# Patient Record
Sex: FEMALE | Race: WHITE | NOT HISPANIC OR LATINO | Employment: OTHER | URBAN - METROPOLITAN AREA
[De-identification: names, ages, dates, MRNs, and addresses within clinical notes are randomized per-mention and may not be internally consistent; named-entity substitution may affect disease eponyms.]

---

## 2017-11-30 ENCOUNTER — TRANSCRIBE ORDERS (OUTPATIENT)
Dept: ADMINISTRATIVE | Facility: HOSPITAL | Age: 77
End: 2017-11-30

## 2018-01-18 ENCOUNTER — TRANSCRIBE ORDERS (OUTPATIENT)
Dept: ADMINISTRATIVE | Facility: HOSPITAL | Age: 78
End: 2018-01-18

## 2018-01-18 DIAGNOSIS — Z12.31 VISIT FOR SCREENING MAMMOGRAM: Primary | ICD-10-CM

## 2018-04-04 ENCOUNTER — HOSPITAL ENCOUNTER (OUTPATIENT)
Dept: MAMMOGRAPHY | Facility: HOSPITAL | Age: 78
Discharge: HOME | End: 2018-04-04
Attending: OBSTETRICS & GYNECOLOGY | Admitting: OBSTETRICS & GYNECOLOGY

## 2018-04-04 DIAGNOSIS — Z12.31 VISIT FOR SCREENING MAMMOGRAM: ICD-10-CM

## 2018-04-04 PROCEDURE — 77063 BREAST TOMOSYNTHESIS BI: CPT | Performed by: RADIOLOGY

## 2018-04-04 PROCEDURE — 77067 SCR MAMMO BI INCL CAD: CPT

## 2018-04-04 PROCEDURE — 77067 SCR MAMMO BI INCL CAD: CPT | Performed by: RADIOLOGY

## 2018-04-04 PROCEDURE — 77063 BREAST TOMOSYNTHESIS BI: CPT

## 2018-08-13 ENCOUNTER — TRANSCRIBE ORDERS (OUTPATIENT)
Dept: CARDIOLOGY | Facility: CLINIC | Age: 78
End: 2018-08-13

## 2018-08-13 DIAGNOSIS — R94.39 ABNORMAL STRESS TEST: Primary | ICD-10-CM

## 2018-08-15 ENCOUNTER — PREP FOR SURGERY (OUTPATIENT)
Dept: OTHER | Facility: HOSPITAL | Age: 78
End: 2018-08-15

## 2018-08-15 ENCOUNTER — HOSPITAL ENCOUNTER (OUTPATIENT)
Facility: HOSPITAL | Age: 78
Discharge: HOME | End: 2018-08-16
Attending: INTERNAL MEDICINE | Admitting: INTERNAL MEDICINE

## 2018-08-15 ENCOUNTER — APPOINTMENT (OUTPATIENT)
Dept: GENERAL RADIOLOGY | Facility: HOSPITAL | Age: 78
End: 2018-08-15

## 2018-08-15 DIAGNOSIS — I25.10 CORONARY ARTERY DISEASE INVOLVING NATIVE CORONARY ARTERY OF NATIVE HEART, ANGINA PRESENCE UNSPECIFIED: Primary | ICD-10-CM

## 2018-08-15 DIAGNOSIS — R94.39 ABNORMAL STRESS TEST: ICD-10-CM

## 2018-08-15 DIAGNOSIS — E78.2 MIXED HYPERLIPIDEMIA: ICD-10-CM

## 2018-08-15 LAB
ACT BLD: 180 SECONDS (ref 82–152)
ALBUMIN SERPL-MCNC: 4.15 G/DL (ref 3.2–4.8)
ALBUMIN/GLOB SERPL: 1.6 G/DL (ref 1.5–2.5)
ALP SERPL-CCNC: 49 U/L (ref 25–100)
ALT SERPL W P-5'-P-CCNC: 16 U/L (ref 7–40)
ANION GAP SERPL CALCULATED.3IONS-SCNC: 9 MMOL/L (ref 3–11)
ARTICHOKE IGE QN: 107 MG/DL (ref 0–130)
AST SERPL-CCNC: 17 U/L (ref 0–33)
BASOPHILS # BLD AUTO: 0.07 10*3/MM3 (ref 0–0.2)
BASOPHILS NFR BLD AUTO: 0.7 % (ref 0–1)
BILIRUB SERPL-MCNC: 0.4 MG/DL (ref 0.3–1.2)
BUN BLD-MCNC: 17 MG/DL (ref 9–23)
BUN/CREAT SERPL: 18.1 (ref 7–25)
CALCIUM SPEC-SCNC: 9.5 MG/DL (ref 8.7–10.4)
CHLORIDE SERPL-SCNC: 101 MMOL/L (ref 99–109)
CHOLEST SERPL-MCNC: 207 MG/DL (ref 0–200)
CO2 SERPL-SCNC: 26 MMOL/L (ref 20–31)
CREAT BLD-MCNC: 0.94 MG/DL (ref 0.6–1.3)
DEPRECATED RDW RBC AUTO: 42.1 FL (ref 37–54)
EOSINOPHIL # BLD AUTO: 0.31 10*3/MM3 (ref 0–0.3)
EOSINOPHIL NFR BLD AUTO: 3.2 % (ref 0–3)
ERYTHROCYTE [DISTWIDTH] IN BLOOD BY AUTOMATED COUNT: 12.9 % (ref 11.3–14.5)
GFR SERPL CREATININE-BSD FRML MDRD: 58 ML/MIN/1.73
GLOBULIN UR ELPH-MCNC: 2.6 GM/DL
GLUCOSE BLD-MCNC: 97 MG/DL (ref 70–100)
GLUCOSE BLDC GLUCOMTR-MCNC: 112 MG/DL (ref 70–130)
GLUCOSE BLDC GLUCOMTR-MCNC: 136 MG/DL (ref 70–130)
GLUCOSE BLDC GLUCOMTR-MCNC: 142 MG/DL (ref 70–130)
GLUCOSE BLDC GLUCOMTR-MCNC: 95 MG/DL (ref 70–130)
GLUCOSE BLDC GLUCOMTR-MCNC: 97 MG/DL (ref 70–130)
HBA1C MFR BLD: 8.3 % (ref 4.8–5.6)
HCT VFR BLD AUTO: 35 % (ref 34.5–44)
HDLC SERPL-MCNC: 39 MG/DL (ref 40–60)
HGB BLD-MCNC: 11.3 G/DL (ref 11.5–15.5)
IMM GRANULOCYTES # BLD: 0.04 10*3/MM3 (ref 0–0.03)
IMM GRANULOCYTES NFR BLD: 0.4 % (ref 0–0.6)
LYMPHOCYTES # BLD AUTO: 2.97 10*3/MM3 (ref 0.6–4.8)
LYMPHOCYTES NFR BLD AUTO: 31 % (ref 24–44)
MCH RBC QN AUTO: 29.2 PG (ref 27–31)
MCHC RBC AUTO-ENTMCNC: 32.3 G/DL (ref 32–36)
MCV RBC AUTO: 90.4 FL (ref 80–99)
MONOCYTES # BLD AUTO: 0.76 10*3/MM3 (ref 0–1)
MONOCYTES NFR BLD AUTO: 7.9 % (ref 0–12)
NEUTROPHILS # BLD AUTO: 5.47 10*3/MM3 (ref 1.5–8.3)
NEUTROPHILS NFR BLD AUTO: 57.2 % (ref 41–71)
PLATELET # BLD AUTO: 248 10*3/MM3 (ref 150–450)
PMV BLD AUTO: 10.1 FL (ref 6–12)
POTASSIUM BLD-SCNC: 3.9 MMOL/L (ref 3.5–5.5)
PROT SERPL-MCNC: 6.7 G/DL (ref 5.7–8.2)
RBC # BLD AUTO: 3.87 10*6/MM3 (ref 3.89–5.14)
SODIUM BLD-SCNC: 136 MMOL/L (ref 132–146)
TRIGL SERPL-MCNC: 275 MG/DL (ref 0–150)
WBC NRBC COR # BLD: 9.58 10*3/MM3 (ref 3.5–10.8)

## 2018-08-15 PROCEDURE — 93571 IV DOP VEL&/PRESS C FLO 1ST: CPT | Performed by: INTERNAL MEDICINE

## 2018-08-15 PROCEDURE — 93005 ELECTROCARDIOGRAM TRACING: CPT | Performed by: INTERNAL MEDICINE

## 2018-08-15 PROCEDURE — G0378 HOSPITAL OBSERVATION PER HR: HCPCS

## 2018-08-15 PROCEDURE — 25010000002 MIDAZOLAM PER 1 MG: Performed by: INTERNAL MEDICINE

## 2018-08-15 PROCEDURE — C1874 STENT, COATED/COV W/DEL SYS: HCPCS | Performed by: INTERNAL MEDICINE

## 2018-08-15 PROCEDURE — 82962 GLUCOSE BLOOD TEST: CPT

## 2018-08-15 PROCEDURE — 25010000002 FENTANYL CITRATE (PF) 100 MCG/2ML SOLUTION: Performed by: INTERNAL MEDICINE

## 2018-08-15 PROCEDURE — C1769 GUIDE WIRE: HCPCS | Performed by: INTERNAL MEDICINE

## 2018-08-15 PROCEDURE — 0 IOPAMIDOL PER 1 ML: Performed by: INTERNAL MEDICINE

## 2018-08-15 PROCEDURE — 80061 LIPID PANEL: CPT | Performed by: PHYSICIAN ASSISTANT

## 2018-08-15 PROCEDURE — 93005 ELECTROCARDIOGRAM TRACING: CPT | Performed by: PHYSICIAN ASSISTANT

## 2018-08-15 PROCEDURE — C9600 PERC DRUG-EL COR STENT SING: HCPCS | Performed by: INTERNAL MEDICINE

## 2018-08-15 PROCEDURE — 92978 ENDOLUMINL IVUS OCT C 1ST: CPT | Performed by: INTERNAL MEDICINE

## 2018-08-15 PROCEDURE — C1760 CLOSURE DEV, VASC: HCPCS | Performed by: INTERNAL MEDICINE

## 2018-08-15 PROCEDURE — C1887 CATHETER, GUIDING: HCPCS | Performed by: INTERNAL MEDICINE

## 2018-08-15 PROCEDURE — 93458 L HRT ARTERY/VENTRICLE ANGIO: CPT | Performed by: INTERNAL MEDICINE

## 2018-08-15 PROCEDURE — 83036 HEMOGLOBIN GLYCOSYLATED A1C: CPT | Performed by: PHYSICIAN ASSISTANT

## 2018-08-15 PROCEDURE — C1753 CATH, INTRAVAS ULTRASOUND: HCPCS | Performed by: INTERNAL MEDICINE

## 2018-08-15 PROCEDURE — 92928 PRQ TCAT PLMT NTRAC ST 1 LES: CPT | Performed by: INTERNAL MEDICINE

## 2018-08-15 PROCEDURE — 36415 COLL VENOUS BLD VENIPUNCTURE: CPT

## 2018-08-15 PROCEDURE — 25010000002 HEPARIN (PORCINE) PER 1000 UNITS: Performed by: INTERNAL MEDICINE

## 2018-08-15 PROCEDURE — C1725 CATH, TRANSLUMIN NON-LASER: HCPCS | Performed by: INTERNAL MEDICINE

## 2018-08-15 PROCEDURE — 25010000002 ONDANSETRON PER 1 MG: Performed by: INTERNAL MEDICINE

## 2018-08-15 PROCEDURE — 25010000002 BIVALIRUDIN TRIFLUOROACETATE 250 MG RECONSTITUTED SOLUTION 1 EACH VIAL: Performed by: INTERNAL MEDICINE

## 2018-08-15 PROCEDURE — 71046 X-RAY EXAM CHEST 2 VIEWS: CPT

## 2018-08-15 PROCEDURE — 80053 COMPREHEN METABOLIC PANEL: CPT | Performed by: PHYSICIAN ASSISTANT

## 2018-08-15 PROCEDURE — 85347 COAGULATION TIME ACTIVATED: CPT

## 2018-08-15 PROCEDURE — S0260 H&P FOR SURGERY: HCPCS | Performed by: INTERNAL MEDICINE

## 2018-08-15 PROCEDURE — C1894 INTRO/SHEATH, NON-LASER: HCPCS | Performed by: INTERNAL MEDICINE

## 2018-08-15 PROCEDURE — 85025 COMPLETE CBC W/AUTO DIFF WBC: CPT | Performed by: PHYSICIAN ASSISTANT

## 2018-08-15 DEVICE — XIENCE SIERRA™ EVEROLIMUS ELUTING CORONARY STENT SYSTEM 2.25 MM X 28 MM / RAPID-EXCHANGE
Type: IMPLANTABLE DEVICE | Status: FUNCTIONAL
Brand: XIENCE SIERRA™

## 2018-08-15 DEVICE — XIENCE SIERRA™ EVEROLIMUS ELUTING CORONARY STENT SYSTEM 2.50 MM X 18 MM / RAPID-EXCHANGE
Type: IMPLANTABLE DEVICE | Status: FUNCTIONAL
Brand: XIENCE SIERRA™

## 2018-08-15 RX ORDER — PRASUGREL 10 MG/1
10 TABLET, FILM COATED ORAL DAILY
Status: DISCONTINUED | OUTPATIENT
Start: 2018-08-16 | End: 2018-08-16 | Stop reason: HOSPADM

## 2018-08-15 RX ORDER — REPAGLINIDE 2 MG/1
2 TABLET ORAL
COMMUNITY
End: 2019-05-07

## 2018-08-15 RX ORDER — DEXTROSE MONOHYDRATE 25 G/50ML
25 INJECTION, SOLUTION INTRAVENOUS
Status: DISCONTINUED | OUTPATIENT
Start: 2018-08-15 | End: 2018-08-16 | Stop reason: HOSPADM

## 2018-08-15 RX ORDER — ACETAMINOPHEN 325 MG/1
650 TABLET ORAL EVERY 4 HOURS PRN
Status: DISCONTINUED | OUTPATIENT
Start: 2018-08-15 | End: 2018-08-15 | Stop reason: HOSPADM

## 2018-08-15 RX ORDER — ISOSORBIDE MONONITRATE 30 MG/1
30 TABLET, EXTENDED RELEASE ORAL EVERY MORNING
COMMUNITY
End: 2018-08-16 | Stop reason: HOSPADM

## 2018-08-15 RX ORDER — MIDAZOLAM HYDROCHLORIDE 1 MG/ML
INJECTION INTRAMUSCULAR; INTRAVENOUS AS NEEDED
Status: DISCONTINUED | OUTPATIENT
Start: 2018-08-15 | End: 2018-08-15 | Stop reason: HOSPADM

## 2018-08-15 RX ORDER — LEVOTHYROXINE SODIUM 88 UG/1
88 TABLET ORAL EVERY MORNING
COMMUNITY
End: 2019-05-07 | Stop reason: SDUPTHER

## 2018-08-15 RX ORDER — NITROGLYCERIN 0.4 MG/1
0.4 TABLET SUBLINGUAL
Status: DISCONTINUED | OUTPATIENT
Start: 2018-08-15 | End: 2018-08-15 | Stop reason: HOSPADM

## 2018-08-15 RX ORDER — AMOXICILLIN 875 MG/1
875 TABLET, COATED ORAL 2 TIMES DAILY
COMMUNITY
End: 2018-10-04

## 2018-08-15 RX ORDER — PRASUGREL 10 MG/1
60 TABLET, FILM COATED ORAL ONCE
Status: COMPLETED | OUTPATIENT
Start: 2018-08-15 | End: 2018-08-15

## 2018-08-15 RX ORDER — HEPARIN SODIUM 1000 [USP'U]/ML
INJECTION, SOLUTION INTRAVENOUS; SUBCUTANEOUS AS NEEDED
Status: DISCONTINUED | OUTPATIENT
Start: 2018-08-15 | End: 2018-08-15 | Stop reason: HOSPADM

## 2018-08-15 RX ORDER — SODIUM CHLORIDE 9 MG/ML
1-3 INJECTION, SOLUTION INTRAVENOUS CONTINUOUS
Status: DISCONTINUED | OUTPATIENT
Start: 2018-08-15 | End: 2018-08-15

## 2018-08-15 RX ORDER — NICOTINE POLACRILEX 4 MG
15 LOZENGE BUCCAL
Status: DISCONTINUED | OUTPATIENT
Start: 2018-08-15 | End: 2018-08-16 | Stop reason: HOSPADM

## 2018-08-15 RX ORDER — ASPIRIN 81 MG/1
81 TABLET ORAL DAILY
Status: DISCONTINUED | OUTPATIENT
Start: 2018-08-15 | End: 2018-08-15

## 2018-08-15 RX ORDER — CARVEDILOL 6.25 MG/1
6.25 TABLET ORAL 2 TIMES DAILY WITH MEALS
Status: DISCONTINUED | OUTPATIENT
Start: 2018-08-15 | End: 2018-08-16 | Stop reason: HOSPADM

## 2018-08-15 RX ORDER — FENTANYL CITRATE 50 UG/ML
INJECTION, SOLUTION INTRAMUSCULAR; INTRAVENOUS AS NEEDED
Status: DISCONTINUED | OUTPATIENT
Start: 2018-08-15 | End: 2018-08-15 | Stop reason: HOSPADM

## 2018-08-15 RX ORDER — NITROGLYCERIN 0.4 MG/1
0.4 TABLET SUBLINGUAL
Status: CANCELLED | OUTPATIENT
Start: 2018-08-15

## 2018-08-15 RX ORDER — SODIUM CHLORIDE 0.9 % (FLUSH) 0.9 %
1-10 SYRINGE (ML) INJECTION AS NEEDED
Status: DISCONTINUED | OUTPATIENT
Start: 2018-08-15 | End: 2018-08-15 | Stop reason: HOSPADM

## 2018-08-15 RX ORDER — ASPIRIN 81 MG/1
81 TABLET ORAL EVERY EVENING
COMMUNITY

## 2018-08-15 RX ORDER — SODIUM CHLORIDE 9 MG/ML
100 INJECTION, SOLUTION INTRAVENOUS CONTINUOUS
Status: DISCONTINUED | OUTPATIENT
Start: 2018-08-15 | End: 2018-08-16 | Stop reason: HOSPADM

## 2018-08-15 RX ORDER — ONDANSETRON 2 MG/ML
4 INJECTION INTRAMUSCULAR; INTRAVENOUS EVERY 6 HOURS PRN
Status: DISCONTINUED | OUTPATIENT
Start: 2018-08-15 | End: 2018-08-16 | Stop reason: HOSPADM

## 2018-08-15 RX ORDER — CARVEDILOL 6.25 MG/1
12.5 TABLET ORAL 2 TIMES DAILY WITH MEALS
COMMUNITY

## 2018-08-15 RX ORDER — LEVOTHYROXINE SODIUM 88 UG/1
88 TABLET ORAL EVERY MORNING
Status: DISCONTINUED | OUTPATIENT
Start: 2018-08-16 | End: 2018-08-16 | Stop reason: HOSPADM

## 2018-08-15 RX ORDER — CLOPIDOGREL BISULFATE 75 MG/1
75 TABLET ORAL DAILY
Status: DISCONTINUED | OUTPATIENT
Start: 2018-08-15 | End: 2018-08-15

## 2018-08-15 RX ORDER — ACETAMINOPHEN 325 MG/1
650 TABLET ORAL EVERY 4 HOURS PRN
Status: CANCELLED | OUTPATIENT
Start: 2018-08-15

## 2018-08-15 RX ORDER — GLIMEPIRIDE 4 MG/1
4 TABLET ORAL 2 TIMES DAILY
COMMUNITY
End: 2019-05-07 | Stop reason: SDUPTHER

## 2018-08-15 RX ORDER — SODIUM CHLORIDE 0.9 % (FLUSH) 0.9 %
1-10 SYRINGE (ML) INJECTION AS NEEDED
Status: CANCELLED | OUTPATIENT
Start: 2018-08-15

## 2018-08-15 RX ORDER — LIDOCAINE HYDROCHLORIDE 10 MG/ML
INJECTION, SOLUTION EPIDURAL; INFILTRATION; INTRACAUDAL; PERINEURAL AS NEEDED
Status: DISCONTINUED | OUTPATIENT
Start: 2018-08-15 | End: 2018-08-15 | Stop reason: HOSPADM

## 2018-08-15 RX ORDER — CLOPIDOGREL BISULFATE 75 MG/1
75 TABLET ORAL NIGHTLY
COMMUNITY
End: 2018-08-16 | Stop reason: HOSPADM

## 2018-08-15 RX ORDER — ASPIRIN 81 MG/1
81 TABLET ORAL EVERY EVENING
Status: DISCONTINUED | OUTPATIENT
Start: 2018-08-16 | End: 2018-08-16 | Stop reason: HOSPADM

## 2018-08-15 RX ADMIN — CARVEDILOL 6.25 MG: 6.25 TABLET, FILM COATED ORAL at 18:42

## 2018-08-15 RX ADMIN — ASPIRIN 81 MG: 81 TABLET, COATED ORAL at 10:49

## 2018-08-15 RX ADMIN — PRASUGREL HYDROCHLORIDE 60 MG: 10 TABLET, FILM COATED ORAL at 14:58

## 2018-08-15 RX ADMIN — ACETAMINOPHEN 650 MG: 325 TABLET ORAL at 09:55

## 2018-08-15 RX ADMIN — CLOPIDOGREL BISULFATE 75 MG: 75 TABLET ORAL at 10:49

## 2018-08-15 RX ADMIN — SODIUM CHLORIDE 3 ML/KG/HR: 9 INJECTION, SOLUTION INTRAVENOUS at 10:57

## 2018-08-15 RX ADMIN — ONDANSETRON 4 MG: 2 INJECTION INTRAMUSCULAR; INTRAVENOUS at 15:08

## 2018-08-15 NOTE — H&P
Pre-cardiac Catheterization History and Physical  [facility] at [facility]      Patient:  Angelica Spivey  :  1940  MRN: [id number]    PCP:  Abimael Matthews MD  PHONE:  [phone]    DATE: 8/15/2018  ID: Angelica Spivey is a 78 y.o. female resident of [place], [place]     CC: Chest pain and abnormal MPS    PROBLEM LIST:   1. Exertional angina   A. GXT MPS 2018: medium sized area of ischemia in anterosteptal and anteroapical wall, EF 64%  2. Hypertension  3. Dyslipidemia with statin intolerance  4. DM2  5. Hypothyroidism   6. Tendonitis  7. Surgical history:   A. Hysterectomy   B. Cataracts   C. Bladder sling   D. Vulvectomy    BRIEF HPI: Mrs. Spivey is a pleasant 78 y.o WF with history as noted above who presents for cardiac catheterization. She has been having symptoms of exertional angina with minimal activity such as walking, that is relieved with rest for the past month. She also describes nocturnal angina which lasts at most 5 minutes. This is described as midsternal chest pressure that radiates to bilateral shoulders and arms. She denies associated dyspnea, palpitations, nausea/vomiting or diaphoresis. She denies prior history of cardiovascular, cerebrovascular or peripheral vascular disease. She reports she has hyperlipidemia with elevated triglycerides and has tried numerous statins in the past, including Lipitor, Crestor and Pravastatin, however she is intolerant due to myalgias. She has recently been approved for Repatha through Dr. Matthews's office. She underwent a stress MPS in his office which was abnormal and demonstrated anteroseptal, and anteroapical ischemia with normal EF. She has been started on Plavix and Imdur and referred for catheterization today. Denies tobacco or alcohol use. 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 Family history is significant for CAD in her mother, father and brother.     Cardiac Risk Factors: advanced  "age (older than 55 for men, 65 for women), diabetes mellitus, dyslipidemia, hypertension and sedentary lifestyle    Allergies:      Allergies   Allergen Reactions   • Biaxin [Clarithromycin] Anaphylaxis     \"BLISTERS AND THROAT SWELLING\"   • Allegra [Fexofenadine] Other (See Comments)     \"MAKES ME FEEL FUNNY\"   • Sulfa Antibiotics Nausea Only       MEDICATIONS:  · ASA 81mg daily  · Clopidogrel 75mg daily  · Carvedilol 6.25mg BID  · Glimepiride 4mg BID  Isosorbide mononitrate 30mg daily  · Levothyrozine 88mcg daily  · Tradjenta 5mg daily  · Metformin 1000mg daily  · Prandin 2mg TID before meals    Past medical & surgical history, social and family history reviewed in the electronic medical record.    ROS: Pertinent positives listed in the HPI and problem list above. All others reviewed and negative.     Physical Exam:   /68 (BP Location: Left arm, Patient Position: Lying)   Pulse 71   Temp 97 °F (36.1 °C) (Tympanic)   Resp 16   Ht 162.6 cm (64\")   Wt 76.8 kg (169 lb 5 oz)   SpO2 97%   BMI 29.06 kg/m²     Constitutional:    Alert, cooperative, in no acute distress   Neck:     No Jugular venous distention, adenopathy, or thyromegaly noted. There are no carotid bruits.    Heart:    Regular rhythm and normal rate, normal S1 and S2, no murmurs,gallops, rubs, or clicks. No distinct PMI noted.    Lungs:     Clear to auscultation bilaterally, respirations regular, even     and unlabored    Abdomen:     Soft non-tender, non-distended, normal bowel sounds, no masses or organomegaly   Extremities:   No gross deformities, no edema, clubbing, or cyanosis.    Pulses:   Peripheral pulses palpable and equal bilaterally.     Barbaeu Test:  Left: Normal  (oxymetric Allens) Right: Not Assessed     Labs and Diagnostic Data:    Results from last 7 days  Lab Units 08/15/18  0934   SODIUM mmol/L 136   POTASSIUM mmol/L 3.9   CHLORIDE mmol/L 101   CO2 mmol/L 26.0   BUN mg/dL 17   CREATININE mg/dL 0.94   GLUCOSE mg/dL 97 "   CALCIUM mg/dL 9.5       Results from last 7 days  Lab Units 08/15/18  0934   WBC 10*3/mm3 9.58   HEMOGLOBIN g/dL 11.3*   HEMATOCRIT % 35.0   PLATELETS 10*3/mm3 248     Lab Results   Component Value Date    CHOL 207 (H) 08/15/2018    TRIG 275 (H) 08/15/2018    HDL 39 (L) 08/15/2018     08/15/2018    AST 17 08/15/2018    ALT 16 08/15/2018                   EKG: pending     IMPRESSION:  · 79 y/o WF with history of HTN, HLD and DM2 with recent symptoms of exertional angina, CCS III-IV. She underwent a GXT MPS and this was abnormal with anteroseptal and anteroapical ischemia, normal EF. She presents for cardiac catheterization with intervention standby.     PLAN:  · Procedure to perform: LHC +/- CBI. Risks, benefits and alternatives to the procedure explained to the patient and she understands and wishes to proceed.     I, David Burnett MD, personally performed the services described as documented by the above named individual. I have made any necessary edits and it is both accurate and complete 8/15/2018  11:40 AM    Scribed for David Burnett MD by Dalia Shah PA-C. 8/15/2018  10:25 AM

## 2018-08-16 VITALS
SYSTOLIC BLOOD PRESSURE: 145 MMHG | OXYGEN SATURATION: 94 % | WEIGHT: 169.31 LBS | RESPIRATION RATE: 16 BRPM | BODY MASS INDEX: 28.91 KG/M2 | HEART RATE: 87 BPM | DIASTOLIC BLOOD PRESSURE: 75 MMHG | TEMPERATURE: 99.2 F | HEIGHT: 64 IN

## 2018-08-16 LAB
ANION GAP SERPL CALCULATED.3IONS-SCNC: 6 MMOL/L (ref 3–11)
BUN BLD-MCNC: 12 MG/DL (ref 9–23)
BUN/CREAT SERPL: 14.5 (ref 7–25)
CALCIUM SPEC-SCNC: 9.3 MG/DL (ref 8.7–10.4)
CHLORIDE SERPL-SCNC: 99 MMOL/L (ref 99–109)
CO2 SERPL-SCNC: 28 MMOL/L (ref 20–31)
CREAT BLD-MCNC: 0.83 MG/DL (ref 0.6–1.3)
GFR SERPL CREATININE-BSD FRML MDRD: 67 ML/MIN/1.73
GLUCOSE BLD-MCNC: 80 MG/DL (ref 70–100)
GLUCOSE BLDC GLUCOMTR-MCNC: 106 MG/DL (ref 70–130)
GLUCOSE BLDC GLUCOMTR-MCNC: 234 MG/DL (ref 70–130)
POTASSIUM BLD-SCNC: 4.1 MMOL/L (ref 3.5–5.5)
SODIUM BLD-SCNC: 133 MMOL/L (ref 132–146)

## 2018-08-16 PROCEDURE — A9270 NON-COVERED ITEM OR SERVICE: HCPCS | Performed by: INTERNAL MEDICINE

## 2018-08-16 PROCEDURE — 63710000001 CARVEDILOL 6.25 MG TABLET: Performed by: PHYSICIAN ASSISTANT

## 2018-08-16 PROCEDURE — A9270 NON-COVERED ITEM OR SERVICE: HCPCS | Performed by: PHYSICIAN ASSISTANT

## 2018-08-16 PROCEDURE — 99217 PR OBSERVATION CARE DISCHARGE MANAGEMENT: CPT | Performed by: INTERNAL MEDICINE

## 2018-08-16 PROCEDURE — G0378 HOSPITAL OBSERVATION PER HR: HCPCS

## 2018-08-16 PROCEDURE — 63710000001 INSULIN LISPRO (HUMAN) PER 5 UNITS: Performed by: PHYSICIAN ASSISTANT

## 2018-08-16 PROCEDURE — 80048 BASIC METABOLIC PNL TOTAL CA: CPT | Performed by: PHYSICIAN ASSISTANT

## 2018-08-16 PROCEDURE — 63710000001 PRASUGREL 10 MG TABLET: Performed by: INTERNAL MEDICINE

## 2018-08-16 PROCEDURE — 82962 GLUCOSE BLOOD TEST: CPT

## 2018-08-16 PROCEDURE — 63710000001 LEVOTHYROXINE 88 MCG TABLET: Performed by: PHYSICIAN ASSISTANT

## 2018-08-16 RX ORDER — PRASUGREL 10 MG/1
10 TABLET, FILM COATED ORAL DAILY
Qty: 30 TABLET | Refills: 11 | Status: SHIPPED | OUTPATIENT
Start: 2018-08-17 | End: 2019-03-11 | Stop reason: ALTCHOICE

## 2018-08-16 RX ADMIN — CARVEDILOL 6.25 MG: 6.25 TABLET, FILM COATED ORAL at 08:49

## 2018-08-16 RX ADMIN — PRASUGREL HYDROCHLORIDE 10 MG: 10 TABLET, FILM COATED ORAL at 08:49

## 2018-08-16 RX ADMIN — LEVOTHYROXINE SODIUM 88 MCG: 88 TABLET ORAL at 06:04

## 2018-08-16 NOTE — PROGRESS NOTES
"  [facility]  PROGRESS NOTE    Date of Admission: 8/15/2018  Length of Stay: 0  Primary Care Physician: Abimael Matthews MD    Chief Complaint: f/u CAD with angina   Problem List:   1. Coronary artery disease  A. Exertional angina CCS III-IV summer 2018  B. GXT MPS 8/9/2018: medium sized area of ischemia in anterosteptal and anteroapical wall, EF 64%  C. LHC 8/15/2018: Normal LV gram, 2 vessel CAD involving distal dominant RCA (non-culprit) and severe disease in proximal/mid LAD; PTCA and KASHIF x3 from proximal to distal LAD under iFR and OCT guidance   2. Hypertension  3. Dyslipidemia with statin intolerance  4. DM2  5. Hypothyroidism   6. Tendonitis  7. Surgical history:              A. Hysterectomy              B. Cataracts              C. Bladder sling              D. Vulvectomy    Subjective      Patient feels well, denies angina, dyspnea. Has ambulated with no concerns.     Objective   Vitals: /72   Pulse 72   Temp 99 °F (37.2 °C) (Oral)   Resp 16   Ht 162.6 cm (64\")   Wt 76.8 kg (169 lb 5 oz)   SpO2 94%   BMI 29.06 kg/m²     Physical Exam:  GENERAL: Alert, cooperative, in no acute distress.   HEENT: Normocephalic, no jugular venous distention  HEART: No discrete PMI is noted. Regular rhythm, normal rate, and no murmurs, gallops, or rubs.   LUNGS: Clear to auscultation bilaterally. No wheezing, rales or ronchi.  ABDOMEN: Soft, bowel sounds present, non-tender   NEUROLOGIC: No focal abnormalities involving strength or sensation are noted.   EXTREMITIES: No clubbing, cyanosis, or edema noted. Sheath site stable with no bleeding, slight ecchymosis and no hematoma. DP pulses palpable     Results:    Results from last 7 days  Lab Units 08/15/18  0934   WBC 10*3/mm3 9.58   HEMOGLOBIN g/dL 11.3*   HEMATOCRIT % 35.0   PLATELETS 10*3/mm3 248       Results from last 7 days  Lab Units 08/16/18  0500 08/15/18  0934   SODIUM mmol/L 133 136   POTASSIUM mmol/L 4.1 3.9 "   CHLORIDE mmol/L 99 101   CO2 mmol/L 28.0 26.0   BUN mg/dL 12 17   CREATININE mg/dL 0.83 0.94   GLUCOSE mg/dL 80 97      Lab Results   Component Value Date    CHOL 207 (H) 08/15/2018    TRIG 275 (H) 08/15/2018    HDL 39 (L) 08/15/2018     08/15/2018    AST 17 08/15/2018    ALT 16 08/15/2018       Results from last 7 days  Lab Units 08/15/18  0934   HEMOGLOBIN A1C % 8.30*       Results from last 7 days  Lab Units 08/15/18  0934   CHOLESTEROL mg/dL 207*   TRIGLYCERIDES mg/dL 275*   HDL CHOL mg/dL 39*   LDL CHOL mg/dL 107       Intake/Output Summary (Last 24 hours) at 08/16/18 0840  Last data filed at 08/16/18 0355   Gross per 24 hour   Intake             2000 ml   Output              300 ml   Net             1700 ml     I personally reviewed the patient's EKG/Telemetry data    Radiology Data:   [facility] 8/15/2018:  Final Impression: 1) Normal left ventriculogram.                              2) Two vessel coronary artery disease involving the distal dominant RCA stenosis (non-culprit) and severe disease in the proximal/mid through distal LAD.  The LAD was treated with iFR and OCT guided stenting with an excellent angiographic result obtained (0% residual focal stenosis) that with the realization that treatment was not optimal due to abnormal iFR and apparent (by angiography and OCT) severe diffuse disease in the mid to distal LAD, a small vessel before this disease occurred.                                Current Medications:    aspirin 81 mg Oral Q PM   carvedilol 6.25 mg Oral BID With Meals   insulin lispro 0-7 Units Subcutaneous 4x Daily With Meals & Nightly   levothyroxine 88 mcg Oral QAM   Sarbjit      Sarbjit      Sarbjit      Sarbjit      prasugrel 10 mg Oral Daily       sodium chloride 100 mL/hr Last Rate: Stopped (08/15/18 9801)       Assessment and Plan:   1. CAD with angina  - s/p KASHIF x3 to proximal to distal LAD  - distal RCA  disease left untreated and addressed as a staged procedure  - Normal LV  gram  - on ASA and Prasugrel  - patient intolerant to multiple statins and reports she was approved for PCSK-9 inhibitor through Dr. Matthews's office    2. HTN  - controlled    3. HLD  -   - intolerant to multiple statins  - patient is approved for Repatha through Dr. Matthews's office recently, however has not started this yet    Disposition: Patient stable and ready for discharge on DAPT with aspirin and Prasugrel, and her usual home medicines. She will begin Repatha from Dr. Matthews's office. She will return for staged intervention of the RCA in about 1 month. All discussed with the patient and her daughters, and will discuss by telephone with Dr. Matthews as well.     I, David Burnett MD, personally performed the services described as documented by the above named individual. I have made any necessary edits and it is both accurate and complete 8/16/2018  10:16 AM          Scribed for David Burnett MD by Dalia Shah PA-C.

## 2018-08-16 NOTE — PLAN OF CARE
Problem: Cardiac Catheterization (Diagnostic/Interventional) (Adult)  Goal: Signs and Symptoms of Listed Potential Problems Will be Absent, Minimized or Managed (Cardiac Catheterization)  Outcome: Ongoing (interventions implemented as appropriate)    Goal: Anesthesia/Sedation Recovery  Outcome: Ongoing (interventions implemented as appropriate)      Problem: Pain, Acute (Adult)  Goal: Identify Related Risk Factors and Signs and Symptoms  Outcome: Ongoing (interventions implemented as appropriate)    Goal: Acceptable Pain Control/Comfort Level  Outcome: Ongoing (interventions implemented as appropriate)      Problem: Patient Care Overview  Goal: Plan of Care Review  Outcome: Ongoing (interventions implemented as appropriate)   08/16/18 3318   Coping/Psychosocial   Plan of Care Reviewed With patient;family   OTHER   Outcome Summary Pt came to the floor post heart cath. Small hematoma and leobardo patch on arrival. VSS. Normal sinus. No complaints of pain. Will continue to monitor.      Goal: Individualization and Mutuality  Outcome: Ongoing (interventions implemented as appropriate)    Goal: Discharge Needs Assessment  Outcome: Ongoing (interventions implemented as appropriate)    Goal: Interprofessional Rounds/Family Conf  Outcome: Ongoing (interventions implemented as appropriate)      Problem: Diabetes, Type 2 (Adult)  Goal: Signs and Symptoms of Listed Potential Problems Will be Absent, Minimized or Managed (Diabetes, Type 2)  Outcome: Ongoing (interventions implemented as appropriate)

## 2018-08-16 NOTE — NURSING NOTE
Pt. Referred for Phase II Cardiac Rehab. Staff discussed benefits of exercise, program protocol, and educational material provided. Teach back verified.  Permission granted from patient for staff to fax referral information to outlying program at this time.  Staff to fax referral info to [facility].

## 2018-08-16 NOTE — PROGRESS NOTES
Discharge Planning Assessment  [facility]     Patient Name: Angelica Spivey  MRN: [id number]  Today's Date: 8/16/2018    Admit Date: 8/15/2018          Discharge Needs Assessment     Row Name 08/16/18 1005       Living Environment    Lives With spouse;child(anisha), adult    Current Living Arrangements home/apartment/condo    Primary Care Provided by self    Provides Primary Care For no one    Family Caregiver if Needed spouse;child(anisha), adult    Able to Return to Prior Arrangements yes       Transition Planning    Patient/Family Anticipates Transition to home    Transportation Anticipated family or friend will provide            Discharge Plan     Row Name 08/16/18 1010       Plan    Plan Home    Patient/Family in Agreement with Plan yes    Plan Comments Spoke with patient and dtr at bedside. Patient lives with her  and dtr in [facility]. PTA she was independent with ADL's and mobility. She denies any HH/DME needs at this time. Plan is to return home w assist from family. CM following.     Final Discharge Disposition Code 01 - home or self-care        Destination     No service coordination in this encounter.      Durable Medical Equipment     No service coordination in this encounter.      Dialysis/Infusion     No service coordination in this encounter.      Home Medical Care     No service coordination in this encounter.      Social Care     No service coordination in this encounter.                Demographic Summary     Row Name 08/16/18 1004       General Information    Arrived From home    Reason for Consult discharge planning            Functional Status     Row Name 08/16/18 1005       Functional Status    Usual Activity Tolerance good    Current Activity Tolerance good            Psychosocial    No documentation.           Abuse/Neglect    No documentation.           Legal    No documentation.           Substance Abuse    No documentation.           Patient Forms    No documentation.         Roselia  Nori RN

## 2018-08-16 NOTE — DISCHARGE INSTRUCTIONS
Patient will receive a call to schedule staged intervention to Right Coronary Artery in about 1 month

## 2018-08-17 ENCOUNTER — HOSPITAL ENCOUNTER (EMERGENCY)
Facility: HOSPITAL | Age: 78
Discharge: HOME | End: 2018-08-17
Attending: EMERGENCY MEDICINE | Admitting: EMERGENCY MEDICINE

## 2018-08-17 VITALS
BODY MASS INDEX: 28.51 KG/M2 | SYSTOLIC BLOOD PRESSURE: 139 MMHG | DIASTOLIC BLOOD PRESSURE: 74 MMHG | HEIGHT: 64 IN | WEIGHT: 167 LBS | TEMPERATURE: 98.2 F | OXYGEN SATURATION: 97 % | HEART RATE: 72 BPM | RESPIRATION RATE: 16 BRPM

## 2018-08-17 DIAGNOSIS — R04.0 RECURRENT EPISTAXIS: Primary | ICD-10-CM

## 2018-08-17 LAB
ALBUMIN SERPL-MCNC: 4.14 G/DL (ref 3.2–4.8)
ALBUMIN/GLOB SERPL: 1.4 G/DL (ref 1.5–2.5)
ALP SERPL-CCNC: 55 U/L (ref 25–100)
ALT SERPL W P-5'-P-CCNC: 16 U/L (ref 7–40)
ANION GAP SERPL CALCULATED.3IONS-SCNC: 6 MMOL/L (ref 3–11)
AST SERPL-CCNC: 20 U/L (ref 0–33)
BASOPHILS # BLD AUTO: 0.04 10*3/MM3 (ref 0–0.2)
BASOPHILS NFR BLD AUTO: 0.5 % (ref 0–1)
BILIRUB SERPL-MCNC: 0.4 MG/DL (ref 0.3–1.2)
BUN BLD-MCNC: 11 MG/DL (ref 9–23)
BUN/CREAT SERPL: 12.9 (ref 7–25)
CALCIUM SPEC-SCNC: 9.6 MG/DL (ref 8.7–10.4)
CHLORIDE SERPL-SCNC: 101 MMOL/L (ref 99–109)
CO2 SERPL-SCNC: 30 MMOL/L (ref 20–31)
CREAT BLD-MCNC: 0.85 MG/DL (ref 0.6–1.3)
DEPRECATED RDW RBC AUTO: 43.5 FL (ref 37–54)
EOSINOPHIL # BLD AUTO: 0.28 10*3/MM3 (ref 0–0.3)
EOSINOPHIL NFR BLD AUTO: 3.3 % (ref 0–3)
ERYTHROCYTE [DISTWIDTH] IN BLOOD BY AUTOMATED COUNT: 13.1 % (ref 11.3–14.5)
GFR SERPL CREATININE-BSD FRML MDRD: 65 ML/MIN/1.73
GLOBULIN UR ELPH-MCNC: 2.9 GM/DL
GLUCOSE BLD-MCNC: 165 MG/DL (ref 70–100)
HCT VFR BLD AUTO: 38.2 % (ref 34.5–44)
HGB BLD-MCNC: 12.3 G/DL (ref 11.5–15.5)
IMM GRANULOCYTES # BLD: 0.05 10*3/MM3 (ref 0–0.03)
IMM GRANULOCYTES NFR BLD: 0.6 % (ref 0–0.6)
INR PPP: 0.98 (ref 0.91–1.09)
LYMPHOCYTES # BLD AUTO: 2.28 10*3/MM3 (ref 0.6–4.8)
LYMPHOCYTES NFR BLD AUTO: 26.6 % (ref 24–44)
MCH RBC QN AUTO: 29.3 PG (ref 27–31)
MCHC RBC AUTO-ENTMCNC: 32.2 G/DL (ref 32–36)
MCV RBC AUTO: 91 FL (ref 80–99)
MONOCYTES # BLD AUTO: 0.61 10*3/MM3 (ref 0–1)
MONOCYTES NFR BLD AUTO: 7.1 % (ref 0–12)
NEUTROPHILS # BLD AUTO: 5.37 10*3/MM3 (ref 1.5–8.3)
NEUTROPHILS NFR BLD AUTO: 62.5 % (ref 41–71)
PLATELET # BLD AUTO: 265 10*3/MM3 (ref 150–450)
PMV BLD AUTO: 9.7 FL (ref 6–12)
POTASSIUM BLD-SCNC: 4.2 MMOL/L (ref 3.5–5.5)
PROT SERPL-MCNC: 7 G/DL (ref 5.7–8.2)
PROTHROMBIN TIME: 10.3 SECONDS (ref 9.6–11.5)
RBC # BLD AUTO: 4.2 10*6/MM3 (ref 3.89–5.14)
SODIUM BLD-SCNC: 137 MMOL/L (ref 132–146)
WBC NRBC COR # BLD: 8.58 10*3/MM3 (ref 3.5–10.8)

## 2018-08-17 PROCEDURE — 80053 COMPREHEN METABOLIC PANEL: CPT | Performed by: EMERGENCY MEDICINE

## 2018-08-17 PROCEDURE — 85025 COMPLETE CBC W/AUTO DIFF WBC: CPT | Performed by: EMERGENCY MEDICINE

## 2018-08-17 PROCEDURE — 85610 PROTHROMBIN TIME: CPT | Performed by: EMERGENCY MEDICINE

## 2018-08-17 PROCEDURE — 99283 EMERGENCY DEPT VISIT LOW MDM: CPT

## 2018-08-17 RX ORDER — OXYMETAZOLINE HYDROCHLORIDE 0.05 G/100ML
1 SPRAY NASAL ONCE
Status: COMPLETED | OUTPATIENT
Start: 2018-08-17 | End: 2018-08-17

## 2018-08-17 RX ORDER — ASPIRIN 81 MG/1
81 TABLET, CHEWABLE ORAL ONCE
Status: COMPLETED | OUTPATIENT
Start: 2018-08-17 | End: 2018-08-17

## 2018-08-17 RX ADMIN — ASPIRIN 81 MG 81 MG: 81 TABLET ORAL at 14:46

## 2018-08-17 RX ADMIN — OXYMETAZOLINE HYDROCHLORIDE 1 SPRAY: 5 SPRAY NASAL at 12:49

## 2018-08-17 NOTE — DISCHARGE INSTRUCTIONS
Use the Afrin one spray in each nostril twice a day for the next 2 days.  Also begin using Ocean nasal spray one squirt in each nostril 4 times a day her next month.    If you're nose starts to bleed at home blow your nose to remove the clot and put a squirt of Afrin in your nose and soak the cotton ball that I gave you with Afrin and rolled up and placed in your nose and pinch her nose and hopefully this will stop it.  If the bleeding continues return to the ER for further evaluation.

## 2018-08-17 NOTE — ED PROVIDER NOTES
Subjective   Angelica Spivey is a 78 y.o.female who presents to the ED with c/o epistaxis with onset today. She reports that she recently was admitted for coronary stent placement per Dr. Burnett. This morning, she woke up and suddenly developed epistaxis across her bilateral nares. Right mildly worse than left. The pt's bleeding stopped while en route to the ED. She has no prior hx of epistaxis. She also complains of sore throat and cough but denies chest pain, palpitations, leg swelling, decreased appetite, ear pain, or any other complaints at this time. She denies any home O2 use.        History provided by:  Patient and relative  Nose Bleed   Location:  Bilateral  Severity:  Moderate  Timing:  Constant  Progression:  Worsening  Chronicity:  New  Relieved by:  None tried  Worsened by:  Nothing  Ineffective treatments:  None tried  Associated symptoms: cough and sore throat        Review of Systems   Constitutional: Negative for appetite change.   HENT: Positive for nosebleeds and sore throat. Negative for ear pain.    Respiratory: Positive for cough.    Cardiovascular: Negative for chest pain, palpitations and leg swelling.   All other systems reviewed and are negative.    Chart reviewed 12:33 PM    Problem List:   1. Coronary artery disease  A. Exertional angina CCS III-IV summer 2018  B. GXT MPS 8/9/2018: medium sized area of ischemia in anterosteptal and anteroapical wall, EF 64%  C. LHC 8/15/2018: Normal LV gram, 2 vessel CAD involving distal dominant RCA (non-culprit) and severe disease in proximal/mid LAD; PTCA and KASHIF x3 from proximal to distal LAD under iFR and OCT guidance   2. Hypertension  3. Dyslipidemia with statin intolerance  4. DM2  5. Hypothyroidism   6. Tendonitis  7. Surgical history:              A. Hysterectomy              B. Cataracts              C. Bladder sling              D. Vulvectomy  Past Medical History:   Diagnosis Date   • Diabetes mellitus (CMS/HCC)        Allergies   Allergen  "Reactions   • Biaxin [Clarithromycin] Anaphylaxis     \"BLISTERS AND THROAT SWELLING\"   • Allegra [Fexofenadine] Other (See Comments)     \"MAKES ME FEEL FUNNY\"   • Sulfa Antibiotics Nausea Only       Past Surgical History:   Procedure Laterality Date   • CARDIAC CATHETERIZATION N/A 8/15/2018    Procedure: Left Heart Cath;  Surgeon: David Burnett MD;  Location:  [facility];  Service: Cardiology   • CATARACT EXTRACTION Bilateral    • COLONOSCOPY     • HYSTERECTOMY  2014    PARTIAL   • OOPHORECTOMY Bilateral 2014       Family History   Problem Relation Age of Onset   • Breast cancer Neg Hx    • Ovarian cancer Neg Hx        Social History     Social History   • Marital status:      Social History Main Topics   • Smoking status: Never Smoker   • Smokeless tobacco: Never Used   • Alcohol use No   • Drug use: No   • Sexual activity: Defer     Other Topics Concern   • Not on file         Objective   Physical Exam   Constitutional: She is oriented to person, place, and time. She appears well-developed and well-nourished. No distress.   HENT:   Head: Normocephalic and atraumatic.   Nose: Nose normal.   Mouth/Throat: Oropharynx is clear and moist.   TMs: hearing aids bilaterally. Nose: no active bleeding. Right vestibule was without stigmata or bleeding. Inferior turbinate was swollen with no active bleeding on right. Left nare is less swollen with no active bleeding. Oropharynx normal.    Eyes: Conjunctivae are normal. No scleral icterus.   Neck: Normal range of motion. Neck supple.   Cardiovascular: Normal rate, regular rhythm and normal heart sounds.    No murmur heard.  Pulmonary/Chest: Effort normal and breath sounds normal. No stridor. No respiratory distress.   Abdominal: Soft. Bowel sounds are normal. There is no tenderness.   Musculoskeletal:   Extremities normal.   Lymphadenopathy:     She has no cervical adenopathy.   Neurological: She is alert and oriented to person, place, and time. "   Face symmetric, voice strong, tongue midline; Vision, hearing and speech all preserved.     Skin: Skin is warm and dry.   Psychiatric: She has a normal mood and affect. Her behavior is normal.   Nursing note and vitals reviewed.      Procedures         ED Course  ED Course as of Aug 17 1619   Fri Aug 17, 2018   1452 Re-evaluated the pt.  [SC]      ED Course User Index  [SC] Tee Cordoba     Recent Results (from the past 24 hour(s))   Comprehensive Metabolic Panel    Collection Time: 08/17/18 12:55 PM   Result Value Ref Range    Glucose 165 (H) 70 - 100 mg/dL    BUN 11 9 - 23 mg/dL    Creatinine 0.85 0.60 - 1.30 mg/dL    Sodium 137 132 - 146 mmol/L    Potassium 4.2 3.5 - 5.5 mmol/L    Chloride 101 99 - 109 mmol/L    CO2 30.0 20.0 - 31.0 mmol/L    Calcium 9.6 8.7 - 10.4 mg/dL    Total Protein 7.0 5.7 - 8.2 g/dL    Albumin 4.14 3.20 - 4.80 g/dL    ALT (SGPT) 16 7 - 40 U/L    AST (SGOT) 20 0 - 33 U/L    Alkaline Phosphatase 55 25 - 100 U/L    Total Bilirubin 0.4 0.3 - 1.2 mg/dL    eGFR Non African Amer 65 >60 mL/min/1.73    Globulin 2.9 gm/dL    A/G Ratio 1.4 (L) 1.5 - 2.5 g/dL    BUN/Creatinine Ratio 12.9 7.0 - 25.0    Anion Gap 6.0 3.0 - 11.0 mmol/L   Protime-INR    Collection Time: 08/17/18 12:55 PM   Result Value Ref Range    Protime 10.3 9.6 - 11.5 Seconds    INR 0.98 0.91 - 1.09   CBC Auto Differential    Collection Time: 08/17/18 12:55 PM   Result Value Ref Range    WBC 8.58 3.50 - 10.80 10*3/mm3    RBC 4.20 3.89 - 5.14 10*6/mm3    Hemoglobin 12.3 11.5 - 15.5 g/dL    Hematocrit 38.2 34.5 - 44.0 %    MCV 91.0 80.0 - 99.0 fL    MCH 29.3 27.0 - 31.0 pg    MCHC 32.2 32.0 - 36.0 g/dL    RDW 13.1 11.3 - 14.5 %    RDW-SD 43.5 37.0 - 54.0 fl    MPV 9.7 6.0 - 12.0 fL    Platelets 265 150 - 450 10*3/mm3    Neutrophil % 62.5 41.0 - 71.0 %    Lymphocyte % 26.6 24.0 - 44.0 %    Monocyte % 7.1 0.0 - 12.0 %    Eosinophil % 3.3 (H) 0.0 - 3.0 %    Basophil % 0.5 0.0 - 1.0 %    Immature Grans % 0.6 0.0 - 0.6 %     Neutrophils, Absolute 5.37 1.50 - 8.30 10*3/mm3    Lymphocytes, Absolute 2.28 0.60 - 4.80 10*3/mm3    Monocytes, Absolute 0.61 0.00 - 1.00 10*3/mm3    Eosinophils, Absolute 0.28 0.00 - 0.30 10*3/mm3    Basophils, Absolute 0.04 0.00 - 0.20 10*3/mm3    Immature Grans, Absolute 0.05 (H) 0.00 - 0.03 10*3/mm3     Note: In addition to lab results from this visit, the labs listed above may include labs taken at another facility or during a different encounter within the last 24 hours. Please correlate lab times with ED admission and discharge times for further clarification of the services performed during this visit.    No orders to display     Vitals:    08/17/18 1440 08/17/18 1447 08/17/18 1500 08/17/18 1507   BP:   139/74    BP Location:       Patient Position:       Pulse:  76  72   Resp:  16  16   Temp:       TempSrc:       SpO2: 97%  97%    Weight:       Height:         Medications   oxymetazoline (AFRIN) nasal spray 1 spray (1 spray Nasal Given 8/17/18 1249)   aspirin chewable tablet 81 mg (81 mg Oral Given 8/17/18 1446)     ECG/EMG Results (last 24 hours)     ** No results found for the last 24 hours. **                        MDM  Number of Diagnoses or Management Options  Recurrent epistaxis:   Diagnosis management comments:       Reviewed all available studies at the bedside with the patient and her family.  Her labs are reassuring.  She is not anemic and her blood pressure is reasonable.    In the ER she was treated with atomized Afrin in each nostril. I observed the patient for a total 4 hours after she had her oral antiplatelet agents.    She has had no evidence of bleeding here in the ER.    I discussed case with Dr. Burnett, the patient's cardiologist, is down to see the patient he agrees with continuing anticoagulation.    Unfortunately I cannot guarantee the patient would not bleed again from her nose I talked her about various treatment strategies to use at home including Afrin for the next 2 days and  Ocean nasal spray 4 times a day for the next month and I'll refer to ENT if she has ongoing issues.  Here I have given her a pack of cotton balls and explained how to use this to help if she develops a nosebleed at home and if that doesn't work she's been told to return to the ER for further evaluation and treatment.    All are agreeable with the plan       Amount and/or Complexity of Data Reviewed  Clinical lab tests: reviewed        Final diagnoses:   Recurrent epistaxis       Documentation assistance provided by sandrine Cordoba.  Information recorded by the sandrine was done at my direction and has been verified and validated by me.     Tee Cordoba  08/17/18 1323       Tee Cordoba  08/17/18 9279       Doug Cabrera MD  08/17/18 8218

## 2018-09-26 PROBLEM — E78.2 MIXED HYPERLIPIDEMIA: Status: ACTIVE | Noted: 2018-09-26

## 2018-09-26 PROBLEM — I25.10 CORONARY ARTERY DISEASE INVOLVING NATIVE CORONARY ARTERY OF NATIVE HEART: Status: ACTIVE | Noted: 2018-09-26

## 2018-10-04 ENCOUNTER — PREP FOR SURGERY (OUTPATIENT)
Dept: OTHER | Facility: HOSPITAL | Age: 78
End: 2018-10-04

## 2018-10-04 ENCOUNTER — APPOINTMENT (OUTPATIENT)
Dept: PREADMISSION TESTING | Facility: HOSPITAL | Age: 78
End: 2018-10-04

## 2018-10-04 DIAGNOSIS — I10 ESSENTIAL HYPERTENSION: ICD-10-CM

## 2018-10-04 DIAGNOSIS — E78.2 MIXED HYPERLIPIDEMIA: ICD-10-CM

## 2018-10-04 DIAGNOSIS — I25.10 CORONARY ARTERY DISEASE INVOLVING NATIVE CORONARY ARTERY OF NATIVE HEART, ANGINA PRESENCE UNSPECIFIED: ICD-10-CM

## 2018-10-04 DIAGNOSIS — I25.10 CORONARY ARTERY DISEASE INVOLVING NATIVE CORONARY ARTERY OF NATIVE HEART, ANGINA PRESENCE UNSPECIFIED: Primary | ICD-10-CM

## 2018-10-04 LAB
ALBUMIN SERPL-MCNC: 4.26 G/DL (ref 3.2–4.8)
ALBUMIN/GLOB SERPL: 2 G/DL (ref 1.5–2.5)
ALP SERPL-CCNC: 59 U/L (ref 25–100)
ALT SERPL W P-5'-P-CCNC: 19 U/L (ref 7–40)
ANION GAP SERPL CALCULATED.3IONS-SCNC: 6 MMOL/L (ref 3–11)
ARTICHOKE IGE QN: 118 MG/DL (ref 0–130)
AST SERPL-CCNC: 24 U/L (ref 0–33)
BILIRUB SERPL-MCNC: 0.5 MG/DL (ref 0.3–1.2)
BUN BLD-MCNC: 14 MG/DL (ref 9–23)
BUN/CREAT SERPL: 14.9 (ref 7–25)
CALCIUM SPEC-SCNC: 9.5 MG/DL (ref 8.7–10.4)
CHLORIDE SERPL-SCNC: 100 MMOL/L (ref 99–109)
CHOLEST SERPL-MCNC: 269 MG/DL (ref 0–200)
CO2 SERPL-SCNC: 29 MMOL/L (ref 20–31)
CREAT BLD-MCNC: 0.94 MG/DL (ref 0.6–1.3)
DEPRECATED RDW RBC AUTO: 43.1 FL (ref 37–54)
ERYTHROCYTE [DISTWIDTH] IN BLOOD BY AUTOMATED COUNT: 13.2 % (ref 11.3–14.5)
GFR SERPL CREATININE-BSD FRML MDRD: 58 ML/MIN/1.73
GLOBULIN UR ELPH-MCNC: 2.1 GM/DL
GLUCOSE BLD-MCNC: 193 MG/DL (ref 70–100)
HBA1C MFR BLD: 7.8 % (ref 4.8–5.6)
HCT VFR BLD AUTO: 37.5 % (ref 34.5–44)
HDLC SERPL-MCNC: 47 MG/DL (ref 40–60)
HGB BLD-MCNC: 12.1 G/DL (ref 11.5–15.5)
MCH RBC QN AUTO: 28.9 PG (ref 27–31)
MCHC RBC AUTO-ENTMCNC: 32.3 G/DL (ref 32–36)
MCV RBC AUTO: 89.5 FL (ref 80–99)
PLATELET # BLD AUTO: 232 10*3/MM3 (ref 150–450)
PMV BLD AUTO: 10.3 FL (ref 6–12)
POTASSIUM BLD-SCNC: 4.5 MMOL/L (ref 3.5–5.5)
PROT SERPL-MCNC: 6.4 G/DL (ref 5.7–8.2)
RBC # BLD AUTO: 4.19 10*6/MM3 (ref 3.89–5.14)
SODIUM BLD-SCNC: 135 MMOL/L (ref 132–146)
TRIGL SERPL-MCNC: 530 MG/DL (ref 0–150)
WBC NRBC COR # BLD: 7.1 10*3/MM3 (ref 3.5–10.8)

## 2018-10-04 PROCEDURE — 80061 LIPID PANEL: CPT | Performed by: PHYSICIAN ASSISTANT

## 2018-10-04 PROCEDURE — 83036 HEMOGLOBIN GLYCOSYLATED A1C: CPT | Performed by: PHYSICIAN ASSISTANT

## 2018-10-04 PROCEDURE — 85027 COMPLETE CBC AUTOMATED: CPT | Performed by: PHYSICIAN ASSISTANT

## 2018-10-04 PROCEDURE — 36415 COLL VENOUS BLD VENIPUNCTURE: CPT

## 2018-10-04 PROCEDURE — 80053 COMPREHEN METABOLIC PANEL: CPT | Performed by: PHYSICIAN ASSISTANT

## 2018-10-04 RX ORDER — ACETAMINOPHEN 325 MG/1
650 TABLET ORAL EVERY 4 HOURS PRN
Status: CANCELLED | OUTPATIENT
Start: 2018-10-04

## 2018-10-04 RX ORDER — NITROGLYCERIN 0.4 MG/1
0.4 TABLET SUBLINGUAL
Status: CANCELLED | OUTPATIENT
Start: 2018-10-04

## 2018-10-04 RX ORDER — SODIUM CHLORIDE 0.9 % (FLUSH) 0.9 %
3 SYRINGE (ML) INJECTION EVERY 12 HOURS SCHEDULED
Status: CANCELLED | OUTPATIENT
Start: 2018-10-04

## 2018-10-04 RX ORDER — OMEPRAZOLE 20 MG/1
20 CAPSULE, DELAYED RELEASE ORAL DAILY
COMMUNITY
End: 2019-11-26

## 2018-10-04 RX ORDER — SODIUM CHLORIDE 0.9 % (FLUSH) 0.9 %
3-10 SYRINGE (ML) INJECTION AS NEEDED
Status: CANCELLED | OUTPATIENT
Start: 2018-10-04

## 2018-10-04 NOTE — DISCHARGE INSTRUCTIONS

## 2018-10-04 NOTE — PAT
Patient stated she was not told to stop aspirin or effient. Called Dr. Burnett office and confirmed with JONG Walters that that patient is to continue taking.

## 2018-10-05 ENCOUNTER — HOSPITAL ENCOUNTER (OUTPATIENT)
Facility: HOSPITAL | Age: 78
Discharge: HOME | End: 2018-10-06
Attending: INTERNAL MEDICINE | Admitting: INTERNAL MEDICINE

## 2018-10-05 DIAGNOSIS — I25.10 CORONARY ARTERY DISEASE INVOLVING NATIVE CORONARY ARTERY OF NATIVE HEART, ANGINA PRESENCE UNSPECIFIED: ICD-10-CM

## 2018-10-05 DIAGNOSIS — E78.2 MIXED HYPERLIPIDEMIA: ICD-10-CM

## 2018-10-05 LAB
ACT BLD: 290 SECONDS (ref 82–152)
GLUCOSE BLDC GLUCOMTR-MCNC: 154 MG/DL (ref 70–130)

## 2018-10-05 PROCEDURE — 85347 COAGULATION TIME ACTIVATED: CPT

## 2018-10-05 PROCEDURE — 25010000002 FENTANYL CITRATE (PF) 100 MCG/2ML SOLUTION: Performed by: INTERNAL MEDICINE

## 2018-10-05 PROCEDURE — A9270 NON-COVERED ITEM OR SERVICE: HCPCS | Performed by: NURSE PRACTITIONER

## 2018-10-05 PROCEDURE — A9270 NON-COVERED ITEM OR SERVICE: HCPCS | Performed by: INTERNAL MEDICINE

## 2018-10-05 PROCEDURE — 25010000002 MIDAZOLAM PER 1 MG: Performed by: INTERNAL MEDICINE

## 2018-10-05 PROCEDURE — 93005 ELECTROCARDIOGRAM TRACING: CPT | Performed by: PHYSICIAN ASSISTANT

## 2018-10-05 PROCEDURE — 93454 CORONARY ARTERY ANGIO S&I: CPT | Performed by: INTERNAL MEDICINE

## 2018-10-05 PROCEDURE — 82962 GLUCOSE BLOOD TEST: CPT

## 2018-10-05 PROCEDURE — G0378 HOSPITAL OBSERVATION PER HR: HCPCS

## 2018-10-05 PROCEDURE — C1769 GUIDE WIRE: HCPCS | Performed by: INTERNAL MEDICINE

## 2018-10-05 PROCEDURE — 63710000001 LINAGLIPTIN 5 MG TABLET: Performed by: INTERNAL MEDICINE

## 2018-10-05 PROCEDURE — C1887 CATHETER, GUIDING: HCPCS | Performed by: INTERNAL MEDICINE

## 2018-10-05 PROCEDURE — 63710000001 HYDROCODONE-ACETAMINOPHEN 5-325 MG TABLET: Performed by: NURSE PRACTITIONER

## 2018-10-05 PROCEDURE — C9600 PERC DRUG-EL COR STENT SING: HCPCS | Performed by: INTERNAL MEDICINE

## 2018-10-05 PROCEDURE — C1894 INTRO/SHEATH, NON-LASER: HCPCS | Performed by: INTERNAL MEDICINE

## 2018-10-05 PROCEDURE — 92928 PRQ TCAT PLMT NTRAC ST 1 LES: CPT | Performed by: INTERNAL MEDICINE

## 2018-10-05 PROCEDURE — 93010 ELECTROCARDIOGRAM REPORT: CPT | Performed by: INTERNAL MEDICINE

## 2018-10-05 PROCEDURE — C1874 STENT, COATED/COV W/DEL SYS: HCPCS | Performed by: INTERNAL MEDICINE

## 2018-10-05 PROCEDURE — 63710000001 CARVEDILOL 6.25 MG TABLET: Performed by: INTERNAL MEDICINE

## 2018-10-05 PROCEDURE — C1760 CLOSURE DEV, VASC: HCPCS | Performed by: INTERNAL MEDICINE

## 2018-10-05 PROCEDURE — 25010000002 ONDANSETRON PER 1 MG: Performed by: NURSE PRACTITIONER

## 2018-10-05 PROCEDURE — 25010000002 HEPARIN (PORCINE) PER 1000 UNITS: Performed by: INTERNAL MEDICINE

## 2018-10-05 PROCEDURE — S0260 H&P FOR SURGERY: HCPCS | Performed by: INTERNAL MEDICINE

## 2018-10-05 DEVICE — XIENCE SIERRA™ EVEROLIMUS ELUTING CORONARY STENT SYSTEM 2.25 MM X 12 MM / RAPID-EXCHANGE
Type: IMPLANTABLE DEVICE | Status: FUNCTIONAL
Brand: XIENCE SIERRA™

## 2018-10-05 RX ORDER — MIDAZOLAM HYDROCHLORIDE 1 MG/ML
INJECTION INTRAMUSCULAR; INTRAVENOUS AS NEEDED
Status: DISCONTINUED | OUTPATIENT
Start: 2018-10-05 | End: 2018-10-05 | Stop reason: HOSPADM

## 2018-10-05 RX ORDER — SODIUM CHLORIDE 9 MG/ML
1-3 INJECTION, SOLUTION INTRAVENOUS CONTINUOUS
Status: DISCONTINUED | OUTPATIENT
Start: 2018-10-05 | End: 2018-10-05

## 2018-10-05 RX ORDER — HYDROCHLOROTHIAZIDE 12.5 MG/1
12.5 TABLET ORAL DAILY
Status: DISCONTINUED | OUTPATIENT
Start: 2018-10-06 | End: 2018-10-06 | Stop reason: HOSPADM

## 2018-10-05 RX ORDER — HYDROCODONE BITARTRATE AND ACETAMINOPHEN 5; 325 MG/1; MG/1
1 TABLET ORAL EVERY 6 HOURS PRN
Status: DISCONTINUED | OUTPATIENT
Start: 2018-10-05 | End: 2018-10-06 | Stop reason: HOSPADM

## 2018-10-05 RX ORDER — PANTOPRAZOLE SODIUM 40 MG/1
40 TABLET, DELAYED RELEASE ORAL EVERY MORNING
Status: DISCONTINUED | OUTPATIENT
Start: 2018-10-06 | End: 2018-10-06 | Stop reason: HOSPADM

## 2018-10-05 RX ORDER — PRASUGREL 10 MG/1
10 TABLET, FILM COATED ORAL DAILY
Status: DISCONTINUED | OUTPATIENT
Start: 2018-10-06 | End: 2018-10-06 | Stop reason: HOSPADM

## 2018-10-05 RX ORDER — ACETAMINOPHEN 325 MG/1
650 TABLET ORAL EVERY 4 HOURS PRN
Status: DISCONTINUED | OUTPATIENT
Start: 2018-10-05 | End: 2018-10-05 | Stop reason: HOSPADM

## 2018-10-05 RX ORDER — SODIUM CHLORIDE 0.9 % (FLUSH) 0.9 %
3 SYRINGE (ML) INJECTION EVERY 12 HOURS SCHEDULED
Status: DISCONTINUED | OUTPATIENT
Start: 2018-10-05 | End: 2018-10-05 | Stop reason: HOSPADM

## 2018-10-05 RX ORDER — LIDOCAINE HYDROCHLORIDE 10 MG/ML
INJECTION, SOLUTION INFILTRATION; PERINEURAL AS NEEDED
Status: DISCONTINUED | OUTPATIENT
Start: 2018-10-05 | End: 2018-10-05 | Stop reason: HOSPADM

## 2018-10-05 RX ORDER — SODIUM CHLORIDE 0.9 % (FLUSH) 0.9 %
3-10 SYRINGE (ML) INJECTION AS NEEDED
Status: DISCONTINUED | OUTPATIENT
Start: 2018-10-05 | End: 2018-10-05 | Stop reason: HOSPADM

## 2018-10-05 RX ORDER — ASPIRIN 81 MG/1
81 TABLET ORAL EVERY EVENING
Status: DISCONTINUED | OUTPATIENT
Start: 2018-10-06 | End: 2018-10-06 | Stop reason: HOSPADM

## 2018-10-05 RX ORDER — HEPARIN SODIUM 1000 [USP'U]/ML
INJECTION, SOLUTION INTRAVENOUS; SUBCUTANEOUS AS NEEDED
Status: DISCONTINUED | OUTPATIENT
Start: 2018-10-05 | End: 2018-10-05 | Stop reason: HOSPADM

## 2018-10-05 RX ORDER — OMEGA-3-ACID ETHYL ESTERS 1 G/1
2 CAPSULE, LIQUID FILLED ORAL 2 TIMES DAILY
Qty: 120 CAPSULE | Refills: 11 | Status: SHIPPED | OUTPATIENT
Start: 2018-10-05 | End: 2019-05-07

## 2018-10-05 RX ORDER — REPAGLINIDE 0.5 MG/1
2 TABLET ORAL
Status: DISCONTINUED | OUTPATIENT
Start: 2018-10-05 | End: 2018-10-06 | Stop reason: HOSPADM

## 2018-10-05 RX ORDER — CARVEDILOL 6.25 MG/1
6.25 TABLET ORAL 2 TIMES DAILY WITH MEALS
Status: DISCONTINUED | OUTPATIENT
Start: 2018-10-05 | End: 2018-10-06 | Stop reason: HOSPADM

## 2018-10-05 RX ORDER — ACETAMINOPHEN 325 MG/1
650 TABLET ORAL EVERY 4 HOURS PRN
Status: DISCONTINUED | OUTPATIENT
Start: 2018-10-05 | End: 2018-10-06 | Stop reason: HOSPADM

## 2018-10-05 RX ORDER — FENTANYL CITRATE 50 UG/ML
INJECTION, SOLUTION INTRAMUSCULAR; INTRAVENOUS AS NEEDED
Status: DISCONTINUED | OUTPATIENT
Start: 2018-10-05 | End: 2018-10-05 | Stop reason: HOSPADM

## 2018-10-05 RX ORDER — ONDANSETRON 2 MG/ML
4 INJECTION INTRAMUSCULAR; INTRAVENOUS EVERY 6 HOURS PRN
Status: DISCONTINUED | OUTPATIENT
Start: 2018-10-05 | End: 2018-10-06 | Stop reason: HOSPADM

## 2018-10-05 RX ORDER — NITROGLYCERIN 0.4 MG/1
0.4 TABLET SUBLINGUAL
Status: DISCONTINUED | OUTPATIENT
Start: 2018-10-05 | End: 2018-10-05 | Stop reason: HOSPADM

## 2018-10-05 RX ORDER — LOSARTAN POTASSIUM 50 MG/1
100 TABLET ORAL
Status: DISCONTINUED | OUTPATIENT
Start: 2018-10-06 | End: 2018-10-06 | Stop reason: HOSPADM

## 2018-10-05 RX ADMIN — HYDROCODONE BITARTRATE AND ACETAMINOPHEN 1 TABLET: 5; 325 TABLET ORAL at 19:18

## 2018-10-05 RX ADMIN — ONDANSETRON HYDROCHLORIDE 4 MG: 2 INJECTION, SOLUTION INTRAMUSCULAR; INTRAVENOUS at 21:01

## 2018-10-05 RX ADMIN — LINAGLIPTIN 5 MG: 5 TABLET, FILM COATED ORAL at 21:01

## 2018-10-05 RX ADMIN — SODIUM CHLORIDE 2.99 ML/KG/HR: 9 INJECTION, SOLUTION INTRAVENOUS at 09:39

## 2018-10-05 RX ADMIN — CARVEDILOL 6.25 MG: 6.25 TABLET, FILM COATED ORAL at 19:18

## 2018-10-05 RX ADMIN — Medication 3 ML: at 09:39

## 2018-10-05 RX ADMIN — HYDROCODONE BITARTRATE AND ACETAMINOPHEN 1 TABLET: 5; 325 TABLET ORAL at 23:58

## 2018-10-05 RX ADMIN — ACETAMINOPHEN 650 MG: 325 TABLET ORAL at 13:31

## 2018-10-05 NOTE — PROGRESS NOTES
Patient will be kept overnight for observation and may be discharged home if stable in the AM.  She will continue DAPT, Repatha, as well as her usual home medicines. We will add generic Lovaza 2g twice daily due to elevated triglycerides. She will follow up with Dr. Matthews and see us as needed.

## 2018-10-06 VITALS
DIASTOLIC BLOOD PRESSURE: 65 MMHG | OXYGEN SATURATION: 94 % | TEMPERATURE: 97.8 F | SYSTOLIC BLOOD PRESSURE: 121 MMHG | WEIGHT: 163.8 LBS | HEIGHT: 64 IN | RESPIRATION RATE: 18 BRPM | HEART RATE: 73 BPM | BODY MASS INDEX: 27.96 KG/M2

## 2018-10-06 LAB
BASOPHILS # BLD AUTO: 0.02 10*3/MM3 (ref 0–0.2)
BASOPHILS NFR BLD AUTO: 0.3 % (ref 0–1)
DEPRECATED RDW RBC AUTO: 44.7 FL (ref 37–54)
EOSINOPHIL # BLD AUTO: 0.12 10*3/MM3 (ref 0–0.3)
EOSINOPHIL NFR BLD AUTO: 1.5 % (ref 0–3)
ERYTHROCYTE [DISTWIDTH] IN BLOOD BY AUTOMATED COUNT: 13.3 % (ref 11.3–14.5)
GLUCOSE BLDC GLUCOMTR-MCNC: 218 MG/DL (ref 70–130)
HCT VFR BLD AUTO: 33.5 % (ref 34.5–44)
HGB BLD-MCNC: 10.6 G/DL (ref 11.5–15.5)
IMM GRANULOCYTES # BLD: 0.03 10*3/MM3 (ref 0–0.03)
IMM GRANULOCYTES NFR BLD: 0.4 % (ref 0–0.6)
LYMPHOCYTES # BLD AUTO: 1.24 10*3/MM3 (ref 0.6–4.8)
LYMPHOCYTES NFR BLD AUTO: 15.6 % (ref 24–44)
MCH RBC QN AUTO: 29.2 PG (ref 27–31)
MCHC RBC AUTO-ENTMCNC: 31.6 G/DL (ref 32–36)
MCV RBC AUTO: 92.3 FL (ref 80–99)
MONOCYTES # BLD AUTO: 0.43 10*3/MM3 (ref 0–1)
MONOCYTES NFR BLD AUTO: 5.4 % (ref 0–12)
NEUTROPHILS # BLD AUTO: 6.09 10*3/MM3 (ref 1.5–8.3)
NEUTROPHILS NFR BLD AUTO: 76.8 % (ref 41–71)
PLATELET # BLD AUTO: 187 10*3/MM3 (ref 150–450)
PMV BLD AUTO: 10.4 FL (ref 6–12)
RBC # BLD AUTO: 3.63 10*6/MM3 (ref 3.89–5.14)
WBC NRBC COR # BLD: 7.93 10*3/MM3 (ref 3.5–10.8)

## 2018-10-06 PROCEDURE — 25010000002 ONDANSETRON PER 1 MG: Performed by: NURSE PRACTITIONER

## 2018-10-06 PROCEDURE — G0378 HOSPITAL OBSERVATION PER HR: HCPCS

## 2018-10-06 PROCEDURE — A9270 NON-COVERED ITEM OR SERVICE: HCPCS | Performed by: INTERNAL MEDICINE

## 2018-10-06 PROCEDURE — 63710000001 PRASUGREL 10 MG TABLET: Performed by: INTERNAL MEDICINE

## 2018-10-06 PROCEDURE — A9270 NON-COVERED ITEM OR SERVICE: HCPCS | Performed by: NURSE PRACTITIONER

## 2018-10-06 PROCEDURE — 99217 PR OBSERVATION CARE DISCHARGE MANAGEMENT: CPT | Performed by: INTERNAL MEDICINE

## 2018-10-06 PROCEDURE — 63710000001 REPAGLINIDE 0.5 MG TABLET: Performed by: INTERNAL MEDICINE

## 2018-10-06 PROCEDURE — 25010000002 PROMETHAZINE PER 50 MG: Performed by: NURSE PRACTITIONER

## 2018-10-06 PROCEDURE — 63710000001 LOSARTAN 50 MG TABLET: Performed by: INTERNAL MEDICINE

## 2018-10-06 PROCEDURE — 85025 COMPLETE CBC W/AUTO DIFF WBC: CPT | Performed by: NURSE PRACTITIONER

## 2018-10-06 PROCEDURE — 63710000001 HYDROCODONE-ACETAMINOPHEN 5-325 MG TABLET: Performed by: NURSE PRACTITIONER

## 2018-10-06 PROCEDURE — 63710000001 CARVEDILOL 6.25 MG TABLET: Performed by: INTERNAL MEDICINE

## 2018-10-06 PROCEDURE — 82962 GLUCOSE BLOOD TEST: CPT

## 2018-10-06 PROCEDURE — 63710000001 PANTOPRAZOLE 40 MG TABLET DELAYED-RELEASE: Performed by: INTERNAL MEDICINE

## 2018-10-06 PROCEDURE — 63710000001 HYDROCHLOROTHIAZIDE 12.5 MG TABLET: Performed by: INTERNAL MEDICINE

## 2018-10-06 RX ORDER — PROMETHAZINE HYDROCHLORIDE 25 MG/ML
12.5 INJECTION, SOLUTION INTRAMUSCULAR; INTRAVENOUS ONCE
Status: COMPLETED | OUTPATIENT
Start: 2018-10-06 | End: 2018-10-06

## 2018-10-06 RX ADMIN — ONDANSETRON HYDROCHLORIDE 4 MG: 2 INJECTION, SOLUTION INTRAMUSCULAR; INTRAVENOUS at 04:31

## 2018-10-06 RX ADMIN — HYDROCHLOROTHIAZIDE 12.5 MG: 12.5 TABLET ORAL at 09:06

## 2018-10-06 RX ADMIN — HYDROCODONE BITARTRATE AND ACETAMINOPHEN 1 TABLET: 5; 325 TABLET ORAL at 04:31

## 2018-10-06 RX ADMIN — PANTOPRAZOLE SODIUM 40 MG: 40 TABLET, DELAYED RELEASE ORAL at 09:06

## 2018-10-06 RX ADMIN — PROMETHAZINE HYDROCHLORIDE 12.5 MG: 25 INJECTION INTRAMUSCULAR; INTRAVENOUS at 08:36

## 2018-10-06 RX ADMIN — PRASUGREL HYDROCHLORIDE 10 MG: 10 TABLET, FILM COATED ORAL at 09:06

## 2018-10-06 RX ADMIN — REPAGLINIDE 2 MG: 0.5 TABLET ORAL at 09:06

## 2018-10-06 RX ADMIN — CARVEDILOL 6.25 MG: 6.25 TABLET, FILM COATED ORAL at 09:06

## 2018-10-06 RX ADMIN — LOSARTAN POTASSIUM 100 MG: 50 TABLET ORAL at 09:06

## 2018-10-06 NOTE — DISCHARGE SUMMARY
"  [facility]   Inpatient Progress Note       LOS: 0 days   Patient Care Team:  Abimael Matthews MD as PCP - General (Cardiology)    Chief Complaint:  Follow-up for CAD and hematoma    Subjective     Interval History:   No chest pain. Right groin is sore. Has had some nausea and vomiting overnight. Thinks this is related to narcotic use overnight.  Currently feels well.  Ambulating.      Review of Systems:   Pertinent positives noted in history, exam, and assessment. Otherwise reviewed and negative.      Objective     Vitals:  Blood pressure 121/65, pulse 73, temperature 97.8 °F (36.6 °C), temperature source Oral, resp. rate 18, height 162.6 cm (64\"), weight 74.3 kg (163 lb 12.8 oz), SpO2 94 %.     Intake/Output Summary (Last 24 hours) at 10/06/18 1033  Last data filed at 10/06/18 0900   Gross per 24 hour   Intake              360 ml   Output              275 ml   Net               85 ml     Physical Exam   Constitutional: She is oriented to person, place, and time. She appears well-developed and well-nourished. No distress.   Neck: No JVD present. No tracheal deviation present.   Cardiovascular: Normal rate, regular rhythm, normal heart sounds and intact distal pulses.  Exam reveals no friction rub.    No murmur heard.  Right groin with large ecchymotic area no current hematoma.   Pulmonary/Chest: Effort normal and breath sounds normal. No respiratory distress.   Abdominal: Soft. Bowel sounds are normal. There is no tenderness.   Musculoskeletal: She exhibits no edema or deformity.   RFA site ecchymotic without hematoma or bruit   Neurological: She is alert and oriented to person, place, and time.          Results Review:     I reviewed the patient's new clinical results.      Results from last 7 days  Lab Units 10/06/18  0632   WBC 10*3/mm3 7.93   HEMOGLOBIN g/dL 10.6*   HEMATOCRIT % 33.5*   PLATELETS 10*3/mm3 187       Results from last 7 days  Lab Units 10/04/18  1517   SODIUM mmol/L " 135   POTASSIUM mmol/L 4.5   CHLORIDE mmol/L 100   CO2 mmol/L 29.0   BUN mg/dL 14   CREATININE mg/dL 0.94   CALCIUM mg/dL 9.5   BILIRUBIN mg/dL 0.5   ALK PHOS U/L 59   ALT (SGPT) U/L 19   AST (SGOT) U/L 24   GLUCOSE mg/dL 193*       Results from last 7 days  Lab Units 10/04/18  1517   SODIUM mmol/L 135   POTASSIUM mmol/L 4.5   CHLORIDE mmol/L 100   CO2 mmol/L 29.0   BUN mg/dL 14   CREATININE mg/dL 0.94   GLUCOSE mg/dL 193*   CALCIUM mg/dL 9.5         No results found for: TROPONINI        Results from last 7 days  Lab Units 10/04/18  1517   CHOLESTEROL mg/dL 269*   TRIGLYCERIDES mg/dL 530*   HDL CHOL mg/dL 47   LDL CHOL mg/dL 118             Tele:  SR    Assessment/Plan     Active Problems:    Mixed hyperlipidemia    Coronary artery disease involving native coronary artery of native heart      1. CAD with intervention to distal RCA last evening with Dr. Burnett.  2. Femoral access site hematoma. Stable today.  3. Dyslipidemia  4. Hypertension.  5. Diabetes     Plan:    Patient is stable from cardiac standpoint and may be discharged home today. Will follow-up with Dr. Matthews in 2 weeks.    [place] Shore, BUDDY  10/06/18  10:33 AM  I, Dinesh Fowler MD, personally performed the services described in this documentation as scribed by the above individual in my presence, and it is both accurate and complete    Dictated utilizing Dragon dictation

## 2018-10-23 ENCOUNTER — DOCUMENTATION (OUTPATIENT)
Dept: CARDIAC REHAB | Facility: HOSPITAL | Age: 78
End: 2018-10-23

## 2018-10-24 ENCOUNTER — DOCUMENTATION (OUTPATIENT)
Dept: CARDIAC REHAB | Facility: HOSPITAL | Age: 78
End: 2018-10-24

## 2018-10-24 NOTE — PROGRESS NOTES
Pt. Referred for Phase II Cardiac Rehab. Staff discussed benefits of exercise, program protocol, and educational material provided. Teach back verified.  Permission granted from patient for staff to fax referral information to outlying program at this time.  Staff to fax referral info to [facility].

## 2019-02-13 ENCOUNTER — TRANSCRIBE ORDERS (OUTPATIENT)
Dept: ADMINISTRATIVE | Facility: HOSPITAL | Age: 79
End: 2019-02-13

## 2019-02-13 DIAGNOSIS — Z12.31 VISIT FOR SCREENING MAMMOGRAM: Primary | ICD-10-CM

## 2019-03-11 RX ORDER — CLOPIDOGREL BISULFATE 75 MG/1
75 TABLET ORAL DAILY
Qty: 30 TABLET | Refills: 11 | Status: ON HOLD | OUTPATIENT
Start: 2019-03-11 | End: 2020-12-03

## 2019-04-05 ENCOUNTER — HOSPITAL ENCOUNTER (OUTPATIENT)
Dept: MAMMOGRAPHY | Facility: HOSPITAL | Age: 79
Discharge: HOME | End: 2019-04-05
Admitting: OBSTETRICS & GYNECOLOGY

## 2019-04-05 DIAGNOSIS — Z12.31 VISIT FOR SCREENING MAMMOGRAM: ICD-10-CM

## 2019-04-05 PROCEDURE — 77067 SCR MAMMO BI INCL CAD: CPT | Performed by: RADIOLOGY

## 2019-04-05 PROCEDURE — 77063 BREAST TOMOSYNTHESIS BI: CPT

## 2019-04-05 PROCEDURE — 77067 SCR MAMMO BI INCL CAD: CPT

## 2019-04-05 PROCEDURE — 77063 BREAST TOMOSYNTHESIS BI: CPT | Performed by: RADIOLOGY

## 2019-05-07 ENCOUNTER — OFFICE VISIT (OUTPATIENT)
Dept: ENDOCRINOLOGY | Facility: CLINIC | Age: 79
End: 2019-05-07

## 2019-05-07 VITALS
WEIGHT: 167 LBS | OXYGEN SATURATION: 98 % | HEART RATE: 82 BPM | BODY MASS INDEX: 28.67 KG/M2 | SYSTOLIC BLOOD PRESSURE: 122 MMHG | DIASTOLIC BLOOD PRESSURE: 62 MMHG

## 2019-05-07 DIAGNOSIS — E03.9 ACQUIRED HYPOTHYROIDISM: ICD-10-CM

## 2019-05-07 DIAGNOSIS — E78.2 MIXED HYPERLIPIDEMIA: ICD-10-CM

## 2019-05-07 DIAGNOSIS — E11.65 UNCONTROLLED TYPE 2 DIABETES MELLITUS WITH HYPERGLYCEMIA (HCC): Primary | ICD-10-CM

## 2019-05-07 DIAGNOSIS — D64.9 ANEMIA, UNSPECIFIED TYPE: ICD-10-CM

## 2019-05-07 LAB — GLUCOSE BLDC GLUCOMTR-MCNC: 329 MG/DL (ref 70–130)

## 2019-05-07 PROCEDURE — 82570 ASSAY OF URINE CREATININE: CPT | Performed by: PHYSICIAN ASSISTANT

## 2019-05-07 PROCEDURE — 82947 ASSAY GLUCOSE BLOOD QUANT: CPT | Performed by: PHYSICIAN ASSISTANT

## 2019-05-07 PROCEDURE — 82043 UR ALBUMIN QUANTITATIVE: CPT | Performed by: PHYSICIAN ASSISTANT

## 2019-05-07 PROCEDURE — 85025 COMPLETE CBC W/AUTO DIFF WBC: CPT | Performed by: PHYSICIAN ASSISTANT

## 2019-05-07 PROCEDURE — 84439 ASSAY OF FREE THYROXINE: CPT | Performed by: PHYSICIAN ASSISTANT

## 2019-05-07 PROCEDURE — 84443 ASSAY THYROID STIM HORMONE: CPT | Performed by: PHYSICIAN ASSISTANT

## 2019-05-07 PROCEDURE — 99204 OFFICE O/P NEW MOD 45 MIN: CPT | Performed by: PHYSICIAN ASSISTANT

## 2019-05-07 RX ORDER — EZETIMIBE 10 MG/1
10 TABLET ORAL DAILY
Qty: 30 TABLET | Refills: 6 | Status: SHIPPED | OUTPATIENT
Start: 2019-05-07 | End: 2019-11-26 | Stop reason: SDUPTHER

## 2019-05-07 RX ORDER — NITROGLYCERIN 0.4 MG/1
0.4 TABLET SUBLINGUAL
COMMUNITY
Start: 2019-03-06

## 2019-05-07 RX ORDER — BLOOD-GLUCOSE METER
EACH MISCELLANEOUS
Qty: 1 KIT | Refills: 0 | Status: SHIPPED | OUTPATIENT
Start: 2019-05-07

## 2019-05-07 RX ORDER — INSULIN GLARGINE 100 [IU]/ML
INJECTION, SOLUTION SUBCUTANEOUS
Refills: 2 | COMMUNITY
Start: 2019-04-25 | End: 2019-05-07 | Stop reason: SDUPTHER

## 2019-05-07 RX ORDER — INSULIN GLARGINE 100 [IU]/ML
15 INJECTION, SOLUTION SUBCUTANEOUS NIGHTLY
Qty: 2 PEN | Refills: 6 | Status: SHIPPED | OUTPATIENT
Start: 2019-05-07 | End: 2019-11-26 | Stop reason: SDUPTHER

## 2019-05-07 RX ORDER — GEMFIBROZIL 600 MG/1
600 TABLET, FILM COATED ORAL DAILY
Refills: 0 | COMMUNITY
Start: 2019-04-26 | End: 2019-11-26

## 2019-05-07 RX ORDER — LEVOTHYROXINE SODIUM 88 UG/1
88 TABLET ORAL EVERY MORNING
Qty: 30 TABLET | Refills: 6 | Status: SHIPPED | OUTPATIENT
Start: 2019-05-07 | End: 2019-11-26 | Stop reason: SDUPTHER

## 2019-05-07 RX ORDER — PEN NEEDLE, DIABETIC 32GX 5/32"
NEEDLE, DISPOSABLE MISCELLANEOUS
Refills: 2 | COMMUNITY
Start: 2019-04-01 | End: 2019-11-26 | Stop reason: SDUPTHER

## 2019-05-07 RX ORDER — GLIMEPIRIDE 4 MG/1
4 TABLET ORAL
Qty: 60 TABLET | Refills: 6 | Status: SHIPPED | OUTPATIENT
Start: 2019-05-07 | End: 2019-11-26 | Stop reason: SDUPTHER

## 2019-05-07 NOTE — PATIENT INSTRUCTIONS
Stop prandin.  Start Jardiance 25mg daily.  Continue Tradjenta 5mg daily.  Continue Metformin 500mg 2 pills daily.  Continue glimepiride 4mg 2x/day- always before a meal, otherwise it can drop sugar too low.  Increase Lantus to 15 units in the evening  We're looking for morning sugar <150 and 2 hours after a meal <180.

## 2019-05-07 NOTE — PROGRESS NOTES
"Chief Complaint  Establish care for Diabetes Mellitus.    HPI   Angelica Spivey is a 79 y.o. female who is here today for evaluation of Diabetes Mellitus type 2. The initial diagnosis of diabetes was made in her 50s.     A1C- 8.1 (5/2/19), 7.8 (10/2018)  Labs reviewed:  5/2/19- GFR 54, LFTs wnl, ,   10/2018- hgb 10.6, hct 33.5    Diabetic complications: cardiovascular disease s/p multiple stents  Eye exam current (within one year): utd  Foot care and dental care: discussed    Current diabetic medications include:  Metformin 500mg 2 tab QD - did not tolerate higher dose  Tradjenta 5mg QD  Glimepiride 4mg BID AC  Prandin 2mg TID AC  Lantus 10U QPM     Statin: did not tolerate statins due to myalgias, on lopid. Could not swallow vascepa pills. Had chest pains with repatha, took several shots.      Past medications: n/a    Diabetic Monitoring  - checks glucose 1x/day  Glucose is averaging- fbs 140-180  Hypoglycemia- not recently  Home blood sugar records: glucometer downloaded, data reviewed and scanned to chart    Nutrition:     Current diet: in general, a \"healthy\" diet  , on average, 3 meals per day  Current exercise: none  Seen RD in past: no    Hypothyroidism  -diagnosed many years ago  -on levothyroxine 88mcg daily, taking in the morning on empty stomach, not missing doses    The following portions of the patient's history were reviewed and updated by me as appropriate: allergies, current medications, past family history, past social history, past surgical history and problem list.    Past Medical History:   Diagnosis Date   • Arthritis    • Coronary artery disease    • Diabetes mellitus (CMS/HCC)    • Disease of thyroid gland    • Elevated cholesterol    • GERD (gastroesophageal reflux disease)    • Hearing aid worn    • History of stomach ulcers    • Hypertension    • PONV (postoperative nausea and vomiting)    • Wears glasses        Medications    Current Outpatient Medications:   •  aspirin 81 MG EC " tablet, Take 81 mg by mouth Every Evening., Disp: , Rfl:   •  carvedilol (COREG) 6.25 MG tablet, Take 6.25 mg by mouth 2 (Two) Times a Day With Meals., Disp: , Rfl:   •  clopidogrel (PLAVIX) 75 MG tablet, Take 1 tablet by mouth Daily., Disp: 30 tablet, Rfl: 11  •  glimepiride (AMARYL) 4 MG tablet, Take 1 tablet by mouth 2 (Two) Times a Day Before Meals., Disp: 60 tablet, Rfl: 6  •  levothyroxine (SYNTHROID, LEVOTHROID) 88 MCG tablet, Take 1 tablet by mouth Every Morning., Disp: 30 tablet, Rfl: 6  •  linagliptin (TRADJENTA) 5 MG tablet tablet, Take 1 tablet by mouth Every Evening., Disp: 30 tablet, Rfl: 6  •  losartan 50 MG tablet 2 tablet, hydrochlorothiazide 25 MG tablet 0.5 tablet, Take 1 dose by mouth Daily., Disp: , Rfl:   •  metFORMIN (GLUCOPHAGE) 500 MG tablet, Take 2 tablets by mouth Every Evening., Disp: 60 tablet, Rfl: 6  •  omeprazole (priLOSEC) 20 MG capsule, Take 20 mg by mouth Daily., Disp: , Rfl:   •  Empagliflozin (JARDIANCE) 25 MG tablet, Take 25 mg by mouth Daily., Disp: 30 tablet, Rfl: 6  •  ezetimibe (ZETIA) 10 MG tablet, Take 1 tablet by mouth Daily., Disp: 30 tablet, Rfl: 6  •  gemfibrozil (LOPID) 600 MG tablet, , Disp: , Rfl: 0  •  LANTUS SOLOSTAR 100 UNIT/ML injection pen, Inject 15 Units under the skin into the appropriate area as directed Every Night., Disp: 2 pen, Rfl: 6  •  nitroglycerin (NITROSTAT) 0.4 MG SL tablet, , Disp: , Rfl:   •  RELION PEN NEEDLES 32G X 4 MM misc, , Disp: , Rfl: 2    Review of Systems  Review of Systems   Constitutional: Negative for chills, fatigue, fever and unexpected weight change.   HENT: Positive for hearing loss. Negative for ear pain, nosebleeds, rhinorrhea and sore throat.    Eyes: Negative for pain, discharge, redness, itching and visual disturbance.   Respiratory: Positive for shortness of breath. Negative for cough and wheezing.    Cardiovascular: Positive for leg swelling. Negative for chest pain and palpitations.   Gastrointestinal: Negative for  abdominal pain, blood in stool, constipation and diarrhea.   Endocrine: Negative for cold intolerance, heat intolerance and polydipsia.   Genitourinary: Negative for dysuria, frequency, hematuria, pelvic pain, vaginal bleeding and vaginal discharge.   Musculoskeletal: Positive for arthralgias, joint swelling and myalgias. Negative for gait problem.   Skin: Negative for rash.   Allergic/Immunologic: Negative.    Neurological: Positive for weakness. Negative for dizziness, syncope, numbness and headaches.   Hematological: Negative for adenopathy. Bruises/bleeds easily.   Psychiatric/Behavioral: Positive for confusion. Negative for sleep disturbance and suicidal ideas. The patient is not nervous/anxious.         Physical Exam    /62   Pulse 82   Wt 75.8 kg (167 lb)   SpO2 98%   BMI 28.67 kg/m² Body mass index is 28.67 kg/m².  Physical Exam   Constitutional: She is oriented to person, place, and time. She appears well-developed. No distress.   HENT:   Head: Normocephalic.   Right Ear: External ear normal.   Left Ear: External ear normal.   Mouth/Throat: No oropharyngeal exudate.   Eyes: Conjunctivae and lids are normal. Right eye exhibits no discharge. Left eye exhibits no discharge. Right pupil is reactive. Left pupil is reactive.   Neck: No JVD present. No tracheal deviation present. No thyroid mass and no thyromegaly present.   Cardiovascular: Normal rate, regular rhythm, normal heart sounds and intact distal pulses.   No murmur heard.  Pulmonary/Chest: Effort normal and breath sounds normal. No respiratory distress. She has no wheezes.   Abdominal: Soft. Bowel sounds are normal. There is no tenderness.   Musculoskeletal: She exhibits no edema or tenderness.    Angelica had a diabetic foot exam performed today.   During the foot exam she had a monofilament test performed.    Neurological Sensory Findings -  Unaltered sharp/dull right ankle/foot discrimination and unaltered sharp/dull left ankle/foot  discrimination.  Vascular Status -  Her right foot exhibits normal foot vasculature  and no edema. Her left foot exhibits normal foot vasculature  and no edema.  Skin Integrity  -  Her right foot skin is intact. She has callous right foot.  She has no right foot onychomycosis and no right foot ulcer.Her left foot skin is intact.She has callous left foot. She has no left foot onychomycosis and no left foot ulcer..  Lymphadenopathy:     She has no cervical adenopathy.   Neurological: She is alert and oriented to person, place, and time.   Skin: Skin is warm, dry and intact. No rash noted. She is not diaphoretic. No erythema.   Psychiatric: She has a normal mood and affect. Her speech is normal and behavior is normal. Thought content normal.       Labs and Imaging   Lab Results   Component Value Date    HGBA1C 7.80 (H) 10/04/2018    HGBA1C 8.30 (H) 08/15/2018     Office Visit on 05/07/2019   Component Date Value Ref Range Status   • Glucose 05/07/2019 329* 70 - 130 mg/dL Final     No images are attached to the encounter or orders placed in the encounter.  Mammo Screening Digital Tomosynthesis Bilateral With Cad    Result Date: 4/8/2019  No findings suspicious for malignancy.  ACR BI-RADS CATEGORY:  1, NEGATIVE  RECOMMENDATION: Yearly mammogram, yearly clinical breast exam, and encourage self breast awareness.  CAD was used.  The standard false negative rate of mammography is between 10% and 25%. Complex patterns or increased breast density will markedly elevate the false negative rate of mammography.  A letter, in lay terminology, with the results of this exam will be mailed to the patient.   If there is a palpable area of concern, biopsy should be considered regardless of imaging findings.    This report was finalized on 4/8/2019 2:15 PM by Dr. Sofia Robertson MD.        Assessment / Plan   Angelica was seen today for establish care.    Diagnoses and all orders for this visit:    Uncontrolled type 2 diabetes mellitus with  hyperglycemia (CMS/HCC)  -     POC Glucose Fingerstick  -     Microalbumin / Creatinine Urine Ratio - Urine, Clean Catch  -     Empagliflozin (JARDIANCE) 25 MG tablet; Take 25 mg by mouth Daily.  -     metFORMIN (GLUCOPHAGE) 500 MG tablet; Take 2 tablets by mouth Every Evening.  -     linagliptin (TRADJENTA) 5 MG tablet tablet; Take 1 tablet by mouth Every Evening.  -     LANTUS SOLOSTAR 100 UNIT/ML injection pen; Inject 15 Units under the skin into the appropriate area as directed Every Night.  -     glimepiride (AMARYL) 4 MG tablet; Take 1 tablet by mouth 2 (Two) Times a Day Before Meals.    Anemia, unspecified type  -     CBC & Differential  -     CBC Auto Differential    Mixed hyperlipidemia  -     ezetimibe (ZETIA) 10 MG tablet; Take 1 tablet by mouth Daily.    Acquired hypothyroidism  -     TSH  -     T4, Free  -     levothyroxine (SYNTHROID, LEVOTHROID) 88 MCG tablet; Take 1 tablet by mouth Every Morning.        Diabetes Mellitus 2 is under inadequate control.  -A1c 8.1, no hypoglycemia per pt report  -Discussed with patient that glycemic targets are generally set somewhat higher (eg, <8 percent) for older adult patients with comorbidities.   -hx of anemia, which can falsely lower a1c, will recheck cbc  -fbs 140-180, would prefer consistently <150  -increase lantus to 15u qhs  -dc prandin  -add jardiance 25mg daily. Samples provided x 4 weeks. This may need a PA- she has CAD s/p multiple stents. Discussed importance of adequate hydration and good hygiene with this medication. Discussed with her if she experiences any dizziness we'll decrease her losartan-hctz.  -cont metformin 500mg 2 tab qd, did not tolerate higher dose  -discussed diet. Literature provided.  -discussed foot care  -check bs fasting and hs. Bring meter to f/u      1.  Diet: 3-4 carb servings per meal for females, 4-5 carb servings per meal for males  Spread carb intake throughout the day  Increase lean protein and vegetable intake  Avoid  sugary drinks and processed carbs including crackers, cookies, cakes  2.  Exercise: Recommend at least 30 minutes of exercise daily, at least 5 days per week. Increase exercise gradually.   3.  Blood Glucose Goal: Blood glucose goal <150 fasting, <180 2 hr postprandial  4.  Microalbumin due  5.  Education performed during this visit: long term diabetic complications discussed. , annual eye examinations at Ophthalmology discussed, dental hygiene discussed  and foot care reviewed., home glucose monitoring emphasized, all medications, side effects and compliance discussed carefully and Hypoglycemia management and prevention reviewed. Reviewed ‘ABCs’ of diabetes management (respective goals in parentheses):  A1C (<7), blood pressure (<130/80), and cholesterol (LDL <100, if CVD <70).    Hyperlipidemia  -intolerant to multiple statins  -also did not tolerate repatha due to chest pains that resolved after stopping the medication  -could not swallow vascepa  -cont lopid  -will try zetia 10mg daily    Patient Instructions   Stop prandin.  Start Jardiance 25mg daily.  Continue Tradjenta 5mg daily.  Continue Metformin 500mg 2 pills daily.  Continue glimepiride 4mg 2x/day- always before a meal, otherwise it can drop sugar too low.  Increase Lantus to 15 units in the evening  We're looking for morning sugar <150 and 2 hours after a meal <180.      Follow up: Return in about 3 months (around 8/7/2019).    Discussed the nature of the disease including, risks, complications, implications, management, safe and proper use of medications. Encouraged therapeutic lifestyle changes including low calorie diet with plenty of fruits and vegetables, daily exercise, medication compliance, and keeping scheduled follow up appointments with me and any other providers. Encouraged patient to have appointment for complete physical, fasting labs, appropriate screenings, and immunizations on an annual basis.    25 min  of 45 min face-to-face visit  time spent for coordination of care and counselling regarding identified problems as outlined in the objective, assessment and discussion portions of the documentation.    Santos Soriano PA-C

## 2019-05-08 LAB
ALBUMIN UR-MCNC: <1.2 MG/L
BASOPHILS # BLD AUTO: 0.04 10*3/MM3 (ref 0–0.2)
BASOPHILS NFR BLD AUTO: 0.8 % (ref 0–1.5)
CREAT UR-MCNC: 32.7 MG/DL
DEPRECATED RDW RBC AUTO: 46.2 FL (ref 37–54)
EOSINOPHIL # BLD AUTO: 0.18 10*3/MM3 (ref 0–0.4)
EOSINOPHIL NFR BLD AUTO: 3.6 % (ref 0.3–6.2)
ERYTHROCYTE [DISTWIDTH] IN BLOOD BY AUTOMATED COUNT: 13.4 % (ref 12.3–15.4)
HCT VFR BLD AUTO: 39.7 % (ref 34–46.6)
HGB BLD-MCNC: 12.5 G/DL (ref 12–15.9)
IMM GRANULOCYTES # BLD AUTO: 0.03 10*3/MM3 (ref 0–0.05)
IMM GRANULOCYTES NFR BLD AUTO: 0.6 % (ref 0–0.5)
LYMPHOCYTES # BLD AUTO: 1.86 10*3/MM3 (ref 0.7–3.1)
LYMPHOCYTES NFR BLD AUTO: 36.8 % (ref 19.6–45.3)
MCH RBC QN AUTO: 29.5 PG (ref 26.6–33)
MCHC RBC AUTO-ENTMCNC: 31.5 G/DL (ref 31.5–35.7)
MCV RBC AUTO: 93.6 FL (ref 79–97)
MICROALBUMIN/CREAT UR: NORMAL MG/G
MONOCYTES # BLD AUTO: 0.48 10*3/MM3 (ref 0.1–0.9)
MONOCYTES NFR BLD AUTO: 9.5 % (ref 5–12)
NEUTROPHILS # BLD AUTO: 2.46 10*3/MM3 (ref 1.7–7)
NEUTROPHILS NFR BLD AUTO: 48.7 % (ref 42.7–76)
NRBC BLD AUTO-RTO: 0 /100 WBC (ref 0–0.2)
PLATELET # BLD AUTO: 259 10*3/MM3 (ref 140–450)
PMV BLD AUTO: 11.2 FL (ref 6–12)
RBC # BLD AUTO: 4.24 10*6/MM3 (ref 3.77–5.28)
T4 FREE SERPL-MCNC: 1.27 NG/DL (ref 0.93–1.7)
TSH SERPL DL<=0.05 MIU/L-ACNC: 1.4 MIU/ML (ref 0.27–4.2)
WBC NRBC COR # BLD: 5.05 10*3/MM3 (ref 3.4–10.8)

## 2019-05-14 DIAGNOSIS — E11.65 UNCONTROLLED TYPE 2 DIABETES MELLITUS WITH HYPERGLYCEMIA (HCC): ICD-10-CM

## 2019-05-22 ENCOUNTER — TELEPHONE (OUTPATIENT)
Dept: INTERNAL MEDICINE | Facility: CLINIC | Age: 79
End: 2019-05-22

## 2019-05-22 NOTE — TELEPHONE ENCOUNTER
Attempted to contact to inform them that I did receive the fax, I filled it out and faxed in to the company the same day I got it but no one answered and there was no voicemail to leave a message, it just kept ringing.

## 2019-05-22 NOTE — TELEPHONE ENCOUNTER
PT WOULD LIKE TO KNOW IF PEDRO RECEIVED THE PAPERWORK FOR PATIENT ASSISTANCE PROGRAM. COULD YOU PLEASE CONTACT HER BACK ASAP. #[phone]

## 2019-05-22 NOTE — TELEPHONE ENCOUNTER
The family obviously called the financial assistance company because a representative spoke with Nika and informed her that the forms were not received and asked us to re fax them. I have everything to fax again except the original form that Santos signed, I put it in the scan folder to be scanned to the chart and it has yet to be done and it is no longer in the scan folder. I will have to what until Friday to ask Melly or whom ever is doing her scanning for her if they have it so I can re fax the information.

## 2019-05-24 NOTE — TELEPHONE ENCOUNTER
Spoke with pt's daughter and explained the situation, she is aware that I will re fax the information ASAP and will try to have someone come and  the samples

## 2019-05-24 NOTE — TELEPHONE ENCOUNTER
Attempted to call [phone] but there was no answer an no VM, LARA @ [phone] stating that I was unable to get a copy of the original form to refax right now because it has not been scanned to the chart and it was unavailable to me but that I would do it as soon as I could. Until then I offered to supply her with samples of Jardiance that I have placed in the cabinet in the front office if she would like to pick them up.

## 2019-07-25 ENCOUNTER — TELEPHONE (OUTPATIENT)
Dept: INTERNAL MEDICINE | Facility: CLINIC | Age: 79
End: 2019-07-25

## 2019-07-25 NOTE — TELEPHONE ENCOUNTER
PATIENT RECENTLY HAD A HEART ATTACK. SHE WOULD LIKE TO DISCUSS PATIENTS PHYSICAL ISSUES AND MEDICATION CONCERNS TILL PATIENT IS SEEN IN OFFICE. PHONE -[phone]

## 2019-07-26 NOTE — TELEPHONE ENCOUNTER
Spoke with pt's daughter and she said that after her mother had heart surgery he blood sugar has been better, she has since decreased  Lantus to 10 units, the problem is with the Jardinace ,it is causing really bad yeast infections that her PCP has been treating, she now has a UTI. I advised her that they could stop the Jardiance but that we would like to see a blood sugar log so that you could decide if what adjustments or changes to make, they will e-mail it to me Monday.

## 2019-07-30 NOTE — TELEPHONE ENCOUNTER
Spoke with PT's daughter and advised her or all recommendations, she will bring meter to next appt

## 2019-08-26 ENCOUNTER — OFFICE VISIT (OUTPATIENT)
Dept: ENDOCRINOLOGY | Facility: CLINIC | Age: 79
End: 2019-08-26

## 2019-08-26 VITALS
BODY MASS INDEX: 28.84 KG/M2 | WEIGHT: 168 LBS | HEART RATE: 89 BPM | OXYGEN SATURATION: 98 % | DIASTOLIC BLOOD PRESSURE: 70 MMHG | SYSTOLIC BLOOD PRESSURE: 122 MMHG

## 2019-08-26 DIAGNOSIS — E03.9 ACQUIRED HYPOTHYROIDISM: ICD-10-CM

## 2019-08-26 DIAGNOSIS — E11.65 UNCONTROLLED TYPE 2 DIABETES MELLITUS WITH HYPERGLYCEMIA (HCC): Primary | ICD-10-CM

## 2019-08-26 DIAGNOSIS — E78.2 MIXED HYPERLIPIDEMIA: ICD-10-CM

## 2019-08-26 LAB
GLUCOSE BLDC GLUCOMTR-MCNC: 244 MG/DL (ref 70–130)
HBA1C MFR BLD: 8 %

## 2019-08-26 PROCEDURE — 82947 ASSAY GLUCOSE BLOOD QUANT: CPT | Performed by: PHYSICIAN ASSISTANT

## 2019-08-26 PROCEDURE — 99214 OFFICE O/P EST MOD 30 MIN: CPT | Performed by: PHYSICIAN ASSISTANT

## 2019-08-26 PROCEDURE — 83036 HEMOGLOBIN GLYCOSYLATED A1C: CPT | Performed by: PHYSICIAN ASSISTANT

## 2019-08-26 RX ORDER — MAGNESIUM OXIDE 400 MG/1
1 TABLET ORAL DAILY
Refills: 0 | COMMUNITY
Start: 2019-08-17 | End: 2019-11-26

## 2019-08-26 NOTE — PROGRESS NOTES
"Chief Complaint  Establish care for Diabetes Mellitus.    HPI   Angelica Spivey is a 79 y.o. female who is here today for evaluation of Diabetes Mellitus type 2. The initial diagnosis of diabetes was made in her 50s.     Did not tolerate Jardiance due to frequent yeast infections.   S/p recent MI and stent.     A1C- 8 (8/26/19), 8.1 (5/2/19), 7.8 (10/2018)  Labs reviewed:  5/2/19- GFR 54, LFTs wnl, ,   10/2018- hgb 10.6, hct 33.5    Diabetic complications: cardiovascular disease s/p multiple stents  Eye exam current (within one year): utd  Foot care and dental care: discussed    Current diabetic medications include:  Metformin 500mg 2 tab QD - did not tolerate higher dose  Tradjenta 5mg QD  Glimepiride 4mg BID AC  Lantus 10U QPM     Statin: did not tolerate statins due to myalgias, on lopid. Could not swallow vascepa pills. Had chest pains with repatha, took several shots.      Past medications: prandin    Diabetic Monitoring  - checks glucose 1x/day  Glucose is averaging- fbs mostly 120-140s, in the evening mostly 180-200s  Hypoglycemia- not recently  Home blood sugar records: glucometer downloaded, data reviewed and scanned to chart    Nutrition:     Current diet: in general, a \"healthy\" diet  , on average, 3 meals per day  Current exercise: none  Seen RD in past: no    Hypothyroidism  -diagnosed many years ago  -on levothyroxine 88mcg daily, taking in the morning on empty stomach, not missing doses    The following portions of the patient's history were reviewed and updated by me as appropriate: allergies, current medications, past family history, past social history, past surgical history and problem list.    Past Medical History:   Diagnosis Date   • Arthritis    • Coronary artery disease    • Diabetes mellitus (CMS/HCC)    • Disease of thyroid gland    • Elevated cholesterol    • GERD (gastroesophageal reflux disease)    • Hearing aid worn    • History of stomach ulcers    • Hypertension    • PONV " (postoperative nausea and vomiting)    • Wears glasses        Medications    Current Outpatient Medications:   •  aspirin 81 MG EC tablet, Take 81 mg by mouth Every Evening., Disp: , Rfl:   •  Blood Glucose Monitoring Suppl (ACCU-CHEK LEXIE PLUS) w/Device kit, Use device to check blood sugar 3 times a day, Disp: 1 kit, Rfl: 0  •  carvedilol (COREG) 6.25 MG tablet, Take 6.25 mg by mouth 2 (Two) Times a Day With Meals., Disp: , Rfl:   •  clopidogrel (PLAVIX) 75 MG tablet, Take 1 tablet by mouth Daily., Disp: 30 tablet, Rfl: 11  •  ezetimibe (ZETIA) 10 MG tablet, Take 1 tablet by mouth Daily., Disp: 30 tablet, Rfl: 6  •  gemfibrozil (LOPID) 600 MG tablet, , Disp: , Rfl: 0  •  glimepiride (AMARYL) 4 MG tablet, Take 1 tablet by mouth 2 (Two) Times a Day Before Meals., Disp: 60 tablet, Rfl: 6  •  glucose blood (ACCU-CHEK LEXIE PLUS) test strip, Use as instructed to test blood sugar 3 times daily, Disp: 100 each, Rfl: 12  •  LANTUS SOLOSTAR 100 UNIT/ML injection pen, Inject 15 Units under the skin into the appropriate area as directed Every Night., Disp: 2 pen, Rfl: 6  •  levothyroxine (SYNTHROID, LEVOTHROID) 88 MCG tablet, Take 1 tablet by mouth Every Morning., Disp: 30 tablet, Rfl: 6  •  losartan 50 MG tablet 2 tablet, hydrochlorothiazide 25 MG tablet 0.5 tablet, Take 1 dose by mouth Daily., Disp: , Rfl:   •  magnesium oxide (MAG-OX) 400 MG tablet, Take 1 tablet by mouth Daily., Disp: , Rfl: 0  •  metFORMIN (GLUCOPHAGE) 500 MG tablet, Take 2 tablets by mouth Every Evening., Disp: 60 tablet, Rfl: 6  •  nitroglycerin (NITROSTAT) 0.4 MG SL tablet, , Disp: , Rfl:   •  omeprazole (priLOSEC) 20 MG capsule, Take 20 mg by mouth Daily., Disp: , Rfl:   •  RELION PEN NEEDLES 32G X 4 MM misc, , Disp: , Rfl: 2  •  Dulaglutide (TRULICITY) 0.75 MG/0.5ML solution pen-injector, Inject 0.75 mg under the skin into the appropriate area as directed 1 (One) Time Per Week., Disp: 4 pen, Rfl: 6    Review of Systems  Review of Systems    Constitutional: Negative for chills, fatigue, fever and unexpected weight change.   HENT: Positive for hearing loss. Negative for ear pain, nosebleeds, rhinorrhea and sore throat.    Eyes: Negative for pain, discharge, redness, itching and visual disturbance.   Respiratory: Positive for shortness of breath. Negative for cough and wheezing.    Cardiovascular: Positive for leg swelling. Negative for chest pain and palpitations.   Gastrointestinal: Negative for abdominal pain, blood in stool, constipation and diarrhea.   Endocrine: Negative for cold intolerance, heat intolerance and polydipsia.   Genitourinary: Negative for dysuria, frequency, hematuria, pelvic pain, vaginal bleeding and vaginal discharge.   Musculoskeletal: Positive for arthralgias, joint swelling and myalgias. Negative for gait problem.   Skin: Negative for rash.   Allergic/Immunologic: Negative.    Neurological: Positive for weakness. Negative for dizziness, syncope, numbness and headaches.   Hematological: Negative for adenopathy. Bruises/bleeds easily.   Psychiatric/Behavioral: Positive for confusion. Negative for sleep disturbance and suicidal ideas. The patient is not nervous/anxious.         Physical Exam    /70   Pulse 89   Wt 76.2 kg (168 lb)   SpO2 98%   BMI 28.84 kg/m² Body mass index is 28.84 kg/m².  Physical Exam   Constitutional: She is oriented to person, place, and time. She appears well-developed. No distress.   HENT:   Head: Normocephalic.   Right Ear: External ear normal.   Left Ear: External ear normal.   Mouth/Throat: No oropharyngeal exudate.   Eyes: Conjunctivae and lids are normal. Right eye exhibits no discharge. Left eye exhibits no discharge. Right pupil is reactive. Left pupil is reactive.   Neck: No JVD present. No tracheal deviation present. No thyroid mass and no thyromegaly present.   Cardiovascular: Normal rate, regular rhythm, normal heart sounds and intact distal pulses.   No murmur  heard.  Pulmonary/Chest: Effort normal and breath sounds normal. No respiratory distress. She has no wheezes.   Abdominal: Soft. Bowel sounds are normal. There is no tenderness.   Musculoskeletal: She exhibits no edema or tenderness.   Lymphadenopathy:     She has no cervical adenopathy.   Neurological: She is alert and oriented to person, place, and time.   Skin: Skin is warm, dry and intact. No rash noted. She is not diaphoretic. No erythema.   Psychiatric: She has a normal mood and affect. Her speech is normal and behavior is normal. Thought content normal.       Labs and Imaging   Lab Results   Component Value Date    HGBA1C 8.0 08/26/2019    HGBA1C 7.80 (H) 10/04/2018    HGBA1C 8.30 (H) 08/15/2018     Office Visit on 08/26/2019   Component Date Value Ref Range Status   • Glucose 08/26/2019 244* 70 - 130 mg/dL Final   • Hemoglobin A1C 08/26/2019 8.0  % Final     No images are attached to the encounter or orders placed in the encounter.  Mammo Screening Digital Tomosynthesis Bilateral With Cad    Result Date: 4/8/2019  No findings suspicious for malignancy.  ACR BI-RADS CATEGORY:  1, NEGATIVE  RECOMMENDATION: Yearly mammogram, yearly clinical breast exam, and encourage self breast awareness.  CAD was used.  The standard false negative rate of mammography is between 10% and 25%. Complex patterns or increased breast density will markedly elevate the false negative rate of mammography.  A letter, in lay terminology, with the results of this exam will be mailed to the patient.   If there is a palpable area of concern, biopsy should be considered regardless of imaging findings.    This report was finalized on 4/8/2019 2:15 PM by Dr. Sofia Robertson MD.        Assessment / Plan   Angelica was seen today for follow-up.    Diagnoses and all orders for this visit:    Uncontrolled type 2 diabetes mellitus with hyperglycemia (CMS/HCC)  -     POC Glucose Fingerstick  -     POC Glycosylated Hemoglobin (Hb A1C)  -     Dulaglutide  (TRULICITY) 0.75 MG/0.5ML solution pen-injector; Inject 0.75 mg under the skin into the appropriate area as directed 1 (One) Time Per Week.    Mixed hyperlipidemia    Acquired hypothyroidism        Diabetes Mellitus 2 is under inadequate control.  -A1c 8  -Discussed with patient that glycemic targets are generally set somewhat higher (eg, <8 percent) for older adult patients with comorbidities.   -hx of anemia, which can falsely lower a1c, will recheck cbc  -fasting bs in good range, need to improve post prandial readings  -did not tolerate jardiance due to yeast infections  -continue lantus 10u qhs  -cont metformin 500mg 2 tab qd, did not tolerate higher dose  -cont glimepiride 4mg AC BID (before breakfast and supper)  -change tradjenta to trulicity 0.75mg sc weekly. Samples provided. Administration reviewed. No hx of pancreatitis. No personal or fam hx of thyroid ca or MEN2. Ideally she would be on victoza due to CV indication, however, pt does not want to take another daily injection      1.  Diet: 3-4 carb servings per meal for females, 4-5 carb servings per meal for males  Spread carb intake throughout the day  Increase lean protein and vegetable intake  Avoid sugary drinks and processed carbs including crackers, cookies, cakes  2.  Exercise: Recommend at least 30 minutes of exercise daily, at least 5 days per week. Increase exercise gradually.   3.  Blood Glucose Goal: Blood glucose goal <150 fasting, <180 2 hr postprandial  4.  Microalbumin due 2/2020  5.  Education performed during this visit: long term diabetic complications discussed. , annual eye examinations at Ophthalmology discussed, dental hygiene discussed  and foot care reviewed., home glucose monitoring emphasized, all medications, side effects and compliance discussed carefully and Hypoglycemia management and prevention reviewed. Reviewed ‘ABCs’ of diabetes management (respective goals in parentheses):  A1C (<7), blood pressure (<130/80), and  cholesterol (LDL <100, if CVD <70).    Hyperlipidemia  -intolerant to multiple statins  -also did not tolerate repatha due to chest pains that resolved after stopping the medication  -could not swallow vascepa  -cont lopid  -zetia 10mg daily added 5/2019  -recheck lipids next visit    Hypothyroidism  -cont levothyroxine  -normal TFTs 5/2019    There are no Patient Instructions on file for this visit.    Follow up: Return in about 3 months (around 11/26/2019).    Discussed the nature of the disease including, risks, complications, implications, management, safe and proper use of medications. Encouraged therapeutic lifestyle changes including low calorie diet with plenty of fruits and vegetables, daily exercise, medication compliance, and keeping scheduled follow up appointments with me and any other providers. Encouraged patient to have appointment for complete physical, fasting labs, appropriate screenings, and immunizations on an annual basis.      Santos Soriano PA-C

## 2019-11-14 ENCOUNTER — PREP FOR SURGERY (OUTPATIENT)
Dept: OTHER | Facility: HOSPITAL | Age: 79
End: 2019-11-14

## 2019-11-14 DIAGNOSIS — K21.9 CHRONIC GERD: Primary | ICD-10-CM

## 2019-11-19 PROBLEM — K21.9 CHRONIC GERD: Status: ACTIVE | Noted: 2019-11-19

## 2019-11-26 ENCOUNTER — OFFICE VISIT (OUTPATIENT)
Dept: ENDOCRINOLOGY | Facility: CLINIC | Age: 79
End: 2019-11-26

## 2019-11-26 ENCOUNTER — APPOINTMENT (OUTPATIENT)
Dept: PREADMISSION TESTING | Facility: HOSPITAL | Age: 79
End: 2019-11-26

## 2019-11-26 VITALS
WEIGHT: 165 LBS | DIASTOLIC BLOOD PRESSURE: 70 MMHG | SYSTOLIC BLOOD PRESSURE: 138 MMHG | OXYGEN SATURATION: 98 % | BODY MASS INDEX: 28.32 KG/M2 | HEART RATE: 74 BPM

## 2019-11-26 VITALS — HEIGHT: 64 IN | BODY MASS INDEX: 28.24 KG/M2 | WEIGHT: 165.4 LBS

## 2019-11-26 DIAGNOSIS — E03.9 ACQUIRED HYPOTHYROIDISM: ICD-10-CM

## 2019-11-26 DIAGNOSIS — E78.2 MIXED HYPERLIPIDEMIA: ICD-10-CM

## 2019-11-26 DIAGNOSIS — E11.65 UNCONTROLLED TYPE 2 DIABETES MELLITUS WITH HYPERGLYCEMIA (HCC): Primary | ICD-10-CM

## 2019-11-26 LAB
DEPRECATED RDW RBC AUTO: 45.6 FL (ref 37–54)
ERYTHROCYTE [DISTWIDTH] IN BLOOD BY AUTOMATED COUNT: 14.3 % (ref 12.3–15.4)
GLUCOSE BLDC GLUCOMTR-MCNC: 163 MG/DL (ref 70–130)
HBA1C MFR BLD: 6.9 %
HCT VFR BLD AUTO: 37.6 % (ref 34–46.6)
HGB BLD-MCNC: 11.8 G/DL (ref 12–15.9)
MCH RBC QN AUTO: 27.3 PG (ref 26.6–33)
MCHC RBC AUTO-ENTMCNC: 31.4 G/DL (ref 31.5–35.7)
MCV RBC AUTO: 87 FL (ref 79–97)
PLATELET # BLD AUTO: 242 10*3/MM3 (ref 140–450)
PMV BLD AUTO: 9.8 FL (ref 6–12)
RBC # BLD AUTO: 4.32 10*6/MM3 (ref 3.77–5.28)
WBC NRBC COR # BLD: 6.37 10*3/MM3 (ref 3.4–10.8)

## 2019-11-26 PROCEDURE — 82947 ASSAY GLUCOSE BLOOD QUANT: CPT | Performed by: PHYSICIAN ASSISTANT

## 2019-11-26 PROCEDURE — 99214 OFFICE O/P EST MOD 30 MIN: CPT | Performed by: PHYSICIAN ASSISTANT

## 2019-11-26 PROCEDURE — 36415 COLL VENOUS BLD VENIPUNCTURE: CPT

## 2019-11-26 PROCEDURE — 85027 COMPLETE CBC AUTOMATED: CPT | Performed by: ANESTHESIOLOGY

## 2019-11-26 PROCEDURE — 83036 HEMOGLOBIN GLYCOSYLATED A1C: CPT | Performed by: PHYSICIAN ASSISTANT

## 2019-11-26 RX ORDER — RANOLAZINE 500 MG/1
500 TABLET, EXTENDED RELEASE ORAL 2 TIMES DAILY
Status: ON HOLD | COMMUNITY
End: 2020-01-08

## 2019-11-26 RX ORDER — EZETIMIBE 10 MG/1
10 TABLET ORAL DAILY
Qty: 30 TABLET | Refills: 6 | Status: ON HOLD | OUTPATIENT
Start: 2019-11-26 | End: 2020-01-08

## 2019-11-26 RX ORDER — GLIMEPIRIDE 4 MG/1
4 TABLET ORAL
Qty: 60 TABLET | Refills: 6 | Status: SHIPPED | OUTPATIENT
Start: 2019-11-26

## 2019-11-26 RX ORDER — PEN NEEDLE, DIABETIC 32GX 5/32"
NEEDLE, DISPOSABLE MISCELLANEOUS
Qty: 50 EACH | Refills: 11 | Status: SHIPPED | OUTPATIENT
Start: 2019-11-26

## 2019-11-26 RX ORDER — PANTOPRAZOLE SODIUM 40 MG/1
40 TABLET, DELAYED RELEASE ORAL DAILY
Status: ON HOLD | COMMUNITY
End: 2020-01-08

## 2019-11-26 RX ORDER — INSULIN GLARGINE 100 [IU]/ML
INJECTION, SOLUTION SUBCUTANEOUS
Qty: 2 PEN | Refills: 6 | Status: ON HOLD | OUTPATIENT
Start: 2019-11-26 | End: 2020-01-08

## 2019-11-26 RX ORDER — LEVOTHYROXINE SODIUM 88 UG/1
88 TABLET ORAL EVERY MORNING
Qty: 30 TABLET | Refills: 6 | Status: SHIPPED | OUTPATIENT
Start: 2019-11-26

## 2019-11-26 NOTE — PROGRESS NOTES
"Chief Complaint  Establish care for Diabetes Mellitus.    HPI   Angelica Spivey is a 79 y.o. female who is here today for evaluation of Diabetes Mellitus type 2. The initial diagnosis of diabetes was made in her 50s.     Did not start Trulicity due to cost.   Has improved diet in the last 2 weeks.   Labs with PCP 9/2019.     A1C- 8 (8/26/19), 8.1 (5/2/19), 7.8 (10/2018)  Labs reviewed:  5/2/19- GFR 54, LFTs wnl, ,   10/2018- hgb 10.6, hct 33.5    Diabetic complications: cardiovascular disease s/p multiple stents  Eye exam current (within one year): utd  Foot care and dental care: discussed    Current diabetic medications include:  Metformin 500mg 2 tab QD - did not tolerate higher dose  Tradjenta 5mg QD - still taking  Glimepiride 4mg BID AC, before breakfast and dinner  Lantus 10U QPM - varying the amount she takes based on her blood sugar in the evening to avoid hypoglycemia    Statin: did not tolerate statins due to myalgias, on lopid. Could not swallow vascepa pills. Had chest pains with repatha, took several shots. Now on zetia 10mg daily    Past medications: prandin, Jardiance (yeast infections), tradjenta     Diabetic Monitoring  - checks glucose 2x/day  Glucose is averaging- fbs mostly 100-130s, in the evening mostly 140-180  Hypoglycemia- not recently  Home blood sugar records: glucometer downloaded, data reviewed and scanned to chart    Nutrition:     Current diet: in general, a \"healthy\" diet  , on average, 3 meals per day  Current exercise: none  Seen RD in past: no    Hypothyroidism  -diagnosed many years ago  -on levothyroxine 88mcg daily, taking in the morning on empty stomach, not missing doses    The following portions of the patient's history were reviewed and updated by me as appropriate: allergies, current medications, past family history, past social history, past surgical history and problem list.    Past Medical History:   Diagnosis Date   • Anxiety    • Arthritis    • Coronary " artery disease    • Diabetes mellitus (CMS/HCC)    • Disease of thyroid gland    • Elevated cholesterol    • GERD (gastroesophageal reflux disease)    • Hearing aid worn    • Heart attack (CMS/HCC)    • History of stomach ulcers    • Hypertension    • PONV (postoperative nausea and vomiting)    • Wears glasses        Medications    Current Outpatient Medications:   •  aspirin 81 MG EC tablet, Take 81 mg by mouth Every Evening., Disp: , Rfl:   •  Blood Glucose Monitoring Suppl (ACCU-CHEK LEXIE PLUS) w/Device kit, Use device to check blood sugar 3 times a day, Disp: 1 kit, Rfl: 0  •  carvedilol (COREG) 6.25 MG tablet, Take 6.25 mg by mouth 2 (Two) Times a Day With Meals., Disp: , Rfl:   •  clopidogrel (PLAVIX) 75 MG tablet, Take 1 tablet by mouth Daily., Disp: 30 tablet, Rfl: 11  •  ezetimibe (ZETIA) 10 MG tablet, Take 1 tablet by mouth Daily., Disp: 30 tablet, Rfl: 6  •  glimepiride (AMARYL) 4 MG tablet, Take 1 tablet by mouth 2 (Two) Times a Day Before Meals., Disp: 60 tablet, Rfl: 6  •  glucose blood (ACCU-CHEK LEXIE PLUS) test strip, Use as instructed to test blood sugar 3 times daily, Disp: 100 each, Rfl: 12  •  LANTUS SOLOSTAR 100 UNIT/ML injection pen, Take up to 15u qhs as directed, Disp: 2 pen, Rfl: 6  •  levothyroxine (SYNTHROID, LEVOTHROID) 88 MCG tablet, Take 1 tablet by mouth Every Morning., Disp: 30 tablet, Rfl: 6  •  losartan 50 MG tablet 2 tablet, hydrochlorothiazide 25 MG tablet 0.5 tablet, Take 1 dose by mouth Daily., Disp: , Rfl:   •  metFORMIN (GLUCOPHAGE) 500 MG tablet, Take 2 tablets by mouth Every Evening., Disp: 60 tablet, Rfl: 6  •  nitroglycerin (NITROSTAT) 0.4 MG SL tablet, , Disp: , Rfl:   •  pantoprazole (PROTONIX) 40 MG EC tablet, Take 40 mg by mouth Daily., Disp: , Rfl:   •  ranolazine (RANEXA) 500 MG 12 hr tablet, Take 500 mg by mouth 2 (Two) Times a Day., Disp: , Rfl:   •  RELION PEN NEEDLES 32G X 4 MM misc, Use daily with insulin, Disp: 50 each, Rfl: 11  •  linagliptin (TRADJENTA) 5  MG tablet tablet, Take 1 tablet by mouth Daily., Disp: 30 tablet, Rfl: 6    Review of Systems  Review of Systems   Constitutional: Negative for chills, fatigue, fever and unexpected weight change.   HENT: Positive for hearing loss. Negative for ear pain, nosebleeds, rhinorrhea and sore throat.    Eyes: Negative for pain, discharge, redness, itching and visual disturbance.   Respiratory: Positive for shortness of breath. Negative for cough and wheezing.    Cardiovascular: Positive for leg swelling. Negative for chest pain and palpitations.   Gastrointestinal: Negative for abdominal pain, blood in stool, constipation and diarrhea.   Endocrine: Negative for cold intolerance, heat intolerance and polydipsia.   Genitourinary: Negative for dysuria, frequency, hematuria, pelvic pain, vaginal bleeding and vaginal discharge.   Musculoskeletal: Positive for arthralgias, joint swelling and myalgias. Negative for gait problem.   Skin: Negative for rash.   Allergic/Immunologic: Negative.    Neurological: Positive for weakness. Negative for dizziness, syncope, numbness and headaches.   Hematological: Negative for adenopathy. Bruises/bleeds easily.   Psychiatric/Behavioral: Positive for confusion. Negative for sleep disturbance and suicidal ideas. The patient is not nervous/anxious.         Physical Exam    /70   Pulse 74   Wt 74.8 kg (165 lb)   SpO2 98%   BMI 28.32 kg/m² Body mass index is 28.32 kg/m².  Physical Exam   Constitutional: She is oriented to person, place, and time. She appears well-developed. No distress.   HENT:   Head: Normocephalic.   Right Ear: External ear normal.   Left Ear: External ear normal.   Mouth/Throat: No oropharyngeal exudate.   Eyes: Conjunctivae and lids are normal. Right eye exhibits no discharge. Left eye exhibits no discharge. Right pupil is reactive. Left pupil is reactive.   Neck: No JVD present. No tracheal deviation present. No thyroid mass and no thyromegaly present.    Cardiovascular: Normal rate, regular rhythm, normal heart sounds and intact distal pulses.   No murmur heard.  Pulmonary/Chest: Effort normal and breath sounds normal. No respiratory distress. She has no wheezes.   Abdominal: Soft. Bowel sounds are normal. There is no tenderness.   Musculoskeletal: She exhibits no edema or tenderness.   Lymphadenopathy:     She has no cervical adenopathy.   Neurological: She is alert and oriented to person, place, and time.   Skin: Skin is warm, dry and intact. No rash noted. She is not diaphoretic. No erythema.   Psychiatric: She has a normal mood and affect. Her speech is normal and behavior is normal. Thought content normal.       Labs and Imaging   Lab Results   Component Value Date    HGBA1C 6.9 11/26/2019    HGBA1C 8.0 08/26/2019    HGBA1C 7.80 (H) 10/04/2018     Appointment on 11/26/2019   Component Date Value Ref Range Status   • WBC 11/26/2019 6.37  3.40 - 10.80 10*3/mm3 Final   • RBC 11/26/2019 4.32  3.77 - 5.28 10*6/mm3 Final   • Hemoglobin 11/26/2019 11.8* 12.0 - 15.9 g/dL Final   • Hematocrit 11/26/2019 37.6  34.0 - 46.6 % Final   • MCV 11/26/2019 87.0  79.0 - 97.0 fL Final   • MCH 11/26/2019 27.3  26.6 - 33.0 pg Final   • MCHC 11/26/2019 31.4* 31.5 - 35.7 g/dL Final   • RDW 11/26/2019 14.3  12.3 - 15.4 % Final   • RDW-SD 11/26/2019 45.6  37.0 - 54.0 fl Final   • MPV 11/26/2019 9.8  6.0 - 12.0 fL Final   • Platelets 11/26/2019 242  140 - 450 10*3/mm3 Final   Office Visit on 11/26/2019   Component Date Value Ref Range Status   • Glucose 11/26/2019 163* 70 - 130 mg/dL Final   • Hemoglobin A1C 11/26/2019 6.9  % Final     No images are attached to the encounter or orders placed in the encounter.  Mammo Screening Digital Tomosynthesis Bilateral With Cad    Result Date: 4/8/2019  No findings suspicious for malignancy.  ACR BI-RADS CATEGORY:  1, NEGATIVE  RECOMMENDATION: Yearly mammogram, yearly clinical breast exam, and encourage self breast awareness.  CAD was used.  The  standard false negative rate of mammography is between 10% and 25%. Complex patterns or increased breast density will markedly elevate the false negative rate of mammography.  A letter, in lay terminology, with the results of this exam will be mailed to the patient.   If there is a palpable area of concern, biopsy should be considered regardless of imaging findings.    This report was finalized on 4/8/2019 2:15 PM by Dr. Sofia Robertson MD.        Assessment / Plan   Angelica was seen today for follow-up.    Diagnoses and all orders for this visit:    Uncontrolled type 2 diabetes mellitus with hyperglycemia (CMS/[facility])  -     POC Glucose Fingerstick  -     POC Glycosylated Hemoglobin (Hb A1C)  -     glucose blood (ACCU-CHEK LEXIE PLUS) test strip; Use as instructed to test blood sugar 3 times daily  -     RELION PEN NEEDLES 32G X 4 MM misc; Use daily with insulin  -     metFORMIN (GLUCOPHAGE) 500 MG tablet; Take 2 tablets by mouth Every Evening.  -     LANTUS SOLOSTAR 100 UNIT/ML injection pen; Take up to 15u qhs as directed  -     glimepiride (AMARYL) 4 MG tablet; Take 1 tablet by mouth 2 (Two) Times a Day Before Meals.  -     linagliptin (TRADJENTA) 5 MG tablet tablet; Take 1 tablet by mouth Daily.    Mixed hyperlipidemia  -     ezetimibe (ZETIA) 10 MG tablet; Take 1 tablet by mouth Daily.    Acquired hypothyroidism  -     levothyroxine (SYNTHROID, LEVOTHROID) 88 MCG tablet; Take 1 tablet by mouth Every Morning.        Diabetes Mellitus 2 is under good control.  -A1c 6.9, no hypoglycemia  -bs readings have improved in the last 2 weeks with improved diet  -Discussed with patient that glycemic targets are generally set somewhat higher (eg, <8 percent) for older adult patients with comorbidities.   -continue lantus but decrease to 8u qhs as she is currently varying her dose to avoid hypoglycemia  -cont metformin 500mg 2 tab qd, did not tolerate higher dose  -cont glimepiride 4mg AC BID (before breakfast and  supper)  -continue tradjenta 5mg daily      1.  Diet: 3-4 carb servings per meal for females, 4-5 carb servings per meal for males  Spread carb intake throughout the day  Increase lean protein and vegetable intake  Avoid sugary drinks and processed carbs including crackers, cookies, cakes  2.  Exercise: Recommend at least 30 minutes of exercise daily, at least 5 days per week. Increase exercise gradually.   3.  Blood Glucose Goal: Blood glucose goal <150 fasting, <180 2 hr postprandial  4.  Microalbumin due 2/2020  5.  Education performed during this visit: long term diabetic complications discussed. , annual eye examinations at Ophthalmology discussed, dental hygiene discussed  and foot care reviewed., home glucose monitoring emphasized, all medications, side effects and compliance discussed carefully and Hypoglycemia management and prevention reviewed. Reviewed ‘ABCs’ of diabetes management (respective goals in parentheses):  A1C (<7), blood pressure (<130/80), and cholesterol (LDL <100, if CVD <70).    Hyperlipidemia  -intolerant to multiple statins  -also did not tolerate repatha due to chest pains that resolved after stopping the medication  -could not swallow vascepa  -cont lopid  -zetia 10mg daily added 5/2019  -will obtain labs from PCP from 9/2019    Hypothyroidism  -cont levothyroxine  -normal TFTs 5/2019, will obtain updated labs from PCP    There are no Patient Instructions on file for this visit.    Follow up: Return in about 3 months (around 2/26/2020).    Discussed the nature of the disease including, risks, complications, implications, management, safe and proper use of medications. Encouraged therapeutic lifestyle changes including low calorie diet with plenty of fruits and vegetables, daily exercise, medication compliance, and keeping scheduled follow up appointments with me and any other providers. Encouraged patient to have appointment for complete physical, fasting labs, appropriate screenings,  and immunizations on an annual basis.      Santos Soriano PA-C

## 2019-12-06 ENCOUNTER — ANESTHESIA (OUTPATIENT)
Dept: GASTROENTEROLOGY | Facility: HOSPITAL | Age: 79
End: 2019-12-06

## 2019-12-06 ENCOUNTER — ANESTHESIA EVENT (OUTPATIENT)
Dept: GASTROENTEROLOGY | Facility: HOSPITAL | Age: 79
End: 2019-12-06

## 2019-12-06 ENCOUNTER — HOSPITAL ENCOUNTER (OUTPATIENT)
Facility: HOSPITAL | Age: 79
Setting detail: HOSPITAL OUTPATIENT SURGERY
Discharge: HOME | End: 2019-12-06
Attending: INTERNAL MEDICINE | Admitting: INTERNAL MEDICINE

## 2019-12-06 VITALS
DIASTOLIC BLOOD PRESSURE: 84 MMHG | BODY MASS INDEX: 27.83 KG/M2 | WEIGHT: 163 LBS | RESPIRATION RATE: 20 BRPM | OXYGEN SATURATION: 97 % | SYSTOLIC BLOOD PRESSURE: 148 MMHG | HEIGHT: 64 IN | TEMPERATURE: 98.9 F | HEART RATE: 80 BPM

## 2019-12-06 DIAGNOSIS — K21.9 CHRONIC GERD: ICD-10-CM

## 2019-12-06 LAB
GLUCOSE BLDC GLUCOMTR-MCNC: 104 MG/DL (ref 70–130)
GLUCOSE BLDC GLUCOMTR-MCNC: 75 MG/DL (ref 70–130)
GLUCOSE BLDC GLUCOMTR-MCNC: 89 MG/DL (ref 70–130)
GLUCOSE BLDC GLUCOMTR-MCNC: 91 MG/DL (ref 70–130)
POTASSIUM BLD-SCNC: 3.9 MMOL/L (ref 3.5–5.2)

## 2019-12-06 PROCEDURE — 25010000002 PROPOFOL 10 MG/ML EMULSION: Performed by: NURSE ANESTHETIST, CERTIFIED REGISTERED

## 2019-12-06 PROCEDURE — 25010000003 LIDOCAINE 1 % SOLUTION: Performed by: NURSE ANESTHETIST, CERTIFIED REGISTERED

## 2019-12-06 PROCEDURE — 82962 GLUCOSE BLOOD TEST: CPT

## 2019-12-06 PROCEDURE — 88342 IMHCHEM/IMCYTCHM 1ST ANTB: CPT | Performed by: INTERNAL MEDICINE

## 2019-12-06 PROCEDURE — 93010 ELECTROCARDIOGRAM REPORT: CPT | Performed by: INTERNAL MEDICINE

## 2019-12-06 PROCEDURE — 88305 TISSUE EXAM BY PATHOLOGIST: CPT | Performed by: INTERNAL MEDICINE

## 2019-12-06 PROCEDURE — 43239 EGD BIOPSY SINGLE/MULTIPLE: CPT | Performed by: INTERNAL MEDICINE

## 2019-12-06 PROCEDURE — 93005 ELECTROCARDIOGRAM TRACING: CPT | Performed by: ANESTHESIOLOGY

## 2019-12-06 PROCEDURE — 84132 ASSAY OF SERUM POTASSIUM: CPT | Performed by: ANESTHESIOLOGY

## 2019-12-06 RX ORDER — SODIUM CHLORIDE, SODIUM LACTATE, POTASSIUM CHLORIDE, CALCIUM CHLORIDE 600; 310; 30; 20 MG/100ML; MG/100ML; MG/100ML; MG/100ML
INJECTION, SOLUTION INTRAVENOUS CONTINUOUS PRN
Status: DISCONTINUED | OUTPATIENT
Start: 2019-12-06 | End: 2019-12-06 | Stop reason: SURG

## 2019-12-06 RX ORDER — PROMETHAZINE HYDROCHLORIDE 25 MG/ML
6.25 INJECTION, SOLUTION INTRAMUSCULAR; INTRAVENOUS ONCE AS NEEDED
Status: DISCONTINUED | OUTPATIENT
Start: 2019-12-06 | End: 2019-12-06 | Stop reason: HOSPADM

## 2019-12-06 RX ORDER — SUCRALFATE 1 G/1
1 TABLET ORAL 4 TIMES DAILY PRN
Status: ON HOLD | COMMUNITY
End: 2020-12-03

## 2019-12-06 RX ORDER — LIDOCAINE HYDROCHLORIDE 10 MG/ML
INJECTION, SOLUTION INFILTRATION; PERINEURAL AS NEEDED
Status: DISCONTINUED | OUTPATIENT
Start: 2019-12-06 | End: 2019-12-06 | Stop reason: SURG

## 2019-12-06 RX ORDER — DEXTROSE MONOHYDRATE 25 G/50ML
25 INJECTION, SOLUTION INTRAVENOUS ONCE
Status: COMPLETED | OUTPATIENT
Start: 2019-12-06 | End: 2019-12-06

## 2019-12-06 RX ORDER — PROMETHAZINE HYDROCHLORIDE 25 MG/1
25 TABLET ORAL ONCE AS NEEDED
Status: DISCONTINUED | OUTPATIENT
Start: 2019-12-06 | End: 2019-12-06 | Stop reason: HOSPADM

## 2019-12-06 RX ORDER — PROPOFOL 10 MG/ML
VIAL (ML) INTRAVENOUS AS NEEDED
Status: DISCONTINUED | OUTPATIENT
Start: 2019-12-06 | End: 2019-12-06 | Stop reason: SURG

## 2019-12-06 RX ORDER — FAMOTIDINE 10 MG/ML
20 INJECTION, SOLUTION INTRAVENOUS ONCE
Status: COMPLETED | OUTPATIENT
Start: 2019-12-06 | End: 2019-12-06

## 2019-12-06 RX ORDER — PROMETHAZINE HYDROCHLORIDE 25 MG/1
25 SUPPOSITORY RECTAL ONCE AS NEEDED
Status: DISCONTINUED | OUTPATIENT
Start: 2019-12-06 | End: 2019-12-06 | Stop reason: HOSPADM

## 2019-12-06 RX ORDER — SODIUM CHLORIDE, SODIUM LACTATE, POTASSIUM CHLORIDE, CALCIUM CHLORIDE 600; 310; 30; 20 MG/100ML; MG/100ML; MG/100ML; MG/100ML
20 INJECTION, SOLUTION INTRAVENOUS ONCE
Status: COMPLETED | OUTPATIENT
Start: 2019-12-06 | End: 2019-12-06

## 2019-12-06 RX ADMIN — PROPOFOL 50 MG: 10 INJECTION, EMULSION INTRAVENOUS at 16:54

## 2019-12-06 RX ADMIN — LIDOCAINE HYDROCHLORIDE 50 MG: 10 INJECTION, SOLUTION INFILTRATION; PERINEURAL at 16:54

## 2019-12-06 RX ADMIN — SODIUM CHLORIDE, POTASSIUM CHLORIDE, SODIUM LACTATE AND CALCIUM CHLORIDE 20 ML/HR: 600; 310; 30; 20 INJECTION, SOLUTION INTRAVENOUS at 14:18

## 2019-12-06 RX ADMIN — DEXTROSE MONOHYDRATE 25 ML: 25 INJECTION, SOLUTION INTRAVENOUS at 16:08

## 2019-12-06 RX ADMIN — SODIUM CHLORIDE, POTASSIUM CHLORIDE, SODIUM LACTATE AND CALCIUM CHLORIDE: 600; 310; 30; 20 INJECTION, SOLUTION INTRAVENOUS at 16:49

## 2019-12-06 RX ADMIN — PROPOFOL 100 MG: 10 INJECTION, EMULSION INTRAVENOUS at 16:57

## 2019-12-06 RX ADMIN — FAMOTIDINE 20 MG: 10 INJECTION INTRAVENOUS at 14:18

## 2019-12-06 NOTE — OP NOTE
EGD with biopsy    Instrument: Olympus video gastroscope    Preop diagnosis: atypical chest pain and reflux    Postop diagnosis: gastritis shallow ulceration      Indications: Patient is a 79-year-old with atypical chest pain which described as a burning as well as epigastric burning.  This is fairly constant.  It is unrelieved with Nexium.  She does have a history of peptic ulcer disease.      Procedure: After the patient was informed of the risk benefits alternatives to the procedure informed sent was obtained.  The endoscope was inserted in the oropharynx down the esophagus into the stomach and duodenum.  Esophageal mucosa looked normal without any obvious signs of esophagitis.  Some small biopsies were taken to rule out eosinophilic esophagitis although no rings and furrows were seen.  There was a crisp Z line.  I did not see any Ricci's esophagus.  There was a very small hiatal hernia.  The stomach is remarkable for some mild gastritis.  There was one shallow erosion.  The duodenal bulb and descending duodenum were unremarkable.  Biopsies were taken from the antrum to rule out Helicobacter.  Retroflexion was performed.  Photos were taken.      Impressions and plan #1 gastritis with erosion #2 small hiatal hernia    Plan I am going to have her double her Nexium to twice daily if that is okay with cardiology.  We will follow-up the biopsies.        CC Aldo Anderson

## 2019-12-06 NOTE — ANESTHESIA PREPROCEDURE EVALUATION
Anesthesia Evaluation     Patient summary reviewed and Nursing notes reviewed   history of anesthetic complications: PONV  NPO Solid Status: > 8 hours  NPO Liquid Status: > 2 hours           Airway   Mallampati: I  TM distance: >3 FB  Neck ROM: full  No difficulty expected  Dental    (+) poor dentition    Pulmonary    (+) decreased breath sounds,   Cardiovascular     Rhythm: regular  Rate: normal    (+) hypertension well controlled 2 medications or greater, past MI  >12 months, CAD, hyperlipidemia,       Neuro/Psych  (+) psychiatric history,     GI/Hepatic/Renal/Endo    (+)  GERD,  diabetes mellitus type 2 well controlled using insulin,     Musculoskeletal     Abdominal   (+) obese,    Substance History      OB/GYN          Other   arthritis,                      Anesthesia Plan    ASA 3     general     intravenous induction     Anesthetic plan, all risks, benefits, and alternatives have been provided, discussed and informed consent has been obtained with: patient.    Plan discussed with CRNA.

## 2019-12-06 NOTE — H&P
"Gastroenterology Consult Note    Reason for Consultation: Reflux    Patient Care Team:  Abdoulaye Andreson DO as PCP - General (Internal Medicine)  Aldo Barth DO as Consulting Physician (Cardiology)    Chief complaint burning in the chest      History of present illness: The patient is a 79-year-old female with some burning in her chest.  This is been worse for the past month.  It is fairly constant.  It even comes up into her mouth.  She has tried Protonix in the past without significant benefit.  She is now on Nexium 40 mg a day.  She has an epigastric distress as well which is burning and fairly constant.  She has had an ultrasound of the gallbladder which was normal.  She has no dysphasia.  She is on Plavix.  Is a history of peptic ulcer disease.  She had a colonoscopy in the not too distant past and had polyps.  This was done by Dr. Shea.  She had recent coronary stents in September.  She has had 6 stents total.  She has diabetes and hyperlipidemia.    Allergies   Allergen Reactions   • Biaxin [Clarithromycin] Anaphylaxis     \"BLISTERS AND THROAT SWELLING\"   • Allegra [Fexofenadine] Other (See Comments)     \"MAKES ME FEEL FUNNY\"   • Repatha [Evolocumab] Other (See Comments)     Chest pain   • Sulfa Antibiotics Nausea Only     Prior to Admission medications    Medication Sig Start Date End Date Taking? Authorizing Provider   aspirin 81 MG EC tablet Take 81 mg by mouth Every Evening.   Yes Tim Gao MD   Blood Glucose Monitoring Suppl (ACCU-CHEK LEXIE PLUS) w/Device kit Use device to check blood sugar 3 times a day 5/7/19  Yes Santos Soriano PA-C   carvedilol (COREG) 6.25 MG tablet Take 6.25 mg by mouth 2 (Two) Times a Day With Meals.   Yes Tim Gao MD   clopidogrel (PLAVIX) 75 MG tablet Take 1 tablet by mouth Daily. 3/11/19  Yes David Burnett MD   ezetimibe (ZETIA) 10 MG tablet Take 1 tablet by mouth Daily. 11/26/19 11/25/20 Yes Santos Soriano PA-C   glimepiride " (AMARYL) 4 MG tablet Take 1 tablet by mouth 2 (Two) Times a Day Before Meals. 11/26/19  Yes Santos Soriano PA-C   glucose blood (ACCU-CHEK LEXIE PLUS) test strip Use as instructed to test blood sugar 3 times daily 11/26/19  Yes Santos Soriano PA-C   LANTUS SOLOSTAR 100 UNIT/ML injection pen Take up to 15u qhs as directed 11/26/19  Yes Santos Soriano PA-C   levothyroxine (SYNTHROID, LEVOTHROID) 88 MCG tablet Take 1 tablet by mouth Every Morning. 11/26/19  Yes Santos Soriano PA-C   linagliptin (TRADJENTA) 5 MG tablet tablet Take 1 tablet by mouth Daily. 11/26/19  Yes Santos Soriano PA-C   losartan 50 MG tablet 2 tablet, hydrochlorothiazide 25 MG tablet 0.5 tablet Take 1 dose by mouth Daily.   Yes Tim Gao MD   metFORMIN (GLUCOPHAGE) 500 MG tablet Take 2 tablets by mouth Every Evening. 11/26/19  Yes Santos Soriano PA-C   nitroglycerin (NITROSTAT) 0.4 MG SL tablet  3/6/19  Yes ProviderTim MD   pantoprazole (PROTONIX) 40 MG EC tablet Take 40 mg by mouth Daily.   Yes Tim Gao MD   ranolazine (RANEXA) 500 MG 12 hr tablet Take 500 mg by mouth 2 (Two) Times a Day.   Yes ProviderTim MD   RELION PEN NEEDLES 32G X 4 MM misc Use daily with insulin 11/26/19  Yes Santos Soriano PA-C   sucralfate (CARAFATE) 1 g tablet Take 1 g by mouth 4 (Four) Times a Day.   Yes ProviderTim MD      No current facility-administered medications for this encounter.       Past Medical History:   Diagnosis Date   • Anxiety    • Arthritis    • Coronary artery disease    • Diabetes mellitus (CMS/HCC)    • Disease of thyroid gland    • Elevated cholesterol    • GERD (gastroesophageal reflux disease)    • Hearing aid worn    • Heart attack (CMS/HCC)    • History of stomach ulcers    • Hypertension    • PONV (postoperative nausea and vomiting)    • Wears glasses      Past Surgical History:   Procedure Laterality Date   • BLADDER SUSPENSION     • CARDIAC CATHETERIZATION N/A  8/15/2018    Procedure: Left Heart Cath;  Surgeon: David Burnett MD;  Location:  [facility];  Service: Cardiology   • CATARACT EXTRACTION Bilateral    • COLONOSCOPY     • ENDOSCOPY     • HYSTERECTOMY  2014    PARTIAL   • OOPHORECTOMY Bilateral 2014   • OH RT/LT HEART CATHETERS N/A 10/5/2018    Procedure: Percutaneous Coronary Intervention;  Surgeon: David Burnett MD;  Location:  [facility];  Service: Cardiology   • VULVECTOMY       Family History   Problem Relation Age of Onset   • Breast cancer Neg Hx    • Ovarian cancer Neg Hx      GI Family History  No family history of colon polyps or cancers  Social History     Tobacco Use   Smoking Status Never Smoker   Smokeless Tobacco Never Used     Social History     Substance and Sexual Activity   Alcohol Use No      Social History     Substance and Sexual Activity   Drug Use No        ------  Review of Systems    Vital Signs   Temp:  [97.4 °F (36.3 °C)] 97.4 °F (36.3 °C)  Heart Rate:  [84] 84  Resp:  [16] 16  BP: (151)/(79) 151/79    Physical Exam:   General Appearance: Alert, in no acute distress  Head: Normocephalic, without obvious abnormality, atraumatic  Eyes: Lids and lashes normal, conjunctivae and sclerae normal, no icterus, no pallor, corneas clear, PERRLA  Ears: Ears appear intact with no abnormalities noted  Throat: No oral lesions, no thrush, oral mucosa moist  Neck: No adenopathy, supple, trachea midline, no thyromegaly, no JVD  Lungs: Clear to auscultation,respirations regular, even and unlabored Heart: Regular rhythm and normal rate, normal S1 and S2, no murmur, no gallop, no rub, no click  Chest Wall: Symmetrical respiratory expansion  Abdomen: Normal bowel sounds, no masses, no organomegaly, soft non-tender, non-distended, no guarding, no rebound tenderness  Extremities:  Moves all extremities well, no edema, no cyanosis, no redness  Skin: No bleeding, bruising or rash  Neurologic: Cranial nerves 2 - 12 grossly  intact, no focal deficits       LABS:   Lab Results (last 48 hours)     Procedure Component Value Units Date/Time    Potassium [[id number]] Collected:  12/06/19 1431    Specimen:  Blood Updated:  12/06/19 1431    POC Glucose Once [[id number]]  (Normal) Collected:  12/06/19 1354    Specimen:  Blood Updated:  12/06/19 1400     Glucose 89 mg/dL         RADIOLOGY:  Imaging Results (Last 24 Hours)     ** No results found for the last 24 hours. **          Assessment/Plan       Chronic GERD      Impressions and plan #1 burning chest pain as well as epigastric pain.  We will plan an upper endoscopy.  We will look for signs of eosinophilic esophagitis.  We will look for signs of ulcerative esophagitis.  Will biopsy as needed.  Risk benefits alternatives were explained in detail.  She has no dysphasia.  If we do not find anything significant we may have her double her Nexium for a while to see if that does not improve her symptoms.    I discussed the patients findings and my recommendations with patient    Burak Patino MD  12/06/19  2:58 PM

## 2019-12-06 NOTE — ANESTHESIA POSTPROCEDURE EVALUATION
Patient: Angelica Spivey    Procedure Summary     Date:  12/06/19 Room / Location:   [facility]    Anesthesia Start:  1650 Anesthesia Stop:      Procedure:  ESOPHAGOGASTRODUODENOSCOPY (N/A ) Diagnosis:       Chronic GERD      (Chronic GERD [K21.9])    Surgeon:  Burak Patino MD Provider:  Duke Corbett MD    Anesthesia Type:  general ASA Status:  3          Anesthesia Type: general  Last vitals  BP   136/70 (12/06/19 1708)   Temp   99 °F (37.2 °C) (12/06/19 1708)   Pulse   104 (12/06/19 1708)   Resp   24 (12/06/19 1708)     SpO2   99 % (12/06/19 1708)     Post Anesthesia Care and Evaluation    Patient location during evaluation: PACU  Patient participation: complete - patient participated  Level of consciousness: awake and alert  Pain score: 0  Pain management: adequate  Airway patency: patent  Anesthetic complications: No anesthetic complications  PONV Status: none  Cardiovascular status: stable  Respiratory status: acceptable, nasal cannula and spontaneous ventilation  Hydration status: stable    Comments: Pt transferred to PACU with O2. Vital signs stable. Report to PACU RN and care accepted.

## 2019-12-10 LAB
CYTO UR: NORMAL
LAB AP CASE REPORT: NORMAL
LAB AP CLINICAL INFORMATION: NORMAL
PATH REPORT.FINAL DX SPEC: NORMAL
PATH REPORT.GROSS SPEC: NORMAL

## 2019-12-31 ENCOUNTER — TRANSCRIBE ORDERS (OUTPATIENT)
Dept: ADMINISTRATIVE | Facility: HOSPITAL | Age: 79
End: 2019-12-31

## 2019-12-31 DIAGNOSIS — I20.0 UNSTABLE ANGINA (HCC): Primary | ICD-10-CM

## 2020-01-08 ENCOUNTER — HOSPITAL ENCOUNTER (OUTPATIENT)
Facility: HOSPITAL | Age: 80
Discharge: HOME | End: 2020-01-08
Attending: INTERNAL MEDICINE | Admitting: INTERNAL MEDICINE

## 2020-01-08 ENCOUNTER — DOCUMENTATION (OUTPATIENT)
Dept: CARDIAC REHAB | Facility: HOSPITAL | Age: 80
End: 2020-01-08

## 2020-01-08 VITALS
HEART RATE: 77 BPM | DIASTOLIC BLOOD PRESSURE: 70 MMHG | SYSTOLIC BLOOD PRESSURE: 148 MMHG | BODY MASS INDEX: 28.49 KG/M2 | WEIGHT: 166.89 LBS | HEIGHT: 64 IN | RESPIRATION RATE: 16 BRPM | OXYGEN SATURATION: 92 % | TEMPERATURE: 99.5 F

## 2020-01-08 DIAGNOSIS — E78.2 MIXED HYPERLIPIDEMIA: ICD-10-CM

## 2020-01-08 DIAGNOSIS — I20.0 UNSTABLE ANGINA (HCC): Primary | ICD-10-CM

## 2020-01-08 DIAGNOSIS — R94.39 ABNORMAL STRESS TEST: ICD-10-CM

## 2020-01-08 LAB
ANION GAP SERPL CALCULATED.3IONS-SCNC: 11 MMOL/L (ref 5–15)
BUN BLD-MCNC: 14 MG/DL (ref 8–23)
BUN/CREAT SERPL: 17.1 (ref 7–25)
CALCIUM SPEC-SCNC: 10 MG/DL (ref 8.6–10.5)
CHLORIDE SERPL-SCNC: 93 MMOL/L (ref 98–107)
CO2 SERPL-SCNC: 28 MMOL/L (ref 22–29)
CREAT BLD-MCNC: 0.82 MG/DL (ref 0.57–1)
DEPRECATED RDW RBC AUTO: 44.4 FL (ref 37–54)
ERYTHROCYTE [DISTWIDTH] IN BLOOD BY AUTOMATED COUNT: 13.9 % (ref 12.3–15.4)
GFR SERPL CREATININE-BSD FRML MDRD: 68 ML/MIN/1.73
GLUCOSE BLD-MCNC: 179 MG/DL (ref 65–99)
GLUCOSE BLDC GLUCOMTR-MCNC: 101 MG/DL (ref 70–130)
GLUCOSE BLDC GLUCOMTR-MCNC: 207 MG/DL (ref 70–130)
HCT VFR BLD AUTO: 37 % (ref 34–46.6)
HGB BLD-MCNC: 11.8 G/DL (ref 12–15.9)
MCH RBC QN AUTO: 27.4 PG (ref 26.6–33)
MCHC RBC AUTO-ENTMCNC: 31.9 G/DL (ref 31.5–35.7)
MCV RBC AUTO: 85.8 FL (ref 79–97)
PLATELET # BLD AUTO: 232 10*3/MM3 (ref 140–450)
PMV BLD AUTO: 10 FL (ref 6–12)
POTASSIUM BLD-SCNC: 4.4 MMOL/L (ref 3.5–5.2)
RBC # BLD AUTO: 4.31 10*6/MM3 (ref 3.77–5.28)
SODIUM BLD-SCNC: 132 MMOL/L (ref 136–145)
WBC NRBC COR # BLD: 6.7 10*3/MM3 (ref 3.4–10.8)

## 2020-01-08 PROCEDURE — 80048 BASIC METABOLIC PNL TOTAL CA: CPT | Performed by: INTERNAL MEDICINE

## 2020-01-08 PROCEDURE — C1769 GUIDE WIRE: HCPCS | Performed by: INTERNAL MEDICINE

## 2020-01-08 PROCEDURE — 82962 GLUCOSE BLOOD TEST: CPT

## 2020-01-08 PROCEDURE — 25010000002 ONDANSETRON PER 1 MG: Performed by: PHYSICIAN ASSISTANT

## 2020-01-08 PROCEDURE — 25010000002 MIDAZOLAM PER 1 MG: Performed by: INTERNAL MEDICINE

## 2020-01-08 PROCEDURE — C1887 CATHETER, GUIDING: HCPCS | Performed by: INTERNAL MEDICINE

## 2020-01-08 PROCEDURE — 63710000001 INSULIN LISPRO (HUMAN) PER 5 UNITS: Performed by: PHYSICIAN ASSISTANT

## 2020-01-08 PROCEDURE — 0 IOPAMIDOL PER 1 ML: Performed by: INTERNAL MEDICINE

## 2020-01-08 PROCEDURE — C1894 INTRO/SHEATH, NON-LASER: HCPCS | Performed by: INTERNAL MEDICINE

## 2020-01-08 PROCEDURE — 25010000002 BIVALIRUDIN TRIFLUOROACETATE 250 MG RECONSTITUTED SOLUTION 1 EACH VIAL: Performed by: INTERNAL MEDICINE

## 2020-01-08 PROCEDURE — 36415 COLL VENOUS BLD VENIPUNCTURE: CPT

## 2020-01-08 PROCEDURE — 85027 COMPLETE CBC AUTOMATED: CPT | Performed by: INTERNAL MEDICINE

## 2020-01-08 PROCEDURE — 25010000002 FENTANYL CITRATE (PF) 100 MCG/2ML SOLUTION: Performed by: INTERNAL MEDICINE

## 2020-01-08 PROCEDURE — 93458 L HRT ARTERY/VENTRICLE ANGIO: CPT | Performed by: INTERNAL MEDICINE

## 2020-01-08 RX ORDER — TEMAZEPAM 7.5 MG/1
7.5 CAPSULE ORAL NIGHTLY PRN
Status: DISCONTINUED | OUTPATIENT
Start: 2020-01-08 | End: 2020-01-08 | Stop reason: HOSPADM

## 2020-01-08 RX ORDER — FENTANYL CITRATE 50 UG/ML
INJECTION, SOLUTION INTRAMUSCULAR; INTRAVENOUS AS NEEDED
Status: DISCONTINUED | OUTPATIENT
Start: 2020-01-08 | End: 2020-01-08 | Stop reason: HOSPADM

## 2020-01-08 RX ORDER — ALPRAZOLAM 0.25 MG/1
0.25 TABLET ORAL 3 TIMES DAILY PRN
Status: DISCONTINUED | OUTPATIENT
Start: 2020-01-08 | End: 2020-01-08 | Stop reason: HOSPADM

## 2020-01-08 RX ORDER — MONTELUKAST SODIUM 10 MG/1
10 TABLET ORAL NIGHTLY
COMMUNITY

## 2020-01-08 RX ORDER — SODIUM CHLORIDE 9 MG/ML
250 INJECTION, SOLUTION INTRAVENOUS ONCE AS NEEDED
Status: DISCONTINUED | OUTPATIENT
Start: 2020-01-08 | End: 2020-01-08 | Stop reason: HOSPADM

## 2020-01-08 RX ORDER — LIDOCAINE HYDROCHLORIDE 10 MG/ML
INJECTION, SOLUTION EPIDURAL; INFILTRATION; INTRACAUDAL; PERINEURAL AS NEEDED
Status: DISCONTINUED | OUTPATIENT
Start: 2020-01-08 | End: 2020-01-08 | Stop reason: HOSPADM

## 2020-01-08 RX ORDER — ACETAMINOPHEN 325 MG/1
650 TABLET ORAL EVERY 4 HOURS PRN
Status: DISCONTINUED | OUTPATIENT
Start: 2020-01-08 | End: 2020-01-08 | Stop reason: HOSPADM

## 2020-01-08 RX ORDER — DEXTROSE MONOHYDRATE 25 G/50ML
25 INJECTION, SOLUTION INTRAVENOUS
Status: DISCONTINUED | OUTPATIENT
Start: 2020-01-08 | End: 2020-01-08 | Stop reason: HOSPADM

## 2020-01-08 RX ORDER — ESOMEPRAZOLE MAGNESIUM 40 MG/1
40 CAPSULE, DELAYED RELEASE ORAL 2 TIMES DAILY
Status: ON HOLD | COMMUNITY
End: 2020-12-03

## 2020-01-08 RX ORDER — ONDANSETRON 2 MG/ML
4 INJECTION INTRAMUSCULAR; INTRAVENOUS ONCE
Status: COMPLETED | OUTPATIENT
Start: 2020-01-08 | End: 2020-01-08

## 2020-01-08 RX ORDER — ASPIRIN 325 MG
325 TABLET, DELAYED RELEASE (ENTERIC COATED) ORAL DAILY
Status: DISCONTINUED | OUTPATIENT
Start: 2020-01-08 | End: 2020-01-08 | Stop reason: HOSPADM

## 2020-01-08 RX ORDER — OXYCODONE AND ACETAMINOPHEN 10; 325 MG/1; MG/1
1 TABLET ORAL EVERY 4 HOURS PRN
Status: DISCONTINUED | OUTPATIENT
Start: 2020-01-08 | End: 2020-01-08 | Stop reason: HOSPADM

## 2020-01-08 RX ORDER — NICOTINE POLACRILEX 4 MG
15 LOZENGE BUCCAL
Status: DISCONTINUED | OUTPATIENT
Start: 2020-01-08 | End: 2020-01-08 | Stop reason: HOSPADM

## 2020-01-08 RX ORDER — MIDAZOLAM HYDROCHLORIDE 1 MG/ML
INJECTION INTRAMUSCULAR; INTRAVENOUS AS NEEDED
Status: DISCONTINUED | OUTPATIENT
Start: 2020-01-08 | End: 2020-01-08 | Stop reason: HOSPADM

## 2020-01-08 RX ORDER — SODIUM CHLORIDE 9 MG/ML
250 INJECTION, SOLUTION INTRAVENOUS CONTINUOUS
Status: ACTIVE | OUTPATIENT
Start: 2020-01-08 | End: 2020-01-08

## 2020-01-08 RX ORDER — HYDROCODONE BITARTRATE AND ACETAMINOPHEN 5; 325 MG/1; MG/1
1 TABLET ORAL EVERY 4 HOURS PRN
Status: DISCONTINUED | OUTPATIENT
Start: 2020-01-08 | End: 2020-01-08 | Stop reason: HOSPADM

## 2020-01-08 RX ADMIN — ASPIRIN 325 MG: 325 TABLET, COATED ORAL at 11:56

## 2020-01-08 RX ADMIN — ONDANSETRON 4 MG: 2 INJECTION INTRAMUSCULAR; INTRAVENOUS at 14:57

## 2020-01-08 NOTE — H&P
"Pre-Cardiac Catheterization Report  Cardiovascular Laboratory  [facility]      Patient:  Angelica Spivey  :  1940  PCP:  Abdoulaye Anderson DO  PHONE:  [phone]    DATE: 2020    BRIEF HPI:  Angelica Spivey is a 79 y.o. female with hypertension, hypercholesterolemia, diabetes mellitus, and known coronary artery disease.  She is post previous multiple stents.  She is complaining of a 3-month history of chest pain which she describes as a pressure.  States is moderate severity lasting approximately 5 minutes with radiation to her left arm.  Associated symptoms include shortness of breath.  Her symptoms increase with walking and are decreased with rest.  She now presents for left heart catheterization with possible intervention.    Cardiac Risk Factors:  advanced age (older than 55 for men, 65 for women), diabetes mellitus, dyslipidemia, family history of premature cardiovascular disease, hypertension    Anginal class in last 2 weeks:  CCS class III    CHF Class in last 2 weeks:  NYHA Class II    Cardiogenic shock:  no    Cardiac arrest <24 hours:  no    Stress test within last 6 months:   no   Details:    Previous cardiac catheterization:  yes  Details:     Previous CABG:  no  Details:      Allergies:     IV contrast allergy:  no  Allergies   Allergen Reactions   • Biaxin [Clarithromycin] Anaphylaxis     \"BLISTERS AND THROAT SWELLING\"   • Allegra [Fexofenadine] Other (See Comments)     \"MAKES ME FEEL FUNNY\"   • Repatha [Evolocumab] Other (See Comments)     Chest pain   • Sulfa Antibiotics Nausea Only       MEDICATIONS:  Prior to Admission medications    Medication Sig Start Date End Date Taking? Authorizing Provider   aspirin 81 MG EC tablet Take 81 mg by mouth Every Evening.    Provider, MD Tim   Blood Glucose Monitoring Suppl (ACCU-CHEK LEXIE PLUS) w/Device kit Use device to check blood sugar 3 times a day 19   Santos Soriano PA-C   carvedilol (COREG) 6.25 MG tablet " Take 6.25 mg by mouth 2 (Two) Times a Day With Meals.    Tim Gao MD   clopidogrel (PLAVIX) 75 MG tablet Take 1 tablet by mouth Daily. 3/11/19   David Burnett MD   ezetimibe (ZETIA) 10 MG tablet Take 1 tablet by mouth Daily. 11/26/19 11/25/20  Santos Soriano PA-C   glimepiride (AMARYL) 4 MG tablet Take 1 tablet by mouth 2 (Two) Times a Day Before Meals. 11/26/19   Santos Soriano PA-C   glucose blood (ACCU-CHEK LEXIE PLUS) test strip Use as instructed to test blood sugar 3 times daily 11/26/19   Santos Soriano PA-C   LANTUS SOLOSTAR 100 UNIT/ML injection pen Take up to 15u qhs as directed 11/26/19   Santos Soriano PA-C   levothyroxine (SYNTHROID, LEVOTHROID) 88 MCG tablet Take 1 tablet by mouth Every Morning. 11/26/19   Santos Soriano PA-C   linagliptin (TRADJENTA) 5 MG tablet tablet Take 1 tablet by mouth Daily. 11/26/19   Santos Soriano PA-C   losartan 50 MG tablet 2 tablet, hydrochlorothiazide 25 MG tablet 0.5 tablet Take 1 dose by mouth Daily.    Tim Gao MD   metFORMIN (GLUCOPHAGE) 500 MG tablet Take 2 tablets by mouth Every Evening. 11/26/19   Santos Soriano PA-C   nitroglycerin (NITROSTAT) 0.4 MG SL tablet  3/6/19   Tim Gao MD   pantoprazole (PROTONIX) 40 MG EC tablet Take 40 mg by mouth Daily.    Tim Gao MD   ranolazine (RANEXA) 500 MG 12 hr tablet Take 500 mg by mouth 2 (Two) Times a Day.    Tim Gao MD   RELION PEN NEEDLES 32G X 4 MM misc Use daily with insulin 11/26/19   Santos Soriano PA-C   sucralfate (CARAFATE) 1 g tablet Take 1 g by mouth 4 (Four) Times a Day.    Tim Gao MD       Past medical & surgical history, social and family history reviewed in the electronic medical record.    ROS:  Cardiovascular ROS: positive for - chest pain and shortness of breath    Physical Exam:    Vitals: There were no vitals filed for this visit. There were no vitals filed for this visit.    General  Appearance:    Alert, cooperative, in no acute distress   Head:    Normocephalic, without obvious abnormality, atraumatic   Eyes:            Lids and lashes normal, conjunctivae and sclerae normal, no   icterus, no pallor, corneas clear, PERRLA   Ears:    Ears appear intact with no abnormalities noted   Neck:   No adenopathy, supple, trachea midline, no thyromegaly, no   carotid bruit, no JVD   Back:     No kyphosis present, no scoliosis present, range of motion normal   Lungs:     Clear to auscultation,respirations regular, even and                  unlabored    Heart:    Regular rhythm and normal rate, normal S1 and S2, no            murmur, no gallop, no rub, no click   Chest Wall:    No abnormalities observed   Abdomen:     Normal bowel sounds, no masses, no organomegaly, soft        non-tender, non-distended, no guarding, no rebound                tenderness   Rectal:     Deferred   Extremities:   Moves all extremities well, no edema, no cyanosis, no             redness   Pulses:   Pulses palpable and equal bilaterally   Skin:   No bleeding, bruising or rash   Neurologic:   Cranial nerves 2 - 12 grossly intact, sensation intact     Barbaeu Test:  Left: Normal  (oxymetric Allens) Right: Not Assessed             Lab Results   Component Value Date    TRIG 530 (H) 10/04/2018    HDL 47 10/04/2018    AST 24 10/04/2018    ALT 19 10/04/2018           Impression      · Unstable angina    Plan     · Procedure to perform: [facility]  · Planned access: Left radial artery              MARILYN Ledezma  01/08/20  11:00 AM

## 2020-01-08 NOTE — H&P (VIEW-ONLY)
"Pre-Cardiac Catheterization Report  Cardiovascular Laboratory  [facility]      Patient:  Angelica Spivey  :  1940  PCP:  Abdoulaye Anderson DO  PHONE:  [phone]    DATE: 2020    BRIEF HPI:  Angelica Spivey is a 79 y.o. female with hypertension, hypercholesterolemia, diabetes mellitus, and known coronary artery disease.  She is post previous multiple stents.  She is complaining of a 3-month history of chest pain which she describes as a pressure.  States is moderate severity lasting approximately 5 minutes with radiation to her left arm.  Associated symptoms include shortness of breath.  Her symptoms increase with walking and are decreased with rest.  She now presents for left heart catheterization with possible intervention.    Cardiac Risk Factors:  advanced age (older than 55 for men, 65 for women), diabetes mellitus, dyslipidemia, family history of premature cardiovascular disease, hypertension    Anginal class in last 2 weeks:  CCS class III    CHF Class in last 2 weeks:  NYHA Class II    Cardiogenic shock:  no    Cardiac arrest <24 hours:  no    Stress test within last 6 months:   no   Details:    Previous cardiac catheterization:  yes  Details:     Previous CABG:  no  Details:      Allergies:     IV contrast allergy:  no  Allergies   Allergen Reactions   • Biaxin [Clarithromycin] Anaphylaxis     \"BLISTERS AND THROAT SWELLING\"   • Allegra [Fexofenadine] Other (See Comments)     \"MAKES ME FEEL FUNNY\"   • Repatha [Evolocumab] Other (See Comments)     Chest pain   • Sulfa Antibiotics Nausea Only       MEDICATIONS:  Prior to Admission medications    Medication Sig Start Date End Date Taking? Authorizing Provider   aspirin 81 MG EC tablet Take 81 mg by mouth Every Evening.    Provider, MD Tim   Blood Glucose Monitoring Suppl (ACCU-CHEK LEXIE PLUS) w/Device kit Use device to check blood sugar 3 times a day 19   Santos Soriano PA-C   carvedilol (COREG) 6.25 MG tablet " Take 6.25 mg by mouth 2 (Two) Times a Day With Meals.    Tim Gao MD   clopidogrel (PLAVIX) 75 MG tablet Take 1 tablet by mouth Daily. 3/11/19   David Burnett MD   ezetimibe (ZETIA) 10 MG tablet Take 1 tablet by mouth Daily. 11/26/19 11/25/20  Santos Soriano PA-C   glimepiride (AMARYL) 4 MG tablet Take 1 tablet by mouth 2 (Two) Times a Day Before Meals. 11/26/19   Santos Soriano PA-C   glucose blood (ACCU-CHEK LEXIE PLUS) test strip Use as instructed to test blood sugar 3 times daily 11/26/19   Santos Soriano PA-C   LANTUS SOLOSTAR 100 UNIT/ML injection pen Take up to 15u qhs as directed 11/26/19   Santos Soriano PA-C   levothyroxine (SYNTHROID, LEVOTHROID) 88 MCG tablet Take 1 tablet by mouth Every Morning. 11/26/19   Santos Soriano PA-C   linagliptin (TRADJENTA) 5 MG tablet tablet Take 1 tablet by mouth Daily. 11/26/19   Santos Soriano PA-C   losartan 50 MG tablet 2 tablet, hydrochlorothiazide 25 MG tablet 0.5 tablet Take 1 dose by mouth Daily.    Tim Gao MD   metFORMIN (GLUCOPHAGE) 500 MG tablet Take 2 tablets by mouth Every Evening. 11/26/19   Santos Soriano PA-C   nitroglycerin (NITROSTAT) 0.4 MG SL tablet  3/6/19   Tim Gao MD   pantoprazole (PROTONIX) 40 MG EC tablet Take 40 mg by mouth Daily.    Tim Gao MD   ranolazine (RANEXA) 500 MG 12 hr tablet Take 500 mg by mouth 2 (Two) Times a Day.    Tim Gao MD   RELION PEN NEEDLES 32G X 4 MM misc Use daily with insulin 11/26/19   Santos Soriano PA-C   sucralfate (CARAFATE) 1 g tablet Take 1 g by mouth 4 (Four) Times a Day.    Tim Gao MD       Past medical & surgical history, social and family history reviewed in the electronic medical record.    ROS:  Cardiovascular ROS: positive for - chest pain and shortness of breath    Physical Exam:    Vitals: There were no vitals filed for this visit. There were no vitals filed for this visit.    General  Appearance:    Alert, cooperative, in no acute distress   Head:    Normocephalic, without obvious abnormality, atraumatic   Eyes:            Lids and lashes normal, conjunctivae and sclerae normal, no   icterus, no pallor, corneas clear, PERRLA   Ears:    Ears appear intact with no abnormalities noted   Neck:   No adenopathy, supple, trachea midline, no thyromegaly, no   carotid bruit, no JVD   Back:     No kyphosis present, no scoliosis present, range of motion normal   Lungs:     Clear to auscultation,respirations regular, even and                  unlabored    Heart:    Regular rhythm and normal rate, normal S1 and S2, no            murmur, no gallop, no rub, no click   Chest Wall:    No abnormalities observed   Abdomen:     Normal bowel sounds, no masses, no organomegaly, soft        non-tender, non-distended, no guarding, no rebound                tenderness   Rectal:     Deferred   Extremities:   Moves all extremities well, no edema, no cyanosis, no             redness   Pulses:   Pulses palpable and equal bilaterally   Skin:   No bleeding, bruising or rash   Neurologic:   Cranial nerves 2 - 12 grossly intact, sensation intact     Barbaeu Test:  Left: Normal  (oxymetric Allens) Right: Not Assessed             Lab Results   Component Value Date    TRIG 530 (H) 10/04/2018    HDL 47 10/04/2018    AST 24 10/04/2018    ALT 19 10/04/2018           Impression      · Unstable angina    Plan     · Procedure to perform: [facility]  · Planned access: Left radial artery              MARILYN Ledezma  01/08/20  11:00 AM

## 2020-01-15 ENCOUNTER — HOSPITAL ENCOUNTER (OUTPATIENT)
Facility: HOSPITAL | Age: 80
Setting detail: HOSPITAL OUTPATIENT SURGERY
Discharge: HOME | End: 2020-01-15
Attending: INTERNAL MEDICINE | Admitting: INTERNAL MEDICINE

## 2020-01-15 VITALS
WEIGHT: 163 LBS | TEMPERATURE: 97.9 F | SYSTOLIC BLOOD PRESSURE: 122 MMHG | DIASTOLIC BLOOD PRESSURE: 78 MMHG | BODY MASS INDEX: 27.83 KG/M2 | RESPIRATION RATE: 18 BRPM | HEART RATE: 83 BPM | HEIGHT: 64 IN | OXYGEN SATURATION: 97 %

## 2020-01-15 LAB
BUN BLDA-MCNC: 18 MG/DL (ref 8–26)
CA-I BLDA-SCNC: 1.25 MMOL/L (ref 1.2–1.32)
CHLORIDE BLDA-SCNC: 99 MMOL/L (ref 98–109)
CO2 BLDA-SCNC: 27 MMOL/L (ref 24–29)
CREAT BLDA-MCNC: 0.8 MG/DL (ref 0.6–1.3)
GLUCOSE BLDC GLUCOMTR-MCNC: 116 MG/DL (ref 70–130)
HCT VFR BLDA CALC: 35 % (ref 38–51)
HGB BLDA-MCNC: 11.9 G/DL (ref 12–17)
POTASSIUM BLDA-SCNC: 4.4 MMOL/L (ref 3.5–4.9)
SODIUM BLDA-SCNC: 134 MMOL/L (ref 138–146)

## 2020-01-15 PROCEDURE — 25010000003 LIDOCAINE 1 % SOLUTION: Performed by: INTERNAL MEDICINE

## 2020-01-15 PROCEDURE — C1753 CATH, INTRAVAS ULTRASOUND: HCPCS | Performed by: INTERNAL MEDICINE

## 2020-01-15 PROCEDURE — C9600 PERC DRUG-EL COR STENT SING: HCPCS | Performed by: INTERNAL MEDICINE

## 2020-01-15 PROCEDURE — C1769 GUIDE WIRE: HCPCS | Performed by: INTERNAL MEDICINE

## 2020-01-15 PROCEDURE — 0 IOPAMIDOL PER 1 ML: Performed by: INTERNAL MEDICINE

## 2020-01-15 PROCEDURE — C1887 CATHETER, GUIDING: HCPCS | Performed by: INTERNAL MEDICINE

## 2020-01-15 PROCEDURE — 93005 ELECTROCARDIOGRAM TRACING: CPT | Performed by: INTERNAL MEDICINE

## 2020-01-15 PROCEDURE — 80047 BASIC METABLC PNL IONIZED CA: CPT

## 2020-01-15 PROCEDURE — C1725 CATH, TRANSLUMIN NON-LASER: HCPCS | Performed by: INTERNAL MEDICINE

## 2020-01-15 PROCEDURE — 85014 HEMATOCRIT: CPT

## 2020-01-15 PROCEDURE — C1874 STENT, COATED/COV W/DEL SYS: HCPCS | Performed by: INTERNAL MEDICINE

## 2020-01-15 PROCEDURE — 92978 ENDOLUMINL IVUS OCT C 1ST: CPT | Performed by: INTERNAL MEDICINE

## 2020-01-15 PROCEDURE — 25010000002 BIVALIRUDIN TRIFLUOROACETATE 250 MG RECONSTITUTED SOLUTION 1 EACH VIAL

## 2020-01-15 PROCEDURE — 63710000001 INSULIN LISPRO (HUMAN) PER 5 UNITS: Performed by: PHYSICIAN ASSISTANT

## 2020-01-15 PROCEDURE — C1894 INTRO/SHEATH, NON-LASER: HCPCS | Performed by: INTERNAL MEDICINE

## 2020-01-15 PROCEDURE — 25010000002 MIDAZOLAM PER 1 MG: Performed by: INTERNAL MEDICINE

## 2020-01-15 PROCEDURE — 25010000002 FENTANYL CITRATE (PF) 100 MCG/2ML SOLUTION: Performed by: INTERNAL MEDICINE

## 2020-01-15 DEVICE — STNT CORNRY RESOLUTE ONYX RX 2X30MM: Type: IMPLANTABLE DEVICE | Status: FUNCTIONAL

## 2020-01-15 RX ORDER — ASPIRIN 325 MG
325 TABLET, DELAYED RELEASE (ENTERIC COATED) ORAL DAILY
Status: DISCONTINUED | OUTPATIENT
Start: 2020-01-15 | End: 2020-01-15 | Stop reason: HOSPADM

## 2020-01-15 RX ORDER — ALPRAZOLAM 0.25 MG/1
0.25 TABLET ORAL 3 TIMES DAILY PRN
Status: DISCONTINUED | OUTPATIENT
Start: 2020-01-15 | End: 2020-01-15 | Stop reason: HOSPADM

## 2020-01-15 RX ORDER — NICOTINE POLACRILEX 4 MG
15 LOZENGE BUCCAL
Status: DISCONTINUED | OUTPATIENT
Start: 2020-01-15 | End: 2020-01-15 | Stop reason: HOSPADM

## 2020-01-15 RX ORDER — HYDROCODONE BITARTRATE AND ACETAMINOPHEN 5; 325 MG/1; MG/1
1 TABLET ORAL EVERY 4 HOURS PRN
Status: DISCONTINUED | OUTPATIENT
Start: 2020-01-15 | End: 2020-01-15 | Stop reason: HOSPADM

## 2020-01-15 RX ORDER — SODIUM CHLORIDE 9 MG/ML
250 INJECTION, SOLUTION INTRAVENOUS CONTINUOUS
Status: ACTIVE | OUTPATIENT
Start: 2020-01-15 | End: 2020-01-15

## 2020-01-15 RX ORDER — FENTANYL CITRATE 50 UG/ML
INJECTION, SOLUTION INTRAMUSCULAR; INTRAVENOUS AS NEEDED
Status: DISCONTINUED | OUTPATIENT
Start: 2020-01-15 | End: 2020-01-15 | Stop reason: HOSPADM

## 2020-01-15 RX ORDER — ONDANSETRON 2 MG/ML
4 INJECTION INTRAMUSCULAR; INTRAVENOUS EVERY 6 HOURS PRN
Status: DISCONTINUED | OUTPATIENT
Start: 2020-01-15 | End: 2020-01-15 | Stop reason: HOSPADM

## 2020-01-15 RX ORDER — NALOXONE HCL 0.4 MG/ML
0.4 VIAL (ML) INJECTION
Status: DISCONTINUED | OUTPATIENT
Start: 2020-01-15 | End: 2020-01-15 | Stop reason: HOSPADM

## 2020-01-15 RX ORDER — ACETAMINOPHEN 325 MG/1
650 TABLET ORAL EVERY 4 HOURS PRN
Status: DISCONTINUED | OUTPATIENT
Start: 2020-01-15 | End: 2020-01-15 | Stop reason: HOSPADM

## 2020-01-15 RX ORDER — DEXTROSE MONOHYDRATE 25 G/50ML
25 INJECTION, SOLUTION INTRAVENOUS
Status: DISCONTINUED | OUTPATIENT
Start: 2020-01-15 | End: 2020-01-15 | Stop reason: HOSPADM

## 2020-01-15 RX ORDER — TEMAZEPAM 7.5 MG/1
7.5 CAPSULE ORAL NIGHTLY PRN
Status: DISCONTINUED | OUTPATIENT
Start: 2020-01-15 | End: 2020-01-15 | Stop reason: HOSPADM

## 2020-01-15 RX ORDER — MORPHINE SULFATE 2 MG/ML
1 INJECTION, SOLUTION INTRAMUSCULAR; INTRAVENOUS EVERY 4 HOURS PRN
Status: DISCONTINUED | OUTPATIENT
Start: 2020-01-15 | End: 2020-01-15 | Stop reason: HOSPADM

## 2020-01-15 RX ORDER — MIDAZOLAM HYDROCHLORIDE 1 MG/ML
INJECTION INTRAMUSCULAR; INTRAVENOUS AS NEEDED
Status: DISCONTINUED | OUTPATIENT
Start: 2020-01-15 | End: 2020-01-15 | Stop reason: HOSPADM

## 2020-01-15 RX ORDER — LIDOCAINE HYDROCHLORIDE 10 MG/ML
INJECTION, SOLUTION INFILTRATION; PERINEURAL AS NEEDED
Status: DISCONTINUED | OUTPATIENT
Start: 2020-01-15 | End: 2020-01-15 | Stop reason: HOSPADM

## 2020-01-15 RX ADMIN — ASPIRIN 325 MG: 325 TABLET, COATED ORAL at 13:58

## 2020-01-15 NOTE — INTERVAL H&P NOTE
H&P reviewed. The patient was examined and there are no changes to the H&P.   Patient returns for CBI to [facility].

## 2020-01-16 ENCOUNTER — DOCUMENTATION (OUTPATIENT)
Dept: CARDIAC REHAB | Facility: HOSPITAL | Age: 80
End: 2020-01-16

## 2020-01-16 ENCOUNTER — CALL CENTER PROGRAMS (OUTPATIENT)
Dept: CALL CENTER | Facility: HOSPITAL | Age: 80
End: 2020-01-16

## 2020-01-16 NOTE — PROGRESS NOTES
Pt. Referred for Phase II Cardiac Rehab. Staff discussed benefits of exercise, program protocol, and educational material provided. Teach back verified.  Permission granted from patient for staff to fax referral information to outlying program at this time.  Staff to fax referral info to [facility].

## 2020-01-16 NOTE — OUTREACH NOTE
PCI Survey      Responses   Facility patient discharged from?  [place]   Procedure date  01/15/20   PCI site:  Left, Arm   Performing MD Dr. Keyon Deng   Attempt successful?  Yes   Call start time  1154   Call end time  1155   Is the patient taking prescribed medications:  ASA, Plavix   Nursing intervention  Patient education provided, Advised patient to call cardiology office   Does the patient have an appointment scheduled with the cardiologist?  No   Did the patient feel prepared to go home on the same day as the procedure?  Yes   Is the patient satisfied with the same day discharge process?  Yes   PCI call completed  Yes          Attila Edmonds RN

## 2020-01-29 ENCOUNTER — DOCUMENTATION (OUTPATIENT)
Dept: CARDIAC REHAB | Facility: HOSPITAL | Age: 80
End: 2020-01-29

## 2020-01-29 NOTE — PROGRESS NOTES
Patient contacted staff in regards to a Cardiac Rehab closer to her home. Staff contacted [facility] and will fax referral.

## 2020-02-25 ENCOUNTER — TRANSCRIBE ORDERS (OUTPATIENT)
Dept: ADMINISTRATIVE | Facility: HOSPITAL | Age: 80
End: 2020-02-25

## 2020-02-25 DIAGNOSIS — Z12.31 VISIT FOR SCREENING MAMMOGRAM: Primary | ICD-10-CM

## 2020-07-04 DIAGNOSIS — E03.9 ACQUIRED HYPOTHYROIDISM: ICD-10-CM

## 2020-07-06 RX ORDER — LEVOTHYROXINE SODIUM 88 UG/1
TABLET ORAL
Qty: 90 TABLET | Refills: 0 | OUTPATIENT
Start: 2020-07-06

## 2020-08-14 ENCOUNTER — HOSPITAL ENCOUNTER (OUTPATIENT)
Dept: MAMMOGRAPHY | Facility: HOSPITAL | Age: 80
Discharge: HOME | End: 2020-08-14
Admitting: OBSTETRICS & GYNECOLOGY

## 2020-08-14 DIAGNOSIS — Z12.31 VISIT FOR SCREENING MAMMOGRAM: ICD-10-CM

## 2020-08-14 PROCEDURE — 77063 BREAST TOMOSYNTHESIS BI: CPT | Performed by: RADIOLOGY

## 2020-08-14 PROCEDURE — 77063 BREAST TOMOSYNTHESIS BI: CPT

## 2020-08-14 PROCEDURE — 77067 SCR MAMMO BI INCL CAD: CPT

## 2020-08-14 PROCEDURE — 77067 SCR MAMMO BI INCL CAD: CPT | Performed by: RADIOLOGY

## 2020-12-01 ENCOUNTER — TRANSCRIBE ORDERS (OUTPATIENT)
Dept: ADMINISTRATIVE | Facility: HOSPITAL | Age: 80
End: 2020-12-01

## 2020-12-01 DIAGNOSIS — R94.39 ABNORMAL STRESS TEST: Primary | ICD-10-CM

## 2020-12-03 ENCOUNTER — HOSPITAL ENCOUNTER (OUTPATIENT)
Facility: HOSPITAL | Age: 80
Discharge: HOME | End: 2020-12-03
Attending: INTERNAL MEDICINE | Admitting: INTERNAL MEDICINE

## 2020-12-03 VITALS
RESPIRATION RATE: 16 BRPM | DIASTOLIC BLOOD PRESSURE: 69 MMHG | TEMPERATURE: 98.6 F | SYSTOLIC BLOOD PRESSURE: 124 MMHG | WEIGHT: 167.11 LBS | BODY MASS INDEX: 28.53 KG/M2 | OXYGEN SATURATION: 97 % | HEART RATE: 76 BPM | HEIGHT: 64 IN

## 2020-12-03 DIAGNOSIS — R94.39 ABNORMAL STRESS TEST: ICD-10-CM

## 2020-12-03 LAB
ANION GAP SERPL CALCULATED.3IONS-SCNC: 10 MMOL/L (ref 5–15)
BUN SERPL-MCNC: 15 MG/DL (ref 8–23)
BUN/CREAT SERPL: 20.3 (ref 7–25)
CALCIUM SPEC-SCNC: 9.9 MG/DL (ref 8.6–10.5)
CHLORIDE SERPL-SCNC: 104 MMOL/L (ref 98–107)
CO2 SERPL-SCNC: 26 MMOL/L (ref 22–29)
CREAT SERPL-MCNC: 0.74 MG/DL (ref 0.57–1)
DEPRECATED RDW RBC AUTO: 49.7 FL (ref 37–54)
ERYTHROCYTE [DISTWIDTH] IN BLOOD BY AUTOMATED COUNT: 16.1 % (ref 12.3–15.4)
GFR SERPL CREATININE-BSD FRML MDRD: 76 ML/MIN/1.73
GLUCOSE SERPL-MCNC: 165 MG/DL (ref 65–99)
HCT VFR BLD AUTO: 37.9 % (ref 34–46.6)
HGB BLD-MCNC: 11.6 G/DL (ref 12–15.9)
MCH RBC QN AUTO: 25.8 PG (ref 26.6–33)
MCHC RBC AUTO-ENTMCNC: 30.6 G/DL (ref 31.5–35.7)
MCV RBC AUTO: 84.4 FL (ref 79–97)
PLATELET # BLD AUTO: 255 10*3/MM3 (ref 140–450)
PMV BLD AUTO: 9.8 FL (ref 6–12)
POTASSIUM SERPL-SCNC: 4.2 MMOL/L (ref 3.5–5.2)
RBC # BLD AUTO: 4.49 10*6/MM3 (ref 3.77–5.28)
SODIUM SERPL-SCNC: 140 MMOL/L (ref 136–145)
WBC # BLD AUTO: 7.67 10*3/MM3 (ref 3.4–10.8)

## 2020-12-03 PROCEDURE — C1769 GUIDE WIRE: HCPCS | Performed by: INTERNAL MEDICINE

## 2020-12-03 PROCEDURE — 93458 L HRT ARTERY/VENTRICLE ANGIO: CPT | Performed by: INTERNAL MEDICINE

## 2020-12-03 PROCEDURE — 25010000002 HEPARIN (PORCINE) PER 1000 UNITS: Performed by: INTERNAL MEDICINE

## 2020-12-03 PROCEDURE — 63710000001 ASPIRIN 325 MG TABLET DELAYED-RELEASE: Performed by: PHYSICIAN ASSISTANT

## 2020-12-03 PROCEDURE — 25010000002 FENTANYL CITRATE (PF) 100 MCG/2ML SOLUTION: Performed by: INTERNAL MEDICINE

## 2020-12-03 PROCEDURE — 99152 MOD SED SAME PHYS/QHP 5/>YRS: CPT | Performed by: INTERNAL MEDICINE

## 2020-12-03 PROCEDURE — 25010000002 MIDAZOLAM PER 1 MG: Performed by: INTERNAL MEDICINE

## 2020-12-03 PROCEDURE — A9270 NON-COVERED ITEM OR SERVICE: HCPCS | Performed by: PHYSICIAN ASSISTANT

## 2020-12-03 PROCEDURE — C1894 INTRO/SHEATH, NON-LASER: HCPCS | Performed by: INTERNAL MEDICINE

## 2020-12-03 PROCEDURE — 0 IOPAMIDOL PER 1 ML: Performed by: INTERNAL MEDICINE

## 2020-12-03 PROCEDURE — 80048 BASIC METABOLIC PNL TOTAL CA: CPT

## 2020-12-03 PROCEDURE — 85027 COMPLETE CBC AUTOMATED: CPT

## 2020-12-03 RX ORDER — ACETAMINOPHEN 325 MG/1
650 TABLET ORAL EVERY 4 HOURS PRN
Status: DISCONTINUED | OUTPATIENT
Start: 2020-12-03 | End: 2020-12-03 | Stop reason: HOSPADM

## 2020-12-03 RX ORDER — LIDOCAINE HYDROCHLORIDE 10 MG/ML
INJECTION, SOLUTION EPIDURAL; INFILTRATION; INTRACAUDAL; PERINEURAL AS NEEDED
Status: DISCONTINUED | OUTPATIENT
Start: 2020-12-03 | End: 2020-12-03 | Stop reason: HOSPADM

## 2020-12-03 RX ORDER — NICOTINE POLACRILEX 4 MG
15 LOZENGE BUCCAL
Status: DISCONTINUED | OUTPATIENT
Start: 2020-12-03 | End: 2020-12-03 | Stop reason: HOSPADM

## 2020-12-03 RX ORDER — ASPIRIN 325 MG
325 TABLET, DELAYED RELEASE (ENTERIC COATED) ORAL DAILY
Status: DISCONTINUED | OUTPATIENT
Start: 2020-12-03 | End: 2020-12-03 | Stop reason: HOSPADM

## 2020-12-03 RX ORDER — HYDROCODONE BITARTRATE AND ACETAMINOPHEN 5; 325 MG/1; MG/1
1 TABLET ORAL EVERY 4 HOURS PRN
Status: DISCONTINUED | OUTPATIENT
Start: 2020-12-03 | End: 2020-12-03 | Stop reason: HOSPADM

## 2020-12-03 RX ORDER — SODIUM CHLORIDE 9 MG/ML
250 INJECTION, SOLUTION INTRAVENOUS CONTINUOUS
Status: ACTIVE | OUTPATIENT
Start: 2020-12-03 | End: 2020-12-03

## 2020-12-03 RX ORDER — ALPRAZOLAM 0.25 MG/1
0.25 TABLET ORAL 3 TIMES DAILY PRN
Status: DISCONTINUED | OUTPATIENT
Start: 2020-12-03 | End: 2020-12-03 | Stop reason: HOSPADM

## 2020-12-03 RX ORDER — DEXTROSE MONOHYDRATE 25 G/50ML
25 INJECTION, SOLUTION INTRAVENOUS
Status: DISCONTINUED | OUTPATIENT
Start: 2020-12-03 | End: 2020-12-03 | Stop reason: HOSPADM

## 2020-12-03 RX ORDER — FENTANYL CITRATE 50 UG/ML
INJECTION, SOLUTION INTRAMUSCULAR; INTRAVENOUS AS NEEDED
Status: DISCONTINUED | OUTPATIENT
Start: 2020-12-03 | End: 2020-12-03 | Stop reason: HOSPADM

## 2020-12-03 RX ORDER — TEMAZEPAM 7.5 MG/1
7.5 CAPSULE ORAL NIGHTLY PRN
Status: DISCONTINUED | OUTPATIENT
Start: 2020-12-03 | End: 2020-12-03 | Stop reason: HOSPADM

## 2020-12-03 RX ORDER — POTASSIUM CHLORIDE 750 MG/1
10 TABLET, FILM COATED, EXTENDED RELEASE ORAL 2 TIMES DAILY
COMMUNITY

## 2020-12-03 RX ORDER — MIDAZOLAM HYDROCHLORIDE 1 MG/ML
INJECTION INTRAMUSCULAR; INTRAVENOUS AS NEEDED
Status: DISCONTINUED | OUTPATIENT
Start: 2020-12-03 | End: 2020-12-03 | Stop reason: HOSPADM

## 2020-12-03 RX ADMIN — ASPIRIN 325 MG: 325 TABLET, COATED ORAL at 13:26

## 2020-12-03 NOTE — H&P
"Pre-Cardiac Catheterization Report  Cardiovascular Laboratory  [facility]      Patient:  Angelica Spivey  :  1940  PCP:  Abdoulaye Anderson DO  PHONE:  [phone]    DATE: 12/3/2020    BRIEF HPI:  Angelica Spivey is a 80 y.o. female with hypertension, hypercholesterolemia, diabetes mellitus, and known coronary artery disease.  She is post previous LAD stent.  She is complaining of chest pain which she describes as burning that is been increasing over the past several weeks.  She states it is moderate in severity lasting minutes with associated shortness of breath, dyspnea on exertion, and fatigue.  She recently underwent an abnormal stress test and now presents for left heart catheterization with possible intervention.    Cardiac Risk Factors:  advanced age (older than 55 for men, 65 for women), diabetes mellitus, dyslipidemia, family history of premature cardiovascular disease, hypertension    Anginal class in last 2 weeks:  CCS class III    CHF Class in last 2 weeks:  NYHA Class II    Cardiogenic shock:  no    Cardiac arrest <24 hours:  no    Stress test within last 6 months:   yes   Details:    Previous cardiac catheterization:  yes  Details:     Previous CABG:  no  Details:      Allergies:     IV contrast allergy:  no  Allergies   Allergen Reactions   • Biaxin [Clarithromycin] Anaphylaxis     \"BLISTERS AND THROAT SWELLING\"   • Allegra [Fexofenadine] Other (See Comments)     \"MAKES ME FEEL FUNNY\"   • Repatha [Evolocumab] Other (See Comments)     Chest pain   • Sulfa Antibiotics Nausea Only       MEDICATIONS:  Prior to Admission medications    Medication Sig Start Date End Date Taking? Authorizing Provider   aspirin 81 MG EC tablet Take 81 mg by mouth Every Evening.    Provider, MD Tim   Blood Glucose Monitoring Suppl (ACCU-CHEK LEXIE PLUS) w/Device kit Use device to check blood sugar 3 times a day 19   Santos Soriano PA-C   carvedilol (COREG) 6.25 MG tablet Take 12.5 " mg by mouth 2 (Two) Times a Day With Meals.    Tim Gao MD   clopidogrel (PLAVIX) 75 MG tablet Take 1 tablet by mouth Daily. 3/11/19   David Burnett MD   esomeprazole (nexIUM) 40 MG capsule Take 40 mg by mouth 2 (Two) Times a Day.    Tim Gao MD   glimepiride (AMARYL) 4 MG tablet Take 1 tablet by mouth 2 (Two) Times a Day Before Meals. 11/26/19   Santos Soriano PA-C   glucose blood (ACCU-CHEK LEXIE PLUS) test strip Use as instructed to test blood sugar 3 times daily 11/26/19   Santos Soriano PA-C   Insulin Glargine (LANTUS SOLOSTAR SC) Inject 8 Units under the skin into the appropriate area as directed Every Night.    Tim Gao MD   levothyroxine (SYNTHROID, LEVOTHROID) 88 MCG tablet Take 1 tablet by mouth Every Morning. 11/26/19   Santos Soriano PA-C   linagliptin (TRADJENTA) 5 MG tablet tablet Take 5 mg by mouth Every Evening.    Tim Gao MD   losartan 50 MG tablet 100 mg, hydroCHLOROthiazide 12.5 MG 12.5 mg Take 1 dose by mouth Daily.    Tim Gao MD   metFORMIN (GLUCOPHAGE) 500 MG tablet Take 2 tablets by mouth Every Evening. 11/26/19   Santos Soriano PA-C   montelukast (SINGULAIR) 10 MG tablet Take 10 mg by mouth Every Night.    Tim Gao MD   nitroglycerin (NITROSTAT) 0.4 MG SL tablet Take 0.4 mg by mouth Every 5 (Five) Minutes As Needed. 3/6/19   Tim Gao MD   RELION PEN NEEDLES 32G X 4 MM misc Use daily with insulin 11/26/19   Santos Soriano PA-C   sucralfate (CARAFATE) 1 g tablet Take 1 g by mouth 4 (Four) Times a Day As Needed.    Tim Gao MD       Past medical & surgical history, social and family history reviewed in the electronic medical record.    ROS:  Cardiovascular ROS: positive for - chest pain, dyspnea on exertion and shortness of breath    Physical Exam:    Vitals: There were no vitals filed for this visit. There were no vitals filed for this visit.    General Appearance:     Alert, cooperative, in no acute distress   Head:    Normocephalic, without obvious abnormality, atraumatic   Eyes:            Lids and lashes normal, conjunctivae and sclerae normal, no   icterus, no pallor, corneas clear, PERRLA   Ears:    Ears appear intact with no abnormalities noted   Neck:   No adenopathy, supple, trachea midline, no thyromegaly, no   carotid bruit, no JVD   Back:     No kyphosis present, no scoliosis present, range of motion normal   Lungs:     Clear to auscultation,respirations regular, even and                unlabored    Heart:    Regular rhythm and normal rate, normal S1 and S2, no         murmur, no gallop, no rub, no click   Chest Wall:    No abnormalities observed   Abdomen:     Normal bowel sounds, no masses, no organomegaly, soft     nontender, nondistended, no guarding, no rebound                tenderness   Rectal:     Deferred   Extremities:   Moves all extremities well, no edema, no cyanosis, no           redness   Pulses:   Pulses palpable and equal bilaterally   Skin:   No bleeding, bruising or rash   Neurologic:   Cranial nerves 2 - 12 grossly intact, sensation intact     Barbaeu Test:  Left: Normal  (oxymetric Allens) Right: Not Assessed             Lab Results   Component Value Date    TRIG 530 (H) 10/04/2018    HDL 47 10/04/2018    AST 24 10/04/2018    ALT 19 10/04/2018           Impression      · Abnormal stress test    Plan     · Procedure to perform: [facility]  · Planned access: Left radial artery              MARILYN Ledezma  12/03/20  13:00 EST

## 2020-12-21 RX ORDER — INSULIN GLARGINE 100 [IU]/ML
INJECTION, SOLUTION SUBCUTANEOUS
Qty: 6 ML | Refills: 0 | Status: SHIPPED | OUTPATIENT
Start: 2020-12-21

## 2021-01-25 RX ORDER — INSULIN GLARGINE 100 [IU]/ML
INJECTION, SOLUTION SUBCUTANEOUS
Qty: 6 ML | Refills: 0 | OUTPATIENT
Start: 2021-01-25

## 2021-07-06 ENCOUNTER — TRANSCRIBE ORDERS (OUTPATIENT)
Dept: ADMINISTRATIVE | Facility: HOSPITAL | Age: 81
End: 2021-07-06

## 2021-07-06 DIAGNOSIS — Z12.31 VISIT FOR SCREENING MAMMOGRAM: Primary | ICD-10-CM

## 2021-10-14 ENCOUNTER — APPOINTMENT (OUTPATIENT)
Dept: MAMMOGRAPHY | Facility: HOSPITAL | Age: 81
End: 2021-10-14

## 2021-11-30 ENCOUNTER — APPOINTMENT (OUTPATIENT)
Dept: MAMMOGRAPHY | Facility: HOSPITAL | Age: 81
End: 2021-11-30

## 2021-12-20 ENCOUNTER — HOSPITAL ENCOUNTER (OUTPATIENT)
Dept: MAMMOGRAPHY | Facility: HOSPITAL | Age: 81
Discharge: HOME | End: 2021-12-20
Admitting: OBSTETRICS & GYNECOLOGY

## 2021-12-20 DIAGNOSIS — Z12.31 VISIT FOR SCREENING MAMMOGRAM: ICD-10-CM

## 2021-12-20 PROCEDURE — 77067 SCR MAMMO BI INCL CAD: CPT

## 2021-12-20 PROCEDURE — 77063 BREAST TOMOSYNTHESIS BI: CPT | Performed by: RADIOLOGY

## 2021-12-20 PROCEDURE — 77067 SCR MAMMO BI INCL CAD: CPT | Performed by: RADIOLOGY

## 2021-12-20 PROCEDURE — 77063 BREAST TOMOSYNTHESIS BI: CPT

## 2022-07-15 NOTE — H&P
"  Pre-cardiac Catheterization History and Physical  [facility]      Patient:  Angelica Spivey  :  1940  MRN: [id number]    PCP:  Abimael Matthews MD  PHONE:  [phone]    DATE: 10/5/2018  ID: Angelica Spivey is a 78 y.o. female resident of [place], [place]     CC: CAD with recent angina     PROBLEM LIST:   1. Coronary artery disease  A. Exertional angina CCS III-IV summer 2018  B. GXT MPS 2018: medium sized area of ischemia in anterosteptal and anteroapical wall, EF 64%  C. LHC 8/15/2018: Normal LV gram, 2 vessel CAD involving distal dominant RCA (non-culprit) and severe disease in proximal/mid LAD; PTCA and KASHIF x3 from proximal to distal LAD under iFR and OCT guidance   2. Hypertension  3. Dyslipidemia with statin intolerance  A. Started on Repatha 2018  4. DM2  5. Hypothyroidism   6. Tendonitis  7. Surgical history:              A. Hysterectomy              B. Cataracts              C. Bladder sling              D. Vulvectomy    BRIEF HPI: Mrs. Spivey is a pleasant 79 y/o WF with above noted history who presents today for staged PCI to RCA. She was seen approximately 2 months ago at which time she had an abnormal stress test. Catheterization revealed 2 vessel CAD involving distal RCA (non-culprit) and proximal/mid LAD. The LAD was treated with KASHIF x3 under iFR and OCT guidance and RCA intervention was deferred. She now presents for staged PCI to RCA. She reports she has been \"taking it easy\" however has not had recurrent angina or exertional dyspnea since LAD intervention. She denies any palpitations, dyspnea, orthopnea or PND. She has not had any recurrent epistaxis since August and no other signs of bleeding.     Allergies:      Allergies   Allergen Reactions   • Biaxin [Clarithromycin] Anaphylaxis     \"BLISTERS AND THROAT SWELLING\"   • Allegra [Fexofenadine] Other (See Comments)     \"MAKES ME FEEL FUNNY\"   • Sulfa Antibiotics Nausea Only     MEDICATIONS:  · ASA " "Sirisha Ruiz (88 y.o. Female)             Date of Birth   1933    Social Security Number       Address   [address] [place] [address]    Home Phone   [phone]    MRN   [id number]       [employer]   None    Marital Status                               Admission Date   7/10/22    Admission Type   Emergency    Admitting Provider   Willi Byrd MD    Attending Provider   Rocio Farmer MD    Department, Room/Bed   [address], [address]/1       Discharge Date       Discharge Disposition   Home-Health Care [facility]    Discharge Destination                               Attending Provider: Rocio Farmer MD    Allergies: Sulfa Antibiotics    Isolation: Contact   Infection: COVID Screen (preop/placement) (07/14/22), Other (07/14/22)   Code Status: CPR   Advance Care Planning Activity    Ht: 157.5 cm (62\")   Wt: 60.3 kg (133 lb)    Admission Cmt: None   Principal Problem: None                Active Insurance as of 7/10/2022     Primary Coverage     Payor Plan Insurance Group Employer/Plan Group    MEDICARE MEDICARE A & B      Payor Plan Address Payor Plan Phone Number Payor Plan Fax Number Effective Dates    [address] [phone]  7/1/2000 - None Entered    [facility] [id number]       Subscriber Name Subscriber Birth Date Member ID       SIRISHA RUIZ 1933 [id number]           Secondary Coverage     Payor Plan Insurance Group Employer/Plan Group    C[employer] CIGNA [id number]     Payor Plan Address Payor Plan Phone Number Payor Plan Fax Number Effective Dates    [address] [phone]  1/1/2016 - None Entered    [facility] [id number]       Subscriber Name Subscriber Birth Date Member ID       SIRISHA RUIZ 1933 [id number]                 Emergency Contacts      (Rel.) Home Phone Work Phone Mobile Phone    Marcos Aiken (Daughter) [phone] -- [phone]    Antelmo Ruiz (Son) [phone] -- --            {Outbreak/Travel/Exposure " "81mg daily  · Prasugrel 10mg daily  · Replaganide 2mg TID  · Glimepiride 4mg BID  · Coreg 6.25mg BID  · Metformin 1000mg daily  · Tradjenta 5mg daily   · Losartan/HCTZ 100-12.5 mg daily  · Synthroid 88 mcg daily  · Omeprazole 20mg daily  · Repatha injections     Past medical & surgical history, social and family history reviewed in the electronic medical record.    ROS: Pertinent positives listed in the HPI and problem list above. All others reviewed and negative.     Physical Exam:   /64 (BP Location: Right arm, Patient Position: Lying) Comment: 157/81 left  Pulse 78   Temp 98 °F (36.7 °C) (Temporal Artery )   Resp 16   Ht 162.6 cm (64\")   Wt 74.3 kg (163 lb 12.8 oz)   SpO2 99%   BMI 28.12 kg/m²     Constitutional:    Alert, cooperative, in no acute distress   Neck:     No Jugular venous distention, adenopathy, or thyromegaly noted.     Heart:    Regular rhythm and normal rate, normal S1 and S2, no murmurs,gallops, rubs, or clicks. No distinct PMI noted.    Lungs:     Clear to auscultation bilaterally, respirations regular, even     and unlabored    Abdomen:     Soft non-tender, non-distended, normal bowel sounds, no masses or organomegaly   Extremities:   No gross deformities, no edema, clubbing, or cyanosis.    Pulses:   Peripheral pulses palpable and equal bilaterally.     Labs and Diagnostic Data:    Results from last 7 days  Lab Units 10/04/18  1517   SODIUM mmol/L 135   POTASSIUM mmol/L 4.5   CHLORIDE mmol/L 100   CO2 mmol/L 29.0   BUN mg/dL 14   CREATININE mg/dL 0.94   GLUCOSE mg/dL 193*   CALCIUM mg/dL 9.5       Results from last 7 days  Lab Units 10/04/18  1517   WBC 10*3/mm3 7.10   HEMOGLOBIN g/dL 12.1   HEMATOCRIT % 37.5   PLATELETS 10*3/mm3 232     Lab Results   Component Value Date    CHOL 269 (H) 10/04/2018    TRIG 530 (H) 10/04/2018    HDL 47 10/04/2018     10/04/2018    AST 24 10/04/2018    ALT 19 10/04/2018       Results from last 7 days  Lab Units 10/04/18  1517   HEMOGLOBIN " Documentation......;  Question Available Choices Patient Response   COVID-19 Outbreak Screen:  Do you currently have a new onset of the following symptoms?        Fever/Chills, Cough, Shortness of air, Loss of taste or smell, No, Unknown  No (07/10/22 1033)   COVID-19 Outbreak Screen: In the last 14 days, have you had contact with anyone who is ill, has show any of the symptoms listed above and/or has been diagnosis with the 2019 Novel Coronavirus? This includes any immediate household members but excludes any patients with whom you have been in contact within your normal work duties wearing proper PPE, if you are a healthcare worker.  Yes, No, Unknown              No (07/10/22 1033)   COVID-19 Outbreak Screen: Who was notified? Free text (not recorded)   Ebola Screening Outbreak Screen: Have you traveled to the [place] or [place] within the past 21 days?  Yes, No, Unknown (not recorded)   Ebola Screening Outbreak Screen: Do you have ANY of the following symptoms: Fever/Chills, Vomiting, Diarrhea, Fatigue, Headache, Muscle pain, Unexplained bleeding, Abdominal (stomach) pain, No, Unknown (not recorded)   Ebola Screening Outbreak Screen: Name of Person notified Free text (not recorded)   Travel Screen: Have you traveled in the last month? If so, to what country have you traveled? If [place] what state? Yes, No, Unknown  List of all countries  List of all States No (07/10/22 0931)  (not recorded)  (not recorded)   Infection Risk: Do you currently have the following symptoms?  (If cough is selected, the Tuberculosis Screen is performed.) Cough, Fever, Rash, No No (07/10/22 0931)   Tuberculosis Screen: Do you have any of the following Tuberculosis Risks?  · Have you lived or spent time with anyone who had or may have TB?  · Have you lived in or visited any of the following areas for more than one month: [place], [place], [place], [place] or [place], the [place] or [place]?  · Do you have  A1C % 7.80*             [facility] 8/15/2018:  Final Impression: 1) Normal left ventriculogram.                              2) Two vessel coronary artery disease involving the distal dominant RCA stenosis (non-culprit) and severe disease in the proximal/mid through distal LAD.  The LAD was treated with iFR and OCT guided stenting with an excellent angiographic result obtained (0% residual focal stenosis) that with the realization that treatment was not optimal due to abnormal iFR and apparent (by angiography and OCT) severe diffuse disease in the mid to distal LAD, a small vessel before this disease occurred.    EKG: pending     IMPRESSION:  · 77 y/o WF with history of CAD and recent catheterization with KASHIF x3 to LAD. She presents for staged intervention to RCA  · She has taken ASA and Prasugrel this AM.     PLAN:  · Procedure to perform: [facility] +/- CBI. Risks, benefits and alternatives to the procedure explained to the patient and he understands and wishes to proceed.     I, David Burnett MD, personally performed the services described as documented by the above named individual. I have made any necessary edits and it is both accurate and complete 10/5/2018  4:14 PM    Scribed for David Burnett MD by Dalia Shah PA-C. 10/5/2018  9:47 AM         HIV/AIDS?  · Have you lived in or worked in a nursing home, homeless shelter, correctional facility, or substance abuse treatment facility?   · No    If Yes do you have any of the following symptoms? Yes responses display to the right    If Yes, symptoms listed are:  Cough greater than or equal to 3 weeks, Loss of appetite, Unexplained weight loss, Night sweats, Bloody sputum or hemoptysis, Hoarseness, Fever, Fatigue, Chest pain, No (not recorded)  (not recorded)   Exposure Screen: Have you been exposed to any of these contagious diseases in the last month? Measles, Chickenpox, Meningitis, Pertussis, Whooping Cough, No No (07/10/22 8325)

## 2022-11-21 ENCOUNTER — TRANSCRIBE ORDERS (OUTPATIENT)
Dept: ADMINISTRATIVE | Facility: HOSPITAL | Age: 82
End: 2022-11-21

## 2022-11-21 DIAGNOSIS — Z12.31 VISIT FOR SCREENING MAMMOGRAM: Primary | ICD-10-CM

## 2023-01-16 ENCOUNTER — HOSPITAL ENCOUNTER (OUTPATIENT)
Dept: MAMMOGRAPHY | Facility: HOSPITAL | Age: 83
Discharge: HOME | End: 2023-01-16
Admitting: OBSTETRICS & GYNECOLOGY
Payer: MEDICARE

## 2023-01-16 DIAGNOSIS — Z12.31 VISIT FOR SCREENING MAMMOGRAM: ICD-10-CM

## 2023-01-16 PROCEDURE — 77067 SCR MAMMO BI INCL CAD: CPT

## 2023-01-16 PROCEDURE — 77067 SCR MAMMO BI INCL CAD: CPT | Performed by: RADIOLOGY

## 2023-01-16 PROCEDURE — 77063 BREAST TOMOSYNTHESIS BI: CPT | Performed by: RADIOLOGY

## 2023-01-16 PROCEDURE — 77063 BREAST TOMOSYNTHESIS BI: CPT

## 2023-04-24 ENCOUNTER — HOSPITAL ENCOUNTER (INPATIENT)
Facility: HOSPITAL | Age: 83
LOS: 5 days | Discharge: NURSING, REHAB OR LONG-TERM CARE | DRG: 522 | End: 2023-04-29
Attending: EMERGENCY MEDICINE | Admitting: INTERNAL MEDICINE
Payer: MEDICARE

## 2023-04-24 ENCOUNTER — APPOINTMENT (OUTPATIENT)
Dept: GENERAL RADIOLOGY | Facility: HOSPITAL | Age: 83
DRG: 522 | End: 2023-04-24
Payer: MEDICARE

## 2023-04-24 DIAGNOSIS — R73.9 HYPERGLYCEMIA: ICD-10-CM

## 2023-04-24 DIAGNOSIS — E11.65 UNCONTROLLED TYPE 2 DIABETES MELLITUS WITH HYPERGLYCEMIA: ICD-10-CM

## 2023-04-24 DIAGNOSIS — S72.001A CLOSED FRACTURE OF RIGHT HIP, INITIAL ENCOUNTER: ICD-10-CM

## 2023-04-24 DIAGNOSIS — S72.001A CLOSED RIGHT HIP FRACTURE, INITIAL ENCOUNTER: Primary | ICD-10-CM

## 2023-04-24 PROBLEM — Z79.4 TYPE 2 DIABETES MELLITUS WITH HYPERGLYCEMIA, WITH LONG-TERM CURRENT USE OF INSULIN: Status: ACTIVE | Noted: 2019-08-26

## 2023-04-24 PROBLEM — I10 ESSENTIAL HYPERTENSION: Status: ACTIVE | Noted: 2023-04-24

## 2023-04-24 PROBLEM — S72.009A HIP FRACTURE: Status: ACTIVE | Noted: 2023-04-24

## 2023-04-24 LAB
ALBUMIN SERPL-MCNC: 3.8 G/DL (ref 3.5–5.2)
ALBUMIN/GLOB SERPL: 1.4 G/DL
ALP SERPL-CCNC: 71 U/L (ref 39–117)
ALT SERPL W P-5'-P-CCNC: 14 U/L (ref 1–33)
ANION GAP SERPL CALCULATED.3IONS-SCNC: 9 MMOL/L (ref 5–15)
AST SERPL-CCNC: 18 U/L (ref 1–32)
BASOPHILS # BLD AUTO: 0.07 10*3/MM3 (ref 0–0.2)
BASOPHILS NFR BLD AUTO: 0.6 % (ref 0–1.5)
BILIRUB SERPL-MCNC: 0.2 MG/DL (ref 0–1.2)
BUN SERPL-MCNC: 17 MG/DL (ref 8–23)
BUN/CREAT SERPL: 20.7 (ref 7–25)
CALCIUM SPEC-SCNC: 9.4 MG/DL (ref 8.6–10.5)
CHLORIDE SERPL-SCNC: 104 MMOL/L (ref 98–107)
CO2 SERPL-SCNC: 26 MMOL/L (ref 22–29)
CREAT SERPL-MCNC: 0.82 MG/DL (ref 0.57–1)
DEPRECATED RDW RBC AUTO: 46.5 FL (ref 37–54)
EGFRCR SERPLBLD CKD-EPI 2021: 72 ML/MIN/1.73
EOSINOPHIL # BLD AUTO: 0.19 10*3/MM3 (ref 0–0.4)
EOSINOPHIL NFR BLD AUTO: 1.6 % (ref 0.3–6.2)
ERYTHROCYTE [DISTWIDTH] IN BLOOD BY AUTOMATED COUNT: 14.6 % (ref 12.3–15.4)
GLOBULIN UR ELPH-MCNC: 2.7 GM/DL
GLUCOSE SERPL-MCNC: 214 MG/DL (ref 65–99)
HCT VFR BLD AUTO: 36 % (ref 34–46.6)
HGB BLD-MCNC: 11.4 G/DL (ref 12–15.9)
IMM GRANULOCYTES # BLD AUTO: 0.06 10*3/MM3 (ref 0–0.05)
IMM GRANULOCYTES NFR BLD AUTO: 0.5 % (ref 0–0.5)
INR PPP: 1.02 (ref 0.84–1.13)
LYMPHOCYTES # BLD AUTO: 2.03 10*3/MM3 (ref 0.7–3.1)
LYMPHOCYTES NFR BLD AUTO: 17.4 % (ref 19.6–45.3)
MCH RBC QN AUTO: 27.4 PG (ref 26.6–33)
MCHC RBC AUTO-ENTMCNC: 31.7 G/DL (ref 31.5–35.7)
MCV RBC AUTO: 86.5 FL (ref 79–97)
MONOCYTES # BLD AUTO: 0.63 10*3/MM3 (ref 0.1–0.9)
MONOCYTES NFR BLD AUTO: 5.4 % (ref 5–12)
NEUTROPHILS NFR BLD AUTO: 74.5 % (ref 42.7–76)
NEUTROPHILS NFR BLD AUTO: 8.7 10*3/MM3 (ref 1.7–7)
NRBC BLD AUTO-RTO: 0 /100 WBC (ref 0–0.2)
PLATELET # BLD AUTO: 229 10*3/MM3 (ref 140–450)
PMV BLD AUTO: 10.2 FL (ref 6–12)
POTASSIUM SERPL-SCNC: 4.3 MMOL/L (ref 3.5–5.2)
PROT SERPL-MCNC: 6.5 G/DL (ref 6–8.5)
PROTHROMBIN TIME: 13.3 SECONDS (ref 11.4–14.4)
RBC # BLD AUTO: 4.16 10*6/MM3 (ref 3.77–5.28)
SODIUM SERPL-SCNC: 139 MMOL/L (ref 136–145)
WBC NRBC COR # BLD: 11.68 10*3/MM3 (ref 3.4–10.8)

## 2023-04-24 PROCEDURE — 71045 X-RAY EXAM CHEST 1 VIEW: CPT

## 2023-04-24 PROCEDURE — 83036 HEMOGLOBIN GLYCOSYLATED A1C: CPT | Performed by: INTERNAL MEDICINE

## 2023-04-24 PROCEDURE — 73552 X-RAY EXAM OF FEMUR 2/>: CPT

## 2023-04-24 PROCEDURE — 85610 PROTHROMBIN TIME: CPT | Performed by: EMERGENCY MEDICINE

## 2023-04-24 PROCEDURE — 25010000002 FENTANYL CITRATE (PF) 50 MCG/ML SOLUTION: Performed by: EMERGENCY MEDICINE

## 2023-04-24 PROCEDURE — 84443 ASSAY THYROID STIM HORMONE: CPT | Performed by: INTERNAL MEDICINE

## 2023-04-24 PROCEDURE — 85025 COMPLETE CBC W/AUTO DIFF WBC: CPT | Performed by: EMERGENCY MEDICINE

## 2023-04-24 PROCEDURE — 99285 EMERGENCY DEPT VISIT HI MDM: CPT

## 2023-04-24 PROCEDURE — 73502 X-RAY EXAM HIP UNI 2-3 VIEWS: CPT

## 2023-04-24 PROCEDURE — 80053 COMPREHEN METABOLIC PANEL: CPT | Performed by: EMERGENCY MEDICINE

## 2023-04-24 PROCEDURE — 86901 BLOOD TYPING SEROLOGIC RH(D): CPT

## 2023-04-24 PROCEDURE — 86900 BLOOD TYPING SEROLOGIC ABO: CPT

## 2023-04-24 PROCEDURE — 93005 ELECTROCARDIOGRAM TRACING: CPT | Performed by: EMERGENCY MEDICINE

## 2023-04-24 RX ORDER — CLOPIDOGREL BISULFATE 75 MG/1
1 TABLET ORAL DAILY
Status: ON HOLD | COMMUNITY
Start: 2023-01-29 | End: 2023-04-29 | Stop reason: SDUPTHER

## 2023-04-24 RX ORDER — LOSARTAN POTASSIUM AND HYDROCHLOROTHIAZIDE 12.5; 1 MG/1; MG/1
TABLET ORAL
COMMUNITY
End: 2023-04-29 | Stop reason: HOSPADM

## 2023-04-24 RX ORDER — FAMOTIDINE 40 MG/1
1 TABLET, FILM COATED ORAL DAILY
COMMUNITY
Start: 2023-04-09

## 2023-04-24 RX ORDER — FENTANYL CITRATE 50 UG/ML
50 INJECTION, SOLUTION INTRAMUSCULAR; INTRAVENOUS ONCE
Status: COMPLETED | OUTPATIENT
Start: 2023-04-24 | End: 2023-04-24

## 2023-04-24 RX ORDER — SODIUM CHLORIDE 0.9 % (FLUSH) 0.9 %
10 SYRINGE (ML) INJECTION AS NEEDED
Status: DISCONTINUED | OUTPATIENT
Start: 2023-04-24 | End: 2023-04-29 | Stop reason: HOSPADM

## 2023-04-24 RX ORDER — EVOLOCUMAB 140 MG/ML
INJECTION, SOLUTION SUBCUTANEOUS
Status: ON HOLD | COMMUNITY
End: 2023-04-26

## 2023-04-24 RX ORDER — ESTRADIOL 0.1 MG/G
1 CREAM VAGINAL
COMMUNITY
Start: 2022-12-27 | End: 2023-05-04

## 2023-04-24 RX ORDER — METFORMIN HYDROCHLORIDE 500 MG/1
TABLET, EXTENDED RELEASE ORAL
Status: ON HOLD | COMMUNITY
End: 2023-04-29 | Stop reason: SDUPTHER

## 2023-04-24 RX ORDER — FLUTICASONE PROPIONATE 50 MCG
SPRAY, SUSPENSION (ML) NASAL
COMMUNITY
Start: 2022-12-28

## 2023-04-24 RX ORDER — CLOBETASOL PROPIONATE 0.5 MG/G
OINTMENT TOPICAL
COMMUNITY
Start: 2022-12-27 | End: 2023-04-29 | Stop reason: HOSPADM

## 2023-04-24 RX ORDER — BUPIVACAINE HYDROCHLORIDE 2.5 MG/ML
30 INJECTION, SOLUTION EPIDURAL; INFILTRATION; INTRACAUDAL ONCE
Status: COMPLETED | OUTPATIENT
Start: 2023-04-24 | End: 2023-04-24

## 2023-04-24 RX ORDER — DEXLANSOPRAZOLE 60 MG/1
60 CAPSULE, DELAYED RELEASE ORAL NIGHTLY
COMMUNITY

## 2023-04-24 RX ORDER — LIDOCAINE HYDROCHLORIDE 10 MG/ML
10 INJECTION, SOLUTION EPIDURAL; INFILTRATION; INTRACAUDAL; PERINEURAL ONCE
Status: COMPLETED | OUTPATIENT
Start: 2023-04-24 | End: 2023-04-24

## 2023-04-24 RX ADMIN — BUPIVACAINE HYDROCHLORIDE 30 ML: 2.5 INJECTION, SOLUTION EPIDURAL; INFILTRATION; INTRACAUDAL at 21:46

## 2023-04-24 RX ADMIN — FENTANYL CITRATE 50 MCG: 50 INJECTION, SOLUTION INTRAMUSCULAR; INTRAVENOUS at 20:16

## 2023-04-24 RX ADMIN — LIDOCAINE HYDROCHLORIDE 10 ML: 10 INJECTION, SOLUTION EPIDURAL; INFILTRATION; INTRACAUDAL; PERINEURAL at 21:46

## 2023-04-24 NOTE — Clinical Note
Level of Care: Telemetry [5]   Diagnosis: Hip fracture [197979]   Admitting Physician: YUDELKA DUNNE [[id number]]   Attending Physician: YUDELKA DUNNE [[id number]]   Isolate for COVID?: No [0]   Bed Request Comments: tele   Certification: I Certify That Inpatient Hospital Services Are Medically Necessary For Greater Than 2 Midnights

## 2023-04-25 ENCOUNTER — ANESTHESIA EVENT (OUTPATIENT)
Dept: TELEMETRY | Facility: HOSPITAL | Age: 83
DRG: 522 | End: 2023-04-25
Payer: MEDICARE

## 2023-04-25 ENCOUNTER — ANESTHESIA EVENT (OUTPATIENT)
Dept: PERIOP | Facility: HOSPITAL | Age: 83
DRG: 522 | End: 2023-04-25
Payer: MEDICARE

## 2023-04-25 ENCOUNTER — ANESTHESIA (OUTPATIENT)
Dept: TELEMETRY | Facility: HOSPITAL | Age: 83
DRG: 522 | End: 2023-04-25
Payer: MEDICARE

## 2023-04-25 ENCOUNTER — ANESTHESIA EVENT CONVERTED (OUTPATIENT)
Dept: ANESTHESIOLOGY | Facility: HOSPITAL | Age: 83
DRG: 522 | End: 2023-04-25
Payer: MEDICARE

## 2023-04-25 LAB
ABO GROUP BLD: NORMAL
ABO GROUP BLD: NORMAL
ANION GAP SERPL CALCULATED.3IONS-SCNC: 11 MMOL/L (ref 5–15)
BASOPHILS # BLD AUTO: 0.05 10*3/MM3 (ref 0–0.2)
BASOPHILS NFR BLD AUTO: 0.5 % (ref 0–1.5)
BLD GP AB SCN SERPL QL: NEGATIVE
BUN SERPL-MCNC: 15 MG/DL (ref 8–23)
BUN/CREAT SERPL: 21.7 (ref 7–25)
CALCIUM SPEC-SCNC: 9.5 MG/DL (ref 8.6–10.5)
CHLORIDE SERPL-SCNC: 103 MMOL/L (ref 98–107)
CO2 SERPL-SCNC: 25 MMOL/L (ref 22–29)
CREAT SERPL-MCNC: 0.69 MG/DL (ref 0.57–1)
DEPRECATED RDW RBC AUTO: 44.9 FL (ref 37–54)
EGFRCR SERPLBLD CKD-EPI 2021: 87.3 ML/MIN/1.73
EOSINOPHIL # BLD AUTO: 0.13 10*3/MM3 (ref 0–0.4)
EOSINOPHIL NFR BLD AUTO: 1.4 % (ref 0.3–6.2)
ERYTHROCYTE [DISTWIDTH] IN BLOOD BY AUTOMATED COUNT: 14.3 % (ref 12.3–15.4)
GLUCOSE BLDC GLUCOMTR-MCNC: 139 MG/DL (ref 70–130)
GLUCOSE BLDC GLUCOMTR-MCNC: 159 MG/DL (ref 70–130)
GLUCOSE BLDC GLUCOMTR-MCNC: 162 MG/DL (ref 70–130)
GLUCOSE BLDC GLUCOMTR-MCNC: 162 MG/DL (ref 70–130)
GLUCOSE BLDC GLUCOMTR-MCNC: 197 MG/DL (ref 70–130)
GLUCOSE SERPL-MCNC: 180 MG/DL (ref 65–99)
HBA1C MFR BLD: 8.2 % (ref 4.8–5.6)
HCT VFR BLD AUTO: 34.7 % (ref 34–46.6)
HGB BLD-MCNC: 11.1 G/DL (ref 12–15.9)
IMM GRANULOCYTES # BLD AUTO: 0.05 10*3/MM3 (ref 0–0.05)
IMM GRANULOCYTES NFR BLD AUTO: 0.5 % (ref 0–0.5)
INR PPP: 1.03 (ref 0.84–1.13)
LYMPHOCYTES # BLD AUTO: 2.21 10*3/MM3 (ref 0.7–3.1)
LYMPHOCYTES NFR BLD AUTO: 23.3 % (ref 19.6–45.3)
MAGNESIUM SERPL-MCNC: 1.8 MG/DL (ref 1.6–2.4)
MCH RBC QN AUTO: 27.5 PG (ref 26.6–33)
MCHC RBC AUTO-ENTMCNC: 32 G/DL (ref 31.5–35.7)
MCV RBC AUTO: 85.9 FL (ref 79–97)
MONOCYTES # BLD AUTO: 0.74 10*3/MM3 (ref 0.1–0.9)
MONOCYTES NFR BLD AUTO: 7.8 % (ref 5–12)
NEUTROPHILS NFR BLD AUTO: 6.32 10*3/MM3 (ref 1.7–7)
NEUTROPHILS NFR BLD AUTO: 66.5 % (ref 42.7–76)
NRBC BLD AUTO-RTO: 0 /100 WBC (ref 0–0.2)
PHOSPHATE SERPL-MCNC: 3.7 MG/DL (ref 2.5–4.5)
PLATELET # BLD AUTO: 208 10*3/MM3 (ref 140–450)
PMV BLD AUTO: 10.3 FL (ref 6–12)
POTASSIUM SERPL-SCNC: 4.1 MMOL/L (ref 3.5–5.2)
PROTHROMBIN TIME: 13.5 SECONDS (ref 11.4–14.4)
RBC # BLD AUTO: 4.04 10*6/MM3 (ref 3.77–5.28)
RH BLD: POSITIVE
RH BLD: POSITIVE
SODIUM SERPL-SCNC: 139 MMOL/L (ref 136–145)
T&S EXPIRATION DATE: NORMAL
TSH SERPL DL<=0.05 MIU/L-ACNC: 3.94 UIU/ML (ref 0.27–4.2)
WBC NRBC COR # BLD: 9.5 10*3/MM3 (ref 3.4–10.8)

## 2023-04-25 PROCEDURE — 84100 ASSAY OF PHOSPHORUS: CPT | Performed by: INTERNAL MEDICINE

## 2023-04-25 PROCEDURE — 25010000002 ROPIVACAINE PER 1 MG: Performed by: NURSE ANESTHETIST, CERTIFIED REGISTERED

## 2023-04-25 PROCEDURE — 25010000002 ONDANSETRON PER 1 MG: Performed by: INTERNAL MEDICINE

## 2023-04-25 PROCEDURE — 82962 GLUCOSE BLOOD TEST: CPT

## 2023-04-25 PROCEDURE — 86850 RBC ANTIBODY SCREEN: CPT | Performed by: INTERNAL MEDICINE

## 2023-04-25 PROCEDURE — 86900 BLOOD TYPING SEROLOGIC ABO: CPT | Performed by: INTERNAL MEDICINE

## 2023-04-25 PROCEDURE — 85610 PROTHROMBIN TIME: CPT | Performed by: INTERNAL MEDICINE

## 2023-04-25 PROCEDURE — 80048 BASIC METABOLIC PNL TOTAL CA: CPT | Performed by: INTERNAL MEDICINE

## 2023-04-25 PROCEDURE — 63710000001 INSULIN DETEMIR PER 5 UNITS: Performed by: INTERNAL MEDICINE

## 2023-04-25 PROCEDURE — 63710000001 INSULIN LISPRO (HUMAN) PER 5 UNITS: Performed by: INTERNAL MEDICINE

## 2023-04-25 PROCEDURE — 85025 COMPLETE CBC W/AUTO DIFF WBC: CPT | Performed by: INTERNAL MEDICINE

## 2023-04-25 PROCEDURE — 83735 ASSAY OF MAGNESIUM: CPT | Performed by: INTERNAL MEDICINE

## 2023-04-25 PROCEDURE — 86901 BLOOD TYPING SEROLOGIC RH(D): CPT | Performed by: INTERNAL MEDICINE

## 2023-04-25 PROCEDURE — 25010000002 HYDROMORPHONE PER 4 MG: Performed by: INTERNAL MEDICINE

## 2023-04-25 RX ORDER — ONDANSETRON 2 MG/ML
4 INJECTION INTRAMUSCULAR; INTRAVENOUS EVERY 6 HOURS PRN
Status: DISCONTINUED | OUTPATIENT
Start: 2023-04-25 | End: 2023-04-29 | Stop reason: HOSPADM

## 2023-04-25 RX ORDER — NALOXONE HCL 0.4 MG/ML
0.4 VIAL (ML) INJECTION
Status: DISCONTINUED | OUTPATIENT
Start: 2023-04-25 | End: 2023-04-25

## 2023-04-25 RX ORDER — INSULIN LISPRO 100 [IU]/ML
0-9 INJECTION, SOLUTION INTRAVENOUS; SUBCUTANEOUS EVERY 6 HOURS SCHEDULED
Status: DISCONTINUED | OUTPATIENT
Start: 2023-04-25 | End: 2023-04-27

## 2023-04-25 RX ORDER — FAMOTIDINE 20 MG/1
40 TABLET, FILM COATED ORAL DAILY
Status: DISCONTINUED | OUTPATIENT
Start: 2023-04-25 | End: 2023-04-29 | Stop reason: HOSPADM

## 2023-04-25 RX ORDER — POLYETHYLENE GLYCOL 3350 17 G/17G
17 POWDER, FOR SOLUTION ORAL DAILY PRN
Status: DISCONTINUED | OUTPATIENT
Start: 2023-04-25 | End: 2023-04-29 | Stop reason: HOSPADM

## 2023-04-25 RX ORDER — CHOLECALCIFEROL (VITAMIN D3) 125 MCG
5 CAPSULE ORAL NIGHTLY PRN
Status: DISCONTINUED | OUTPATIENT
Start: 2023-04-25 | End: 2023-04-29 | Stop reason: HOSPADM

## 2023-04-25 RX ORDER — DEXTROSE MONOHYDRATE 25 G/50ML
25 INJECTION, SOLUTION INTRAVENOUS
Status: DISCONTINUED | OUTPATIENT
Start: 2023-04-25 | End: 2023-04-29 | Stop reason: HOSPADM

## 2023-04-25 RX ORDER — SODIUM CHLORIDE 0.9 % (FLUSH) 0.9 %
10 SYRINGE (ML) INJECTION AS NEEDED
Status: DISCONTINUED | OUTPATIENT
Start: 2023-04-25 | End: 2023-04-29 | Stop reason: HOSPADM

## 2023-04-25 RX ORDER — SODIUM CHLORIDE 9 MG/ML
40 INJECTION, SOLUTION INTRAVENOUS AS NEEDED
Status: DISCONTINUED | OUTPATIENT
Start: 2023-04-25 | End: 2023-04-29 | Stop reason: HOSPADM

## 2023-04-25 RX ORDER — ROPIVACAINE HYDROCHLORIDE 2 MG/ML
INJECTION, SOLUTION EPIDURAL; INFILTRATION; PERINEURAL CONTINUOUS
Status: DISCONTINUED | OUTPATIENT
Start: 2023-04-25 | End: 2023-04-29 | Stop reason: HOSPADM

## 2023-04-25 RX ORDER — SODIUM CHLORIDE 0.9 % (FLUSH) 0.9 %
10 SYRINGE (ML) INJECTION EVERY 12 HOURS SCHEDULED
Status: DISCONTINUED | OUTPATIENT
Start: 2023-04-25 | End: 2023-04-29 | Stop reason: HOSPADM

## 2023-04-25 RX ORDER — ACETAMINOPHEN 325 MG/1
650 TABLET ORAL EVERY 4 HOURS PRN
Status: DISCONTINUED | OUTPATIENT
Start: 2023-04-25 | End: 2023-04-29 | Stop reason: HOSPADM

## 2023-04-25 RX ORDER — LOSARTAN POTASSIUM 50 MG/1
100 TABLET ORAL
Status: DISCONTINUED | OUTPATIENT
Start: 2023-04-25 | End: 2023-04-25

## 2023-04-25 RX ORDER — NALOXONE HCL 0.4 MG/ML
0.4 VIAL (ML) INJECTION AS NEEDED
Status: DISCONTINUED | OUTPATIENT
Start: 2023-04-25 | End: 2023-04-26 | Stop reason: SDUPTHER

## 2023-04-25 RX ORDER — NICOTINE POLACRILEX 4 MG
15 LOZENGE BUCCAL
Status: DISCONTINUED | OUTPATIENT
Start: 2023-04-25 | End: 2023-04-29 | Stop reason: HOSPADM

## 2023-04-25 RX ORDER — CARVEDILOL 12.5 MG/1
12.5 TABLET ORAL 2 TIMES DAILY WITH MEALS
Status: DISCONTINUED | OUTPATIENT
Start: 2023-04-25 | End: 2023-04-29 | Stop reason: HOSPADM

## 2023-04-25 RX ORDER — BISACODYL 5 MG/1
5 TABLET, DELAYED RELEASE ORAL DAILY PRN
Status: DISCONTINUED | OUTPATIENT
Start: 2023-04-25 | End: 2023-04-29 | Stop reason: HOSPADM

## 2023-04-25 RX ORDER — ACETAMINOPHEN 160 MG/5ML
650 SOLUTION ORAL EVERY 4 HOURS PRN
Status: DISCONTINUED | OUTPATIENT
Start: 2023-04-25 | End: 2023-04-29 | Stop reason: HOSPADM

## 2023-04-25 RX ORDER — LEVOTHYROXINE SODIUM 88 UG/1
88 TABLET ORAL EVERY MORNING
Status: DISCONTINUED | OUTPATIENT
Start: 2023-04-25 | End: 2023-04-29 | Stop reason: HOSPADM

## 2023-04-25 RX ORDER — BISACODYL 10 MG
10 SUPPOSITORY, RECTAL RECTAL DAILY PRN
Status: DISCONTINUED | OUTPATIENT
Start: 2023-04-25 | End: 2023-04-29 | Stop reason: HOSPADM

## 2023-04-25 RX ORDER — HYDROCODONE BITARTRATE AND ACETAMINOPHEN 5; 325 MG/1; MG/1
1 TABLET ORAL EVERY 6 HOURS PRN
Status: DISCONTINUED | OUTPATIENT
Start: 2023-04-25 | End: 2023-04-29 | Stop reason: HOSPADM

## 2023-04-25 RX ORDER — HYDROCHLOROTHIAZIDE 12.5 MG/1
12.5 TABLET ORAL
Status: DISCONTINUED | OUTPATIENT
Start: 2023-04-25 | End: 2023-04-25

## 2023-04-25 RX ORDER — ROPIVACAINE HYDROCHLORIDE 2 MG/ML
INJECTION, SOLUTION EPIDURAL; INFILTRATION; PERINEURAL CONTINUOUS
Status: DISCONTINUED | OUTPATIENT
Start: 2023-04-25 | End: 2023-04-25

## 2023-04-25 RX ORDER — AMOXICILLIN 250 MG
2 CAPSULE ORAL 2 TIMES DAILY
Status: DISCONTINUED | OUTPATIENT
Start: 2023-04-25 | End: 2023-04-29 | Stop reason: HOSPADM

## 2023-04-25 RX ORDER — SODIUM CHLORIDE 9 MG/ML
100 INJECTION, SOLUTION INTRAVENOUS CONTINUOUS
Status: ACTIVE | OUTPATIENT
Start: 2023-04-25 | End: 2023-04-26

## 2023-04-25 RX ORDER — SODIUM CHLORIDE, SODIUM LACTATE, POTASSIUM CHLORIDE, CALCIUM CHLORIDE 600; 310; 30; 20 MG/100ML; MG/100ML; MG/100ML; MG/100ML
50 INJECTION, SOLUTION INTRAVENOUS CONTINUOUS
Status: DISCONTINUED | OUTPATIENT
Start: 2023-04-25 | End: 2023-04-25

## 2023-04-25 RX ORDER — ROPIVACAINE HYDROCHLORIDE 5 MG/ML
INJECTION, SOLUTION EPIDURAL; INFILTRATION; PERINEURAL
Status: COMPLETED | OUTPATIENT
Start: 2023-04-25 | End: 2023-04-25

## 2023-04-25 RX ORDER — HYDROMORPHONE HYDROCHLORIDE 1 MG/ML
0.5 INJECTION, SOLUTION INTRAMUSCULAR; INTRAVENOUS; SUBCUTANEOUS
Status: DISCONTINUED | OUTPATIENT
Start: 2023-04-25 | End: 2023-04-26 | Stop reason: SDUPTHER

## 2023-04-25 RX ORDER — MONTELUKAST SODIUM 10 MG/1
10 TABLET ORAL NIGHTLY
Status: DISCONTINUED | OUTPATIENT
Start: 2023-04-25 | End: 2023-04-29 | Stop reason: HOSPADM

## 2023-04-25 RX ORDER — FLUTICASONE PROPIONATE 50 MCG
2 SPRAY, SUSPENSION (ML) NASAL 2 TIMES DAILY
Status: DISCONTINUED | OUTPATIENT
Start: 2023-04-25 | End: 2023-04-29 | Stop reason: HOSPADM

## 2023-04-25 RX ORDER — PANTOPRAZOLE SODIUM 40 MG/1
40 TABLET, DELAYED RELEASE ORAL
Status: DISCONTINUED | OUTPATIENT
Start: 2023-04-25 | End: 2023-04-29 | Stop reason: HOSPADM

## 2023-04-25 RX ORDER — ASPIRIN 81 MG/1
81 TABLET ORAL EVERY EVENING
Status: DISCONTINUED | OUTPATIENT
Start: 2023-04-25 | End: 2023-04-26 | Stop reason: DRUGHIGH

## 2023-04-25 RX ORDER — ONDANSETRON 4 MG/1
4 TABLET, FILM COATED ORAL EVERY 6 HOURS PRN
Status: DISCONTINUED | OUTPATIENT
Start: 2023-04-25 | End: 2023-04-29 | Stop reason: HOSPADM

## 2023-04-25 RX ORDER — ACETAMINOPHEN 650 MG/1
650 SUPPOSITORY RECTAL EVERY 4 HOURS PRN
Status: DISCONTINUED | OUTPATIENT
Start: 2023-04-25 | End: 2023-04-29 | Stop reason: HOSPADM

## 2023-04-25 RX ORDER — HYDROMORPHONE HYDROCHLORIDE 1 MG/ML
0.5 INJECTION, SOLUTION INTRAMUSCULAR; INTRAVENOUS; SUBCUTANEOUS
Status: DISCONTINUED | OUTPATIENT
Start: 2023-04-25 | End: 2023-04-25

## 2023-04-25 RX ADMIN — FLUTICASONE PROPIONATE 2 SPRAY: 50 SPRAY, METERED NASAL at 20:57

## 2023-04-25 RX ADMIN — LOSARTAN POTASSIUM 100 MG: 50 TABLET, FILM COATED ORAL at 08:47

## 2023-04-25 RX ADMIN — Medication 1000 MG: at 20:07

## 2023-04-25 RX ADMIN — FLUTICASONE PROPIONATE 2 SPRAY: 50 SPRAY, METERED NASAL at 08:49

## 2023-04-25 RX ADMIN — ACETAMINOPHEN 325MG 650 MG: 325 TABLET ORAL at 02:04

## 2023-04-25 RX ADMIN — HYDROMORPHONE HYDROCHLORIDE 0.5 MG: 1 INJECTION, SOLUTION INTRAMUSCULAR; INTRAVENOUS; SUBCUTANEOUS at 08:47

## 2023-04-25 RX ADMIN — PANTOPRAZOLE SODIUM 40 MG: 40 TABLET, DELAYED RELEASE ORAL at 05:20

## 2023-04-25 RX ADMIN — FAMOTIDINE 40 MG: 20 TABLET, FILM COATED ORAL at 08:47

## 2023-04-25 RX ADMIN — SODIUM CHLORIDE, POTASSIUM CHLORIDE, SODIUM LACTATE AND CALCIUM CHLORIDE 50 ML/HR: 600; 310; 30; 20 INJECTION, SOLUTION INTRAVENOUS at 01:46

## 2023-04-25 RX ADMIN — INSULIN DETEMIR 5 UNITS: 100 INJECTION, SOLUTION SUBCUTANEOUS at 02:04

## 2023-04-25 RX ADMIN — HYDROCHLOROTHIAZIDE 12.5 MG: 12.5 CAPSULE ORAL at 08:47

## 2023-04-25 RX ADMIN — MONTELUKAST 10 MG: 10 TABLET, FILM COATED ORAL at 20:57

## 2023-04-25 RX ADMIN — INSULIN DETEMIR 5 UNITS: 100 INJECTION, SOLUTION SUBCUTANEOUS at 20:57

## 2023-04-25 RX ADMIN — SENNOSIDES AND DOCUSATE SODIUM 2 TABLET: 8.6; 5 TABLET ORAL at 08:47

## 2023-04-25 RX ADMIN — ROPIVACAINE HYDROCHLORIDE 25 ML: 5 INJECTION, SOLUTION EPIDURAL; INFILTRATION; PERINEURAL at 14:15

## 2023-04-25 RX ADMIN — MONTELUKAST 10 MG: 10 TABLET, FILM COATED ORAL at 02:04

## 2023-04-25 RX ADMIN — LEVOTHYROXINE SODIUM 88 MCG: 0.09 TABLET ORAL at 05:20

## 2023-04-25 RX ADMIN — HYDROCODONE BITARTRATE AND ACETAMINOPHEN 1 TABLET: 5; 325 TABLET ORAL at 12:27

## 2023-04-25 RX ADMIN — ONDANSETRON 4 MG: 2 INJECTION INTRAMUSCULAR; INTRAVENOUS at 09:52

## 2023-04-25 RX ADMIN — ONDANSETRON 4 MG: 2 INJECTION INTRAMUSCULAR; INTRAVENOUS at 15:48

## 2023-04-25 RX ADMIN — Medication 10 ML: at 01:46

## 2023-04-25 RX ADMIN — ASPIRIN 81 MG: 81 TABLET, COATED ORAL at 17:52

## 2023-04-25 RX ADMIN — PANTOPRAZOLE SODIUM 40 MG: 40 TABLET, DELAYED RELEASE ORAL at 17:52

## 2023-04-25 RX ADMIN — INSULIN LISPRO 2 UNITS: 100 INJECTION, SOLUTION INTRAVENOUS; SUBCUTANEOUS at 12:26

## 2023-04-25 RX ADMIN — CARVEDILOL 12.5 MG: 12.5 TABLET, FILM COATED ORAL at 08:47

## 2023-04-25 RX ADMIN — Medication 10 ML: at 08:49

## 2023-04-25 RX ADMIN — INSULIN LISPRO 2 UNITS: 100 INJECTION, SOLUTION INTRAVENOUS; SUBCUTANEOUS at 05:21

## 2023-04-25 RX ADMIN — SENNOSIDES AND DOCUSATE SODIUM 2 TABLET: 8.6; 5 TABLET ORAL at 20:57

## 2023-04-25 RX ADMIN — Medication 10 ML: at 20:59

## 2023-04-25 NOTE — H&P
[facility] Medicine Services  HISTORY AND PHYSICAL    Patient Name: Angelica Spivey  : 1940  MRN: [id number]  Primary Care Physician: Abimael Matthews MD  Date of admission: 2023    Subjective   Subjective     Chief Complaint:  Right hip pain    HPI:  Angelica Spivey is a 83 y.o. female with medical history significant for HTN, HLD, T2DM, CAD s/p prior LAD stent (~ 2018) on DAPT who presents to the ED for evaluation after slipping and falling onto her right hip today. Patient reports the floor was slick and she slipped, denies hitting her head, no syncope, dizziness or lightheadedness. She was unable to get up and presents to the ED for evaluation. Imaging obtained in the ED showing an acute impacted likely subcapital fracture of the right femoral neck. Dr. Onofre, ortho on call, was notified by the ED provider.     Patient reports pain when trying to move her right leg, she is fairly comfortable when lying still. She denies any other recent acute illness. She is diabetic and takes her long acting insulin at night, she has not taken tonight. She does take plavix and took her last dose this morning ~ 10am.    Review of Systems   Constitutional: Negative for chills and fever.   Respiratory: Negative for shortness of breath and wheezing.    Cardiovascular: Negative for chest pain, palpitations and leg swelling.   Gastrointestinal: Positive for constipation (chronic).   Genitourinary: Negative.    Musculoskeletal: Positive for arthralgias and gait problem.   Neurological: Negative for dizziness, syncope and light-headedness.          Personal History     Past Medical History:   Diagnosis Date   • Anxiety    • Arthritis    • Coronary artery disease    • Diabetes mellitus    • Disease of thyroid gland    • Elevated cholesterol    • GERD (gastroesophageal reflux disease)    • Hearing aid worn    • Heart attack    • History of stomach ulcers    • Hypertension    • PONV  (postoperative nausea and vomiting)    • Wears glasses              Past Surgical History:   Procedure Laterality Date   • BLADDER SUSPENSION     • CARDIAC CATHETERIZATION N/A 08/15/2018    Procedure: Left Heart Cath;  Surgeon: David Burnett MD;  Location:  [facility];  Service: Cardiology   • CARDIAC CATHETERIZATION N/A 01/08/2020    Procedure: LEFT HEART CATH;  Surgeon: David Subramanian MD;  Location:  [facility];  Service: Cardiology   • CARDIAC CATHETERIZATION N/A 01/15/2020    Procedure: CBI to the LAD;  Surgeon: Phil Deng MD;  Location:  [facility];  Service: Cardiovascular   • CARDIAC CATHETERIZATION N/A 12/03/2020    Procedure: Left Heart Cath 41825;  Surgeon: Phil Deng MD;  Location:  [facility];  Service: Cardiovascular;  Laterality: N/A;   • CATARACT EXTRACTION Bilateral    • COLONOSCOPY     • ENDOSCOPY     • ENDOSCOPY N/A 12/06/2019    Procedure: ESOPHAGOGASTRODUODENOSCOPY;  Surgeon: Burak Patino MD;  Location:  [facility];  Service: Gastroenterology   • HYSTERECTOMY  2014    PARTIAL   • OOPHORECTOMY Bilateral 2014   • RI RT/LT HEART CATHETERS N/A 10/05/2018    Procedure: Percutaneous Coronary Intervention;  Surgeon: David Burnett MD;  Location:  [facility];  Service: Cardiology   • VULVECTOMY         Family History:  family history is not on file.     Social History:  reports that she has never smoked. She has never used smokeless tobacco. She reports that she does not drink alcohol and does not use drugs.  Social History     Social History Narrative   • Not on file       Medications:  Accu-Chek Ericka Plus, Dulaglutide, Estrogens Conjugated, Evolocumab, Insulin Glargine, Insulin Pen Needle, aspirin, carvedilol, clobetasol, clopidogrel, dexlansoprazole, estradiol, famotidine, fluticasone, glimepiride, glucose blood, levothyroxine, linagliptin, (losartan 50 MG tablet  "100 mg, hydroCHLOROthiazide 12.5 MG 12.5 mg), losartan-hydrochlorothiazide, metFORMIN, metFORMIN ER, montelukast, nitroglycerin, and potassium chloride    Allergies   Allergen Reactions   • Biaxin [Clarithromycin] Anaphylaxis     \"BLISTERS AND THROAT SWELLING\"   • Allegra [Fexofenadine] Other (See Comments)     \"MAKES ME FEEL FUNNY\"   • Repatha [Evolocumab] Other (See Comments)     Chest pain   • Sulfa Antibiotics Nausea Only       Objective   Objective     Vital Signs:   Temp:  [98 °F (36.7 °C)] 98 °F (36.7 °C)  Heart Rate:  [] 104  Resp:  [20] 20  BP: (138-166)/(78-93) 152/86    Physical Exam  Constitutional:       General: She is not in acute distress.     Appearance: Normal appearance. She is well-developed. She is not toxic-appearing.   HENT:      Head: Normocephalic and atraumatic.      Nose: Nose normal.      Mouth/Throat:      Mouth: Mucous membranes are moist.      Pharynx: Oropharynx is clear.   Eyes:      Extraocular Movements: Extraocular movements intact.      Conjunctiva/sclera: Conjunctivae normal.      Pupils: Pupils are equal, round, and reactive to light.   Cardiovascular:      Rate and Rhythm: Normal rate and regular rhythm.      Pulses: Decreased pulses (DP/PT bilaterally weakly palpable).      Heart sounds: Normal heart sounds. No murmur heard.  Pulmonary:      Effort: Pulmonary effort is normal. No respiratory distress.      Breath sounds: Normal breath sounds.   Abdominal:      General: Bowel sounds are normal. There is no distension.      Palpations: Abdomen is soft.      Tenderness: There is no abdominal tenderness. There is no guarding.   Musculoskeletal:         General: Deformity (RLE w/ foot turned inward) present. No swelling. Normal range of motion.      Cervical back: Normal range of motion and neck supple.      Right lower leg: No edema.      Left lower leg: No edema.   Skin:     General: Skin is warm and dry.      Capillary Refill: Capillary refill takes 2 to 3 seconds.      " Findings: No rash.   Neurological:      Mental Status: She is alert and oriented to person, place, and time.      Cranial Nerves: No cranial nerve deficit.   Psychiatric:         Mood and Affect: Mood normal.         Behavior: Behavior normal.            Result Review:  I have personally reviewed the results from the time of this admission to 4/24/2023 23:20 EDT and agree with these findings:  [x]  Laboratory list / accordion  []  Microbiology  [x]  Radiology  [x]  EKG/Telemetry   []  Cardiology/Vascular   []  Pathology  [x]  Old records  []  Other:  Most notable findings include:     LAB RESULTS:      Lab 04/24/23 2012   WBC 11.68*   HEMOGLOBIN 11.4*   HEMATOCRIT 36.0   PLATELETS 229   NEUTROS ABS 8.70*   IMMATURE GRANS (ABS) 0.06*   LYMPHS ABS 2.03   MONOS ABS 0.63   EOS ABS 0.19   MCV 86.5   PROTIME 13.3         Lab 04/24/23 2012   SODIUM 139   POTASSIUM 4.3   CHLORIDE 104   CO2 26.0   ANION GAP 9.0   BUN 17   CREATININE 0.82   EGFR 72.0   GLUCOSE 214*   CALCIUM 9.4         Lab 04/24/23 2012   TOTAL PROTEIN 6.5   ALBUMIN 3.8   GLOBULIN 2.7   ALT (SGPT) 14   AST (SGOT) 18   BILIRUBIN 0.2   ALK PHOS 71         Lab 04/24/23 2012   PROTIME 13.3   INR 1.02                 Brief Urine Lab Results     None        Microbiology Results (last 10 days)     ** No results found for the last 240 hours. **          XR Femur 2 View Right    Result Date: 4/24/2023  XR FEMUR 2 VW RIGHT, XR HIP W OR WO PELVIS 2-3 VIEW RIGHT Date of Exam: 4/24/2023 7:33 PM EDT Indication: fall Comparison: None available. Findings: The bones are demineralized limiting assessment for occult nondisplaced fractures. Right hip: There is an acute impacted fracture through the femoral neck (likely subcapital). Femoral head maintains articulation with the acetabulum. Right femur: No acute fracture or malalignment of the mid or distal femur.     Impression: Impression: Acute impacted likely subcapital fracture of the right femoral neck. Electronically  Signed: Krishan Yuen  4/24/2023 8:34 PM EDT  Workstation ID: [id number]    XR Chest 1 View    Result Date: 4/24/2023  XR CHEST 1 VW Date of Exam: 4/24/2023 7:32 PM EDT Indication: fall Comparison: Chest x-ray 8/15/2018 Findings: Coronary artery stent is noted. Normal cardiomediastinal silhouette. The lungs are clear. No pleural effusion or pneumothorax. No acute osseous findings.     Impression: Impression: No acute cardiopulmonary findings. Electronically Signed: Krishan Yuen  4/24/2023 8:36 PM EDT  Workstation ID: [id number]    XR Hip With or Without Pelvis 2 - 3 View Right    Result Date: 4/24/2023  XR FEMUR 2 VW RIGHT, XR HIP W OR WO PELVIS 2-3 VIEW RIGHT Date of Exam: 4/24/2023 7:33 PM EDT Indication: fall Comparison: None available. Findings: The bones are demineralized limiting assessment for occult nondisplaced fractures. Right hip: There is an acute impacted fracture through the femoral neck (likely subcapital). Femoral head maintains articulation with the acetabulum. Right femur: No acute fracture or malalignment of the mid or distal femur.     Impression: Impression: Acute impacted likely subcapital fracture of the right femoral neck. Electronically Signed: Krishan Yuen  4/24/2023 8:34 PM EDT  Workstation ID: [id number]          Assessment & Plan   Assessment & Plan       Closed fracture of right hip    Mixed hyperlipidemia    Coronary artery disease involving native coronary artery of native heart    Type 2 diabetes mellitus with hyperglycemia, with long-term current use of insulin    Acquired hypothyroidism    Essential hypertension    Right hip fracture  - consult ortho, ED talked w/ Dr. Onofre  - NPO after MN  - pain control  - bedrest  - type/screen  - LR @ 50 ml/hr  - bmp, cbc, mag, phos, inr in am    CAD / HTN / HLD  - on DAPT, LAD stent ~ 2018, last heart cath by Dr. Deng ~ 2020  - hold plavix, last dose ~ 10am today  - continue aspirin  - continue coreg  - hold losartan / hctz  - on repatha,  intolerant to statins  -   T2DM - insulin dependent  - levemir 5 units HS  - fsbg achs w/ moderate dose ssi  - check a1c    Hypothyroidism  - check tsh  - continue levothyroxine    DVT prophylaxis:  SCDS    CODE STATUS:    Code Status (Patient has no pulse and is not breathing): CPR (Attempt to Resuscitate)  Medical Interventions (Patient has pulse or is breathing): Full Support      Expected Discharge  Expected Discharge Date and Time     Expected Discharge Date Expected Discharge Time    Apr 28, 2023             This note has been completed as part of a split-shared workflow.     Signature: Electronically signed by BUDDY Marshall, 04/24/23, 11:20 PM EDT    Total APC time: 45 minutes  Total attending time:  25 min      Attending   Admission Attestation       I have performed an independent face-to-face diagnostic evaluation including performing an independent physical examination as documented here.  The documented plan of care above was reviewed and developed with the advanced practice clinician (APC).      Brief Summary Statement:   Angelica Spivey is a 83 y.o. female with a PMH significant for HTN, HLD, diabetes mellitus type 2, CAD S/P stenting who comes to the ED after a fall with right hip pain.  Patient says that she was walking in her home when she turned, lost her balance.  She fell to her right side injuring her right hip.  She denies injury to other areas of her body, denies head injury or loss of consciousness.    Remainder of detailed HPI is as noted by APC and has been reviewed and/or edited by me for completeness.    Attending Physical Exam:  Temp:  [98 °F (36.7 °C)-98.2 °F (36.8 °C)] 98.2 °F (36.8 °C)  Heart Rate:  [] 93  Resp:  [8-20] 8  BP: (138-166)/(76-93) 165/76    Constitutional: Awake, alert  Eyes: PERRLA, sclerae anicteric, no conjunctival injection  HENT: NCAT, mucous membranes moist  Neck: Supple, no thyromegaly, no lymphadenopathy, trachea midline  Respiratory: Clear to  auscultation bilaterally, nonlabored respirations   Cardiovascular: RRR, no murmurs, rubs, or gallops, palpable pedal pulses bilaterally  Gastrointestinal: Positive bowel sounds, soft, nontender, nondistended  Musculoskeletal: No bilateral ankle edema, no clubbing or cyanosis to extremities  Psychiatric: Appropriate affect, cooperative  Neurologic: Oriented x 3, strength symmetric in upper extremities, Cranial Nerves grossly intact to confrontation, speech clear  Skin: No rashes      Brief Assessment/Plan :  See detailed assessment and plan developed with APC which I have reviewed and/or edited for completeness.            Santa Bojorquez, DO  04/25/23

## 2023-04-25 NOTE — CASE MANAGEMENT/SOCIAL WORK
Discharge Planning Assessment  [facility]     Patient Name: Angelica Spivey  MRN: [id number]  Today's Date: 4/25/2023    Admit Date: 4/24/2023    Plan: Rehab vs HH   Discharge Needs Assessment     Row Name 04/25/23 0924       Living Environment    People in Home child(anisha), adult;spouse    Current Living Arrangements home    Potentially Unsafe Housing Conditions none    Primary Care Provided by self    Provides Primary Care For no one    Family Caregiver if Needed spouse;child(anisha), adult    Quality of Family Relationships supportive    Able to Return to Prior Arrangements yes       Resource/Environmental Concerns    Resource/Environmental Concerns none    Transportation Concerns none       Transition Planning    Patient/Family Anticipates Transition to home    Transportation Anticipated family or friend will provide       Discharge Needs Assessment    Readmission Within the Last 30 Days no previous admission in last 30 days    Equipment Currently Used at Home cane, straight;walker, rolling    Anticipated Changes Related to Illness none    Provided Post Acute Provider List? Yes               Discharge Plan     Row Name 04/25/23 0937       Plan    Final Discharge Disposition Code 62 - inpatient rehab facility    Row Name 04/25/23 0928       Plan    Plan Rehab vs HH    Patient/Family in Agreement with Plan yes    Plan Comments Spoke with Ms. Spivey and daughter at the bedside. Ms. Spivey lives in [facility] with her Spouse and daughter. She is independent with ADL's, she has a rolling walker and a cane, but does not use them. She does not use oxygen or HH. She does not have any stairs in her home. Discussed the possibility of needing IPR if referral is made by PT. Asked where she would be interested in going and they want to go to [facility]. Her PCP is Abimael Matthews and she has [employer] Medicare. CM will continue to follow.              Continued Care and Services - Admitted Since 4/24/2023    Coordination  has not been started for this encounter.       Expected Discharge Date and Time     Expected Discharge Date Expected Discharge Time    Apr 28, 2023          Demographic Summary     Row Name 04/25/23 0922       General Information    Admission Type inpatient    Arrived From home    Referral Source admission list    Reason for Consult discharge planning    Preferred Language English       Contact Information    Permission Granted to Share Info With                Functional Status     Row Name 04/25/23 0923       Functional Status    Usual Activity Tolerance good    Current Activity Tolerance --  See PT notes       Functional Status, IADL    Medications independent    Meal Preparation independent    Housekeeping independent    Laundry independent    Shopping independent       Mental Status    General Appearance WDL WDL       Mental Status Summary    Recent Changes in Mental Status/Cognitive Functioning no changes                         Mercedes Castorena, RN

## 2023-04-25 NOTE — PAYOR COMM NOTE
"Gretchen Mcgill, RN  Utilization Management  P:[phone]  F:[phone]    Ref# [id number]  Angelica Spivey (83 y.o. Female)     Date of Birth   1940    Social Security Number       Address   [address] [place] [address] [address] [place] [address]    Home Phone   [phone]    MRN   [id number]       [facility]    Marital Status                               Admission Date   23    Admission Type   Emergency    Admitting Provider   Sunshine Lambert DO    Attending Provider   Sunshine Lambert DO    Department, Room/Bed   [facility] 3H, S377/1       Discharge Date       Discharge Disposition       Discharge Destination                               Attending Provider: Sunshine Lambert DO    Allergies: Biaxin [Clarithromycin], Allegra [Fexofenadine], Repatha [Evolocumab], Sulfa Antibiotics    Isolation: None   Infection: None   Code Status: CPR    Ht: 162.6 cm (64.02\")   Wt: 70.8 kg (156 lb)    Admission Cmt: None   Principal Problem: Closed fracture of right hip [S72.001A]                 Active Insurance as of 2023     Primary Coverage     Payor Plan Insurance Group Employer/Plan Group    ANTHEM MEDICARE REPLACEMENT ANTHEM MEDICARE ADVANTAGE [id number]     Payor Plan Address Payor Plan Phone Number Payor Plan Fax Number Effective Dates    [address] [phone]  2022 - None Entered    [facility] [id number]       Subscriber Name Subscriber Birth Date Member ID       ANGELICA SPIVEY 1940 [id number]                 Emergency Contacts      (Rel.) Home Phone Work Phone Mobile Phone    Sofia Spivey (Daughter) -- -- [phone]    Keshav Spivey (Spouse) [phone] -- [phone]    Kiki Spivey (Daughter) -- -- [phone]               History & Physical      Santa Bojorquez DO at 23 2307              [facility] Medicine Services  HISTORY AND PHYSICAL    Patient Name: Angelica Spivey  : 1940  MRN: [id number]  Primary Care " Physician: Abimael Matthews MD  Date of admission: 4/24/2023    Subjective    Subjective     Chief Complaint:  Right hip pain    HPI:  Angelica Spivey is a 83 y.o. female with medical history significant for HTN, HLD, T2DM, CAD s/p prior LAD stent (~ 2018) on DAPT who presents to the ED for evaluation after slipping and falling onto her right hip today. Patient reports the floor was slick and she slipped, denies hitting her head, no syncope, dizziness or lightheadedness. She was unable to get up and presents to the ED for evaluation. Imaging obtained in the ED showing an acute impacted likely subcapital fracture of the right femoral neck. Dr. Onofre, ortho on call, was notified by the ED provider.     Patient reports pain when trying to move her right leg, she is fairly comfortable when lying still. She denies any other recent acute illness. She is diabetic and takes her long acting insulin at night, she has not taken tonight. She does take plavix and took her last dose this morning ~ 10am.    Review of Systems   Constitutional: Negative for chills and fever.   Respiratory: Negative for shortness of breath and wheezing.    Cardiovascular: Negative for chest pain, palpitations and leg swelling.   Gastrointestinal: Positive for constipation (chronic).   Genitourinary: Negative.    Musculoskeletal: Positive for arthralgias and gait problem.   Neurological: Negative for dizziness, syncope and light-headedness.          Personal History     Past Medical History:   Diagnosis Date   • Anxiety    • Arthritis    • Coronary artery disease    • Diabetes mellitus    • Disease of thyroid gland    • Elevated cholesterol    • GERD (gastroesophageal reflux disease)    • Hearing aid worn    • Heart attack    • History of stomach ulcers    • Hypertension    • PONV (postoperative nausea and vomiting)    • Wears glasses              Past Surgical History:   Procedure Laterality Date   • BLADDER SUSPENSION     • CARDIAC  CATHETERIZATION N/A 08/15/2018    Procedure: Left Heart Cath;  Surgeon: David Burnett MD;  Location:  [facility];  Service: Cardiology   • CARDIAC CATHETERIZATION N/A 01/08/2020    Procedure: LEFT HEART CATH;  Surgeon: David Subramanian MD;  Location:  [facility];  Service: Cardiology   • CARDIAC CATHETERIZATION N/A 01/15/2020    Procedure: CBI to the LAD;  Surgeon: Phil Deng MD;  Location:  [facility];  Service: Cardiovascular   • CARDIAC CATHETERIZATION N/A 12/03/2020    Procedure: Left Heart Cath 54415;  Surgeon: Phil Deng MD;  Location:  [facility];  Service: Cardiovascular;  Laterality: N/A;   • CATARACT EXTRACTION Bilateral    • COLONOSCOPY     • ENDOSCOPY     • ENDOSCOPY N/A 12/06/2019    Procedure: ESOPHAGOGASTRODUODENOSCOPY;  Surgeon: Burak Patino MD;  Location: [facility] ENDOSCOPY;  Service: Gastroenterology   • HYSTERECTOMY  2014    PARTIAL   • OOPHORECTOMY Bilateral 2014   • FL RT/LT HEART CATHETERS N/A 10/05/2018    Procedure: Percutaneous Coronary Intervention;  Surgeon: David Burnett MD;  Location:  [facility];  Service: Cardiology   • VULVECTOMY         Family History:  family history is not on file.     Social History:  reports that she has never smoked. She has never used smokeless tobacco. She reports that she does not drink alcohol and does not use drugs.  Social History     Social History Narrative   • Not on file       Medications:  Accu-Chek Ericka Plus, Dulaglutide, Estrogens Conjugated, Evolocumab, Insulin Glargine, Insulin Pen Needle, aspirin, carvedilol, clobetasol, clopidogrel, dexlansoprazole, estradiol, famotidine, fluticasone, glimepiride, glucose blood, levothyroxine, linagliptin, (losartan 50 MG tablet 100 mg, hydroCHLOROthiazide 12.5 MG 12.5 mg), losartan-hydrochlorothiazide, metFORMIN, metFORMIN ER, montelukast, nitroglycerin, and potassium  "chloride    Allergies   Allergen Reactions   • Biaxin [Clarithromycin] Anaphylaxis     \"BLISTERS AND THROAT SWELLING\"   • Allegra [Fexofenadine] Other (See Comments)     \"MAKES ME FEEL FUNNY\"   • Repatha [Evolocumab] Other (See Comments)     Chest pain   • Sulfa Antibiotics Nausea Only       Objective    Objective     Vital Signs:   Temp:  [98 °F (36.7 °C)] 98 °F (36.7 °C)  Heart Rate:  [] 104  Resp:  [20] 20  BP: (138-166)/(78-93) 152/86    Physical Exam  Constitutional:       General: She is not in acute distress.     Appearance: Normal appearance. She is well-developed. She is not toxic-appearing.   HENT:      Head: Normocephalic and atraumatic.      Nose: Nose normal.      Mouth/Throat:      Mouth: Mucous membranes are moist.      Pharynx: Oropharynx is clear.   Eyes:      Extraocular Movements: Extraocular movements intact.      Conjunctiva/sclera: Conjunctivae normal.      Pupils: Pupils are equal, round, and reactive to light.   Cardiovascular:      Rate and Rhythm: Normal rate and regular rhythm.      Pulses: Decreased pulses (DP/PT bilaterally weakly palpable).      Heart sounds: Normal heart sounds. No murmur heard.  Pulmonary:      Effort: Pulmonary effort is normal. No respiratory distress.      Breath sounds: Normal breath sounds.   Abdominal:      General: Bowel sounds are normal. There is no distension.      Palpations: Abdomen is soft.      Tenderness: There is no abdominal tenderness. There is no guarding.   Musculoskeletal:         General: Deformity (RLE w/ foot turned inward) present. No swelling. Normal range of motion.      Cervical back: Normal range of motion and neck supple.      Right lower leg: No edema.      Left lower leg: No edema.   Skin:     General: Skin is warm and dry.      Capillary Refill: Capillary refill takes 2 to 3 seconds.      Findings: No rash.   Neurological:      Mental Status: She is alert and oriented to person, place, and time.      Cranial Nerves: No cranial " nerve deficit.   Psychiatric:         Mood and Affect: Mood normal.         Behavior: Behavior normal.            Result Review:  I have personally reviewed the results from the time of this admission to 4/24/2023 23:20 EDT and agree with these findings:  [x]  Laboratory list / accordion  []  Microbiology  [x]  Radiology  [x]  EKG/Telemetry   []  Cardiology/Vascular   []  Pathology  [x]  Old records  []  Other:  Most notable findings include:     LAB RESULTS:      Lab 04/24/23 2012   WBC 11.68*   HEMOGLOBIN 11.4*   HEMATOCRIT 36.0   PLATELETS 229   NEUTROS ABS 8.70*   IMMATURE GRANS (ABS) 0.06*   LYMPHS ABS 2.03   MONOS ABS 0.63   EOS ABS 0.19   MCV 86.5   PROTIME 13.3         Lab 04/24/23 2012   SODIUM 139   POTASSIUM 4.3   CHLORIDE 104   CO2 26.0   ANION GAP 9.0   BUN 17   CREATININE 0.82   EGFR 72.0   GLUCOSE 214*   CALCIUM 9.4         Lab 04/24/23 2012   TOTAL PROTEIN 6.5   ALBUMIN 3.8   GLOBULIN 2.7   ALT (SGPT) 14   AST (SGOT) 18   BILIRUBIN 0.2   ALK PHOS 71         Lab 04/24/23 2012   PROTIME 13.3   INR 1.02                 Brief Urine Lab Results     None        Microbiology Results (last 10 days)     ** No results found for the last 240 hours. **          XR Femur 2 View Right    Result Date: 4/24/2023  XR FEMUR 2 VW RIGHT, XR HIP W OR WO PELVIS 2-3 VIEW RIGHT Date of Exam: 4/24/2023 7:33 PM EDT Indication: fall Comparison: None available. Findings: The bones are demineralized limiting assessment for occult nondisplaced fractures. Right hip: There is an acute impacted fracture through the femoral neck (likely subcapital). Femoral head maintains articulation with the acetabulum. Right femur: No acute fracture or malalignment of the mid or distal femur.     Impression: Impression: Acute impacted likely subcapital fracture of the right femoral neck. Electronically Signed: Krishan Yuen  4/24/2023 8:34 PM EDT  Workstation ID: [id number]    XR Chest 1 View    Result Date: 4/24/2023  XR CHEST 1 VW Date of  Exam: 4/24/2023 7:32 PM EDT Indication: fall Comparison: Chest x-ray 8/15/2018 Findings: Coronary artery stent is noted. Normal cardiomediastinal silhouette. The lungs are clear. No pleural effusion or pneumothorax. No acute osseous findings.     Impression: Impression: No acute cardiopulmonary findings. Electronically Signed: Krishan Yuen  4/24/2023 8:36 PM EDT  Workstation ID: [id number]    XR Hip With or Without Pelvis 2 - 3 View Right    Result Date: 4/24/2023  XR FEMUR 2 VW RIGHT, XR HIP W OR WO PELVIS 2-3 VIEW RIGHT Date of Exam: 4/24/2023 7:33 PM EDT Indication: fall Comparison: None available. Findings: The bones are demineralized limiting assessment for occult nondisplaced fractures. Right hip: There is an acute impacted fracture through the femoral neck (likely subcapital). Femoral head maintains articulation with the acetabulum. Right femur: No acute fracture or malalignment of the mid or distal femur.     Impression: Impression: Acute impacted likely subcapital fracture of the right femoral neck. Electronically Signed: Krishan Yuen  4/24/2023 8:34 PM EDT  Workstation ID: [id number]          Assessment & Plan   Assessment & Plan       Closed fracture of right hip    Mixed hyperlipidemia    Coronary artery disease involving native coronary artery of native heart    Type 2 diabetes mellitus with hyperglycemia, with long-term current use of insulin    Acquired hypothyroidism    Essential hypertension    Right hip fracture  - consult ortho, ED talked w/ Dr. Onofre  - NPO after MN  - pain control  - bedrest  - type/screen  - LR @ 50 ml/hr  - bmp, cbc, mag, phos, inr in am    CAD / HTN / HLD  - on DAPT, LAD stent ~ 2018, last heart cath by Dr. Deng ~ 2020  - hold plavix, last dose ~ 10am today  - continue aspirin  - continue coreg  - hold losartan / hctz  - on repatha, intolerant to statins  -   T2DM - insulin dependent  - levemir 5 units HS  - fsbg achs w/ moderate dose ssi  - check  a1c    Hypothyroidism  - check tsh  - continue levothyroxine    DVT prophylaxis:  SCDS    CODE STATUS:    Code Status (Patient has no pulse and is not breathing): CPR (Attempt to Resuscitate)  Medical Interventions (Patient has pulse or is breathing): Full Support      Expected Discharge  Expected Discharge Date and Time     Expected Discharge Date Expected Discharge Time    Apr 28, 2023             This note has been completed as part of a split-shared workflow.     Signature: Electronically signed by Sahnti Swann, APRN, 04/24/23, 11:20 PM EDT    Total APC time: 45 minutes  Total attending time:  25 min      Attending   Admission Attestation       I have performed an independent face-to-face diagnostic evaluation including performing an independent physical examination as documented here.  The documented plan of care above was reviewed and developed with the advanced practice clinician (APC).      Brief Summary Statement:   Angelica Spivey is a 83 y.o. female with a PMH significant for HTN, HLD, diabetes mellitus type 2, CAD S/P stenting who comes to the ED after a fall with right hip pain.  Patient says that she was walking in her home when she turned, lost her balance.  She fell to her right side injuring her right hip.  She denies injury to other areas of her body, denies head injury or loss of consciousness.    Remainder of detailed HPI is as noted by APC and has been reviewed and/or edited by me for completeness.    Attending Physical Exam:  Temp:  [98 °F (36.7 °C)-98.2 °F (36.8 °C)] 98.2 °F (36.8 °C)  Heart Rate:  [] 93  Resp:  [8-20] 8  BP: (138-166)/(76-93) 165/76    Constitutional: Awake, alert  Eyes: PERRLA, sclerae anicteric, no conjunctival injection  HENT: NCAT, mucous membranes moist  Neck: Supple, no thyromegaly, no lymphadenopathy, trachea midline  Respiratory: Clear to auscultation bilaterally, nonlabored respirations   Cardiovascular: RRR, no murmurs, rubs, or gallops, palpable pedal pulses  bilaterally  Gastrointestinal: Positive bowel sounds, soft, nontender, nondistended  Musculoskeletal: No bilateral ankle edema, no clubbing or cyanosis to extremities  Psychiatric: Appropriate affect, cooperative  Neurologic: Oriented x 3, strength symmetric in upper extremities, Cranial Nerves grossly intact to confrontation, speech clear  Skin: No rashes      Brief Assessment/Plan :  See detailed assessment and plan developed with APC which I have reviewed and/or edited for completeness.            Santa Bojorquez DO  04/25/23                        Electronically signed by Santa Bojorquez DO at 04/25/23 0153          Emergency Department Notes      Attila Christensen MD at 04/25/23 0129      Procedure Orders    1. Nerve Block [[id number]] ordered by Attila Christensen MD               Subjective   History of Present Illness  83-year-old female presents via helicopter to be evaluated for right hip injury.  Approximately 1 hour ago, the patient had a mechanical trip and fall at home and fell awkwardly onto her right hip.  She did not hit her head or lose consciousness.  She was unable to bear weight afterwards so she was flown here from [facility] to be evaluated for potential hip fracture.  She is not anticoagulated.  She is complaining of isolated pain to her right hip that she currently rates at 8 out of 10 in severity.  The pain is worse with attempted movement.  She denies any paresthesias.        Review of Systems   Musculoskeletal:        Right hip pain   All other systems reviewed and are negative.      Past Medical History:   Diagnosis Date   • Anxiety    • Arthritis    • Coronary artery disease    • Diabetes mellitus    • Disease of thyroid gland    • Elevated cholesterol    • GERD (gastroesophageal reflux disease)    • Hearing aid worn    • Heart attack    • History of stomach ulcers    • Hypertension    • PONV (postoperative nausea and vomiting)    • Wears glasses        Allergies   Allergen  "Reactions   • Biaxin [Clarithromycin] Anaphylaxis     \"BLISTERS AND THROAT SWELLING\"   • Allegra [Fexofenadine] Other (See Comments)     \"MAKES ME FEEL FUNNY\"   • Repatha [Evolocumab] Other (See Comments)     Chest pain   • Sulfa Antibiotics Nausea Only       Past Surgical History:   Procedure Laterality Date   • BLADDER SUSPENSION     • CARDIAC CATHETERIZATION N/A 08/15/2018    Procedure: Left Heart Cath;  Surgeon: David Burnett MD;  Location:  [facility];  Service: Cardiology   • CARDIAC CATHETERIZATION N/A 01/08/2020    Procedure: LEFT HEART CATH;  Surgeon: David Subramanian MD;  Location:  [facility];  Service: Cardiology   • CARDIAC CATHETERIZATION N/A 01/15/2020    Procedure: CBI to the LAD;  Surgeon: Phil Deng MD;  Location:  [facility];  Service: Cardiovascular   • CARDIAC CATHETERIZATION N/A 12/03/2020    Procedure: Left Heart Cath 04458;  Surgeon: Phil Deng MD;  Location:  [facility];  Service: Cardiovascular;  Laterality: N/A;   • CATARACT EXTRACTION Bilateral    • COLONOSCOPY     • ENDOSCOPY     • ENDOSCOPY N/A 12/06/2019    Procedure: ESOPHAGOGASTRODUODENOSCOPY;  Surgeon: Burak Patino MD;  Location:  [facility];  Service: Gastroenterology   • HYSTERECTOMY  2014    PARTIAL   • OOPHORECTOMY Bilateral 2014   • MA RT/LT HEART CATHETERS N/A 10/05/2018    Procedure: Percutaneous Coronary Intervention;  Surgeon: David Burnett MD;  Location:  [facility];  Service: Cardiology   • VULVECTOMY         Family History   Problem Relation Age of Onset   • Breast cancer Neg Hx    • Ovarian cancer Neg Hx        Social History     Socioeconomic History   • Marital status:    Tobacco Use   • Smoking status: Never   • Smokeless tobacco: Never   Substance and Sexual Activity   • Alcohol use: No   • Drug use: No   • Sexual activity: Defer           Objective   Physical Exam  Vitals " and nursing note reviewed.   Constitutional:       Appearance: She is well-developed. She is not diaphoretic.      Comments: Nontoxic-appearing female   HENT:      Head: Normocephalic and atraumatic.   Eyes:      Pupils: Pupils are equal, round, and reactive to light.   Neck:      Comments: No midline cervical spine tenderness noted, no step-off or deformity present  Cardiovascular:      Rate and Rhythm: Normal rate and regular rhythm.      Heart sounds: Normal heart sounds. No murmur heard.    No friction rub. No gallop.   Pulmonary:      Effort: Pulmonary effort is normal. No respiratory distress.      Breath sounds: Normal breath sounds. No wheezing or rales.   Abdominal:      General: Bowel sounds are normal. There is no distension.      Palpations: Abdomen is soft. There is no mass.      Tenderness: There is no abdominal tenderness. There is no guarding or rebound.   Musculoskeletal:      Comments: Range of motion of right hip is limited secondary to pain, no pelvic instability noted, no shortening or external rotation of right lower extremity when compared to the left on visual inspection   Skin:     General: Skin is warm and dry.      Findings: No erythema or rash.   Neurological:      Mental Status: She is alert and oriented to person, place, and time.      Comments: Right lower extremity is neurovascularly intact distally with bounding distal pulses and normal sensation   Psychiatric:         Mood and Affect: Mood normal.         Thought Content: Thought content normal.         Judgment: Judgment normal.         Nerve Block    Date/Time: 4/25/2023 1:30 AM  Performed by: Attila Christensen MD  Authorized by: Santa Bojorquez DO     Consent:     Consent obtained:  Verbal and written    Consent given by:  Patient    Risks, benefits, and alternatives were discussed: yes      Risks discussed:  Allergic reaction, bleeding, intravenous injection, infection, nerve damage, pain and swelling  Universal protocol:      Procedure explained and questions answered to patient or proxy's satisfaction: yes      Relevant documents present and verified: yes      Test results available: yes      Imaging studies available: yes      Required blood products, implants, devices, and special equipment available: yes      Site/side marked: yes      Immediately prior to procedure, a time out was called: yes      Patient identity confirmed:  Verbally with patient and arm band  Indications:     Indications:  Pain relief  Location:     Body area:  Lower extremity    Lower extremity nerve:  Femoral    Laterality:  Right  Pre-procedure details:     Skin preparation:  Chlorhexidine    Preparation: Patient was prepped and draped in usual sterile fashion    Skin anesthesia:     Skin anesthesia method:  Local infiltration    Local anesthetic:  Lidocaine 1% w/o epi  Procedure details:     Block needle gauge:  20 G    Guidance: ultrasound      Anesthetic injected:  Bupivacaine 0.25% w/o epi    Steroid injected:  None    Additive injected:  None    Injection procedure:  Anatomic landmarks identified, anatomic landmarks palpated, incremental injection, introduced needle and negative aspiration for blood    Paresthesia:  None  Post-procedure details:     Dressing:  Sterile dressing    Outcome:  Pain improved    Procedure completion:  Tolerated well, no immediate complications              ED Course  ED Course as of 04/25/23 0129   Mon Apr 24, 2023 1928 83-year-old female presents via helicopter to be evaluated for right hip injury.  Approximately an hour ago, the patient had a mechanical trip and fall at home and fell awkwardly onto her right hip.  She was unable to bear weight afterward and was flown here from [facility] to be evaluated for a potential hip fracture.  On arrival to the ED, the patient is nontoxic-appearing.  She did not hit her head or lose consciousness.  NEXUS negative.  We will obtain plain films and labs, provide pain control, and  we will reassess following initial interventions. [DD]   1929 Right lower extremity is neurovascularly intact distally with bounding distal pulses and normal sensation.  No pelvic instability noted.  Range of motion of right hip is limited secondary to pain. [DD]   2000 I personally and independently viewed the patient's x-ray images myself, and I am in agreement with the radiologist's reading for final interpretation.   [DD]   2135 After obtaining informed consent, a hip block was done under ultrasound guidance under sterile conditions without complication.  The patient tolerated the procedure well.  I discussed the patient's case with Dr. Russell of orthopedics who will see the patient in consult.  Patient will be made n.p.o. at midnight in preparation for definitive surgical management tomorrow morning.  I discussed the patient's case with Dr. Bojorquez, and the patient will be admitted under her care for further evaluation and treatment.  The patient is aware/agreeable with the plan at this time. [DD]      ED Course User Index  [DD] Attila Christensen MD                                          Recent Results (from the past 24 hour(s))   Comprehensive Metabolic Panel    Collection Time: 04/24/23  8:12 PM    Specimen: Blood   Result Value Ref Range    Glucose 214 (H) 65 - 99 mg/dL    BUN 17 8 - 23 mg/dL    Creatinine 0.82 0.57 - 1.00 mg/dL    Sodium 139 136 - 145 mmol/L    Potassium 4.3 3.5 - 5.2 mmol/L    Chloride 104 98 - 107 mmol/L    CO2 26.0 22.0 - 29.0 mmol/L    Calcium 9.4 8.6 - 10.5 mg/dL    Total Protein 6.5 6.0 - 8.5 g/dL    Albumin 3.8 3.5 - 5.2 g/dL    ALT (SGPT) 14 1 - 33 U/L    AST (SGOT) 18 1 - 32 U/L    Alkaline Phosphatase 71 39 - 117 U/L    Total Bilirubin 0.2 0.0 - 1.2 mg/dL    Globulin 2.7 gm/dL    A/G Ratio 1.4 g/dL    BUN/Creatinine Ratio 20.7 7.0 - 25.0    Anion Gap 9.0 5.0 - 15.0 mmol/L    eGFR 72.0 >60.0 mL/min/1.73   Protime-INR    Collection Time: 04/24/23  8:12 PM    Specimen: Blood    Result Value Ref Range    Protime 13.3 11.4 - 14.4 Seconds    INR 1.02 0.84 - 1.13   CBC Auto Differential    Collection Time: 04/24/23  8:12 PM    Specimen: Blood   Result Value Ref Range    WBC 11.68 (H) 3.40 - 10.80 10*3/mm3    RBC 4.16 3.77 - 5.28 10*6/mm3    Hemoglobin 11.4 (L) 12.0 - 15.9 g/dL    Hematocrit 36.0 34.0 - 46.6 %    MCV 86.5 79.0 - 97.0 fL    MCH 27.4 26.6 - 33.0 pg    MCHC 31.7 31.5 - 35.7 g/dL    RDW 14.6 12.3 - 15.4 %    RDW-SD 46.5 37.0 - 54.0 fl    MPV 10.2 6.0 - 12.0 fL    Platelets 229 140 - 450 10*3/mm3    Neutrophil % 74.5 42.7 - 76.0 %    Lymphocyte % 17.4 (L) 19.6 - 45.3 %    Monocyte % 5.4 5.0 - 12.0 %    Eosinophil % 1.6 0.3 - 6.2 %    Basophil % 0.6 0.0 - 1.5 %    Immature Grans % 0.5 0.0 - 0.5 %    Neutrophils, Absolute 8.70 (H) 1.70 - 7.00 10*3/mm3    Lymphocytes, Absolute 2.03 0.70 - 3.10 10*3/mm3    Monocytes, Absolute 0.63 0.10 - 0.90 10*3/mm3    Eosinophils, Absolute 0.19 0.00 - 0.40 10*3/mm3    Basophils, Absolute 0.07 0.00 - 0.20 10*3/mm3    Immature Grans, Absolute 0.06 (H) 0.00 - 0.05 10*3/mm3    nRBC 0.0 0.0 - 0.2 /100 WBC   Hemoglobin A1c    Collection Time: 04/24/23  8:12 PM    Specimen: Blood   Result Value Ref Range    Hemoglobin A1C 8.20 (H) 4.80 - 5.60 %   ECG 12 Lead Other; fall    Collection Time: 04/24/23  8:34 PM   Result Value Ref Range    QT Interval 346 ms    QTC Interval 416 ms     Note: In addition to lab results from this visit, the labs listed above may include labs taken at another facility or during a different encounter within the last 24 hours. Please correlate lab times with ED admission and discharge times for further clarification of the services performed during this visit.    XR Chest 1 View   Final Result   Impression:   No acute cardiopulmonary findings.         Electronically Signed: Krishan Yuen     4/24/2023 8:36 PM EDT     Workstation ID: [id number]      XR Hip With or Without Pelvis 2 - 3 View Right   Final Result   Impression:   Acute  "impacted likely subcapital fracture of the right femoral neck.         Electronically Signed: Krishan Yuen     4/24/2023 8:34 PM EDT     Workstation ID: [id number]      XR Femur 2 View Right   Final Result   Impression:   Acute impacted likely subcapital fracture of the right femoral neck.         Electronically Signed: Krishan Wilfrid     4/24/2023 8:34 PM EDT     Workstation ID: [id number]        Vitals:    04/24/23 2300 04/24/23 2316 04/25/23 0000 04/25/23 0050   BP: 152/86  147/81 165/76   BP Location:    Right arm   Patient Position:    Lying   Pulse: 95 104  93   Resp:    8   Temp:    98.2 °F (36.8 °C)   TempSrc:    Oral   SpO2: 96% 97%  96%   Weight:    70.8 kg (156 lb)   Height:    162.6 cm (64.02\")     Medications   sodium chloride 0.9 % flush 10 mL (has no administration in time range)   sodium chloride 0.9 % flush 10 mL (has no administration in time range)   sodium chloride 0.9 % flush 10 mL (has no administration in time range)   sodium chloride 0.9 % infusion 40 mL (has no administration in time range)   dextrose (GLUTOSE) oral gel 15 g (has no administration in time range)   dextrose (D50W) (25 g/50 mL) IV injection 25 g (has no administration in time range)   glucagon (GLUCAGEN) injection 1 mg (has no administration in time range)   lactated ringers infusion (has no administration in time range)   acetaminophen (TYLENOL) tablet 650 mg (has no administration in time range)     Or   acetaminophen (TYLENOL) 160 MG/5ML solution 650 mg (has no administration in time range)     Or   acetaminophen (TYLENOL) suppository 650 mg (has no administration in time range)   HYDROmorphone (DILAUDID) injection 0.5 mg (has no administration in time range)     And   naloxone (NARCAN) injection 0.4 mg (has no administration in time range)   Potassium Replacement - Follow Nurse / BPA Driven Protocol (has no administration in time range)   Magnesium Standard Dose Replacement - Follow Nurse / BPA Driven Protocol (has no " administration in time range)   Phosphorus Replacement - Follow Nurse / BPA Driven Protocol (has no administration in time range)   Calcium Replacement - Follow Nurse / BPA Driven Protocol (has no administration in time range)   insulin detemir (LEVEMIR) injection 5 Units (has no administration in time range)   Insulin Lispro (humaLOG) injection 0-9 Units (has no administration in time range)   sennosides-docusate (PERICOLACE) 8.6-50 MG per tablet 2 tablet (has no administration in time range)     And   polyethylene glycol (MIRALAX) packet 17 g (has no administration in time range)     And   bisacodyl (DULCOLAX) EC tablet 5 mg (has no administration in time range)     And   bisacodyl (DULCOLAX) suppository 10 mg (has no administration in time range)   melatonin tablet 5 mg (has no administration in time range)   ondansetron (ZOFRAN) tablet 4 mg (has no administration in time range)     Or   ondansetron (ZOFRAN) injection 4 mg (has no administration in time range)   fentaNYL citrate (PF) (SUBLIMAZE) injection 50 mcg (50 mcg Intravenous Given 4/24/23 2016)   lidocaine PF 1% (XYLOCAINE) injection 10 mL (10 mL Injection Given by Other 4/24/23 2146)   bupivacaine (PF) (MARCAINE) 0.25 % injection 30 mL (30 mL Injection Given by Other 4/24/23 2146)     ECG/EMG Results (last 24 hours)     Procedure Component Value Units Date/Time    ECG 12 Lead Other; fall [[id number]] Collected: 04/24/23 2034     Updated: 04/24/23 2035     QT Interval 346 ms      QTC Interval 416 ms     Narrative:      Test Reason : Other~  Blood Pressure :   */*   mmHG  Vent. Rate :  87 BPM     Atrial Rate :  87 BPM     P-R Int : 170 ms          QRS Dur :  72 ms      QT Int : 346 ms       P-R-T Axes :  29  34  26 degrees     QTc Int : 416 ms    Normal sinus rhythm with sinus arrhythmia  Nonspecific T wave abnormality  Abnormal ECG  When compared with ECG of 15-DAVID-2020 17:19,  Nonspecific T wave abnormality, worse in Inferior leads    Referred By: EDMD            Confirmed By:         ECG 12 Lead Other; fall   Preliminary Result   Test Reason : Other~   Blood Pressure :   */*   mmHG   Vent. Rate :  87 BPM     Atrial Rate :  87 BPM      P-R Int : 170 ms          QRS Dur :  72 ms       QT Int : 346 ms       P-R-T Axes :  29  34  26 degrees      QTc Int : 416 ms      Normal sinus rhythm with sinus arrhythmia   Nonspecific T wave abnormality   Abnormal ECG   When compared with ECG of 15-DAVID-2020 17:19,   Nonspecific T wave abnormality, worse in Inferior leads      Referred By: EDMD           Confirmed By:               MDM    Final diagnoses:   Closed right hip fracture, initial encounter   Hyperglycemia       ED Disposition  ED Disposition     ED Disposition   Decision to Admit    Condition   --    Comment   Level of Care: Telemetry [5]   Diagnosis: Hip fracture [869306]   Admitting Physician: SANTA BOJORQUEZ [[id number]]   Attending Physician: SANTA BOJORQUEZ [[id number]]   Bed Request Comments: tele               No follow-up provider specified.       Medication List      No changes were made to your prescriptions during this visit.          Attila Christensen MD  04/25/23 0132      Electronically signed by Attila Christensen MD at 04/25/23 0132         Current Facility-Administered Medications   Medication Dose Route Frequency Provider Last Rate Last Admin   • acetaminophen (TYLENOL) tablet 650 mg  650 mg Oral Q4H PRN Santa Bojorquez DO   650 mg at 04/25/23 0204    Or   • acetaminophen (TYLENOL) 160 MG/5ML solution 650 mg  650 mg Oral Q4H PRN Santa Bojorquez, DO        Or   • acetaminophen (TYLENOL) suppository 650 mg  650 mg Rectal Q4H PRN Santa Bojorquez DO       • aspirin EC tablet 81 mg  81 mg Oral Q PM Santa Bojorquez DO       • sennosides-docusate (PERICOLACE) 8.6-50 MG per tablet 2 tablet  2 tablet Oral BID Santa Bojorquez DO   2 tablet at 04/25/23 0847    And   • polyethylene glycol (MIRALAX) packet 17 g  17 g Oral Daily PRN Santa Bojorquez DO        And   •  bisacodyl (DULCOLAX) EC tablet 5 mg  5 mg Oral Daily PRN MaryellenSanta ogden G, DO        And   • bisacodyl (DULCOLAX) suppository 10 mg  10 mg Rectal Daily PRN MaryellenSanta ogden G, DO       • Calcium Replacement - Follow Nurse / BPA Driven Protocol   Does not apply PRN Maryellen, Santa G, DO       • carvedilol (COREG) tablet 12.5 mg  12.5 mg Oral BID With Meals Santa Bojorquez G, DO   12.5 mg at 04/25/23 0847   • dextrose (D50W) (25 g/50 mL) IV injection 25 g  25 g Intravenous Q15 Min PRN Santa Bojorquez G, DO       • dextrose (GLUTOSE) oral gel 15 g  15 g Oral Q15 Min PRN MaryellenSanta ogden G, DO       • famotidine (PEPCID) tablet 40 mg  40 mg Oral Daily Santa Bojorquez G, DO   40 mg at 04/25/23 0847   • fluticasone (FLONASE) 50 MCG/ACT nasal spray 2 spray  2 spray Each Nare BID Santa Bojorquez G, DO   2 spray at 04/25/23 0849   • glucagon (GLUCAGEN) injection 1 mg  1 mg Intramuscular Q15 Min PRN Santa Bojorquez G, DO       • losartan (COZAAR) tablet 100 mg  100 mg Oral Q24H Santa Bojorquez G, DO   100 mg at 04/25/23 0847    And   • hydroCHLOROthiazide (HYDRODIURIL) oral 12.5 mg  12.5 mg Oral Q24H Santa Bojorquez, DO   12.5 mg at 04/25/23 0847   • HYDROmorphone (DILAUDID) injection 0.5 mg  0.5 mg Intravenous Q2H PRN Santa Bojorquez G, DO   0.5 mg at 04/25/23 0847    And   • naloxone (NARCAN) injection 0.4 mg  0.4 mg Intravenous PRN MaryellenSanta ogden G, DO       • insulin detemir (LEVEMIR) injection 5 Units  5 Units Subcutaneous Nightly Santa Bojorquez, DO   5 Units at 04/25/23 0204   • Insulin Lispro (humaLOG) injection 0-9 Units  0-9 Units Subcutaneous Q6H Evette Bojorqueze G, DO   2 Units at 04/25/23 0521   • lactated ringers infusion  50 mL/hr Intravenous Continuous Maryellen, Santa G, DO 50 mL/hr at 04/25/23 0146 50 mL/hr at 04/25/23 0146   • levothyroxine (SYNTHROID, LEVOTHROID) tablet 88 mcg  88 mcg Oral QAM Maryellen, Santa G, DO   88 mcg at 04/25/23 0520   • Magnesium Standard Dose Replacement - Follow Nurse / BPA Driven Protocol   Does not apply PRN  Maryellen, Santa G, DO       • melatonin tablet 5 mg  5 mg Oral Nightly PRN Maryellen, Santa G, DO       • montelukast (SINGULAIR) tablet 10 mg  10 mg Oral Nightly Maryellen, Santa G, DO   10 mg at 04/25/23 0204   • ondansetron (ZOFRAN) tablet 4 mg  4 mg Oral Q6H PRN Maryellen, Santa G, DO        Or   • ondansetron (ZOFRAN) injection 4 mg  4 mg Intravenous Q6H PRN Maryellen, Santa G, DO       • pantoprazole (PROTONIX) EC tablet 40 mg  40 mg Oral BID AC Maryellen, Asnta G, DO   40 mg at 04/25/23 0520   • Phosphorus Replacement - Follow Nurse / BPA Driven Protocol   Does not apply PRN Maryellen, Santa G, DO       • Potassium Replacement - Follow Nurse / BPA Driven Protocol   Does not apply PRN Maryellen, Santa G, DO       • sodium chloride 0.9 % flush 10 mL  10 mL Intravenous PRN Maryellen, Santa G, DO       • sodium chloride 0.9 % flush 10 mL  10 mL Intravenous Q12H Maryellen, Santa G, DO   10 mL at 04/25/23 0849   • sodium chloride 0.9 % flush 10 mL  10 mL Intravenous PRN Maryellen, Santa G, DO       • sodium chloride 0.9 % infusion 40 mL  40 mL Intravenous PRN Maryellen, Santa G, DO         Lab Results (all)     Procedure Component Value Units Date/Time    POC Glucose Once [[id number]]  (Abnormal) Collected: 04/25/23 0516    Specimen: Blood Updated: 04/25/23 0519     Glucose 162 mg/dL      Comment: Meter: [id number] : [id number] Zenon Ruffin       Magnesium [[id number]]  (Normal) Collected: 04/25/23 0355    Specimen: Blood Updated: 04/25/23 0456     Magnesium 1.8 mg/dL     Basic Metabolic Panel [[id number]]  (Abnormal) Collected: 04/25/23 0355    Specimen: Blood Updated: 04/25/23 0456     Glucose 180 mg/dL      BUN 15 mg/dL      Creatinine 0.69 mg/dL      Sodium 139 mmol/L      Potassium 4.1 mmol/L      Chloride 103 mmol/L      CO2 25.0 mmol/L      Calcium 9.5 mg/dL      BUN/Creatinine Ratio 21.7     Anion Gap 11.0 mmol/L      eGFR 87.3 mL/min/1.73     Narrative:      GFR Normal >60  Chronic Kidney Disease <60  Kidney Failure <15    The GFR  formula is only valid for adults with stable renal function between ages 18 and 70.    Phosphorus [[id number]]  (Normal) Collected: 04/25/23 0355    Specimen: Blood Updated: 04/25/23 0456     Phosphorus 3.7 mg/dL     Protime-INR [[id number]]  (Normal) Collected: 04/25/23 0355    Specimen: Blood Updated: 04/25/23 0450     Protime 13.5 Seconds      INR 1.03    CBC Auto Differential [[id number]]  (Abnormal) Collected: 04/25/23 0355    Specimen: Blood Updated: 04/25/23 0435     WBC 9.50 10*3/mm3      RBC 4.04 10*6/mm3      Hemoglobin 11.1 g/dL      Hematocrit 34.7 %      MCV 85.9 fL      MCH 27.5 pg      MCHC 32.0 g/dL      RDW 14.3 %      RDW-SD 44.9 fl      MPV 10.3 fL      Platelets 208 10*3/mm3      Neutrophil % 66.5 %      Lymphocyte % 23.3 %      Monocyte % 7.8 %      Eosinophil % 1.4 %      Basophil % 0.5 %      Immature Grans % 0.5 %      Neutrophils, Absolute 6.32 10*3/mm3      Lymphocytes, Absolute 2.21 10*3/mm3      Monocytes, Absolute 0.74 10*3/mm3      Eosinophils, Absolute 0.13 10*3/mm3      Basophils, Absolute 0.05 10*3/mm3      Immature Grans, Absolute 0.05 10*3/mm3      nRBC 0.0 /100 WBC     POC Glucose Once [[id number]]  (Abnormal) Collected: 04/25/23 0203    Specimen: Blood Updated: 04/25/23 0213     Glucose 162 mg/dL      Comment: Meter: [id number] : [id number] Zenon Ruffin       TSH [[id number]]  (Normal) Collected: 04/24/23 2012    Specimen: Blood Updated: 04/25/23 0141     TSH 3.940 uIU/mL     Hemoglobin A1c [[id number]]  (Abnormal) Collected: 04/24/23 2012    Specimen: Blood Updated: 04/25/23 0116     Hemoglobin A1C 8.20 %     Narrative:      Hemoglobin A1C Ranges:    Increased Risk for Diabetes  5.7% to 6.4%  Diabetes                     >= 6.5%  Diabetic Goal                < 7.0%    Comprehensive Metabolic Panel [[id number]]  (Abnormal) Collected: 04/24/23 2012    Specimen: Blood Updated: 04/24/23 2046     Glucose 214 mg/dL      BUN 17 mg/dL      Creatinine 0.82 mg/dL      Sodium 139  mmol/L      Potassium 4.3 mmol/L      Comment: Slight hemolysis detected by analyzer. Results may be affected.        Chloride 104 mmol/L      CO2 26.0 mmol/L      Calcium 9.4 mg/dL      Total Protein 6.5 g/dL      Albumin 3.8 g/dL      ALT (SGPT) 14 U/L      AST (SGOT) 18 U/L      Alkaline Phosphatase 71 U/L      Total Bilirubin 0.2 mg/dL      Globulin 2.7 gm/dL      Comment: Calculated Result        A/G Ratio 1.4 g/dL      BUN/Creatinine Ratio 20.7     Anion Gap 9.0 mmol/L      eGFR 72.0 mL/min/1.73     Narrative:      GFR Normal >60  Chronic Kidney Disease <60  Kidney Failure <15    The GFR formula is only valid for adults with stable renal function between ages 18 and 70.    Protime-INR [[id number]]  (Normal) Collected: 04/24/23 2012    Specimen: Blood Updated: 04/24/23 2037     Protime 13.3 Seconds      INR 1.02    CBC & Differential [[id number]]  (Abnormal) Collected: 04/24/23 2012    Specimen: Blood Updated: 04/24/23 2021    Narrative:      The following orders were created for panel order CBC & Differential.  Procedure                               Abnormality         Status                     ---------                               -----------         ------                     CBC Auto Differential[[id number]]        Abnormal            Final result                 Please view results for these tests on the individual orders.    CBC Auto Differential [[id number]]  (Abnormal) Collected: 04/24/23 2012    Specimen: Blood Updated: 04/24/23 2021     WBC 11.68 10*3/mm3      RBC 4.16 10*6/mm3      Hemoglobin 11.4 g/dL      Hematocrit 36.0 %      MCV 86.5 fL      MCH 27.4 pg      MCHC 31.7 g/dL      RDW 14.6 %      RDW-SD 46.5 fl      MPV 10.2 fL      Platelets 229 10*3/mm3      Neutrophil % 74.5 %      Lymphocyte % 17.4 %      Monocyte % 5.4 %      Eosinophil % 1.6 %      Basophil % 0.6 %      Immature Grans % 0.5 %      Neutrophils, Absolute 8.70 10*3/mm3      Lymphocytes, Absolute 2.03 10*3/mm3      Monocytes,  Absolute 0.63 10*3/mm3      Eosinophils, Absolute 0.19 10*3/mm3      Basophils, Absolute 0.07 10*3/mm3      Immature Grans, Absolute 0.06 10*3/mm3      nRBC 0.0 /100 WBC           Imaging Results (All)     Procedure Component Value Units Date/Time    XR Chest 1 View [[id number]] Collected: 04/24/23 2034     Updated: 04/24/23 2039    Narrative:      XR CHEST 1 VW    Date of Exam: 4/24/2023 7:32 PM EDT    Indication: fall    Comparison: Chest x-ray 8/15/2018    Findings:    Coronary artery stent is noted. Normal cardiomediastinal silhouette. The lungs are clear. No pleural effusion or pneumothorax. No acute osseous findings.      Impression:      Impression:  No acute cardiopulmonary findings.      Electronically Signed: Krishan Yuen    4/24/2023 8:36 PM EDT    Workstation ID: [id number]    XR Hip With or Without Pelvis 2 - 3 View Right [[id number]] Collected: 04/24/23 2032     Updated: 04/24/23 2037    Narrative:      XR FEMUR 2 VW RIGHT, XR HIP W OR WO PELVIS 2-3 VIEW RIGHT    Date of Exam: 4/24/2023 7:33 PM EDT    Indication: fall    Comparison: None available.    Findings:  The bones are demineralized limiting assessment for occult nondisplaced fractures.    Right hip: There is an acute impacted fracture through the femoral neck (likely subcapital). Femoral head maintains articulation with the acetabulum.    Right femur: No acute fracture or malalignment of the mid or distal femur.      Impression:      Impression:  Acute impacted likely subcapital fracture of the right femoral neck.      Electronically Signed: Krishan Yuen    4/24/2023 8:34 PM EDT    Workstation ID: [id number]    XR Femur 2 View Right [[id number]] Collected: 04/24/23 2032     Updated: 04/24/23 2037    Narrative:      XR FEMUR 2 VW RIGHT, XR HIP W OR WO PELVIS 2-3 VIEW RIGHT    Date of Exam: 4/24/2023 7:33 PM EDT    Indication: fall    Comparison: None available.    Findings:  The bones are demineralized limiting assessment for occult nondisplaced  fractures.    Right hip: There is an acute impacted fracture through the femoral neck (likely subcapital). Femoral head maintains articulation with the acetabulum.    Right femur: No acute fracture or malalignment of the mid or distal femur.      Impression:      Impression:  Acute impacted likely subcapital fracture of the right femoral neck.      Electronically Signed: Krishan Yuen    2023 8:34 PM EDT    Workstation ID: [id number]             Physician Progress Notes (all)      Sunshine Lambert DO at 23 0838              [facility] Medicine Services  PROGRESS NOTE    Patient Name: Angelica Spivey  : 1940  MRN: [id number]    Date of Admission: 2023  Primary Care Physician: Abimael Matthews MD    Subjective   Subjective     CC:  Fall,hip fracture     HPI:  No acute events. Pain is fair. States she had a mechanical fall. Turned too quick and slipped. Denies dizziness, palpitation, pre-syncope.     ROS:  Gen- No fevers, chills  CV- No chest pain, palpitations  Resp- No cough, dyspnea  GI- No N/V/D, abd pain     Objective   Objective     Vital Signs:   Temp:  [98 °F (36.7 °C)-98.6 °F (37 °C)] 98.6 °F (37 °C)  Heart Rate:  [] 98  Resp:  [8-20] 16  BP: (135-166)/(72-93) 135/72     Physical Exam:  Constitutional: No acute distress, awake, alert; frail/elderly   HENT: NCAT, mucous membranes moist  Respiratory: Clear to auscultation bilaterally, respiratory effort normal   Cardiovascular: RRR, no murmurs, rubs, or gallops  Gastrointestinal: Positive bowel sounds, soft, nontender, nondistended  Musculoskeletal: No bilateral ankle edema  Psychiatric: Appropriate affect, cooperative  Neurologic: Oriented x 3, strength symmetric in all extremities, Cranial Nerves grossly intact to confrontation, speech clear  Skin: No rashes    Results Reviewed:  LAB RESULTS:      Lab 23  0355 23   WBC 9.50 11.68*   HEMOGLOBIN 11.1* 11.4*   HEMATOCRIT 34.7 36.0   PLATELETS  208 229   NEUTROS ABS 6.32 8.70*   IMMATURE GRANS (ABS) 0.05 0.06*   LYMPHS ABS 2.21 2.03   MONOS ABS 0.74 0.63   EOS ABS 0.13 0.19   MCV 85.9 86.5   PROTIME 13.5 13.3         Lab 04/25/23  0355 04/24/23 2012   SODIUM 139 139   POTASSIUM 4.1 4.3   CHLORIDE 103 104   CO2 25.0 26.0   ANION GAP 11.0 9.0   BUN 15 17   CREATININE 0.69 0.82   EGFR 87.3 72.0   GLUCOSE 180* 214*   CALCIUM 9.5 9.4   MAGNESIUM 1.8  --    PHOSPHORUS 3.7  --    HEMOGLOBIN A1C  --  8.20*   TSH  --  3.940         Lab 04/24/23 2012   TOTAL PROTEIN 6.5   ALBUMIN 3.8   GLOBULIN 2.7   ALT (SGPT) 14   AST (SGOT) 18   BILIRUBIN 0.2   ALK PHOS 71         Lab 04/25/23  0355 04/24/23 2012   PROTIME 13.5 13.3   INR 1.03 1.02             Lab 04/25/23 0355   ABO TYPING A   RH TYPING Positive   ANTIBODY SCREEN Negative         Brief Urine Lab Results     None          Microbiology Results Abnormal     None          XR Femur 2 View Right    Result Date: 4/24/2023  XR FEMUR 2 VW RIGHT, XR HIP W OR WO PELVIS 2-3 VIEW RIGHT Date of Exam: 4/24/2023 7:33 PM EDT Indication: fall Comparison: None available. Findings: The bones are demineralized limiting assessment for occult nondisplaced fractures. Right hip: There is an acute impacted fracture through the femoral neck (likely subcapital). Femoral head maintains articulation with the acetabulum. Right femur: No acute fracture or malalignment of the mid or distal femur.     Impression: Impression: Acute impacted likely subcapital fracture of the right femoral neck. Electronically Signed: Krishan Yuen  4/24/2023 8:34 PM EDT  Workstation ID: [id number]    XR Chest 1 View    Result Date: 4/24/2023  XR CHEST 1 VW Date of Exam: 4/24/2023 7:32 PM EDT Indication: fall Comparison: Chest x-ray 8/15/2018 Findings: Coronary artery stent is noted. Normal cardiomediastinal silhouette. The lungs are clear. No pleural effusion or pneumothorax. No acute osseous findings.     Impression: Impression: No acute cardiopulmonary  findings. Electronically Signed: Krishan Wilfrid  4/24/2023 8:36 PM EDT  Workstation ID: [id number]    XR Hip With or Without Pelvis 2 - 3 View Right    Result Date: 4/24/2023  XR FEMUR 2 VW RIGHT, XR HIP W OR WO PELVIS 2-3 VIEW RIGHT Date of Exam: 4/24/2023 7:33 PM EDT Indication: fall Comparison: None available. Findings: The bones are demineralized limiting assessment for occult nondisplaced fractures. Right hip: There is an acute impacted fracture through the femoral neck (likely subcapital). Femoral head maintains articulation with the acetabulum. Right femur: No acute fracture or malalignment of the mid or distal femur.     Impression: Impression: Acute impacted likely subcapital fracture of the right femoral neck. Electronically Signed: Krishan Yuen  4/24/2023 8:34 PM EDT  Workstation ID: [id number]          Current medications:  Scheduled Meds:aspirin, 81 mg, Oral, Q PM  carvedilol, 12.5 mg, Oral, BID With Meals  famotidine, 40 mg, Oral, Daily  fluticasone, 2 spray, Each Nare, BID  losartan, 100 mg, Oral, Q24H   And  hydroCHLOROthiazide, 12.5 mg, Oral, Q24H  insulin detemir, 5 Units, Subcutaneous, Nightly  insulin lispro, 0-9 Units, Subcutaneous, Q6H  levothyroxine, 88 mcg, Oral, QAM  montelukast, 10 mg, Oral, Nightly  pantoprazole, 40 mg, Oral, BID AC  senna-docusate sodium, 2 tablet, Oral, BID  sodium chloride, 10 mL, Intravenous, Q12H      Continuous Infusions:lactated ringers, 50 mL/hr, Last Rate: 50 mL/hr (04/25/23 0146)      PRN Meds:.•  acetaminophen **OR** acetaminophen **OR** acetaminophen  •  senna-docusate sodium **AND** polyethylene glycol **AND** bisacodyl **AND** bisacodyl  •  Calcium Replacement - Follow Nurse / BPA Driven Protocol  •  dextrose  •  dextrose  •  glucagon (human recombinant)  •  HYDROmorphone **AND** naloxone  •  Magnesium Standard Dose Replacement - Follow Nurse / BPA Driven Protocol  •  melatonin  •  ondansetron **OR** ondansetron  •  Phosphorus Replacement - Follow Nurse / BPA  Driven Protocol  •  Potassium Replacement - Follow Nurse / BPA Driven Protocol  •  [COMPLETED] Insert Peripheral IV **AND** sodium chloride  •  sodium chloride  •  sodium chloride    Assessment & Plan   Assessment & Plan     Active Hospital Problems    Diagnosis  POA   • **Closed fracture of right hip [S72.001A]  Yes   • Essential hypertension [I10]  Unknown   • Acquired hypothyroidism [E03.9]  Yes   • Type 2 diabetes mellitus with hyperglycemia, with long-term current use of insulin [E11.65, Z79.4]  Not Applicable   • Mixed hyperlipidemia [E78.2]  Yes   • Coronary artery disease involving native coronary artery of native heart [I25.10]  Yes      Resolved Hospital Problems   No resolved problems to display.        Brief Hospital Course to date:  Angelica Spivey is a 83 y.o. female with medical history significant for HTN, HLD, T2DM, CAD s/p prior LAD stent (~ 2018) on DAPT who presents to the ED for evaluation after slipping and falling onto her right hip day of admission. Imaging with acute impacted likely subcapital fracture of the right femoral neck.     Right hip fracture  Mechanical fall   - Imaging per above   - Ortho consulted -- planning surgery 4/26. NPO at midnight   - PRN pain control  - SCDs  - PT/OT DVT ppx per ortho      CAD / HTN / HLD  - on DAPT, LAD stent ~ 2018, last heart cath by Dr. Deng ~ 2020  - hold plavix, last dose ~ 10am day of admission   - continue aspirin  - continue coreg. Holding losartan/HCTZ \  - on repatha, intolerant to statins    T2DM - insulin dependent  - levemir 5 units HS  - fsbg achs w/ moderate dose ssi  - A1C 8.2     Hypothyroidism  -  TSH within limits   - continue levothyroxine       Expected Discharge Location and Transportation: likely rehab   Expected Discharge   Expected Discharge Date: 04/28/23       DVT prophylaxis:  Mechanical DVT prophylaxis orders are present.          CODE STATUS:   Code Status and Medical Interventions:   Ordered at: 04/24/23 5115     Code  Status (Patient has no pulse and is not breathing):    CPR (Attempt to Resuscitate)     Medical Interventions (Patient has pulse or is breathing):    Full Support       Sunshine Lambert DO  04/25/23        Electronically signed by Sunshine Lambert DO at 04/25/23 0843          Consult Notes (all)      Rodo Jennings MD at 04/25/23 0809      Consult Orders    1. Inpatient Orthopedic Surgery Consult [[id number]] ordered by Santa Bojorquez DO at 04/24/23 2250                      Orthopedic Consult    Patient: Angelica Spivey                                           YOB: 1940     Date of Admission: 4/24/2023  7:24 PM            Medical Record Number: [id number]    Attending Physician: Sunshine Lambert DO    Consulting Physician: Rodo Jennings MD    Reason for Consult: right hip fracture.    History of Present Illness: 83 y.o. female admitted to [facility], [place], operated by [facility] with Hip fracture [S72.009A].     The patient was evaluated in the emergency room and was diagnosed with a  hip fracture.   Secondary to the age / multiple medical co morbidities the patient was admitted to the hospitalist.   As I was on call for the emergency room I was consulted for further evaluation and treatment.     The patient was in the usual state of health and fell from standing height in Home, Resulting in sudden onset hip pain and inability to ambulate.   Denies any history of loss of consciousness, headache, vomiting, or seizures.   Denies any other injuries.   The patient is accompanied by  family members  to this hospital visit.     The patient denies any prior  pre-existing pain in the hip.  Patient is a  home/ community ambulator. Patient denies/ uses walker/cane assistive device.   The patient  lives at home with   family , is quite active and independent in activities of daily living.  The patient denies history of dementia.    Patient denies any history of: DVT/PE, MRSA, COPD, CHF,  Dementia or A-Fib.   The patient has  history of :CAD, Diabetes mellitus,  The patient is on anticoagulants: Plavix    Past medical history, past surgical history, social history, family history, ALLERGIES, current medications have been reviewed by me.    Past Medical History:   Diagnosis Date   • Anxiety    • Arthritis    • Coronary artery disease    • Diabetes mellitus    • Disease of thyroid gland    • Elevated cholesterol    • GERD (gastroesophageal reflux disease)    • Hearing aid worn    • Heart attack    • History of stomach ulcers    • Hypertension    • PONV (postoperative nausea and vomiting)    • Wears glasses      Past Surgical History:   Procedure Laterality Date   • BLADDER SUSPENSION     • CARDIAC CATHETERIZATION N/A 08/15/2018    Procedure: Left Heart Cath;  Surgeon: David Burnett MD;  Location:  [facility];  Service: Cardiology   • CARDIAC CATHETERIZATION N/A 01/08/2020    Procedure: LEFT HEART CATH;  Surgeon: David Subramanian MD;  Location:  [facility];  Service: Cardiology   • CARDIAC CATHETERIZATION N/A 01/15/2020    Procedure: CBI to the LAD;  Surgeon: Phil Deng MD;  Location:  [facility];  Service: Cardiovascular   • CARDIAC CATHETERIZATION N/A 12/03/2020    Procedure: Left Heart Cath 22545;  Surgeon: Phil Deng MD;  Location:  [facility];  Service: Cardiovascular;  Laterality: N/A;   • CATARACT EXTRACTION Bilateral    • COLONOSCOPY     • ENDOSCOPY     • ENDOSCOPY N/A 12/06/2019    Procedure: ESOPHAGOGASTRODUODENOSCOPY;  Surgeon: Burak Patino MD;  Location:  [facility];  Service: Gastroenterology   • HYSTERECTOMY  2014    PARTIAL   • OOPHORECTOMY Bilateral 2014   • ID RT/LT HEART CATHETERS N/A 10/05/2018    Procedure: Percutaneous Coronary Intervention;  Surgeon: David Burnett MD;  Location:  [facility];  Service: Cardiology   • VULVECTOMY       Social History     Occupational History   • Not on  "file   Tobacco Use   • Smoking status: Never   • Smokeless tobacco: Never   Substance and Sexual Activity   • Alcohol use: No   • Drug use: No   • Sexual activity: Defer      Social History     Social History Narrative   • Not on file     Family History   Problem Relation Age of Onset   • Breast cancer Neg Hx    • Ovarian cancer Neg Hx         Allergies   Allergen Reactions   • Biaxin [Clarithromycin] Anaphylaxis     \"BLISTERS AND THROAT SWELLING\"   • Allegra [Fexofenadine] Other (See Comments)     \"MAKES ME FEEL FUNNY\"   • Repatha [Evolocumab] Other (See Comments)     Chest pain   • Sulfa Antibiotics Nausea Only       Home Medications:  Medications Prior to Admission   Medication Sig Dispense Refill Last Dose   • aspirin 81 MG EC tablet Take 1 tablet by mouth Every Evening.      • Blood Glucose Monitoring Suppl (ACCU-CHEK LEXIE PLUS) w/Device kit Use device to check blood sugar 3 times a day 1 kit 0    • carvedilol (COREG) 6.25 MG tablet Take 2 tablets by mouth 2 (Two) Times a Day With Meals.      • clobetasol (TEMOVATE) 0.05 % ointment Apply  topically to the appropriate area as directed.      • clopidogrel (PLAVIX) 75 MG tablet Take 1 tablet by mouth Daily.      • dexlansoprazole (DEXILANT) 60 MG capsule Daily.      • Dulaglutide 0.75 MG/0.5ML solution pen-injector Inject  under the skin into the appropriate area as directed.      • estradiol (ESTRACE) 0.1 MG/GM vaginal cream Insert 1 g into the vagina Every 3 (Three) Days.      • Estrogens Conjugated (PREMARIN) 0.625 MG/GM vaginal cream Insert 0.5 g into the vagina 2 (Two) Times a Week.      • Evolocumab (Repatha SureClick) solution auto-injector SureClick injection Repatha SureClick 140 mg/mL subcutaneous pen injector   INJECT 1 ML SUBCUTANEOUSLY EVERY TWO WEEKS      • famotidine (PEPCID) 40 MG tablet Take 1 tablet by mouth Daily.      • fluticasone (FLONASE) 50 MCG/ACT nasal spray SMARTSIG:Both Nares      • Lantus SoloStar 100 UNIT/ML injection pen INJECT 15 " UNITS UNDER THE SKIN ONCE DAILY AT BEDTIME 6 mL 0    • levothyroxine (SYNTHROID, LEVOTHROID) 88 MCG tablet Take 1 tablet by mouth Every Morning. 30 tablet 6    • losartan-hydrochlorothiazide (HYZAAR) 100-12.5 MG per tablet losartan 100 mg-hydrochlorothiazide 12.5 mg tablet      • metFORMIN ER (GLUCOPHAGE-XR) 500 MG 24 hr tablet metformin  mg tablet,extended release 24 hr   TK 2 TS PO D      • montelukast (SINGULAIR) 10 MG tablet Take 1 tablet by mouth Every Night.      • nitroglycerin (NITROSTAT) 0.4 MG SL tablet Take 1 tablet by mouth Every 5 (Five) Minutes As Needed.      • RELION PEN NEEDLES 32G X 4 MM misc Use daily with insulin 50 each 11    • glimepiride (AMARYL) 4 MG tablet Take 1 tablet by mouth 2 (Two) Times a Day Before Meals. 60 tablet 6    • glucose blood (ACCU-CHEK LEXIE PLUS) test strip Use as instructed to test blood sugar 3 times daily 100 each 12    • Insulin Glargine (LANTUS SOLOSTAR SC) Inject 8 Units under the skin into the appropriate area as directed Every Night.      • linagliptin (TRADJENTA) 5 MG tablet tablet Take 1 tablet by mouth Every Evening.      • losartan 50 MG tablet 100 mg, hydroCHLOROthiazide 12.5 MG 12.5 mg Take 1 dose by mouth Daily.      • metFORMIN (GLUCOPHAGE) 500 MG tablet Take 2 tablets by mouth Every Evening. 60 tablet 6    • potassium chloride 10 MEQ CR tablet Take 1 tablet by mouth 2 (Two) Times a Day.          Current Medications:  Scheduled Meds:aspirin, 81 mg, Oral, Q PM  carvedilol, 12.5 mg, Oral, BID With Meals  famotidine, 40 mg, Oral, Daily  fluticasone, 2 spray, Each Nare, BID  losartan, 100 mg, Oral, Q24H   And  hydroCHLOROthiazide, 12.5 mg, Oral, Q24H  insulin detemir, 5 Units, Subcutaneous, Nightly  insulin lispro, 0-9 Units, Subcutaneous, Q6H  levothyroxine, 88 mcg, Oral, QAM  montelukast, 10 mg, Oral, Nightly  pantoprazole, 40 mg, Oral, BID AC  senna-docusate sodium, 2 tablet, Oral, BID  sodium chloride, 10 mL, Intravenous, Q12H      Continuous  "Infusions:lactated ringers, 50 mL/hr, Last Rate: 50 mL/hr (04/25/23 0146)      PRN Meds:.•  acetaminophen **OR** acetaminophen **OR** acetaminophen  •  senna-docusate sodium **AND** polyethylene glycol **AND** bisacodyl **AND** bisacodyl  •  Calcium Replacement - Follow Nurse / BPA Driven Protocol  •  dextrose  •  dextrose  •  glucagon (human recombinant)  •  HYDROmorphone **AND** naloxone  •  Magnesium Standard Dose Replacement - Follow Nurse / BPA Driven Protocol  •  melatonin  •  ondansetron **OR** ondansetron  •  Phosphorus Replacement - Follow Nurse / BPA Driven Protocol  •  Potassium Replacement - Follow Nurse / BPA Driven Protocol  •  [COMPLETED] Insert Peripheral IV **AND** sodium chloride  •  sodium chloride  •  sodium chloride    Review of Systems:   A 12 point system review was reviewed with the patient and from the chart  and is negative except as in history of present illness.      Physical Exam: 83 y.o. female                    Vitals:    04/25/23 0000 04/25/23 0050 04/25/23 0425 04/25/23 0700   BP: 147/81 165/76 152/85 135/72   BP Location:  Right arm Right arm Right arm   Patient Position:  Lying Lying Lying   Pulse:  93 91 98   Resp:  8 16 16   Temp:  98.2 °F (36.8 °C) 98.2 °F (36.8 °C) 98.6 °F (37 °C)   TempSrc:  Oral Oral Oral   SpO2:  96% 94% 95%   Weight:  70.8 kg (156 lb)     Height:  162.6 cm (64.02\")          Gait: Could not be tested , patient is nonambulatory.    Mental/HEENT/cardio/skin: The patient's general appearance was well-nourished, well-hydrated, no acute distress.  Orientation was alert and oriented ×3.  The patient's mood was normal.   Pulmonary exam shows normal late exchange, no labored breathing, or shortness of breath.    The skin exam showed normal temperature and color in the area of examination.    Extremities:  right   lower extremity positive shortening, positive external rotation, attempted movements of the  hip are painful and restricted. The patient is able to do " gentle active range of motion of her toes. Gross sensation is intact over the toes.    Pulses: Pulses palpable and equal bilaterally    Diagnostic Tests:    Results from last 7 days   Lab Units 04/25/23  0355 04/24/23 2012   WBC 10*3/mm3 9.50 11.68*   HEMOGLOBIN g/dL 11.1* 11.4*   HEMATOCRIT % 34.7 36.0   PLATELETS 10*3/mm3 208 229     Results from last 7 days   Lab Units 04/25/23  0355 04/24/23 2012   SODIUM mmol/L 139 139   POTASSIUM mmol/L 4.1 4.3   CHLORIDE mmol/L 103 104   CO2 mmol/L 25.0 26.0   BUN mg/dL 15 17   CREATININE mg/dL 0.69 0.82   GLUCOSE mg/dL 180* 214*   CALCIUM mg/dL 9.5 9.4     Results from last 7 days   Lab Units 04/25/23  0355 04/24/23 2012   INR  1.03 1.02     No results found for: URICACID  No results found for: CRYSTAL  Microbiology Results (last 10 days)     ** No results found for the last 240 hours. **        XR Femur 2 View Right    Result Date: 4/24/2023  Impression: Acute impacted likely subcapital fracture of the right femoral neck. Electronically Signed: Krishan Yuen  4/24/2023 8:34 PM EDT  Workstation ID: [id number]    XR Chest 1 View    Result Date: 4/24/2023  Impression: No acute cardiopulmonary findings. Electronically Signed: Krishan Yuen  4/24/2023 8:36 PM EDT  Workstation ID: [id number]    XR Hip With or Without Pelvis 2 - 3 View Right    Result Date: 4/24/2023  Impression: Acute impacted likely subcapital fracture of the right femoral neck. Electronically Signed: Krishan Yuen  4/24/2023 8:34 PM EDT  Workstation ID: [id number]      Assessment:  right displaced femoral neck fracture      Closed fracture of right hip    Mixed hyperlipidemia    Coronary artery disease involving native coronary artery of native heart    Type 2 diabetes mellitus with hyperglycemia, with long-term current use of insulin    Acquired hypothyroidism    Essential hypertension      Plan:    Options and alternatives have been discussed in detail with patient and  family.   The patient is indicated for  a  right total hip arthroplasty.    The likely,  Risks and benefits of the procedure including but not limited to infection, DVT, pulmonary embolism,  leg length discrepancy, recurrent dislocation, possibility of injury to nerves or vessels, possibility of periprosthetic fractures have been discussed in detail.  Despite the risks involved, The patient and patient's family  would like to proceed.    patient took a double dose of Plavix yesterday so we will delay surgery today and plan for surgery tomorrow  The patient is being scheduled for a  right total hip arthroplasty by  anterior approach at [facility] tentatively for 4/26 2023.     Obtain informed consent.     The patient's admitting service has seen the patient and the patient is cleared to the operating room.    The patients  Plavix has been on hold.    bedrest  Pain control  Discussed with anesthesia doing a nerve catheter to help with the patient's pain  N.p.o. after midnight    Date: 4/25/2023      CC: Abimael Matthews MD; MD Fausto Garcia Lyndsey, DO                  Electronically signed by Rodo Jennings MD at 04/25/23 0817

## 2023-04-25 NOTE — ED PROVIDER NOTES
"Subjective   History of Present Illness  83-year-old female presents via helicopter to be evaluated for right hip injury.  Approximately 1 hour ago, the patient had a mechanical trip and fall at home and fell awkwardly onto her right hip.  She did not hit her head or lose consciousness.  She was unable to bear weight afterwards so she was flown here from [facility] to be evaluated for potential hip fracture.  She is not anticoagulated.  She is complaining of isolated pain to her right hip that she currently rates at 8 out of 10 in severity.  The pain is worse with attempted movement.  She denies any paresthesias.        Review of Systems   Musculoskeletal:        Right hip pain   All other systems reviewed and are negative.      Past Medical History:   Diagnosis Date   • Anxiety    • Arthritis    • Coronary artery disease    • Diabetes mellitus    • Disease of thyroid gland    • Elevated cholesterol    • GERD (gastroesophageal reflux disease)    • Hearing aid worn    • Heart attack    • History of stomach ulcers    • Hypertension    • PONV (postoperative nausea and vomiting)    • Wears glasses        Allergies   Allergen Reactions   • Biaxin [Clarithromycin] Anaphylaxis     \"BLISTERS AND THROAT SWELLING\"   • Allegra [Fexofenadine] Other (See Comments)     \"MAKES ME FEEL FUNNY\"   • Repatha [Evolocumab] Other (See Comments)     Chest pain   • Sulfa Antibiotics Nausea Only       Past Surgical History:   Procedure Laterality Date   • BLADDER SUSPENSION     • CARDIAC CATHETERIZATION N/A 08/15/2018    Procedure: Left Heart Cath;  Surgeon: David Burnett MD;  Location:  [facility];  Service: Cardiology   • CARDIAC CATHETERIZATION N/A 01/08/2020    Procedure: LEFT HEART CATH;  Surgeon: David Subramanian MD;  Location:  [facility];  Service: Cardiology   • CARDIAC CATHETERIZATION N/A 01/15/2020    Procedure: CBI to the LAD;  Surgeon: Phil Deng MD;  Location:  [facility] " [facility];  Service: Cardiovascular   • CARDIAC CATHETERIZATION N/A 12/03/2020    Procedure: Left Heart Cath 37331;  Surgeon: Phil Deng MD;  Location:  [facility];  Service: Cardiovascular;  Laterality: N/A;   • CATARACT EXTRACTION Bilateral    • COLONOSCOPY     • ENDOSCOPY     • ENDOSCOPY N/A 12/06/2019    Procedure: ESOPHAGOGASTRODUODENOSCOPY;  Surgeon: Burak Patino MD;  Location:  [facility];  Service: Gastroenterology   • HYSTERECTOMY  2014    PARTIAL   • OOPHORECTOMY Bilateral 2014   • NJ RT/LT HEART CATHETERS N/A 10/05/2018    Procedure: Percutaneous Coronary Intervention;  Surgeon: David Burnett MD;  Location:  [facility];  Service: Cardiology   • VULVECTOMY         Family History   Problem Relation Age of Onset   • Breast cancer Neg Hx    • Ovarian cancer Neg Hx        Social History     Socioeconomic History   • Marital status:    Tobacco Use   • Smoking status: Never   • Smokeless tobacco: Never   Substance and Sexual Activity   • Alcohol use: No   • Drug use: No   • Sexual activity: Defer           Objective   Physical Exam  Vitals and nursing note reviewed.   Constitutional:       Appearance: She is well-developed. She is not diaphoretic.      Comments: Nontoxic-appearing female   HENT:      Head: Normocephalic and atraumatic.   Eyes:      Pupils: Pupils are equal, round, and reactive to light.   Neck:      Comments: No midline cervical spine tenderness noted, no step-off or deformity present  Cardiovascular:      Rate and Rhythm: Normal rate and regular rhythm.      Heart sounds: Normal heart sounds. No murmur heard.    No friction rub. No gallop.   Pulmonary:      Effort: Pulmonary effort is normal. No respiratory distress.      Breath sounds: Normal breath sounds. No wheezing or rales.   Abdominal:      General: Bowel sounds are normal. There is no distension.      Palpations: Abdomen is soft. There is no mass.       Tenderness: There is no abdominal tenderness. There is no guarding or rebound.   Musculoskeletal:      Comments: Range of motion of right hip is limited secondary to pain, no pelvic instability noted, no shortening or external rotation of right lower extremity when compared to the left on visual inspection   Skin:     General: Skin is warm and dry.      Findings: No erythema or rash.   Neurological:      Mental Status: She is alert and oriented to person, place, and time.      Comments: Right lower extremity is neurovascularly intact distally with bounding distal pulses and normal sensation   Psychiatric:         Mood and Affect: Mood normal.         Thought Content: Thought content normal.         Judgment: Judgment normal.         Nerve Block    Date/Time: 4/25/2023 1:30 AM  Performed by: Attila Christensen MD  Authorized by: Santa Bojorquez DO     Consent:     Consent obtained:  Verbal and written    Consent given by:  Patient    Risks, benefits, and alternatives were discussed: yes      Risks discussed:  Allergic reaction, bleeding, intravenous injection, infection, nerve damage, pain and swelling  Universal protocol:     Procedure explained and questions answered to patient or proxy's satisfaction: yes      Relevant documents present and verified: yes      Test results available: yes      Imaging studies available: yes      Required blood products, implants, devices, and special equipment available: yes      Site/side marked: yes      Immediately prior to procedure, a time out was called: yes      Patient identity confirmed:  Verbally with patient and arm band  Indications:     Indications:  Pain relief  Location:     Body area:  Lower extremity    Lower extremity nerve:  Femoral    Laterality:  Right  Pre-procedure details:     Skin preparation:  Chlorhexidine    Preparation: Patient was prepped and draped in usual sterile fashion    Skin anesthesia:     Skin anesthesia method:  Local infiltration    Local  anesthetic:  Lidocaine 1% w/o epi  Procedure details:     Block needle gauge:  20 G    Guidance: ultrasound      Anesthetic injected:  Bupivacaine 0.25% w/o epi    Steroid injected:  None    Additive injected:  None    Injection procedure:  Anatomic landmarks identified, anatomic landmarks palpated, incremental injection, introduced needle and negative aspiration for blood    Paresthesia:  None  Post-procedure details:     Dressing:  Sterile dressing    Outcome:  Pain improved    Procedure completion:  Tolerated well, no immediate complications               ED Course  ED Course as of 04/25/23 0129   Mon Apr 24, 2023 1928 83-year-old female presents via helicopter to be evaluated for right hip injury.  Approximately an hour ago, the patient had a mechanical trip and fall at home and fell awkwardly onto her right hip.  She was unable to bear weight afterward and was flown here from [facility] to be evaluated for a potential hip fracture.  On arrival to the ED, the patient is nontoxic-appearing.  She did not hit her head or lose consciousness.  NEXUS negative.  We will obtain plain films and labs, provide pain control, and we will reassess following initial interventions. [DD]   1929 Right lower extremity is neurovascularly intact distally with bounding distal pulses and normal sensation.  No pelvic instability noted.  Range of motion of right hip is limited secondary to pain. [DD]   2000 I personally and independently viewed the patient's x-ray images myself, and I am in agreement with the radiologist's reading for final interpretation.   [DD]   2135 After obtaining informed consent, a hip block was done under ultrasound guidance under sterile conditions without complication.  The patient tolerated the procedure well.  I discussed the patient's case with Dr. Russell of orthopedics who will see the patient in consult.  Patient will be made n.p.o. at midnight in preparation for definitive surgical management  tomorrow morning.  I discussed the patient's case with Dr. Bojorquez, and the patient will be admitted under her care for further evaluation and treatment.  The patient is aware/agreeable with the plan at this time. [DD]      ED Course User Index  [DD] Attila Christensen MD                                          Recent Results (from the past 24 hour(s))   Comprehensive Metabolic Panel    Collection Time: 04/24/23  8:12 PM    Specimen: Blood   Result Value Ref Range    Glucose 214 (H) 65 - 99 mg/dL    BUN 17 8 - 23 mg/dL    Creatinine 0.82 0.57 - 1.00 mg/dL    Sodium 139 136 - 145 mmol/L    Potassium 4.3 3.5 - 5.2 mmol/L    Chloride 104 98 - 107 mmol/L    CO2 26.0 22.0 - 29.0 mmol/L    Calcium 9.4 8.6 - 10.5 mg/dL    Total Protein 6.5 6.0 - 8.5 g/dL    Albumin 3.8 3.5 - 5.2 g/dL    ALT (SGPT) 14 1 - 33 U/L    AST (SGOT) 18 1 - 32 U/L    Alkaline Phosphatase 71 39 - 117 U/L    Total Bilirubin 0.2 0.0 - 1.2 mg/dL    Globulin 2.7 gm/dL    A/G Ratio 1.4 g/dL    BUN/Creatinine Ratio 20.7 7.0 - 25.0    Anion Gap 9.0 5.0 - 15.0 mmol/L    eGFR 72.0 >60.0 mL/min/1.73   Protime-INR    Collection Time: 04/24/23  8:12 PM    Specimen: Blood   Result Value Ref Range    Protime 13.3 11.4 - 14.4 Seconds    INR 1.02 0.84 - 1.13   CBC Auto Differential    Collection Time: 04/24/23  8:12 PM    Specimen: Blood   Result Value Ref Range    WBC 11.68 (H) 3.40 - 10.80 10*3/mm3    RBC 4.16 3.77 - 5.28 10*6/mm3    Hemoglobin 11.4 (L) 12.0 - 15.9 g/dL    Hematocrit 36.0 34.0 - 46.6 %    MCV 86.5 79.0 - 97.0 fL    MCH 27.4 26.6 - 33.0 pg    MCHC 31.7 31.5 - 35.7 g/dL    RDW 14.6 12.3 - 15.4 %    RDW-SD 46.5 37.0 - 54.0 fl    MPV 10.2 6.0 - 12.0 fL    Platelets 229 140 - 450 10*3/mm3    Neutrophil % 74.5 42.7 - 76.0 %    Lymphocyte % 17.4 (L) 19.6 - 45.3 %    Monocyte % 5.4 5.0 - 12.0 %    Eosinophil % 1.6 0.3 - 6.2 %    Basophil % 0.6 0.0 - 1.5 %    Immature Grans % 0.5 0.0 - 0.5 %    Neutrophils, Absolute 8.70 (H) 1.70 - 7.00 10*3/mm3     "Lymphocytes, Absolute 2.03 0.70 - 3.10 10*3/mm3    Monocytes, Absolute 0.63 0.10 - 0.90 10*3/mm3    Eosinophils, Absolute 0.19 0.00 - 0.40 10*3/mm3    Basophils, Absolute 0.07 0.00 - 0.20 10*3/mm3    Immature Grans, Absolute 0.06 (H) 0.00 - 0.05 10*3/mm3    nRBC 0.0 0.0 - 0.2 /100 WBC   Hemoglobin A1c    Collection Time: 04/24/23  8:12 PM    Specimen: Blood   Result Value Ref Range    Hemoglobin A1C 8.20 (H) 4.80 - 5.60 %   ECG 12 Lead Other; fall    Collection Time: 04/24/23  8:34 PM   Result Value Ref Range    QT Interval 346 ms    QTC Interval 416 ms     Note: In addition to lab results from this visit, the labs listed above may include labs taken at another facility or during a different encounter within the last 24 hours. Please correlate lab times with ED admission and discharge times for further clarification of the services performed during this visit.    XR Chest 1 View   Final Result   Impression:   No acute cardiopulmonary findings.         Electronically Signed: Krishan Yuen     4/24/2023 8:36 PM EDT     Workstation ID: [id number]      XR Hip With or Without Pelvis 2 - 3 View Right   Final Result   Impression:   Acute impacted likely subcapital fracture of the right femoral neck.         Electronically Signed: Krishan Yuen     4/24/2023 8:34 PM EDT     Workstation ID: [id number]      XR Femur 2 View Right   Final Result   Impression:   Acute impacted likely subcapital fracture of the right femoral neck.         Electronically Signed: Krishan Yuen     4/24/2023 8:34 PM EDT     Workstation ID: [id number]        Vitals:    04/24/23 2300 04/24/23 2316 04/25/23 0000 04/25/23 0050   BP: 152/86  147/81 165/76   BP Location:    Right arm   Patient Position:    Lying   Pulse: 95 104  93   Resp:    8   Temp:    98.2 °F (36.8 °C)   TempSrc:    Oral   SpO2: 96% 97%  96%   Weight:    70.8 kg (156 lb)   Height:    162.6 cm (64.02\")     Medications   sodium chloride 0.9 % flush 10 mL (has no administration in time " range)   sodium chloride 0.9 % flush 10 mL (has no administration in time range)   sodium chloride 0.9 % flush 10 mL (has no administration in time range)   sodium chloride 0.9 % infusion 40 mL (has no administration in time range)   dextrose (GLUTOSE) oral gel 15 g (has no administration in time range)   dextrose (D50W) (25 g/50 mL) IV injection 25 g (has no administration in time range)   glucagon (GLUCAGEN) injection 1 mg (has no administration in time range)   lactated ringers infusion (has no administration in time range)   acetaminophen (TYLENOL) tablet 650 mg (has no administration in time range)     Or   acetaminophen (TYLENOL) 160 MG/5ML solution 650 mg (has no administration in time range)     Or   acetaminophen (TYLENOL) suppository 650 mg (has no administration in time range)   HYDROmorphone (DILAUDID) injection 0.5 mg (has no administration in time range)     And   naloxone (NARCAN) injection 0.4 mg (has no administration in time range)   Potassium Replacement - Follow Nurse / BPA Driven Protocol (has no administration in time range)   Magnesium Standard Dose Replacement - Follow Nurse / BPA Driven Protocol (has no administration in time range)   Phosphorus Replacement - Follow Nurse / BPA Driven Protocol (has no administration in time range)   Calcium Replacement - Follow Nurse / BPA Driven Protocol (has no administration in time range)   insulin detemir (LEVEMIR) injection 5 Units (has no administration in time range)   Insulin Lispro (humaLOG) injection 0-9 Units (has no administration in time range)   sennosides-docusate (PERICOLACE) 8.6-50 MG per tablet 2 tablet (has no administration in time range)     And   polyethylene glycol (MIRALAX) packet 17 g (has no administration in time range)     And   bisacodyl (DULCOLAX) EC tablet 5 mg (has no administration in time range)     And   bisacodyl (DULCOLAX) suppository 10 mg (has no administration in time range)   melatonin tablet 5 mg (has no  administration in time range)   ondansetron (ZOFRAN) tablet 4 mg (has no administration in time range)     Or   ondansetron (ZOFRAN) injection 4 mg (has no administration in time range)   fentaNYL citrate (PF) (SUBLIMAZE) injection 50 mcg (50 mcg Intravenous Given 4/24/23 2016)   lidocaine PF 1% (XYLOCAINE) injection 10 mL (10 mL Injection Given by Other 4/24/23 2146)   bupivacaine (PF) (MARCAINE) 0.25 % injection 30 mL (30 mL Injection Given by Other 4/24/23 2146)     ECG/EMG Results (last 24 hours)     Procedure Component Value Units Date/Time    ECG 12 Lead Other; fall [[id number]] Collected: 04/24/23 2034     Updated: 04/24/23 2035     QT Interval 346 ms      QTC Interval 416 ms     Narrative:      Test Reason : Other~  Blood Pressure :   */*   mmHG  Vent. Rate :  87 BPM     Atrial Rate :  87 BPM     P-R Int : 170 ms          QRS Dur :  72 ms      QT Int : 346 ms       P-R-T Axes :  29  34  26 degrees     QTc Int : 416 ms    Normal sinus rhythm with sinus arrhythmia  Nonspecific T wave abnormality  Abnormal ECG  When compared with ECG of 15-DAVID-2020 17:19,  Nonspecific T wave abnormality, worse in Inferior leads    Referred By: EDMD           Confirmed By:         ECG 12 Lead Other; fall   Preliminary Result   Test Reason : Other~   Blood Pressure :   */*   mmHG   Vent. Rate :  87 BPM     Atrial Rate :  87 BPM      P-R Int : 170 ms          QRS Dur :  72 ms       QT Int : 346 ms       P-R-T Axes :  29  34  26 degrees      QTc Int : 416 ms      Normal sinus rhythm with sinus arrhythmia   Nonspecific T wave abnormality   Abnormal ECG   When compared with ECG of 15-DAVID-2020 17:19,   Nonspecific T wave abnormality, worse in Inferior leads      Referred By: EDMD           Confirmed By:               MAGALYS    Final diagnoses:   Closed right hip fracture, initial encounter   Hyperglycemia       ED Disposition  ED Disposition     ED Disposition   Decision to Admit    Condition   --    Comment   Level of Care: Telemetry  [5]   Diagnosis: Hip fracture [197979]   Admitting Physician: YUDELKA DUNNE [[id number]]   Attending Physician: YUDELKA DUNNE [[id number]]   Bed Request Comments: tele               No follow-up provider specified.       Medication List      No changes were made to your prescriptions during this visit.          Attila Christensen MD  04/25/23 0132

## 2023-04-25 NOTE — ANESTHESIA PROCEDURE NOTES
Fasia Iliacus Block    Pre-sedation assessment completed: 4/25/2023 2:15 PM    Patient reassessed immediately prior to procedure    Start time: 4/25/2023 2:15 PM  Reason for block: at surgeon's request and post-op pain management  Performed by  CRNA/CAA: Steven Houston, CRNA  Assisted by: Jessica Maldonado RN  Preanesthetic Checklist  Completed: patient identified, IV checked, site marked, risks and benefits discussed, surgical consent, monitors and equipment checked, pre-op evaluation and timeout performed  Prep:  Pt Position: supine  Sterile barriers:cap, gloves, mask and washed/disinfected hands  Prep: ChloraPrep  Patient monitoring: blood pressure monitoring, continuous pulse oximetry and EKG  Procedure    Sedation: no  Performed under: local infiltration  Guidance:ultrasound guided    ULTRASOUND INTERPRETATION.  Using ultrasound guidance a 20 G gauge needle was placed in close proximity to the nerve, at which point, under ultrasound guidance anesthetic was injected in the area of the nerve and spread of the anesthesia was seen on ultrasound in close proximity thereto.  There were no abnormalities seen on ultrasound; a digital image was taken; and the patient tolerated the procedure with no complications. Images:still images obtained, printed/placed on chart    Laterality:right  Block Type:fascia iliaca compartment  Injection Technique:catheter  Needle Type:echogenic and Tuohy  Needle Gauge:18 G  Resistance on Injection: none  Catheter Size:20 G (20g)  Cath Depth at skin: 13 cm    Medications Used: ropivacaine (NAROPIN) 0.5 % injection - Injection   25 mL - 4/25/2023 2:15:00 PM      Medications  Preservative Free Saline:25ml    Post Assessment  Injection Assessment: negative aspiration for heme, no paresthesia on injection and incremental injection  Patient Tolerance:comfortable throughout block  Complications:no  Additional Notes  CKAFASCIAILIACA: CATHETER  A high-frequency linear transducer, with sterile  "cover, was placed in parasagittal plane on top of the Anterior Superior Iliac Spine (ASIS) and moved medially to identify the Internal Oblique muscle, Sartorius muscle, Iliacus Muscle, Fascia Iliaca (FI) and Fascia Latae. The insertion site was prepped and draped in sterile fashion. Skin and cutaneous tissue was infiltrated with 2-5 ml of 1% Lidocaine. Using ultrasound-guidance, an 18-gauge Contiplex Ultra 360 Touhy needle was advanced in plane from caudad to cephalad. Preservative-free normal saline was utilized for hydro-dissection of tissue, advancement of Touhy, and to confirm final needle placement below FI. Local anesthetic in incremental 3-5 ml injections. Aspiration every 5 ml to prevent intravascular injection. Injection was completed with negative aspiration of blood and negative intravascular injection. Injection pressures were normal with minimal resistance. A 20-gauge Contiplex Echo catheter was placed through the needle and advance out the tip of the Touhy 3-5 cm. The Touhy needle was then removed, and final catheter position verified below the FI. The catheter was secured in the usual fashion with skin glue, benzoin, steri-strips, CHG tegaderm and Label noting \"Nerve Block Catheter\". Jerk tape applied at yellow connector and catheter connection.           "

## 2023-04-25 NOTE — CONSULTS
Orthopedic Consult    Patient: Angelica Spivey                                           YOB: 1940     Date of Admission: 4/24/2023  7:24 PM            Medical Record Number: [id number]    Attending Physician: Sunshine Lambert DO    Consulting Physician: Rodo Jennings MD    Reason for Consult: right hip fracture.    History of Present Illness: 83 y.o. female admitted to [facility] with Hip fracture [S72.009A].     The patient was evaluated in the emergency room and was diagnosed with a  hip fracture.   Secondary to the age / multiple medical co morbidities the patient was admitted to the hospitalist.   As I was on call for the emergency room I was consulted for further evaluation and treatment.     The patient was in the usual state of health and fell from standing height in Home, Resulting in sudden onset hip pain and inability to ambulate.   Denies any history of loss of consciousness, headache, vomiting, or seizures.   Denies any other injuries.   The patient is accompanied by  family members  to this hospital visit.     The patient denies any prior  pre-existing pain in the hip.  Patient is a  home/ community ambulator. Patient denies/ uses walker/cane assistive device.   The patient  lives at home with   family , is quite active and independent in activities of daily living.  The patient denies history of dementia.    Patient denies any history of: DVT/PE, MRSA, COPD, CHF,  Dementia or A-Fib.   The patient has history of :CAD, Diabetes mellitus,  The patient is on anticoagulants: Plavix    Past medical history, past surgical history, social history, family history, ALLERGIES, current medications have been reviewed by me.    Past Medical History:   Diagnosis Date   • Anxiety    • Arthritis    • Coronary artery disease    • Diabetes mellitus    • Disease of thyroid gland    • Elevated cholesterol    • GERD (gastroesophageal reflux disease)    • Hearing aid worn    • Heart attack    •  "History of stomach ulcers    • Hypertension    • PONV (postoperative nausea and vomiting)    • Wears glasses      Past Surgical History:   Procedure Laterality Date   • BLADDER SUSPENSION     • CARDIAC CATHETERIZATION N/A 08/15/2018    Procedure: Left Heart Cath;  Surgeon: David Burnett MD;  Location:  [facility];  Service: Cardiology   • CARDIAC CATHETERIZATION N/A 01/08/2020    Procedure: LEFT HEART CATH;  Surgeon: David Subramanian MD;  Location:  [facility];  Service: Cardiology   • CARDIAC CATHETERIZATION N/A 01/15/2020    Procedure: CBI to the LAD;  Surgeon: Phil Deng MD;  Location:  [facility];  Service: Cardiovascular   • CARDIAC CATHETERIZATION N/A 12/03/2020    Procedure: Left Heart Cath 01902;  Surgeon: Phil Deng MD;  Location:  [facility];  Service: Cardiovascular;  Laterality: N/A;   • CATARACT EXTRACTION Bilateral    • COLONOSCOPY     • ENDOSCOPY     • ENDOSCOPY N/A 12/06/2019    Procedure: ESOPHAGOGASTRODUODENOSCOPY;  Surgeon: Burak Patino MD;  Location:  [facility];  Service: Gastroenterology   • HYSTERECTOMY  2014    PARTIAL   • OOPHORECTOMY Bilateral 2014   • IL RT/LT HEART CATHETERS N/A 10/05/2018    Procedure: Percutaneous Coronary Intervention;  Surgeon: David Burnett MD;  Location:  [facility];  Service: Cardiology   • VULVECTOMY       Social History     Occupational History   • Not on file   Tobacco Use   • Smoking status: Never   • Smokeless tobacco: Never   Substance and Sexual Activity   • Alcohol use: No   • Drug use: No   • Sexual activity: Defer      Social History     Social History Narrative   • Not on file     Family History   Problem Relation Age of Onset   • Breast cancer Neg Hx    • Ovarian cancer Neg Hx         Allergies   Allergen Reactions   • Biaxin [Clarithromycin] Anaphylaxis     \"BLISTERS AND THROAT SWELLING\"   • Allegra [Fexofenadine] " "Other (See Comments)     \"MAKES ME FEEL FUNNY\"   • Repatha [Evolocumab] Other (See Comments)     Chest pain   • Sulfa Antibiotics Nausea Only       Home Medications:  Medications Prior to Admission   Medication Sig Dispense Refill Last Dose   • aspirin 81 MG EC tablet Take 1 tablet by mouth Every Evening.      • Blood Glucose Monitoring Suppl (ACCU-CHEK LEXIE PLUS) w/Device kit Use device to check blood sugar 3 times a day 1 kit 0    • carvedilol (COREG) 6.25 MG tablet Take 2 tablets by mouth 2 (Two) Times a Day With Meals.      • clobetasol (TEMOVATE) 0.05 % ointment Apply  topically to the appropriate area as directed.      • clopidogrel (PLAVIX) 75 MG tablet Take 1 tablet by mouth Daily.      • dexlansoprazole (DEXILANT) 60 MG capsule Daily.      • Dulaglutide 0.75 MG/0.5ML solution pen-injector Inject  under the skin into the appropriate area as directed.      • estradiol (ESTRACE) 0.1 MG/GM vaginal cream Insert 1 g into the vagina Every 3 (Three) Days.      • Estrogens Conjugated (PREMARIN) 0.625 MG/GM vaginal cream Insert 0.5 g into the vagina 2 (Two) Times a Week.      • Evolocumab (Repatha SureClick) solution auto-injector SureClick injection Repatha SureClick 140 mg/mL subcutaneous pen injector   INJECT 1 ML SUBCUTANEOUSLY EVERY TWO WEEKS      • famotidine (PEPCID) 40 MG tablet Take 1 tablet by mouth Daily.      • fluticasone (FLONASE) 50 MCG/ACT nasal spray SMARTSIG:Both Nares      • Lantus SoloStar 100 UNIT/ML injection pen INJECT 15 UNITS UNDER THE SKIN ONCE DAILY AT BEDTIME 6 mL 0    • levothyroxine (SYNTHROID, LEVOTHROID) 88 MCG tablet Take 1 tablet by mouth Every Morning. 30 tablet 6    • losartan-hydrochlorothiazide (HYZAAR) 100-12.5 MG per tablet losartan 100 mg-hydrochlorothiazide 12.5 mg tablet      • metFORMIN ER (GLUCOPHAGE-XR) 500 MG 24 hr tablet metformin  mg tablet,extended release 24 hr   TK 2 TS PO D      • montelukast (SINGULAIR) 10 MG tablet Take 1 tablet by mouth Every Night.    "   • nitroglycerin (NITROSTAT) 0.4 MG SL tablet Take 1 tablet by mouth Every 5 (Five) Minutes As Needed.      • RELION PEN NEEDLES 32G X 4 MM misc Use daily with insulin 50 each 11    • glimepiride (AMARYL) 4 MG tablet Take 1 tablet by mouth 2 (Two) Times a Day Before Meals. 60 tablet 6    • glucose blood (ACCU-CHEK LEXIE PLUS) test strip Use as instructed to test blood sugar 3 times daily 100 each 12    • Insulin Glargine (LANTUS SOLOSTAR SC) Inject 8 Units under the skin into the appropriate area as directed Every Night.      • linagliptin (TRADJENTA) 5 MG tablet tablet Take 1 tablet by mouth Every Evening.      • losartan 50 MG tablet 100 mg, hydroCHLOROthiazide 12.5 MG 12.5 mg Take 1 dose by mouth Daily.      • metFORMIN (GLUCOPHAGE) 500 MG tablet Take 2 tablets by mouth Every Evening. 60 tablet 6    • potassium chloride 10 MEQ CR tablet Take 1 tablet by mouth 2 (Two) Times a Day.          Current Medications:  Scheduled Meds:aspirin, 81 mg, Oral, Q PM  carvedilol, 12.5 mg, Oral, BID With Meals  famotidine, 40 mg, Oral, Daily  fluticasone, 2 spray, Each Nare, BID  losartan, 100 mg, Oral, Q24H   And  hydroCHLOROthiazide, 12.5 mg, Oral, Q24H  insulin detemir, 5 Units, Subcutaneous, Nightly  insulin lispro, 0-9 Units, Subcutaneous, Q6H  levothyroxine, 88 mcg, Oral, QAM  montelukast, 10 mg, Oral, Nightly  pantoprazole, 40 mg, Oral, BID AC  senna-docusate sodium, 2 tablet, Oral, BID  sodium chloride, 10 mL, Intravenous, Q12H      Continuous Infusions:lactated ringers, 50 mL/hr, Last Rate: 50 mL/hr (04/25/23 0146)      PRN Meds:.•  acetaminophen **OR** acetaminophen **OR** acetaminophen  •  senna-docusate sodium **AND** polyethylene glycol **AND** bisacodyl **AND** bisacodyl  •  Calcium Replacement - Follow Nurse / BPA Driven Protocol  •  dextrose  •  dextrose  •  glucagon (human recombinant)  •  HYDROmorphone **AND** naloxone  •  Magnesium Standard Dose Replacement - Follow Nurse / BPA Driven Protocol  •  melatonin  •  " ondansetron **OR** ondansetron  •  Phosphorus Replacement - Follow Nurse / BPA Driven Protocol  •  Potassium Replacement - Follow Nurse / BPA Driven Protocol  •  [COMPLETED] Insert Peripheral IV **AND** sodium chloride  •  sodium chloride  •  sodium chloride    Review of Systems:   A 12 point system review was reviewed with the patient and from the chart  and is negative except as in history of present illness.      Physical Exam: 83 y.o. female                    Vitals:    04/25/23 0000 04/25/23 0050 04/25/23 0425 04/25/23 0700   BP: 147/81 165/76 152/85 135/72   BP Location:  Right arm Right arm Right arm   Patient Position:  Lying Lying Lying   Pulse:  93 91 98   Resp:  8 16 16   Temp:  98.2 °F (36.8 °C) 98.2 °F (36.8 °C) 98.6 °F (37 °C)   TempSrc:  Oral Oral Oral   SpO2:  96% 94% 95%   Weight:  70.8 kg (156 lb)     Height:  162.6 cm (64.02\")          Gait: Could not be tested , patient is nonambulatory.    Mental/HEENT/cardio/skin: The patient's general appearance was well-nourished, well-hydrated, no acute distress.  Orientation was alert and oriented ×3.  The patient's mood was normal.   Pulmonary exam shows normal late exchange, no labored breathing, or shortness of breath.    The skin exam showed normal temperature and color in the area of examination.    Extremities:  right   lower extremity positive shortening, positive external rotation, attempted movements of the  hip are painful and restricted. The patient is able to do gentle active range of motion of her toes. Gross sensation is intact over the toes.    Pulses: Pulses palpable and equal bilaterally    Diagnostic Tests:    Results from last 7 days   Lab Units 04/25/23  0355 04/24/23 2012   WBC 10*3/mm3 9.50 11.68*   HEMOGLOBIN g/dL 11.1* 11.4*   HEMATOCRIT % 34.7 36.0   PLATELETS 10*3/mm3 208 229     Results from last 7 days   Lab Units 04/25/23  0355 04/24/23 2012   SODIUM mmol/L 139 139   POTASSIUM mmol/L 4.1 4.3   CHLORIDE mmol/L 103 104   CO2 " mmol/L 25.0 26.0   BUN mg/dL 15 17   CREATININE mg/dL 0.69 0.82   GLUCOSE mg/dL 180* 214*   CALCIUM mg/dL 9.5 9.4     Results from last 7 days   Lab Units 04/25/23  0355 04/24/23 2012   INR  1.03 1.02     No results found for: URICACID  No results found for: CRYSTAL  Microbiology Results (last 10 days)     ** No results found for the last 240 hours. **        XR Femur 2 View Right    Result Date: 4/24/2023  Impression: Acute impacted likely subcapital fracture of the right femoral neck. Electronically Signed: Krishan Yuen  4/24/2023 8:34 PM EDT  Workstation ID: [id number]    XR Chest 1 View    Result Date: 4/24/2023  Impression: No acute cardiopulmonary findings. Electronically Signed: Krishan Yuen  4/24/2023 8:36 PM EDT  Workstation ID: [id number]    XR Hip With or Without Pelvis 2 - 3 View Right    Result Date: 4/24/2023  Impression: Acute impacted likely subcapital fracture of the right femoral neck. Electronically Signed: Krishan Yuen  4/24/2023 8:34 PM EDT  Workstation ID: [id number]      Assessment:  right displaced femoral neck fracture      Closed fracture of right hip    Mixed hyperlipidemia    Coronary artery disease involving native coronary artery of native heart    Type 2 diabetes mellitus with hyperglycemia, with long-term current use of insulin    Acquired hypothyroidism    Essential hypertension      Plan:    Options and alternatives have been discussed in detail with patient and  family.   The patient is indicated for a  right total hip arthroplasty.    The likely,  Risks and benefits of the procedure including but not limited to infection, DVT, pulmonary embolism,  leg length discrepancy, recurrent dislocation, possibility of injury to nerves or vessels, possibility of periprosthetic fractures have been discussed in detail.  Despite the risks involved, The patient and patient's family  would like to proceed.    patient took a double dose of Plavix yesterday so we will delay surgery today and plan  for surgery tomorrow  The patient is being scheduled for a  right total hip arthroplasty by  anterior approach at [facility] tentatively for 4/26 2023.     Obtain informed consent.     The patient's admitting service has seen the patient and the patient is cleared to the operating room.    The patients  Plavix has been on hold.    bedrest  Pain control  Discussed with anesthesia doing a nerve catheter to help with the patient's pain  N.p.o. after midnight    Date: 4/25/2023      CC: Abimael Matthews MD; MD Fausto Garcia Lyndsey, DO

## 2023-04-25 NOTE — PROGRESS NOTES
[facility] Medicine Services  PROGRESS NOTE    Patient Name: Angelica Spivey  : 1940  MRN: [id number]    Date of Admission: 2023  Primary Care Physician: Abimael Matthews MD    Subjective   Subjective     CC:  Fall,hip fracture     HPI:  No acute events. Pain is fair. States she had a mechanical fall. Turned too quick and slipped. Denies dizziness, palpitation, pre-syncope.     ROS:  Gen- No fevers, chills  CV- No chest pain, palpitations  Resp- No cough, dyspnea  GI- No N/V/D, abd pain     Objective   Objective     Vital Signs:   Temp:  [98 °F (36.7 °C)-98.6 °F (37 °C)] 98.6 °F (37 °C)  Heart Rate:  [] 98  Resp:  [8-20] 16  BP: (135-166)/(72-93) 135/72     Physical Exam:  Constitutional: No acute distress, awake, alert; frail/elderly   HENT: NCAT, mucous membranes moist  Respiratory: Clear to auscultation bilaterally, respiratory effort normal   Cardiovascular: RRR, no murmurs, rubs, or gallops  Gastrointestinal: Positive bowel sounds, soft, nontender, nondistended  Musculoskeletal: No bilateral ankle edema  Psychiatric: Appropriate affect, cooperative  Neurologic: Oriented x 3, strength symmetric in all extremities, Cranial Nerves grossly intact to confrontation, speech clear  Skin: No rashes    Results Reviewed:  LAB RESULTS:      Lab 23  0355 23   WBC 9.50 11.68*   HEMOGLOBIN 11.1* 11.4*   HEMATOCRIT 34.7 36.0   PLATELETS 208 229   NEUTROS ABS 6.32 8.70*   IMMATURE GRANS (ABS) 0.05 0.06*   LYMPHS ABS 2.21 2.03   MONOS ABS 0.74 0.63   EOS ABS 0.13 0.19   MCV 85.9 86.5   PROTIME 13.5 13.3         Lab 23  0355 23   SODIUM 139 139   POTASSIUM 4.1 4.3   CHLORIDE 103 104   CO2 25.0 26.0   ANION GAP 11.0 9.0   BUN 15 17   CREATININE 0.69 0.82   EGFR 87.3 72.0   GLUCOSE 180* 214*   CALCIUM 9.5 9.4   MAGNESIUM 1.8  --    PHOSPHORUS 3.7  --    HEMOGLOBIN A1C  --  8.20*   TSH  --  3.940         Lab 23   TOTAL PROTEIN 6.5    ALBUMIN 3.8   GLOBULIN 2.7   ALT (SGPT) 14   AST (SGOT) 18   BILIRUBIN 0.2   ALK PHOS 71         Lab 04/25/23  0355 04/24/23 2012   PROTIME 13.5 13.3   INR 1.03 1.02             Lab 04/25/23  0355   ABO TYPING A   RH TYPING Positive   ANTIBODY SCREEN Negative         Brief Urine Lab Results     None          Microbiology Results Abnormal     None          XR Femur 2 View Right    Result Date: 4/24/2023  XR FEMUR 2 VW RIGHT, XR HIP W OR WO PELVIS 2-3 VIEW RIGHT Date of Exam: 4/24/2023 7:33 PM EDT Indication: fall Comparison: None available. Findings: The bones are demineralized limiting assessment for occult nondisplaced fractures. Right hip: There is an acute impacted fracture through the femoral neck (likely subcapital). Femoral head maintains articulation with the acetabulum. Right femur: No acute fracture or malalignment of the mid or distal femur.     Impression: Impression: Acute impacted likely subcapital fracture of the right femoral neck. Electronically Signed: Krishan EstradaQustodian  4/24/2023 8:34 PM EDT  Workstation ID: [id number]    XR Chest 1 View    Result Date: 4/24/2023  XR CHEST 1 VW Date of Exam: 4/24/2023 7:32 PM EDT Indication: fall Comparison: Chest x-ray 8/15/2018 Findings: Coronary artery stent is noted. Normal cardiomediastinal silhouette. The lungs are clear. No pleural effusion or pneumothorax. No acute osseous findings.     Impression: Impression: No acute cardiopulmonary findings. Electronically Signed: Krishan OakesTvinci  4/24/2023 8:36 PM EDT  Workstation ID: [id number]    XR Hip With or Without Pelvis 2 - 3 View Right    Result Date: 4/24/2023  XR FEMUR 2 VW RIGHT, XR HIP W OR WO PELVIS 2-3 VIEW RIGHT Date of Exam: 4/24/2023 7:33 PM EDT Indication: fall Comparison: None available. Findings: The bones are demineralized limiting assessment for occult nondisplaced fractures. Right hip: There is an acute impacted fracture through the femoral neck (likely subcapital). Femoral head maintains articulation  with the acetabulum. Right femur: No acute fracture or malalignment of the mid or distal femur.     Impression: Impression: Acute impacted likely subcapital fracture of the right femoral neck. Electronically Signed: Krishan Yuen  4/24/2023 8:34 PM EDT  Workstation ID: [id number]          Current medications:  Scheduled Meds:aspirin, 81 mg, Oral, Q PM  carvedilol, 12.5 mg, Oral, BID With Meals  famotidine, 40 mg, Oral, Daily  fluticasone, 2 spray, Each Nare, BID  losartan, 100 mg, Oral, Q24H   And  hydroCHLOROthiazide, 12.5 mg, Oral, Q24H  insulin detemir, 5 Units, Subcutaneous, Nightly  insulin lispro, 0-9 Units, Subcutaneous, Q6H  levothyroxine, 88 mcg, Oral, QAM  montelukast, 10 mg, Oral, Nightly  pantoprazole, 40 mg, Oral, BID AC  senna-docusate sodium, 2 tablet, Oral, BID  sodium chloride, 10 mL, Intravenous, Q12H      Continuous Infusions:lactated ringers, 50 mL/hr, Last Rate: 50 mL/hr (04/25/23 0146)      PRN Meds:.•  acetaminophen **OR** acetaminophen **OR** acetaminophen  •  senna-docusate sodium **AND** polyethylene glycol **AND** bisacodyl **AND** bisacodyl  •  Calcium Replacement - Follow Nurse / BPA Driven Protocol  •  dextrose  •  dextrose  •  glucagon (human recombinant)  •  HYDROmorphone **AND** naloxone  •  Magnesium Standard Dose Replacement - Follow Nurse / BPA Driven Protocol  •  melatonin  •  ondansetron **OR** ondansetron  •  Phosphorus Replacement - Follow Nurse / BPA Driven Protocol  •  Potassium Replacement - Follow Nurse / BPA Driven Protocol  •  [COMPLETED] Insert Peripheral IV **AND** sodium chloride  •  sodium chloride  •  sodium chloride    Assessment & Plan   Assessment & Plan     Active Hospital Problems    Diagnosis  POA   • **Closed fracture of right hip [S72.001A]  Yes   • Essential hypertension [I10]  Unknown   • Acquired hypothyroidism [E03.9]  Yes   • Type 2 diabetes mellitus with hyperglycemia, with long-term current use of insulin [E11.65, Z79.4]  Not Applicable   • Mixed  hyperlipidemia [E78.2]  Yes   • Coronary artery disease involving native coronary artery of native heart [I25.10]  Yes      Resolved Hospital Problems   No resolved problems to display.        Brief Hospital Course to date:  Angelica Spivey is a 83 y.o. female with medical history significant for HTN, HLD, T2DM, CAD s/p prior LAD stent (~ 2018) on DAPT who presents to the ED for evaluation after slipping and falling onto her right hip day of admission. Imaging with acute impacted likely subcapital fracture of the right femoral neck.     Right hip fracture  Mechanical fall   - Imaging per above   - Ortho consulted -- planning surgery 4/26. NPO at midnight   - PRN pain control  - SCDs  - PT/OT DVT ppx per ortho      CAD / HTN / HLD  - on DAPT, LAD stent ~ 2018, last heart cath by Dr. Deng ~ 2020  - hold plavix, last dose ~ 10am day of admission   - continue aspirin  - continue coreg. Holding losartan/HCTZ \  - on repatha, intolerant to statins    T2DM - insulin dependent  - levemir 5 units HS  - fsbg achs w/ moderate dose ssi  - A1C 8.2     Hypothyroidism  -  TSH within limits   - continue levothyroxine       Expected Discharge Location and Transportation: likely rehab   Expected Discharge   Expected Discharge Date: 04/28/23       DVT prophylaxis:  Mechanical DVT prophylaxis orders are present.          CODE STATUS:   Code Status and Medical Interventions:   Ordered at: 04/24/23 7573     Code Status (Patient has no pulse and is not breathing):    CPR (Attempt to Resuscitate)     Medical Interventions (Patient has pulse or is breathing):    Full Support       Sunshine Lambert DO  04/25/23

## 2023-04-26 ENCOUNTER — APPOINTMENT (OUTPATIENT)
Dept: GENERAL RADIOLOGY | Facility: HOSPITAL | Age: 83
DRG: 522 | End: 2023-04-26
Payer: MEDICARE

## 2023-04-26 ENCOUNTER — ANESTHESIA (OUTPATIENT)
Dept: PERIOP | Facility: HOSPITAL | Age: 83
DRG: 522 | End: 2023-04-26
Payer: MEDICARE

## 2023-04-26 LAB
ANION GAP SERPL CALCULATED.3IONS-SCNC: 8 MMOL/L (ref 5–15)
BUN SERPL-MCNC: 9 MG/DL (ref 8–23)
BUN/CREAT SERPL: 14.3 (ref 7–25)
CALCIUM SPEC-SCNC: 9 MG/DL (ref 8.6–10.5)
CHLORIDE SERPL-SCNC: 105 MMOL/L (ref 98–107)
CO2 SERPL-SCNC: 28 MMOL/L (ref 22–29)
CREAT SERPL-MCNC: 0.63 MG/DL (ref 0.57–1)
DEPRECATED RDW RBC AUTO: 44.1 FL (ref 37–54)
EGFRCR SERPLBLD CKD-EPI 2021: 89.3 ML/MIN/1.73
ERYTHROCYTE [DISTWIDTH] IN BLOOD BY AUTOMATED COUNT: 14.2 % (ref 12.3–15.4)
GLUCOSE BLDC GLUCOMTR-MCNC: 129 MG/DL (ref 70–130)
GLUCOSE BLDC GLUCOMTR-MCNC: 140 MG/DL (ref 70–130)
GLUCOSE BLDC GLUCOMTR-MCNC: 149 MG/DL (ref 70–130)
GLUCOSE BLDC GLUCOMTR-MCNC: 247 MG/DL (ref 70–130)
GLUCOSE BLDC GLUCOMTR-MCNC: 249 MG/DL (ref 70–130)
GLUCOSE SERPL-MCNC: 115 MG/DL (ref 65–99)
HCT VFR BLD AUTO: 34.8 % (ref 34–46.6)
HGB BLD-MCNC: 11.1 G/DL (ref 12–15.9)
MCH RBC QN AUTO: 27.5 PG (ref 26.6–33)
MCHC RBC AUTO-ENTMCNC: 31.9 G/DL (ref 31.5–35.7)
MCV RBC AUTO: 86.4 FL (ref 79–97)
PLATELET # BLD AUTO: 208 10*3/MM3 (ref 140–450)
PMV BLD AUTO: 9.8 FL (ref 6–12)
POTASSIUM SERPL-SCNC: 4.2 MMOL/L (ref 3.5–5.2)
QT INTERVAL: 346 MS
QTC INTERVAL: 416 MS
RBC # BLD AUTO: 4.03 10*6/MM3 (ref 3.77–5.28)
SODIUM SERPL-SCNC: 141 MMOL/L (ref 136–145)
WBC NRBC COR # BLD: 8.65 10*3/MM3 (ref 3.4–10.8)

## 2023-04-26 PROCEDURE — 25010000002 VANCOMYCIN 1 G RECONSTITUTED SOLUTION: Performed by: ORTHOPAEDIC SURGERY

## 2023-04-26 PROCEDURE — 25010000002 KETOROLAC TROMETHAMINE PER 15 MG: Performed by: ORTHOPAEDIC SURGERY

## 2023-04-26 PROCEDURE — 25010000002 DEXAMETHASONE PER 1 MG: Performed by: ANESTHESIOLOGY

## 2023-04-26 PROCEDURE — 25010000002 FENTANYL CITRATE (PF) 100 MCG/2ML SOLUTION: Performed by: ANESTHESIOLOGY

## 2023-04-26 PROCEDURE — C1713 ANCHOR/SCREW BN/BN,TIS/BN: HCPCS | Performed by: ORTHOPAEDIC SURGERY

## 2023-04-26 PROCEDURE — 76000 FLUOROSCOPY <1 HR PHYS/QHP: CPT

## 2023-04-26 PROCEDURE — 97162 PT EVAL MOD COMPLEX 30 MIN: CPT

## 2023-04-26 PROCEDURE — 25010000002 ONDANSETRON PER 1 MG: Performed by: ORTHOPAEDIC SURGERY

## 2023-04-26 PROCEDURE — 80048 BASIC METABOLIC PNL TOTAL CA: CPT | Performed by: INTERNAL MEDICINE

## 2023-04-26 PROCEDURE — 25010000002 CLONIDINE PER 1 MG: Performed by: ORTHOPAEDIC SURGERY

## 2023-04-26 PROCEDURE — 73502 X-RAY EXAM HIP UNI 2-3 VIEWS: CPT

## 2023-04-26 PROCEDURE — 25010000002 CEFAZOLIN IN DEXTROSE 2-4 GM/100ML-% SOLUTION: Performed by: ORTHOPAEDIC SURGERY

## 2023-04-26 PROCEDURE — C1776 JOINT DEVICE (IMPLANTABLE): HCPCS | Performed by: ORTHOPAEDIC SURGERY

## 2023-04-26 PROCEDURE — 27130 TOTAL HIP ARTHROPLASTY: CPT | Performed by: PHYSICIAN ASSISTANT

## 2023-04-26 PROCEDURE — 85027 COMPLETE CBC AUTOMATED: CPT | Performed by: INTERNAL MEDICINE

## 2023-04-26 PROCEDURE — 63710000001 INSULIN LISPRO (HUMAN) PER 5 UNITS: Performed by: ORTHOPAEDIC SURGERY

## 2023-04-26 PROCEDURE — 63710000001 INSULIN DETEMIR PER 5 UNITS: Performed by: ORTHOPAEDIC SURGERY

## 2023-04-26 PROCEDURE — 25010000002 ONDANSETRON PER 1 MG: Performed by: ANESTHESIOLOGY

## 2023-04-26 PROCEDURE — 99232 SBSQ HOSP IP/OBS MODERATE 35: CPT | Performed by: INTERNAL MEDICINE

## 2023-04-26 PROCEDURE — 25010000002 PROPOFOL 10 MG/ML EMULSION: Performed by: NURSE ANESTHETIST, CERTIFIED REGISTERED

## 2023-04-26 PROCEDURE — 25010000002 SUGAMMADEX 200 MG/2ML SOLUTION: Performed by: ANESTHESIOLOGY

## 2023-04-26 PROCEDURE — 97110 THERAPEUTIC EXERCISES: CPT

## 2023-04-26 PROCEDURE — 0SR9049 REPLACEMENT OF RIGHT HIP JOINT WITH CERAMIC ON POLYETHYLENE SYNTHETIC SUBSTITUTE, CEMENTED, OPEN APPROACH: ICD-10-PCS | Performed by: ORTHOPAEDIC SURGERY

## 2023-04-26 PROCEDURE — 82962 GLUCOSE BLOOD TEST: CPT

## 2023-04-26 DEVICE — TOTL HIP HI DEMAND STRYKER: Type: IMPLANTABLE DEVICE | Site: HIP | Status: FUNCTIONAL

## 2023-04-26 DEVICE — SHLL TRIDENT2 TRITANIUM C/HL 48MM: Type: IMPLANTABLE DEVICE | Site: HIP | Status: FUNCTIONAL

## 2023-04-26 DEVICE — PLUG BONE IM FEM/HIP EXETERV40 12MM: Type: IMPLANTABLE DEVICE | Site: HIP | Status: FUNCTIONAL

## 2023-04-26 DEVICE — CMT BONE SIMPLEX/P TMYCIN FDOS 10PK: Type: IMPLANTABLE DEVICE | Site: HIP | Status: FUNCTIONAL

## 2023-04-26 DEVICE — DEV CONTRL TISS STRATAFIX SYMM PDS PLUS VIL CT-1 45CM: Type: IMPLANTABLE DEVICE | Site: HIP | Status: FUNCTIONAL

## 2023-04-26 DEVICE — SUT NONABS MAXBRAID/PE NMBR2 HC5 38IN WHT 900333: Type: IMPLANTABLE DEVICE | Site: HIP | Status: FUNCTIONAL

## 2023-04-26 DEVICE — HD FEM/HIP BIOLOX/DELTA V40 CERAM 36MM MIN2.5: Type: IMPLANTABLE DEVICE | Site: HIP | Status: FUNCTIONAL

## 2023-04-26 DEVICE — IMPLANTABLE DEVICE: Type: IMPLANTABLE DEVICE | Site: HIP | Status: FUNCTIONAL

## 2023-04-26 DEVICE — SCRW HEX LP TRIDENT2 6.5X40MM: Type: IMPLANTABLE DEVICE | Site: HIP | Status: FUNCTIONAL

## 2023-04-26 DEVICE — INSRT HIP TRIDENT X3 0DEG 36MM SZD STRL: Type: IMPLANTABLE DEVICE | Site: HIP | Status: FUNCTIONAL

## 2023-04-26 RX ORDER — SODIUM CHLORIDE 9 MG/ML
40 INJECTION, SOLUTION INTRAVENOUS AS NEEDED
Status: DISCONTINUED | OUTPATIENT
Start: 2023-04-26 | End: 2023-04-26 | Stop reason: HOSPADM

## 2023-04-26 RX ORDER — HYDRALAZINE HYDROCHLORIDE 20 MG/ML
10 INJECTION INTRAMUSCULAR; INTRAVENOUS EVERY 6 HOURS PRN
Status: DISCONTINUED | OUTPATIENT
Start: 2023-04-26 | End: 2023-04-28

## 2023-04-26 RX ORDER — HYDROMORPHONE HYDROCHLORIDE 1 MG/ML
0.5 INJECTION, SOLUTION INTRAMUSCULAR; INTRAVENOUS; SUBCUTANEOUS
Status: DISCONTINUED | OUTPATIENT
Start: 2023-04-26 | End: 2023-04-29 | Stop reason: HOSPADM

## 2023-04-26 RX ORDER — LIDOCAINE HYDROCHLORIDE 10 MG/ML
0.5 INJECTION, SOLUTION EPIDURAL; INFILTRATION; INTRACAUDAL; PERINEURAL ONCE AS NEEDED
Status: DISCONTINUED | OUTPATIENT
Start: 2023-04-26 | End: 2023-04-26 | Stop reason: HOSPADM

## 2023-04-26 RX ORDER — SODIUM CHLORIDE 0.9 % (FLUSH) 0.9 %
3-10 SYRINGE (ML) INJECTION AS NEEDED
Status: DISCONTINUED | OUTPATIENT
Start: 2023-04-26 | End: 2023-04-26 | Stop reason: HOSPADM

## 2023-04-26 RX ORDER — PROMETHAZINE HYDROCHLORIDE 25 MG/1
25 TABLET ORAL ONCE AS NEEDED
Status: DISCONTINUED | OUTPATIENT
Start: 2023-04-26 | End: 2023-04-26 | Stop reason: HOSPADM

## 2023-04-26 RX ORDER — DROPERIDOL 2.5 MG/ML
0.62 INJECTION, SOLUTION INTRAMUSCULAR; INTRAVENOUS
Status: DISCONTINUED | OUTPATIENT
Start: 2023-04-26 | End: 2023-04-26 | Stop reason: HOSPADM

## 2023-04-26 RX ORDER — ONDANSETRON 2 MG/ML
4 INJECTION INTRAMUSCULAR; INTRAVENOUS ONCE AS NEEDED
Status: DISCONTINUED | OUTPATIENT
Start: 2023-04-26 | End: 2023-04-26 | Stop reason: HOSPADM

## 2023-04-26 RX ORDER — TRAMADOL HYDROCHLORIDE 50 MG/1
50 TABLET ORAL EVERY 8 HOURS PRN
Status: DISCONTINUED | OUTPATIENT
Start: 2023-04-26 | End: 2023-04-29 | Stop reason: HOSPADM

## 2023-04-26 RX ORDER — MAGNESIUM HYDROXIDE 1200 MG/15ML
LIQUID ORAL AS NEEDED
Status: DISCONTINUED | OUTPATIENT
Start: 2023-04-26 | End: 2023-04-26 | Stop reason: HOSPADM

## 2023-04-26 RX ORDER — SODIUM CHLORIDE, SODIUM LACTATE, POTASSIUM CHLORIDE, CALCIUM CHLORIDE 600; 310; 30; 20 MG/100ML; MG/100ML; MG/100ML; MG/100ML
9 INJECTION, SOLUTION INTRAVENOUS CONTINUOUS
Status: DISCONTINUED | OUTPATIENT
Start: 2023-04-26 | End: 2023-04-29 | Stop reason: HOSPADM

## 2023-04-26 RX ORDER — HYDRALAZINE HYDROCHLORIDE 20 MG/ML
5 INJECTION INTRAMUSCULAR; INTRAVENOUS
Status: DISCONTINUED | OUTPATIENT
Start: 2023-04-26 | End: 2023-04-26 | Stop reason: HOSPADM

## 2023-04-26 RX ORDER — SODIUM CHLORIDE 0.9 % (FLUSH) 0.9 %
10 SYRINGE (ML) INJECTION EVERY 12 HOURS SCHEDULED
Status: DISCONTINUED | OUTPATIENT
Start: 2023-04-26 | End: 2023-04-26 | Stop reason: HOSPADM

## 2023-04-26 RX ORDER — SODIUM CHLORIDE 0.9 % (FLUSH) 0.9 %
3 SYRINGE (ML) INJECTION EVERY 12 HOURS SCHEDULED
Status: DISCONTINUED | OUTPATIENT
Start: 2023-04-26 | End: 2023-04-26 | Stop reason: HOSPADM

## 2023-04-26 RX ORDER — PROPOFOL 10 MG/ML
VIAL (ML) INTRAVENOUS CONTINUOUS PRN
Status: DISCONTINUED | OUTPATIENT
Start: 2023-04-26 | End: 2023-04-27 | Stop reason: SURG

## 2023-04-26 RX ORDER — FENTANYL CITRATE 50 UG/ML
INJECTION, SOLUTION INTRAMUSCULAR; INTRAVENOUS AS NEEDED
Status: DISCONTINUED | OUTPATIENT
Start: 2023-04-26 | End: 2023-04-27 | Stop reason: SURG

## 2023-04-26 RX ORDER — ALIROCUMAB 150 MG/ML
150 INJECTION, SOLUTION SUBCUTANEOUS
COMMUNITY

## 2023-04-26 RX ORDER — TRANEXAMIC ACID 10 MG/ML
1000 INJECTION, SOLUTION INTRAVENOUS ONCE
Status: COMPLETED | OUTPATIENT
Start: 2023-04-26 | End: 2023-04-26

## 2023-04-26 RX ORDER — ONDANSETRON 2 MG/ML
INJECTION INTRAMUSCULAR; INTRAVENOUS AS NEEDED
Status: DISCONTINUED | OUTPATIENT
Start: 2023-04-26 | End: 2023-04-27 | Stop reason: SURG

## 2023-04-26 RX ORDER — SODIUM CHLORIDE, SODIUM LACTATE, POTASSIUM CHLORIDE, CALCIUM CHLORIDE 600; 310; 30; 20 MG/100ML; MG/100ML; MG/100ML; MG/100ML
100 INJECTION, SOLUTION INTRAVENOUS CONTINUOUS
Status: DISCONTINUED | OUTPATIENT
Start: 2023-04-26 | End: 2023-04-28

## 2023-04-26 RX ORDER — DEXAMETHASONE SODIUM PHOSPHATE 4 MG/ML
INJECTION, SOLUTION INTRA-ARTICULAR; INTRALESIONAL; INTRAMUSCULAR; INTRAVENOUS; SOFT TISSUE AS NEEDED
Status: DISCONTINUED | OUTPATIENT
Start: 2023-04-26 | End: 2023-04-27 | Stop reason: SURG

## 2023-04-26 RX ORDER — ASPIRIN 81 MG/1
81 TABLET ORAL EVERY 12 HOURS SCHEDULED
Status: DISCONTINUED | OUTPATIENT
Start: 2023-04-27 | End: 2023-04-29 | Stop reason: HOSPADM

## 2023-04-26 RX ORDER — MEPERIDINE HYDROCHLORIDE 25 MG/ML
12.5 INJECTION INTRAMUSCULAR; INTRAVENOUS; SUBCUTANEOUS
Status: DISCONTINUED | OUTPATIENT
Start: 2023-04-26 | End: 2023-04-26 | Stop reason: HOSPADM

## 2023-04-26 RX ORDER — CEFAZOLIN SODIUM 2 G/100ML
2 INJECTION, SOLUTION INTRAVENOUS ONCE
Status: COMPLETED | OUTPATIENT
Start: 2023-04-26 | End: 2023-04-26

## 2023-04-26 RX ORDER — FAMOTIDINE 10 MG/ML
20 INJECTION, SOLUTION INTRAVENOUS ONCE
Status: CANCELLED | OUTPATIENT
Start: 2023-04-26 | End: 2023-04-26

## 2023-04-26 RX ORDER — IPRATROPIUM BROMIDE AND ALBUTEROL SULFATE 2.5; .5 MG/3ML; MG/3ML
3 SOLUTION RESPIRATORY (INHALATION) ONCE AS NEEDED
Status: DISCONTINUED | OUTPATIENT
Start: 2023-04-26 | End: 2023-04-26 | Stop reason: HOSPADM

## 2023-04-26 RX ORDER — PROMETHAZINE HYDROCHLORIDE 25 MG/1
25 SUPPOSITORY RECTAL ONCE AS NEEDED
Status: DISCONTINUED | OUTPATIENT
Start: 2023-04-26 | End: 2023-04-26 | Stop reason: HOSPADM

## 2023-04-26 RX ORDER — OXYCODONE HYDROCHLORIDE 5 MG/1
10 TABLET ORAL EVERY 4 HOURS PRN
Status: DISCONTINUED | OUTPATIENT
Start: 2023-04-26 | End: 2023-04-29 | Stop reason: HOSPADM

## 2023-04-26 RX ORDER — MELOXICAM 15 MG/1
15 TABLET ORAL DAILY
Status: DISCONTINUED | OUTPATIENT
Start: 2023-04-27 | End: 2023-04-28

## 2023-04-26 RX ORDER — SCOLOPAMINE TRANSDERMAL SYSTEM 1 MG/1
1 PATCH, EXTENDED RELEASE TRANSDERMAL
Status: DISCONTINUED | OUTPATIENT
Start: 2023-04-26 | End: 2023-04-29 | Stop reason: HOSPADM

## 2023-04-26 RX ORDER — SODIUM CHLORIDE 0.9 % (FLUSH) 0.9 %
10 SYRINGE (ML) INJECTION AS NEEDED
Status: DISCONTINUED | OUTPATIENT
Start: 2023-04-26 | End: 2023-04-26 | Stop reason: HOSPADM

## 2023-04-26 RX ORDER — FENTANYL CITRATE 50 UG/ML
50 INJECTION, SOLUTION INTRAMUSCULAR; INTRAVENOUS
Status: DISCONTINUED | OUTPATIENT
Start: 2023-04-26 | End: 2023-04-26 | Stop reason: HOSPADM

## 2023-04-26 RX ORDER — NALOXONE HCL 0.4 MG/ML
0.4 VIAL (ML) INJECTION AS NEEDED
Status: DISCONTINUED | OUTPATIENT
Start: 2023-04-26 | End: 2023-04-26 | Stop reason: HOSPADM

## 2023-04-26 RX ORDER — CEFAZOLIN SODIUM 2 G/100ML
2 INJECTION, SOLUTION INTRAVENOUS EVERY 8 HOURS
Status: COMPLETED | OUTPATIENT
Start: 2023-04-26 | End: 2023-04-27

## 2023-04-26 RX ORDER — LABETALOL HYDROCHLORIDE 5 MG/ML
5 INJECTION, SOLUTION INTRAVENOUS
Status: DISCONTINUED | OUTPATIENT
Start: 2023-04-26 | End: 2023-04-26 | Stop reason: HOSPADM

## 2023-04-26 RX ORDER — NALOXONE HCL 0.4 MG/ML
0.1 VIAL (ML) INJECTION
Status: DISCONTINUED | OUTPATIENT
Start: 2023-04-26 | End: 2023-04-29 | Stop reason: HOSPADM

## 2023-04-26 RX ORDER — MIDAZOLAM HYDROCHLORIDE 1 MG/ML
0.5 INJECTION INTRAMUSCULAR; INTRAVENOUS
Status: DISCONTINUED | OUTPATIENT
Start: 2023-04-26 | End: 2023-04-26 | Stop reason: HOSPADM

## 2023-04-26 RX ORDER — DROPERIDOL 2.5 MG/ML
0.62 INJECTION, SOLUTION INTRAMUSCULAR; INTRAVENOUS ONCE AS NEEDED
Status: DISCONTINUED | OUTPATIENT
Start: 2023-04-26 | End: 2023-04-26 | Stop reason: HOSPADM

## 2023-04-26 RX ORDER — ROCURONIUM BROMIDE 10 MG/ML
INJECTION, SOLUTION INTRAVENOUS AS NEEDED
Status: DISCONTINUED | OUTPATIENT
Start: 2023-04-26 | End: 2023-04-27 | Stop reason: SURG

## 2023-04-26 RX ORDER — FAMOTIDINE 20 MG/1
20 TABLET, FILM COATED ORAL ONCE
Status: CANCELLED | OUTPATIENT
Start: 2023-04-26 | End: 2023-04-26

## 2023-04-26 RX ORDER — OXYCODONE HYDROCHLORIDE 5 MG/1
5 TABLET ORAL EVERY 4 HOURS PRN
Status: DISCONTINUED | OUTPATIENT
Start: 2023-04-26 | End: 2023-04-29 | Stop reason: HOSPADM

## 2023-04-26 RX ORDER — LORATADINE 10 MG/1
10 TABLET ORAL DAILY
COMMUNITY
End: 2023-04-29 | Stop reason: HOSPADM

## 2023-04-26 RX ORDER — LIDOCAINE HYDROCHLORIDE 10 MG/ML
INJECTION, SOLUTION EPIDURAL; INFILTRATION; INTRACAUDAL; PERINEURAL AS NEEDED
Status: DISCONTINUED | OUTPATIENT
Start: 2023-04-26 | End: 2023-04-27 | Stop reason: SURG

## 2023-04-26 RX ORDER — VANCOMYCIN HYDROCHLORIDE 1 G/20ML
INJECTION, POWDER, LYOPHILIZED, FOR SOLUTION INTRAVENOUS AS NEEDED
Status: DISCONTINUED | OUTPATIENT
Start: 2023-04-26 | End: 2023-04-26 | Stop reason: HOSPADM

## 2023-04-26 RX ORDER — KETOROLAC TROMETHAMINE 15 MG/ML
15 INJECTION, SOLUTION INTRAMUSCULAR; INTRAVENOUS EVERY 6 HOURS PRN
Status: DISCONTINUED | OUTPATIENT
Start: 2023-04-26 | End: 2023-04-28

## 2023-04-26 RX ORDER — ACETAMINOPHEN 500 MG
1000 TABLET ORAL EVERY 8 HOURS
Status: DISCONTINUED | OUTPATIENT
Start: 2023-04-26 | End: 2023-04-29 | Stop reason: HOSPADM

## 2023-04-26 RX ORDER — HYDROCODONE BITARTRATE AND ACETAMINOPHEN 5; 325 MG/1; MG/1
1 TABLET ORAL ONCE AS NEEDED
Status: DISCONTINUED | OUTPATIENT
Start: 2023-04-26 | End: 2023-04-26 | Stop reason: HOSPADM

## 2023-04-26 RX ORDER — ESMOLOL HYDROCHLORIDE 10 MG/ML
INJECTION INTRAVENOUS AS NEEDED
Status: DISCONTINUED | OUTPATIENT
Start: 2023-04-26 | End: 2023-04-27 | Stop reason: SURG

## 2023-04-26 RX ADMIN — ONDANSETRON 4 MG: 2 INJECTION INTRAMUSCULAR; INTRAVENOUS at 15:29

## 2023-04-26 RX ADMIN — ROCURONIUM BROMIDE 50 MG: 10 SOLUTION INTRAVENOUS at 13:50

## 2023-04-26 RX ADMIN — PROPOFOL 25 MCG/KG/MIN: 10 INJECTION, EMULSION INTRAVENOUS at 13:55

## 2023-04-26 RX ADMIN — TRANEXAMIC ACID 1000 MG: 10 INJECTION, SOLUTION INTRAVENOUS at 13:55

## 2023-04-26 RX ADMIN — SODIUM CHLORIDE, POTASSIUM CHLORIDE, SODIUM LACTATE AND CALCIUM CHLORIDE 9 ML/HR: 600; 310; 30; 20 INJECTION, SOLUTION INTRAVENOUS at 12:15

## 2023-04-26 RX ADMIN — KETOROLAC TROMETHAMINE 15 MG: 15 INJECTION, SOLUTION INTRAMUSCULAR; INTRAVENOUS at 23:51

## 2023-04-26 RX ADMIN — TRANEXAMIC ACID 1000 MG: 10 INJECTION, SOLUTION INTRAVENOUS at 15:09

## 2023-04-26 RX ADMIN — CEFAZOLIN SODIUM 2 G: 2 INJECTION, SOLUTION INTRAVENOUS at 21:45

## 2023-04-26 RX ADMIN — ESMOLOL HYDROCHLORIDE 10 MG: 10 INJECTION, SOLUTION INTRAVENOUS at 14:31

## 2023-04-26 RX ADMIN — OXYCODONE 10 MG: 5 TABLET ORAL at 21:44

## 2023-04-26 RX ADMIN — CEFAZOLIN SODIUM 2 G: 2 INJECTION, SOLUTION INTRAVENOUS at 13:45

## 2023-04-26 RX ADMIN — Medication 10 ML: at 21:45

## 2023-04-26 RX ADMIN — FAMOTIDINE 40 MG: 20 TABLET, FILM COATED ORAL at 08:20

## 2023-04-26 RX ADMIN — MONTELUKAST 10 MG: 10 TABLET, FILM COATED ORAL at 21:45

## 2023-04-26 RX ADMIN — PANTOPRAZOLE SODIUM 40 MG: 40 TABLET, DELAYED RELEASE ORAL at 06:22

## 2023-04-26 RX ADMIN — ACETAMINOPHEN 1000 MG: 500 TABLET ORAL at 21:44

## 2023-04-26 RX ADMIN — PANTOPRAZOLE SODIUM 40 MG: 40 TABLET, DELAYED RELEASE ORAL at 17:50

## 2023-04-26 RX ADMIN — INSULIN DETEMIR 5 UNITS: 100 INJECTION, SOLUTION SUBCUTANEOUS at 21:45

## 2023-04-26 RX ADMIN — SCOPALAMINE 1 PATCH: 1 PATCH, EXTENDED RELEASE TRANSDERMAL at 18:13

## 2023-04-26 RX ADMIN — ONDANSETRON 4 MG: 2 INJECTION INTRAMUSCULAR; INTRAVENOUS at 17:11

## 2023-04-26 RX ADMIN — INSULIN LISPRO 4 UNITS: 100 INJECTION, SOLUTION INTRAVENOUS; SUBCUTANEOUS at 23:52

## 2023-04-26 RX ADMIN — DEXAMETHASONE SODIUM PHOSPHATE 4 MG: 4 INJECTION, SOLUTION INTRAMUSCULAR; INTRAVENOUS at 15:29

## 2023-04-26 RX ADMIN — SENNOSIDES AND DOCUSATE SODIUM 2 TABLET: 8.6; 5 TABLET ORAL at 08:20

## 2023-04-26 RX ADMIN — LEVOTHYROXINE SODIUM 88 MCG: 0.09 TABLET ORAL at 06:22

## 2023-04-26 RX ADMIN — CARVEDILOL 12.5 MG: 12.5 TABLET, FILM COATED ORAL at 17:50

## 2023-04-26 RX ADMIN — SODIUM CHLORIDE, POTASSIUM CHLORIDE, SODIUM LACTATE AND CALCIUM CHLORIDE 100 ML/HR: 600; 310; 30; 20 INJECTION, SOLUTION INTRAVENOUS at 17:46

## 2023-04-26 RX ADMIN — PROPOFOL 100 MG: 10 INJECTION, EMULSION INTRAVENOUS at 13:50

## 2023-04-26 RX ADMIN — SODIUM CHLORIDE 100 ML/HR: 9 INJECTION, SOLUTION INTRAVENOUS at 03:58

## 2023-04-26 RX ADMIN — ONDANSETRON 4 MG: 2 INJECTION INTRAMUSCULAR; INTRAVENOUS at 23:52

## 2023-04-26 RX ADMIN — SENNOSIDES AND DOCUSATE SODIUM 2 TABLET: 8.6; 5 TABLET ORAL at 21:45

## 2023-04-26 RX ADMIN — SUGAMMADEX 200 MG: 100 INJECTION, SOLUTION INTRAVENOUS at 15:26

## 2023-04-26 RX ADMIN — FENTANYL CITRATE 25 MCG: 50 INJECTION, SOLUTION INTRAMUSCULAR; INTRAVENOUS at 15:33

## 2023-04-26 RX ADMIN — LIDOCAINE HYDROCHLORIDE 50 MG: 10 INJECTION, SOLUTION EPIDURAL; INFILTRATION; INTRACAUDAL; PERINEURAL at 13:50

## 2023-04-26 RX ADMIN — ROCURONIUM BROMIDE 10 MG: 10 SOLUTION INTRAVENOUS at 14:45

## 2023-04-26 RX ADMIN — CARVEDILOL 12.5 MG: 12.5 TABLET, FILM COATED ORAL at 08:20

## 2023-04-26 RX ADMIN — INSULIN LISPRO 2 UNITS: 100 INJECTION, SOLUTION INTRAVENOUS; SUBCUTANEOUS at 17:46

## 2023-04-26 ASSESSMENT — HOOS JR
HOOS JR SCORE: 24.875
HOOS JR SCORE: 24

## 2023-04-26 NOTE — CASE MANAGEMENT/SOCIAL WORK
Continued Stay Note  [facility]     Patient Name: Angelica Spivey  MRN: [id number]  Today's Date: 4/26/2023    Admit Date: 4/24/2023    Plan: Rehab vs HH   Discharge Plan     Row Name 04/26/23 0925       Plan    Plan Rehab vs HH    Patient/Family in Agreement with Plan yes    Plan Comments Patient is scheduled for surgery today. Waiting for PT/OT notes for possible need for rehab. CM will continue to follow.    Final Discharge Disposition Code 30 - still a patient               Discharge Codes    No documentation.               Expected Discharge Date and Time     Expected Discharge Date Expected Discharge Time    Apr 28, 2023             Mercedes Castorena RN

## 2023-04-26 NOTE — PROGRESS NOTES
[facility] Medicine Services  PROGRESS NOTE    Patient Name: Angelica Spivey  : 1940  MRN: [id number]    Date of Admission: 2023  Primary Care Physician: Abimael Matthews MD    Subjective   Subjective     CC:  fall    HPI:  No acute events. Nausea/vomiting improved. States she thinks it was related to pain medication yesterday. Reviewed plans for surgery today.     ROS:  Gen- No fevers, chills  CV- No chest pain, palpitations  Resp- No cough, dyspnea  GI- No N/V/D, abd pain     Objective   Objective     Vital Signs:   Temp:  [97.2 °F (36.2 °C)-98.5 °F (36.9 °C)] 97.2 °F (36.2 °C)  Heart Rate:  [73-92] 79  Resp:  [16-20] 18  BP: (133-174)/(74-93) 138/89  Flow (L/min):  [2] 2     Physical Exam:  Constitutional: No acute distress, awake, alert  HENT: NCAT, mucous membranes moist  Respiratory: Clear to auscultation bilaterally, respiratory effort normal   Cardiovascular: RRR, no murmurs, rubs, or gallops  Gastrointestinal: Positive bowel sounds, soft, nontender, nondistended  Musculoskeletal: No bilateral ankle edema  Psychiatric: Appropriate affect, cooperative  Neurologic: Oriented x 3, strength symmetric in all extremities, Cranial Nerves grossly intact to confrontation, speech clear  Skin: No rashes    Results Reviewed:  LAB RESULTS:      Lab 23  0355 23   WBC 8.65 9.50 11.68*   HEMOGLOBIN 11.1* 11.1* 11.4*   HEMATOCRIT 34.8 34.7 36.0   PLATELETS 208 208 229   NEUTROS ABS  --  6.32 8.70*   IMMATURE GRANS (ABS)  --  0.05 0.06*   LYMPHS ABS  --  2.21 2.03   MONOS ABS  --  0.74 0.63   EOS ABS  --  0.13 0.19   MCV 86.4 85.9 86.5   PROTIME  --  13.5 13.3         Lab 23  0355 23   SODIUM 141 139 139   POTASSIUM 4.2 4.1 4.3   CHLORIDE 105 103 104   CO2 28.0 25.0 26.0   ANION GAP 8.0 11.0 9.0   BUN 9 15 17   CREATININE 0.63 0.69 0.82   EGFR 89.3 87.3 72.0   GLUCOSE 115* 180* 214*   CALCIUM 9.0 9.5 9.4   MAGNESIUM  --   1.8  --    PHOSPHORUS  --  3.7  --    HEMOGLOBIN A1C  --   --  8.20*   TSH  --   --  3.940         Lab 04/24/23 2012   TOTAL PROTEIN 6.5   ALBUMIN 3.8   GLOBULIN 2.7   ALT (SGPT) 14   AST (SGOT) 18   BILIRUBIN 0.2   ALK PHOS 71         Lab 04/25/23  0355 04/24/23 2012   PROTIME 13.5 13.3   INR 1.03 1.02             Lab 04/25/23 0355   ABO TYPING A   RH TYPING Positive   ANTIBODY SCREEN Negative         Brief Urine Lab Results     None          Microbiology Results Abnormal     None          XR Femur 2 View Right    Result Date: 4/24/2023  XR FEMUR 2 VW RIGHT, XR HIP W OR WO PELVIS 2-3 VIEW RIGHT Date of Exam: 4/24/2023 7:33 PM EDT Indication: fall Comparison: None available. Findings: The bones are demineralized limiting assessment for occult nondisplaced fractures. Right hip: There is an acute impacted fracture through the femoral neck (likely subcapital). Femoral head maintains articulation with the acetabulum. Right femur: No acute fracture or malalignment of the mid or distal femur.     Impression: Impression: Acute impacted likely subcapital fracture of the right femoral neck. Electronically Signed: Krishan Yuen  4/24/2023 8:34 PM EDT  Workstation ID: [id number]    XR Chest 1 View    Result Date: 4/24/2023  XR CHEST 1 VW Date of Exam: 4/24/2023 7:32 PM EDT Indication: fall Comparison: Chest x-ray 8/15/2018 Findings: Coronary artery stent is noted. Normal cardiomediastinal silhouette. The lungs are clear. No pleural effusion or pneumothorax. No acute osseous findings.     Impression: Impression: No acute cardiopulmonary findings. Electronically Signed: Krishan Yuen  4/24/2023 8:36 PM EDT  Workstation ID: [id number]    Fasia Iliacus Block    Result Date: 4/25/2023  Steven Houston CRNA     4/25/2023  4:50 PM Fasia Iliacus Block Pre-sedation assessment completed: 4/25/2023 2:15 PM Patient reassessed immediately prior to procedure Start time: 4/25/2023 2:15 PM Reason for block: at surgeon's request and post-op  pain management Performed by CRNA/CAA: Steven Houston CRNA Assisted by: Jessica Maldonado RN Preanesthetic Checklist Completed: patient identified, IV checked, site marked, risks and benefits discussed, surgical consent, monitors and equipment checked, pre-op evaluation and timeout performed Prep: Pt Position: supine Sterile barriers:cap, gloves, mask and washed/disinfected hands Prep: ChloraPrep Patient monitoring: blood pressure monitoring, continuous pulse oximetry and EKG Procedure Sedation: no Performed under: local infiltration Guidance:ultrasound guided ULTRASOUND INTERPRETATION.  Using ultrasound guidance a 20 G gauge needle was placed in close proximity to the nerve, at which point, under ultrasound guidance anesthetic was injected in the area of the nerve and spread of the anesthesia was seen on ultrasound in close proximity thereto.  There were no abnormalities seen on ultrasound; a digital image was taken; and the patient tolerated the procedure with no complications. Images:still images obtained, printed/placed on chart Laterality:right Block Type:fascia iliaca compartment Injection Technique:catheter Needle Type:echogenic and Tuohy Needle Gauge:18 G Resistance on Injection: none Catheter Size:20 G (20g) Cath Depth at skin: 13 cm Medications Used: ropivacaine (NAROPIN) 0.5 % injection - Injection  25 mL - 4/25/2023 2:15:00 PM Medications Preservative Free Saline:25ml Post Assessment Injection Assessment: negative aspiration for heme, no paresthesia on injection and incremental injection Patient Tolerance:comfortable throughout block Complications:no Additional Notes CKAFASCIAILIACA: CATHETER A high-frequency linear transducer, with sterile cover, was placed in parasagittal plane on top of the Anterior Superior Iliac Spine (ASIS) and moved medially to identify the Internal Oblique muscle, Sartorius muscle, Iliacus Muscle, Fascia Iliaca (FI) and Fascia Latae. The insertion site was prepped and draped in  "sterile fashion. Skin and cutaneous tissue was infiltrated with 2-5 ml of 1% Lidocaine. Using ultrasound-guidance, an 18-gauge Contiplex Ultra 360 Touhy needle was advanced in plane from caudad to cephalad. Preservative-free normal saline was utilized for hydro-dissection of tissue, advancement of Touhy, and to confirm final needle placement below FI. Local anesthetic in incremental 3-5 ml injections. Aspiration every 5 ml to prevent intravascular injection. Injection was completed with negative aspiration of blood and negative intravascular injection. Injection pressures were normal with minimal resistance. A 20-gauge Contiplex Echo catheter was placed through the needle and advance out the tip of the Touhy 3-5 cm. The Touhy needle was then removed, and final catheter position verified below the FI. The catheter was secured in the usual fashion with skin glue, benzoin, steri-strips, CHG tegaderm and Label noting \"Nerve Block Catheter\". Jerk tape applied at yellow connector and catheter connection.     XR Hip With or Without Pelvis 2 - 3 View Right    Result Date: 4/24/2023  XR FEMUR 2 VW RIGHT, XR HIP W OR WO PELVIS 2-3 VIEW RIGHT Date of Exam: 4/24/2023 7:33 PM EDT Indication: fall Comparison: None available. Findings: The bones are demineralized limiting assessment for occult nondisplaced fractures. Right hip: There is an acute impacted fracture through the femoral neck (likely subcapital). Femoral head maintains articulation with the acetabulum. Right femur: No acute fracture or malalignment of the mid or distal femur.     Impression: Impression: Acute impacted likely subcapital fracture of the right femoral neck. Electronically Signed: Krishan Yuen  4/24/2023 8:34 PM EDT  Workstation ID: [id number]          Current medications:  Scheduled Meds:[MAR Hold] aspirin, 81 mg, Oral, Q PM  carvedilol, 12.5 mg, Oral, BID With Meals  ceFAZolin, 2 g, Intravenous, Once  famotidine, 40 mg, Oral, Daily  [MAR Hold] " fluticasone, 2 spray, Each Nare, BID  [MAR Hold] insulin detemir, 5 Units, Subcutaneous, Nightly  [MAR Hold] insulin lispro, 0-9 Units, Subcutaneous, Q6H  [MAR Hold] levothyroxine, 88 mcg, Oral, QAM  [MAR Hold] montelukast, 10 mg, Oral, Nightly  [MAR Hold] pantoprazole, 40 mg, Oral, BID AC  [MAR Hold] senna-docusate sodium, 2 tablet, Oral, BID  [MAR Hold] sodium chloride, 10 mL, Intravenous, Q12H  sodium chloride, 10 mL, Intravenous, Q12H  tranexamic acid, 1,000 mg, Intravenous, Once  tranexamic acid, 1,000 mg, Intravenous, Once      Continuous Infusions:lactated ringers, 9 mL/hr, Last Rate: 9 mL/hr (04/26/23 1215)  ropivacaine,       PRN Meds:.•  [MAR Hold] acetaminophen **OR** [MAR Hold] acetaminophen **OR** [MAR Hold] acetaminophen  •  [MAR Hold] senna-docusate sodium **AND** [MAR Hold] polyethylene glycol **AND** [MAR Hold] bisacodyl **AND** [MAR Hold] bisacodyl  •  [MAR Hold] Calcium Replacement - Follow Nurse / BPA Driven Protocol  •  [MAR Hold] dextrose  •  [MAR Hold] dextrose  •  [MAR Hold] glucagon (human recombinant)  •  [MAR Hold] HYDROcodone-acetaminophen  •  [MAR Hold] HYDROmorphone **AND** [MAR Hold] naloxone  •  lidocaine PF 1%  •  [MAR Hold] Magnesium Standard Dose Replacement - Follow Nurse / BPA Driven Protocol  •  [MAR Hold] melatonin  •  midazolam  •  [MAR Hold] ondansetron **OR** [MAR Hold] ondansetron  •  [MAR Hold] Phosphorus Replacement - Follow Nurse / BPA Driven Protocol  •  Potassium Replacement - Follow Nurse / BPA Driven Protocol  •  [COMPLETED] Insert Peripheral IV **AND** [MAR Hold] sodium chloride  •  [MAR Hold] sodium chloride  •  sodium chloride  •  [MAR Hold] sodium chloride  •  sodium chloride    Assessment & Plan   Assessment & Plan     Active Hospital Problems    Diagnosis  POA   • **Closed fracture of right hip [S72.001A]  Yes   • Essential hypertension [I10]  Unknown   • Acquired hypothyroidism [E03.9]  Yes   • Type 2 diabetes mellitus with hyperglycemia, with long-term  current use of insulin [E11.65, Z79.4]  Not Applicable   • Mixed hyperlipidemia [E78.2]  Yes   • Coronary artery disease involving native coronary artery of native heart [I25.10]  Yes      Resolved Hospital Problems   No resolved problems to display.        Brief Hospital Course to date:  Angelica Spivey is a 83 y.o. female with medical history significant for HTN, HLD, T2DM, CAD s/p prior LAD stent (~ 2018) on DAPT who presents to the ED for evaluation after slipping and falling onto her right hip day of admission. Imaging with acute impacted likely subcapital fracture of the right femoral neck.      Right hip fracture  Mechanical fall   - Imaging per above   - Ortho consulted -- surgery 4/26.   - PRN pain control  - SCDs  - PT/OT DVT ppx per ortho      CAD / HTN / HLD  - on DAPT, LAD stent ~ 2018, last heart cath by Dr. Deng ~ 2020  - hold plavix, last dose ~ 10am day of admission   - continue aspirin  - continue coreg. Holding losartan/HCTZ perioperatively; likely resume tomorrow  - PRN hydralazine   - on repatha, intolerant to statins     T2DM - insulin dependent  - levemir 5 units HS  - fsbg achs w/ moderate dose ssi  - A1C 8.2  - Adjust PRN      Hypothyroidism  -  TSH within limits   - continue levothyroxine    Nausea  - improved. Feels it was related to pain medication yesterday.      Expected Discharge Location and Transportation: likely rehab. PT/OT following surgery  Expected Discharge   Expected Discharge Date: 04/28/23       DVT prophylaxis:  Mechanical DVT prophylaxis orders are present.          CODE STATUS:   Code Status and Medical Interventions:   Ordered at: 04/24/23 5797     Code Status (Patient has no pulse and is not breathing):    CPR (Attempt to Resuscitate)     Medical Interventions (Patient has pulse or is breathing):    Full Support       Sunshine Lambert, DO  04/26/23

## 2023-04-26 NOTE — PROGRESS NOTES
[facility]    Acute pain service Inpatient Progress Note    Patient Name: Angelica Spivey  :  1940  MRN:  [id number]        Acute Pain  Service Inpatient Progress Note:    Analgesia:Excellent  Pain Score:0/10  LOC: alert and awake  Resp Status: room air  Cardiac: VS stable  Side Effects:None  Catheter Site:clean, dressing intact and dry  Cath type: peripheral nerve cath with ON Q  Volume: 1mL,10ml, 10ml InfuSystem Pump.  Dosing/Volume: ropivacaine 0.2%  Catheter Plan:Catheter to remain Insitu and Continue catheter infusion rate unchanged  Comments:

## 2023-04-26 NOTE — OP NOTE
TOTAL HIP ARTHROPLASTY ANTERIOR  Procedure Report    Patient Name:  Angelica Spivey  YOB: 1940    Date of Surgery:  4/26/2023     Indications:    83 y.o. female who was admitted to [facility] following a fall from standing with significant pain and difficulty ambulating. X-rays revealed a displaced femoral neck fracture.    The patient was indicated for a total hip arthroplasty. Likely risks and benefits of the procedure including but not limited to infection, DVT, pulmonary embolism, leg length discrepancy, recurrent dislocation, possibility of injury to nerves and vessels, and periprosthetic fractures have been discussed with the patient and her daughter in detail. Despite the risks involved, the patient elected to proceed and informed consent was obtained.     Patient Identification:  Patient was seen in the preop, consent was reviewed, operative procedure was identified, and surgical site and thigh marked.          Pre-op Diagnosis:   Closed right hip fracture [[id number]]       Post-Op Diagnosis Codes:     * Closed right hip fracture [S72.001A]    Procedure/CPT® Codes:      Procedure(s):  TOTAL HIP ARTHROPLASTY ANTERIOR    Incisional wound vac 4x 13 cm    Staff:  Surgeon(s):  Rodo Jennings MD    Anesthesia: Choice    Estimated Blood Loss: 200ml    Implants:    Implant Name Type Inv. Item Serial No.  Lot No. LRB No. Used Action   DEV CONTRL TISS STRATAFIX SYMM PDS PLUS NIKA CT-1 45CM - [id number] Implant DEV CONTRL TISS STRATAFIX SYMM PDS PLUS NIKA CT-1 45CM  [employer]  [employer] J [employer] DELON ANG Right 1 Implanted   SUT NONABS MAXBRAID/PE NMBR2 HC5 38IN WHT 976523 - [id number] Implant SUT NONABS MAXBRAID/PE NMBR2 HC5 38IN WHT 984559  [employer] [id number] Right 2 Implanted   CMT BONE SIMPLEX/P TMYCIN FDOS 10PK - [id number] Implant CMT BONE SIMPLEX/P TMYCIN FDOS 10PK  [employer] [id number] Right 2 Implanted   SHLL TRIDENT2 TRITANIUM C/HL 48MM - [id number] Implant SHLL  TRIDENT2 TRITANIUM C/HL 48MM  [employer]. [id number] Right 1 Implanted   SCRW HEX LP TRIDENT2 6.5X40MM - [id number] Implant SCRW HEX LP TRIDENT2 6.5X40MM  SKYLER SHIRA [id number] Right 1 Implanted   INSRT HIP TRIDENT X3 0DEG 36MM SZD STRL - [id number] Implant INSRT HIP TRIDENT X3 0DEG 36MM SZD STRL  [employer] [id number] Right 1 Implanted   STEM FEM EXETER V40 CMT 37.5MM SZ1 - [id number] Implant STEM FEM EXETER V40 CMT 37.5MM SZ1  [employer]. [id number] Right 1 Implanted   PLUG BONE IM FEM/HIP [id number] 12MM - [id number] Implant PLUG BONE IM FEM/HIP [id number] 12MM  [employer]. [id number] Right 1 Implanted   HD FEM/HIP BIOLOX/DELTA V40 CERAM 36MM MIN2.5 - [id number] Implant HD FEM/HIP BIOLOX/DELTA V40 CERAM 36MM MIN2.5  [employer]. [id number] Right 1 Implanted       Specimen:          None      Findings: see dictation    Complications: none    Description of Procedure:   The patient was transferred to [facility] operating room and preoperative antibiotics were given IV prior to skin incision as well as 1 gram of Tranexemic Acid. Patient received general anesthesia and was transferred to the [place] Table. All bony prominences were padded adequately. The operative hip was then prepped and draped in the usual sterile fashion. Multiple timeouts were done identifying the correct patient, surgical site, and planned procedure.    A skin incision was made overlying the anterior lateral aspect of the hip for about 10 cm just below the lateral to the anterior superior iliac spine. Skin and subcutaneous tissue and fascia were incised in line with the skin incision overlying the tensor fascia johana muscle. The tensor muscle was then retracted laterally and the interval between sartorius and rectus femoris medially and the tensor fascia muscle were developed. The lateral femoral circumflex vessels were identified and were ligated without difficulty. The deep fascia was incised and the capsule of the hip joint was identified. We  then placed a soft tissue protecting device deep to the rectus and the tensor. Retractors were then placed extracapsular and the capsule was then incised in a T-shaped manner and the anterior posterior capsules were then tied with #2 FiberWire. Following this, retractors were placed intracapsularly. We did identify the obvious signs of severe osteoarthritis upon incising the capsule. We then made appropriate releases on the inferior medial neck and along the saddle of the femoral neck. Femoral neck remaining was identified and osteotomy was done according to the preoperative templating with an oscillating saw. The femoral head and cut neck were extracted using a corkscrew without difficulty. The femoral head did have some minor signs of articular cartilage loss and superior wear.    The acetabular cavity was exposed by placing retractors and taking care to protect the anterior vascular structures. The labrum was excised and the pulvinar was excised from the cotyloid fossa. The acetabular cavity was then progressively reamed maintaining the correct inclination and version under image intensifier control. Adequate medialization was obtained. Adequate fit was found to be obtained with the reamer size of 48. The [place] T2 cup size 48 was then impacted into position. This was found to seat satisfactorily with adequate inclination and anteversion. Single screw was placed for additional fixation. Following this, the cup was cleaned and then a neutral liner impacted. Following this, the periarticular tissues were then infiltrated with local anesthetic.    Attention was then directed to the proximal femur. The proximal femur was delivered using a trochanteric hook placed just distal to the vastus tubercle and proximal to the gluteus rubina tendon. The leg was then externally rotated and gross traction released. Hyperextension and adduction was done using the [place] table. Mechanical lift on the table was utilized to support  the femur and appropriate capsular releases were made to expose the medial slope of the greater trochanter. The proximal femur was delivered and the femoral canal was then entered by removing lateral femoral neck with a box chisel by serial broaching. Adequate fit was found to be obtained with the Size 44 N1 150 broach. We then trialed and -2.5 neck was reduced. Fluoroscopic imaging was used to assess leg length and offset was found to be symmetric. Broach trials were then removed. Cement restrictor was placed distally. The femoral canal was prepped with thorough irrigation followed by a peroxide soak followed by an epinephrine soak. Antibiotic cement was inserted in a retrograde-fashion and pressurization was done prior to implanting the implant. A 44 N1 150 Zoe stem was implanted in appropriate anteversion. It sat very similar to our broach trial. We chose the -2.5 neck ceramic. This was then reduced again and fluoroscopy images both lateral and AP of the femur showed the stem to be in good position. AP pelvis showed symmetric leg length and offset. There are no acute fractures. The area was thoroughly irrigated with saline. We did use of more of the injection cocktail. Betadine irrigation was used followed by saline irrigation. Soft tissue hemostasis was secured and second dose of IV TXA was used. Sponge, needle, and instrument counts were correct. FiberWire sutures directly anterior and posterior capsular flaps were tied to each other. 1 g vanco powder placed deep. We then closed the fascial layer with a running #2 STRATAFIX followed by 2-0 vicryl and then nylon for the skin.     After finishing the wound closure the wound was cleaned and checked.  Total surface area the wound was less than 50 cm².  There were no sinus tracts or fistulas.  Since no sinus tracts or fistulas were present I then covered the incision with a sponge matching the size and depth of the wound, over the wound and covered with  transparent film.  I then placed the film so I provided a tight seal.  I then inserted disposable medical tubing into the dressing through an opening in the transparent film and connected the tubing with a negative pressure pump and activated the pump and checked the seal and the pump was functioning well.  During therapy I will continue to check the pump and the patient for signs of distress or failure.  I will also ensure proper functioning of the equipment with any to be replaced in the canister or soft material accumulated in the foam sponge.  Also instructed patient and caregiver on how to provide continued care for the wound.     The patient was then transferred to the recovery room in stable condition. The patient tolerated the procedure well and there were no complications. The patient had adequate distal pulses and good cap refill.    The patient will be mobilized by physical therapy and receive antibiotic prophylaxis postoperatively for 2 more doses given the first 24 hours. The patient was started on DVT prophylaxis with 81 mg aspirin twice daily starting postoperative day #1.    I discussed the satisfactory performance of the procedure with the patient's family and discussed the postoperative management.      Assistant Participation:  Surgeon(s):  Rodo Jennings MD    Assistant: Brandon Wade PA-C assisted with proper preoperative positioning, preoperative templating, determining availability of proper implants, prepping and draping of patient, manipulation placement of instruments, protection of ligaments and vital soft tissue structures, assistance in maintaining hemostasis and assistance with closure of the wound. Their skills and knowledge of the steps of operation and the desired outcome of each surgical step was crucial, allowing for efficient choreography of surgical procedure, and closure of the wound which lead to reduced surgical time, less blood loss, and less risk of complications for the  patient.         Rodo Jennings MD     Date: 4/26/2023  Time: 15:36 EDT

## 2023-04-26 NOTE — DISCHARGE INSTRUCTIONS
INCISION CARE incisional wound VAC hip and Knee:  You have a sterile incisional wound VAC dressing in place. Only exchange if it becomes saturated (fluid draining out the sides of dressing) and notify the office. The dressing is water resistant. During the first 7 days after surgery, it is ok to shower. DO NOT scrub on or around the dressing. Do not submerge the dressing.  After 7 days, the incisional wound VAC will turn off and lose its suction you can remove your dressing.  You will have been given a fresh dressing to cover the wound with until your follow-up at 2 weeks. you will have staples or sutures in place on your skin. It is OK to shower with the new dressing in place. DO NOT scrub on or around the incision. DO NOT submerge the incision in pools, baths, or hot tubs for 1 month after surgery. Do not touch or pick at the incision. If you have any drainage from the incision after 7 days, cover the incision with sterile gauze and paper tape and alert the office.   Staples will be removed between 10-14 days postoperation.  This may be done by either the home health nurse, rehabilitation nurse, or during your return visit to Dr. Jennings's office.  No creams or ointments to the incision for 1 month after surgery. After 1 month, it is recommended to massage the scar with vitamin E cream to help decrease scar formation.  Check incision every day and notify surgeon immediately if any of the following signs or symptoms are noted:  Increase in redness  Increase in swelling around the incision and of the entire extremity  Increase in pain  Drainage oozing from the incision  Pulling apart of the edges of the incision  Increase in overall body temperature (greater than 100.5 degrees)    Anticoagulants: You will be discharged on an anticoagulant. This is a prophylactic medication that helps prevent blood clots during your post-operative period. Most patients will be on  Aspirin 81 mg Enteric coated every 12 hours orally for  30 days. Some patients, due to increased risk factors, will need to be on a stronger anticoagulant. Dr. Jennings will discuss this need with you.   While taking the anticoagulant, you should avoid taking any additional aspirin, and limit ibuprofen (Advil or Motrin), Aleve (Naprosyn) or other non-steroidal anti-inflammatory medications.   Notify your surgeon immediately if any ivis bleeding is noted in the urine, stool, vomit, or from the nose or the incision. Blood in the stool will often appear as black rather than red. Blood in urine may appear as pink. Blood in vomit may appear as brown/black like coffee grounds.  You will need to apply pressure for longer periods of time to any cuts or abrasions to stop bleeding  Avoid alcohol while taking anticoagulants  Sequential Compression Device: You maybe be discharged home with a compression device that helps promote blood flow and prevent clots in your legs. Wear these at all times for the first two weeks.   Mobilization: The best way to avoid a blood clot is to get up and walk. 10 times a day get up and walk for 5 minutes for the first two weeks. Walking for longer periods of time will increase pain and swelling, making therapy more difficult. If taking any long travel (car or plane) in the first 1-2 months, be sure to get up and walk at least every one hour.     Stool Softeners: You will be at greater risk of constipation after surgery because of being less mobile and taking the pain medications.   Take stool softeners as instructed by your surgeon while on pain medications. Use over the counter Colace 100 mg 1-2 capsules twice daily.   If stools become too loose or too frequent, decreases the dosage or stop the stool softener.  If constipation occurs despite use of stool softeners, you are to continue the stool softeners and add a laxative (Milk of Magnesia 1 ounce daily as needed).  Dulcolax oral tabs or suppository or a fleets enema can also be utilized for  constipation and can be obtained over the counter.   If above interventions are unsuccessful in inducing bowel movements, please contact your family physician's office/surgeon's office.  Drink plenty of fluids and eat fruits and vegetables during your recovery time    Pain Medications utilized after surgery are narcotics and the law requires that the following information be given to all patients that are prescribed narcotics:  CLASSIFICATION: Pain medications are called Opioids and are narcotics  LEGALITIES: It is illegal to share narcotics with others and to drive within 24 hours of taking narcotics  POTENTIAL SIDE EFFECTS: Potential side effects of opioids include: nausea, vomiting, itching, dizziness, drowsiness, dry mouth, constipation, and difficulty urinating.  POTENTIAL ADVERSE EFFECTS:   Opioid tolerance can develop with use of pain medications and this simply means that it requires more and more of the medication to control pain; however, this is seen more in patients that use Opioids for longer periods of time.  Opioid dependence can develop with use of Opioids and this simply means that to stop the medication can cause withdrawal symptoms; however, this is seen with patients that use Opioids for longer periods of time.  Opioid addiction can develop with use of Opioids and the incidence of this is very unlikely in patients who take the medications as ordered and stop the medications as instructed.  Opioid overdose can be dangerous, but is unlikely when the medication is taken as ordered and stopped when ordered. It is important not to mix opioids with alcohol or with any type of sedative, such as Benadryl, as this can lead to over sedation and respiratory difficulty.  DOSAGE:   Pain medications may be needed consistently for the first week to decrease pain and promote adequate pain relief and participation in physical therapy.  After the initial surgical pain begins to resolve, you may begin to decrease  the pain medication and only take it as needed. By the end of 6 weeks, you should be off of pain medications.  You can decrease your pain medication consumption by slowly spacing out the time in between the medication, and using 650mg Tylenol when the pain is not as severe. Do not exceed 3500mg of Tylenol in 24 hours.   Refills will not be given by the office during evening hours, on weekends, or after 6 weeks post-op.  To seek refills on pain medications during the initial 6 week post-operative period, you must call the office 48 hours in advance to request the refill. The office will then notify you when to  the prescription. DO NOT wait until you are out of the medication to request a refill.

## 2023-04-26 NOTE — ANESTHESIA POSTPROCEDURE EVALUATION
Patient: Angelica Spivey    Procedure Summary     Date: 04/26/23 Room / Location:  [facility]    Anesthesia Start: 1345 Anesthesia Stop:     Procedure: TOTAL HIP ARTHROPLASTY ANTERIOR (Right: Hip) Diagnosis:       Closed right hip fracture      (Closed right hip fracture [397376])    Surgeons: Rodo Jennings MD Provider: Abdias Hernandez MD    Anesthesia Type: general ASA Status: 3          Anesthesia Type: general    Vitals  Vitals Value Taken Time   /67 04/26/23 1645   Temp 98 °F (36.7 °C) 04/26/23 1645   Pulse 75 04/26/23 1650   Resp 16 04/26/23 1645   SpO2 99 % 04/26/23 1650   Vitals shown include unvalidated device data.        Post Anesthesia Care and Evaluation    Patient location during evaluation: PACU  Patient participation: complete - patient participated  Level of consciousness: awake and alert  Pain management: adequate    Airway patency: patent  Anesthetic complications: No anesthetic complications  PONV Status: none  Cardiovascular status: hemodynamically stable and acceptable  Respiratory status: nonlabored ventilation, acceptable and nasal cannula  Hydration status: acceptable

## 2023-04-26 NOTE — ANESTHESIA PREPROCEDURE EVALUATION
Anesthesia Evaluation     Patient summary reviewed and Nursing notes reviewed   history of anesthetic complications: PONV  NPO Solid Status: > 8 hours  NPO Liquid Status: > 2 hours           Airway   Mallampati: II  TM distance: >3 FB  Neck ROM: full  No difficulty expected  Dental - normal exam     Pulmonary - negative pulmonary ROS and normal exam    breath sounds clear to auscultation  Cardiovascular - normal exam    ECG reviewed  PT is on anticoagulation therapy  Rhythm: regular  Rate: normal    (+) hypertension, CAD, cardiac stents (Cardiac evaluation 12/22 with negative stress test)       Neuro/Psych- negative ROS  GI/Hepatic/Renal/Endo    (+)  GERD well controlled,  diabetes mellitus (On trulicity - last dose Saturday 4/22/23) type 2, thyroid problem hypothyroidism    Musculoskeletal     Abdominal    Substance History      OB/GYN          Other   arthritis,                      Anesthesia Plan    ASA 3     general     (GETA d/t trulicity)  intravenous induction     Anesthetic plan, risks, benefits, and alternatives have been provided, discussed and informed consent has been obtained with: patient.    Plan discussed with CRNA.        CODE STATUS:    Code Status (Patient has no pulse and is not breathing): CPR (Attempt to Resuscitate)  Medical Interventions (Patient has pulse or is breathing): Full Support

## 2023-04-26 NOTE — ANESTHESIA PROCEDURE NOTES
Airway  Urgency: elective    Date/Time: 4/26/2023 1:52 PM  Airway not difficult    General Information and Staff    Patient location during procedure: OR  CRNA/CAA: Steven Houston CRNA    Indications and Patient Condition  Indications for airway management: airway protection    Preoxygenated: yes  MILS not maintained throughout  Mask difficulty assessment: 1 - vent by mask    Final Airway Details  Final airway type: endotracheal airway      Successful airway: ETT  Cuffed: yes   Successful intubation technique: direct laryngoscopy  Endotracheal tube insertion site: oral  Blade: Queen  Blade size: 2  ETT size (mm): 6.5  Cormack-Lehane Classification: grade I - full view of glottis  Placement verified by: chest auscultation and capnometry   Measured from: lips  ETT/EBT  to lips (cm): 20  Number of attempts at approach: 1  Assessment: lips, teeth, and gum same as pre-op and atraumatic intubation    Additional Comments  Negative epigastric sounds, Breath sound equal bilaterally with symmetric chest rise and fall

## 2023-04-26 NOTE — PLAN OF CARE
Goal Outcome Evaluation:  Plan of Care Reviewed With: patient, daughter        Progress: no change  Outcome Evaluation: PT eval complete. Pt required Mod A x2 to amb 15' in room with FWW. No knee buckling or LOB noted. Pt lethargic and reported nausea/vomiting limiting participation this session. HEP and precautions reviewed with family and pt. Recommend d/c to IRF to promote increased independence with functional mobility and decrease risk of falls.

## 2023-04-26 NOTE — THERAPY EVALUATION
Patient Name: Angelica Spivey  : 1940    MRN: [id number]                              Today's Date: 2023       Admit Date: 2023    Visit Dx:     ICD-10-CM ICD-9-CM   1. Closed right hip fracture, initial encounter  S72.001A 820.8   2. Hyperglycemia  R73.9 790.29     Patient Active Problem List   Diagnosis   • Abnormal stress test   • Mixed hyperlipidemia   • Coronary artery disease involving native coronary artery of native heart   • Type 2 diabetes mellitus with hyperglycemia, with long-term current use of insulin   • Acquired hypothyroidism   • Chronic GERD   • Unstable angina   • Closed fracture of right hip   • Essential hypertension     Past Medical History:   Diagnosis Date   • Anxiety    • Arthritis    • Coronary artery disease    • Diabetes mellitus    • Disease of thyroid gland    • Elevated cholesterol    • GERD (gastroesophageal reflux disease)    • Hearing aid worn    • Heart attack    • History of stomach ulcers    • Hypertension    • PONV (postoperative nausea and vomiting)    • Wears glasses      Past Surgical History:   Procedure Laterality Date   • BLADDER SUSPENSION     • CARDIAC CATHETERIZATION N/A 08/15/2018    Procedure: Left Heart Cath;  Surgeon: David Burnett MD;  Location:  [facility];  Service: Cardiology   • CARDIAC CATHETERIZATION N/A 2020    Procedure: LEFT HEART CATH;  Surgeon: David Subramanian MD;  Location:  [facility];  Service: Cardiology   • CARDIAC CATHETERIZATION N/A 01/15/2020    Procedure: CBI to the LAD;  Surgeon: Phil Deng MD;  Location:  [facility];  Service: Cardiovascular   • CARDIAC CATHETERIZATION N/A 2020    Procedure: Left Heart Cath 42049;  Surgeon: Phil Deng MD;  Location:  [facility];  Service: Cardiovascular;  Laterality: N/A;   • CATARACT EXTRACTION Bilateral    • COLONOSCOPY     • ENDOSCOPY     • ENDOSCOPY N/A 2019     Procedure: ESOPHAGOGASTRODUODENOSCOPY;  Surgeon: Burak Patino MD;  Location:  [facility];  Service: Gastroenterology   • HYSTERECTOMY  2014    PARTIAL   • OOPHORECTOMY Bilateral 2014   • GA RT/LT HEART CATHETERS N/A 10/05/2018    Procedure: Percutaneous Coronary Intervention;  Surgeon: David Burnett MD;  Location:  [facility];  Service: Cardiology   • VULVECTOMY        General Information     Row Name 04/26/23 1848          Physical Therapy Time and Intention    Document Type evaluation  -     Mode of Treatment physical therapy  -     Row Name 04/26/23 1848          General Information    Patient Profile Reviewed yes  -HP     Prior Level of Function min assist:;all household mobility;community mobility;gait;transfer;bed mobility;ADL's  PLOF TBA further. Pt has dementia and is a poor historian at this time.  -     Existing Precautions/Restrictions fall;hip, anterior  dementia; wound vac  -     Barriers to Rehab cognitive status;previous functional deficit  -     Row Name 04/26/23 1848          Living Environment    People in Home child(anisha), adult;spouse  -     Row Name 04/26/23 1848          Home Main Entrance    Number of Stairs, Main Entrance four  -HP     Row Name 04/26/23 1848          Stairs Within Home, Primary    Number of Stairs, Within Home, Primary none  -     Row Name 04/26/23 1848          Cognition    Orientation Status (Cognition) oriented to;person;place;situation;time;verbal cues/prompts needed for orientation  Pt was able to answer orientation questions when directly asked though situational confusion noted.  -     Row Name 04/26/23 1848          Safety Issues, Functional Mobility    Safety Issues Affecting Function (Mobility) ability to follow commands;insight into deficits/self-awareness;awareness of need for assistance;judgment;positioning of assistive device;safety precaution awareness;sequencing abilities  -     Impairments Affecting  Function (Mobility) balance;cognition;endurance/activity tolerance;pain;strength;postural/trunk control  -     Cognitive Impairments, Mobility Safety/Performance impulsivity;insight into deficits/self-awareness;problem-solving/reasoning  -           User Key  (r) = Recorded By, (t) = Taken By, (c) = Cosigned By    Initials Name Provider Type     Krystle Wynn, PT Physical Therapist               Mobility     Row Name 04/26/23 1851          Bed Mobility    Bed Mobility supine-sit  -     Supine-Sit [place] (Bed Mobility) maximum assist (25% patient effort);2 person assist;verbal cues;nonverbal cues (demo/gesture)  -     Assistive Device (Bed Mobility) head of bed elevated;draw sheet;bed rails  -     Comment, (Bed Mobility) Pt required assistance for BLE and trunk management. Pt able to tolerate mobility though reported a fear of pain. Confusion and nausea noted limiting active participation.  -     Row Name 04/26/23 1851          Transfers    Comment, (Transfers) Pt performed STS with Mod A x2 and FWW. VC for upright posture, B knee extension, and holding head up.  -     Row Name 04/26/23 1851          Sit-Stand Transfer    Sit-Stand [place] (Transfers) moderate assist (50% patient effort);2 person assist;nonverbal cues (demo/gesture);verbal cues  -     Assistive Device (Sit-Stand Transfers) walker, front-wheeled  -     Row Name 04/26/23 1851          Gait/Stairs (Locomotion)    [place] Level (Gait) moderate assist (50% patient effort);2 person assist;verbal cues;nonverbal cues (demo/gesture)  -     Assistive Device (Gait) walker, front-wheeled  -     Ambulated day of surgery or within 4 hours of PACU discharge no, other medical factors prevent ambulation  -     Reason Patient was unable to Ambulate Nausea/Vomiting;Excessive Weakness  -     Distance in Feet (Gait) 15  -     Deviations/Abnormal Patterns (Gait) bilateral deviations;base of support, narrow;stride length  decreased;weight shifting decreased;gait speed decreased  -     Bilateral Gait Deviations forward flexed posture;weight shift ability decreased;heel strike decreased  -     Comment, (Gait/Stairs) Pt amb in room with FWW and Mod A x2 for stability and AD management. Pt with decreased sequencing ability and confusion limited active participation this date. Lingering effects of general anesthesia and nausea reported may be a contributing factor to participation barriers. Pt demonstrated B knee flexion, forward trunk flexion, and heavy reliance on FWW. Pt demonstrated step-through gait pattern though B stride length were minimal. Minimal impovement with VC at this time. Activity limited by overall fatigue and general deconditioning. No knee buckling or LOB noted.  -     Row Name 04/26/23 1851          Mobility    Extremity Weight-bearing Status right lower extremity  -     Right Lower Extremity (Weight-bearing Status) weight-bearing as tolerated (WBAT)  -           User Key  (r) = Recorded By, (t) = Taken By, (c) = Cosigned By    Initials Name Provider Type     Krystle Wynn, PT Physical Therapist               Obj/Interventions     Row Name 04/26/23 1857          Range of Motion Comprehensive    General Range of Motion lower extremity range of motion deficits identified  -     Comment, General Range of Motion R hip ROM limited by 50% secondary to fear of hip pain  -     Row Name 04/26/23 1857          Strength Comprehensive (MMT)    General Manual Muscle Testing (MMT) Assessment lower extremity strength deficits identified  -     Comment, General Manual Muscle Testing (MMT) Assessment Pt able to perform B active ankle DF and SLR; BLE grossly 3+/5  -     Row Name 04/26/23 1857          Motor Skills    Therapeutic Exercise hip;knee;ankle  -     Row Name 04/26/23 1857          Hip (Therapeutic Exercise)    Hip (Therapeutic Exercise) PROM (passive range of motion)  -     Hip PROM (Therapeutic  Exercise) right;flexion;aBduction;5 repetitions  -     Row Name 04/26/23 1857          Knee (Therapeutic Exercise)    Knee (Therapeutic Exercise) strengthening exercise  -     Knee Strengthening (Therapeutic Exercise) right;SLR (straight leg raise);LAQ (long arc quad);3 repetitions  -     Row Name 04/26/23 1857          Ankle (Therapeutic Exercise)    Ankle (Therapeutic Exercise) AROM (active range of motion)  -     Ankle AROM (Therapeutic Exercise) bilateral;dorsiflexion;plantarflexion;5 repetitions  -     Row Name 04/26/23 1857          Balance    Balance Assessment sitting static balance;sitting dynamic balance;sit to stand dynamic balance;standing static balance;standing dynamic balance  -     Static Sitting Balance contact guard  -     Dynamic Sitting Balance minimal assist  -     Position, Sitting Balance sitting edge of bed  -     Static Standing Balance minimal assist;non-verbal cues (demo/gesture);verbal cues;2-person assist  -     Dynamic Standing Balance moderate assist;2-person assist;non-verbal cues (demo/gesture);verbal cues  -     Position/Device Used, Standing Balance supported  -     Balance Interventions sitting;standing;sit to stand;occupation based/functional task  -     Row Name 04/26/23 1857          Sensory Assessment (Somatosensory)    Sensory Assessment (Somatosensory) LE sensation intact  -           User Key  (r) = Recorded By, (t) = Taken By, (c) = Cosigned By    Initials Name Provider Type     Krystle Wynn, PT Physical Therapist               Goals/Plan     Row Name 04/26/23 1901          Bed Mobility Goal 1 (PT)    Activity/Assistive Device (Bed Mobility Goal 1, PT) sit to supine/supine to sit  -     [place] Level/Cues Needed (Bed Mobility Goal 1, PT) contact guard required  -     Time Frame (Bed Mobility Goal 1, PT) long term goal (LTG);5 days  -     Progress/Outcomes (Bed Mobility Goal 1, PT) goal ongoing  -     Row Name 04/26/23 1901           Transfer Goal 1 (PT)    Activity/Assistive Device (Transfer Goal 1, PT) sit-to-stand/stand-to-sit;bed-to-chair/chair-to-bed  -HP     [place] Level/Cues Needed (Transfer Goal 1, PT) contact guard required  -HP     Time Frame (Transfer Goal 1, PT) long term goal (LTG);5 days  -HP     Progress/Outcome (Transfer Goal 1, PT) goal ongoing  -HP     Row Name 04/26/23 1901          Gait Training Goal 1 (PT)    Activity/Assistive Device (Gait Training Goal 1, PT) gait (walking locomotion)  -HP     [place] Level (Gait Training Goal 1, PT) contact guard required  -HP     Distance (Gait Training Goal 1, PT) 150  -HP     Time Frame (Gait Training Goal 1, PT) long term goal (LTG);5 days  -HP     Progress/Outcome (Gait Training Goal 1, PT) goal ongoing  -HP     Row Name 04/26/23 1901          Therapy Assessment/Plan (PT)    Planned Therapy Interventions (PT) balance training;bed mobility training;gait training;home exercise program;patient/family education;transfer training;stretching;strengthening;stair training;ROM (range of motion)  -HP           User Key  (r) = Recorded By, (t) = Taken By, (c) = Cosigned By    Initials Name Provider Type    HP Krystle Wynn, PT Physical Therapist               Clinical Impression     Row Name 04/26/23 0587          Pain    Additional Documentation Pain Scale: FACES Pre/Post-Treatment (Group)  -HP     Row Name 04/26/23 9630          Pain Scale: FACES Pre/Post-Treatment    Pain: FACES Scale, Pretreatment 2-->hurts little bit  -HP     Posttreatment Pain Rating 4-->hurts little more  -HP     Pain Location - Side/Orientation Right  -HP     Pain Location generalized  -HP     Pain Location - hip  -HP     Row Name 04/26/23 2668          Plan of Care Review    Plan of Care Reviewed With patient;daughter  -HP     Progress no change  -HP     Outcome Evaluation PT eval complete. Pt required Mod A x2 to amb 15' in room with FWW. No knee buckling or LOB noted. Pt lethargic and reported  nausea/vomiting limiting participation this session. HEP and precautions reviewed with family and pt. Recommend d/c to IRF to promote increased independence with functional mobility and decrease risk of falls.  -     Row Name 04/26/23 1859          Therapy Assessment/Plan (PT)    Rehab Potential (PT) good, to achieve stated therapy goals  -HP     Criteria for Skilled Interventions Met (PT) yes;meets criteria;skilled treatment is necessary  -HP     Therapy Frequency (PT) 2 times/day  -HP     Row Name 04/26/23 1859          Vital Signs    Pre Systolic BP Rehab --  VSS  -HP     Pre Patient Position Supine  -HP     Intra Patient Position Standing  -HP     Post Patient Position Sitting  -HP     Row Name 04/26/23 1859          Positioning and Restraints    Pre-Treatment Position in bed  -HP     Post Treatment Position chair  -HP     In Chair notified nsg;reclined;sitting;call light within reach;encouraged to call for assist;exit alarm on;with family/caregiver;legs elevated;compression device  -HP           User Key  (r) = Recorded By, (t) = Taken By, (c) = Cosigned By    Initials Name Provider Type    HP Krystle Wynn, PT Physical Therapist               Outcome Measures     Row Name 04/26/23 1902          How much help from another person do you currently need...    Turning from your back to your side while in flat bed without using bedrails? 2  -HP     Moving from lying on back to sitting on the side of a flat bed without bedrails? 2  -HP     Moving to and from a bed to a chair (including a wheelchair)? 2  -HP     Standing up from a chair using your arms (e.g., wheelchair, bedside chair)? 2  -HP     Climbing 3-5 steps with a railing? 1  -HP     To walk in hospital room? 1  -HP     AM-PAC 6 Clicks Score (PT) 10  -HP     Highest level of mobility 4 --> Transferred to chair/commode  -     Row Name 04/26/23 1902          PADD    Diagnosis 2  -HP     Gender 1  -HP     Age Group 0  -HP     Gait Distance 0  -HP     Assist  Level 0  -HP     Home Support 3  -HP     PADD Score 6  -HP     Patient Preference extended rehabilitation  -HP     Prediction by PADD Score extended rehabilitation  -     Row Name 04/26/23 1902          Functional Assessment    Outcome Measure Options AM-PAC 6 Clicks Basic Mobility (PT);PADD  -HP           User Key  (r) = Recorded By, (t) = Taken By, (c) = Cosigned By    Initials Name Provider Type     Krystle Wynn, HAROLDO Physical Therapist                             Physical Therapy Education     Title: PT OT SLP Therapies (In Progress)     Topic: Physical Therapy (In Progress)     Point: Mobility training (In Progress)     Learning Progress Summary           Patient Acceptance, E,D, NR by  at 4/26/2023 1902                   Point: Home exercise program (In Progress)     Learning Progress Summary           Patient Acceptance, E,D, NR by  at 4/26/2023 1902                   Point: Body mechanics (In Progress)     Learning Progress Summary           Patient Acceptance, E,D, NR by  at 4/26/2023 1902                   Point: Precautions (In Progress)     Learning Progress Summary           Patient Acceptance, E,D, NR by  at 4/26/2023 1902                               User Key     Initials Effective Dates Name Provider Type [facility] 06/01/21 -  Krystle Wynn, HAROLDO Physical Therapist PT              PT Recommendation and Plan  Planned Therapy Interventions (PT): balance training, bed mobility training, gait training, home exercise program, patient/family education, transfer training, stretching, strengthening, stair training, ROM (range of motion)  Plan of Care Reviewed With: patient, daughter  Progress: no change  Outcome Evaluation: PT eval complete. Pt required Mod A x2 to amb 15' in room with FWW. No knee buckling or LOB noted. Pt lethargic and reported nausea/vomiting limiting participation this session. HEP and precautions reviewed with family and pt. Recommend d/c to IRF to promote increased  independence with functional mobility and decrease risk of falls.     Time Calculation:    PT Charges     Row Name 04/26/23 1720             Time Calculation    Start Time 1720  -HP      PT Received On 04/26/23  -HP      PT Goal Re-Cert Due Date 05/06/23  -HP         Timed Charges    62078 - PT Therapeutic Exercise Minutes 5  -HP      95142 - PT Therapeutic Activity Minutes 5  -HP         Untimed Charges    PT Eval/Re-eval Minutes 34  -HP         Total Minutes    Timed Charges Total Minutes 10  -HP      Untimed Charges Total Minutes 34  -HP       Total Minutes 44  -HP            User Key  (r) = Recorded By, (t) = Taken By, (c) = Cosigned By    Initials Name Provider Type     Krystle Wynn, PT Physical Therapist              Therapy Charges for Today     Code Description Service Date Service Provider Modifiers Qty    25533813749 HC PT THER PROC EA 15 MIN 4/26/2023 Krystle Wynn, PT GP 1    23801407355 HC PT EVAL MOD COMPLEXITY 3 4/26/2023 Krystle Wynn, PT GP 1    53154162024 HC PT THER SUPP EA 15 MIN 4/26/2023 Krystle Wynn, PT GP 3          PT G-Codes  Outcome Measure Options: AM-PAC 6 Clicks Basic Mobility (PT), PADD  AM-PAC 6 Clicks Score (PT): 10  PT Discharge Summary  Anticipated Discharge Disposition (PT): inpatient rehabilitation facility    Krystle Wynn PT  4/26/2023

## 2023-04-26 NOTE — PLAN OF CARE
Goal Outcome Evaluation:               Pt is alert and oriented X 4. VSS. RA/2L NC. [place] in place. NPO at midnight.

## 2023-04-27 PROBLEM — D62 ACUTE BLOOD LOSS ANEMIA: Status: ACTIVE | Noted: 2023-04-27

## 2023-04-27 LAB
ANION GAP SERPL CALCULATED.3IONS-SCNC: 10 MMOL/L (ref 5–15)
BUN SERPL-MCNC: 18 MG/DL (ref 8–23)
BUN/CREAT SERPL: 21.2 (ref 7–25)
CALCIUM SPEC-SCNC: 8.2 MG/DL (ref 8.6–10.5)
CHLORIDE SERPL-SCNC: 103 MMOL/L (ref 98–107)
CO2 SERPL-SCNC: 26 MMOL/L (ref 22–29)
CREAT SERPL-MCNC: 0.85 MG/DL (ref 0.57–1)
DEPRECATED RDW RBC AUTO: 45.7 FL (ref 37–54)
EGFRCR SERPLBLD CKD-EPI 2021: 68.9 ML/MIN/1.73
ERYTHROCYTE [DISTWIDTH] IN BLOOD BY AUTOMATED COUNT: 14.3 % (ref 12.3–15.4)
GLUCOSE BLDC GLUCOMTR-MCNC: 139 MG/DL (ref 70–130)
GLUCOSE BLDC GLUCOMTR-MCNC: 251 MG/DL (ref 70–130)
GLUCOSE BLDC GLUCOMTR-MCNC: 257 MG/DL (ref 70–130)
GLUCOSE BLDC GLUCOMTR-MCNC: 281 MG/DL (ref 70–130)
GLUCOSE SERPL-MCNC: 171 MG/DL (ref 65–99)
HCT VFR BLD AUTO: 27.4 % (ref 34–46.6)
HGB BLD-MCNC: 8.5 G/DL (ref 12–15.9)
MCH RBC QN AUTO: 27.2 PG (ref 26.6–33)
MCHC RBC AUTO-ENTMCNC: 31 G/DL (ref 31.5–35.7)
MCV RBC AUTO: 87.8 FL (ref 79–97)
PLATELET # BLD AUTO: 163 10*3/MM3 (ref 140–450)
PMV BLD AUTO: 10.4 FL (ref 6–12)
POTASSIUM SERPL-SCNC: 4 MMOL/L (ref 3.5–5.2)
RBC # BLD AUTO: 3.12 10*6/MM3 (ref 3.77–5.28)
SODIUM SERPL-SCNC: 139 MMOL/L (ref 136–145)
WBC NRBC COR # BLD: 10.1 10*3/MM3 (ref 3.4–10.8)

## 2023-04-27 PROCEDURE — 82948 REAGENT STRIP/BLOOD GLUCOSE: CPT

## 2023-04-27 PROCEDURE — 25010000002 CEFAZOLIN IN DEXTROSE 2-4 GM/100ML-% SOLUTION: Performed by: ORTHOPAEDIC SURGERY

## 2023-04-27 PROCEDURE — 99232 SBSQ HOSP IP/OBS MODERATE 35: CPT | Performed by: INTERNAL MEDICINE

## 2023-04-27 PROCEDURE — 63710000001 INSULIN LISPRO (HUMAN) PER 5 UNITS: Performed by: ORTHOPAEDIC SURGERY

## 2023-04-27 PROCEDURE — 82962 GLUCOSE BLOOD TEST: CPT

## 2023-04-27 PROCEDURE — 97166 OT EVAL MOD COMPLEX 45 MIN: CPT

## 2023-04-27 PROCEDURE — 63710000001 INSULIN DETEMIR PER 5 UNITS: Performed by: ORTHOPAEDIC SURGERY

## 2023-04-27 PROCEDURE — 80048 BASIC METABOLIC PNL TOTAL CA: CPT | Performed by: ORTHOPAEDIC SURGERY

## 2023-04-27 PROCEDURE — 97530 THERAPEUTIC ACTIVITIES: CPT

## 2023-04-27 PROCEDURE — 63710000001 INSULIN LISPRO (HUMAN) PER 5 UNITS

## 2023-04-27 PROCEDURE — 97535 SELF CARE MNGMENT TRAINING: CPT

## 2023-04-27 PROCEDURE — 85027 COMPLETE CBC AUTOMATED: CPT | Performed by: ORTHOPAEDIC SURGERY

## 2023-04-27 PROCEDURE — 97110 THERAPEUTIC EXERCISES: CPT

## 2023-04-27 PROCEDURE — 97116 GAIT TRAINING THERAPY: CPT

## 2023-04-27 RX ORDER — INSULIN LISPRO 100 [IU]/ML
0-9 INJECTION, SOLUTION INTRAVENOUS; SUBCUTANEOUS
Status: DISCONTINUED | OUTPATIENT
Start: 2023-04-27 | End: 2023-04-29 | Stop reason: HOSPADM

## 2023-04-27 RX ADMIN — Medication 10 ML: at 21:21

## 2023-04-27 RX ADMIN — PANTOPRAZOLE SODIUM 40 MG: 40 TABLET, DELAYED RELEASE ORAL at 17:15

## 2023-04-27 RX ADMIN — TRAMADOL HYDROCHLORIDE 50 MG: 50 TABLET, FILM COATED ORAL at 09:21

## 2023-04-27 RX ADMIN — MELOXICAM 15 MG: 15 TABLET ORAL at 09:21

## 2023-04-27 RX ADMIN — PANTOPRAZOLE SODIUM 40 MG: 40 TABLET, DELAYED RELEASE ORAL at 09:21

## 2023-04-27 RX ADMIN — INSULIN DETEMIR 5 UNITS: 100 INJECTION, SOLUTION SUBCUTANEOUS at 21:04

## 2023-04-27 RX ADMIN — INSULIN LISPRO 6 UNITS: 100 INJECTION, SOLUTION INTRAVENOUS; SUBCUTANEOUS at 22:48

## 2023-04-27 RX ADMIN — ASPIRIN 81 MG: 81 TABLET, COATED ORAL at 20:52

## 2023-04-27 RX ADMIN — CARVEDILOL 12.5 MG: 12.5 TABLET, FILM COATED ORAL at 09:21

## 2023-04-27 RX ADMIN — ACETAMINOPHEN 1000 MG: 500 TABLET ORAL at 14:07

## 2023-04-27 RX ADMIN — INSULIN LISPRO 2 UNITS: 100 INJECTION, SOLUTION INTRAVENOUS; SUBCUTANEOUS at 06:28

## 2023-04-27 RX ADMIN — CEFAZOLIN SODIUM 2 G: 2 INJECTION, SOLUTION INTRAVENOUS at 05:15

## 2023-04-27 RX ADMIN — ASPIRIN 81 MG: 81 TABLET, COATED ORAL at 09:21

## 2023-04-27 RX ADMIN — SODIUM CHLORIDE, POTASSIUM CHLORIDE, SODIUM LACTATE AND CALCIUM CHLORIDE 100 ML/HR: 600; 310; 30; 20 INJECTION, SOLUTION INTRAVENOUS at 05:15

## 2023-04-27 RX ADMIN — MONTELUKAST 10 MG: 10 TABLET, FILM COATED ORAL at 20:52

## 2023-04-27 RX ADMIN — SENNOSIDES AND DOCUSATE SODIUM 2 TABLET: 8.6; 5 TABLET ORAL at 09:21

## 2023-04-27 RX ADMIN — ACETAMINOPHEN 1000 MG: 500 TABLET ORAL at 21:05

## 2023-04-27 RX ADMIN — LEVOTHYROXINE SODIUM 88 MCG: 0.09 TABLET ORAL at 05:15

## 2023-04-27 RX ADMIN — FAMOTIDINE 40 MG: 20 TABLET, FILM COATED ORAL at 09:21

## 2023-04-27 RX ADMIN — INSULIN LISPRO 6 UNITS: 100 INJECTION, SOLUTION INTRAVENOUS; SUBCUTANEOUS at 17:15

## 2023-04-27 RX ADMIN — FLUTICASONE PROPIONATE 2 SPRAY: 50 SPRAY, METERED NASAL at 20:52

## 2023-04-27 RX ADMIN — CARVEDILOL 12.5 MG: 12.5 TABLET, FILM COATED ORAL at 17:15

## 2023-04-27 RX ADMIN — ACETAMINOPHEN 1000 MG: 500 TABLET ORAL at 05:15

## 2023-04-27 RX ADMIN — Medication 10 ML: at 09:20

## 2023-04-27 RX ADMIN — SENNOSIDES AND DOCUSATE SODIUM 2 TABLET: 8.6; 5 TABLET ORAL at 20:52

## 2023-04-27 RX ADMIN — INSULIN LISPRO 6 UNITS: 100 INJECTION, SOLUTION INTRAVENOUS; SUBCUTANEOUS at 12:04

## 2023-04-27 NOTE — PLAN OF CARE
Problem: Skin Injury Risk Increased  Goal: Skin Health and Integrity  Outcome: Ongoing, Progressing  Intervention: Optimize Skin Protection  Recent Flowsheet Documentation  Taken 4/27/2023 1613 by Margo Love RN  Pressure Reduction Techniques: frequent weight shift encouraged  Head of Bed (HOB) Positioning: HOB at 60 degrees  Pressure Reduction Devices: chair cushion utilized  Skin Protection: adhesive use limited  Taken 4/27/2023 1430 by Margo Love RN  Pressure Reduction Techniques:   frequent weight shift encouraged   positioned off wounds   weight shift assistance provided  Head of Bed (HOB) Positioning: HOB at 60 degrees  Pressure Reduction Devices:   chair cushion utilized   positioning supports utilized  Skin Protection:   adhesive use limited   transparent dressing maintained   tubing/devices free from skin contact  Taken 4/27/2023 1200 by Margo Love RN  Pressure Reduction Techniques:   frequent weight shift encouraged   weight shift assistance provided  Head of Bed (HOB) Positioning: HOB at 60 degrees  Pressure Reduction Devices: chair cushion utilized  Skin Protection: adhesive use limited  Taken 4/27/2023 1000 by Margo Love RN  Pressure Reduction Techniques: frequent weight shift encouraged  Head of Bed (HOB) Positioning: HOB at 60 degrees  Pressure Reduction Devices: pressure-redistributing mattress utilized  Skin Protection: adhesive use limited  Taken 4/27/2023 0800 by Margo Love RN  Pressure Reduction Techniques: frequent weight shift encouraged  Head of Bed (HOB) Positioning: HOB at 60-90 degrees  Pressure Reduction Devices:   pressure-redistributing mattress utilized   positioning supports utilized  Skin Protection:   adhesive use limited   transparent dressing maintained   tubing/devices free from skin contact     Problem: Fall Injury Risk  Goal: Absence of Fall and Fall-Related Injury  Outcome: Ongoing, Progressing  Intervention: Identify and Manage  Contributors  Recent Flowsheet Documentation  Taken 4/27/2023 0800 by Margo Love, RN  Medication Review/Management: medications reviewed  Intervention: Promote Injury-Free Environment  Recent Flowsheet Documentation  Taken 4/27/2023 1613 by Margo Love, RN  Safety Promotion/Fall Prevention:   activity supervised   assistive device/personal items within reach   clutter free environment maintained   fall prevention program maintained   gait belt   toileting scheduled   safety round/check completed   room organization consistent   nonskid shoes/slippers when out of bed  Taken 4/27/2023 1430 by Margo Love, RN  Safety Promotion/Fall Prevention:   activity supervised   assistive device/personal items within reach   clutter free environment maintained   fall prevention program maintained   gait belt   toileting scheduled   safety round/check completed   room organization consistent   nonskid shoes/slippers when out of bed  Taken 4/27/2023 1200 by Margo Love, RN  Safety Promotion/Fall Prevention:   activity supervised   clutter free environment maintained   fall prevention program maintained   assistive device/personal items within reach   gait belt   toileting scheduled   safety round/check completed   room organization consistent   nonskid shoes/slippers when out of bed  Taken 4/27/2023 1000 by Margo Love, RN  Safety Promotion/Fall Prevention:   activity supervised   assistive device/personal items within reach   clutter free environment maintained   fall prevention program maintained   gait belt   toileting scheduled   safety round/check completed   nonskid shoes/slippers when out of bed   room organization consistent  Taken 4/27/2023 0800 by Margo Love, RN  Safety Promotion/Fall Prevention:   activity supervised   assistive device/personal items within reach   clutter free environment maintained   fall prevention program maintained   gait belt   toileting scheduled   safety  round/check completed   room organization consistent   nonskid shoes/slippers when out of bed     Problem: Adult Inpatient Plan of Care  Goal: Plan of Care Review  Outcome: Ongoing, Progressing  Flowsheets (Taken 4/27/2023 1754)  Progress: improving  Plan of Care Reviewed With: patient  Outcome Evaluation: Patient VSS, 1.5L NC, NSR on tele, A&Ox4, no confusion today. C/o pain, PO pain meds given. Prevena wound vac in place/intact. Up in chair most of shift. Voiding per purewick. No skin issues, waffle in place. Continue to monitor.  Goal: Patient-Specific Goal (Individualized)  Outcome: Ongoing, Progressing  Goal: Absence of Hospital-Acquired Illness or Injury  Outcome: Ongoing, Progressing  Intervention: Identify and Manage Fall Risk  Recent Flowsheet Documentation  Taken 4/27/2023 1613 by Margo Love, RN  Safety Promotion/Fall Prevention:   activity supervised   assistive device/personal items within reach   clutter free environment maintained   fall prevention program maintained   gait belt   toileting scheduled   safety round/check completed   room organization consistent   nonskid shoes/slippers when out of bed  Taken 4/27/2023 1430 by Margo Love, RN  Safety Promotion/Fall Prevention:   activity supervised   assistive device/personal items within reach   clutter free environment maintained   fall prevention program maintained   gait belt   toileting scheduled   safety round/check completed   room organization consistent   nonskid shoes/slippers when out of bed  Taken 4/27/2023 1200 by Margo Love, RN  Safety Promotion/Fall Prevention:   activity supervised   clutter free environment maintained   fall prevention program maintained   assistive device/personal items within reach   gait belt   toileting scheduled   safety round/check completed   room organization consistent   nonskid shoes/slippers when out of bed  Taken 4/27/2023 1000 by Margo Love, RN  Safety Promotion/Fall Prevention:    activity supervised   assistive device/personal items within reach   clutter free environment maintained   fall prevention program maintained   gait belt   toileting scheduled   safety round/check completed   nonskid shoes/slippers when out of bed   room organization consistent  Taken 4/27/2023 0800 by Margo Love RN  Safety Promotion/Fall Prevention:   activity supervised   assistive device/personal items within reach   clutter free environment maintained   fall prevention program maintained   gait belt   toileting scheduled   safety round/check completed   room organization consistent   nonskid shoes/slippers when out of bed  Intervention: Prevent Skin Injury  Recent Flowsheet Documentation  Taken 4/27/2023 1613 by Margo Love RN  Body Position: (up in chair)   other (see comments)   weight shifting  Skin Protection: adhesive use limited  Taken 4/27/2023 1430 by Margo Love RN  Body Position: (up in chair)   tilted   left   other (see comments)  Skin Protection:   adhesive use limited   transparent dressing maintained   tubing/devices free from skin contact  Taken 4/27/2023 1200 by Margo Love RN  Body Position: (up in chair) other (see comments)  Skin Protection: adhesive use limited  Taken 4/27/2023 1000 by Margo Love RN  Body Position: sitting up in bed  Skin Protection: adhesive use limited  Taken 4/27/2023 0800 by Margo Love RN  Body Position: sitting up in bed  Skin Protection:   adhesive use limited   transparent dressing maintained   tubing/devices free from skin contact  Intervention: Prevent and Manage VTE (Venous Thromboembolism) Risk  Recent Flowsheet Documentation  Taken 4/27/2023 1613 by Margo Love RN  VTE Prevention/Management:   bilateral   sequential compression devices on  Taken 4/27/2023 1430 by Margo Love RN  VTE Prevention/Management:   bilateral   sequential compression devices on  Taken 4/27/2023 1200 by Margo Love RN  VTE  Prevention/Management:   bilateral   sequential compression devices on  Taken 4/27/2023 1000 by Margo Love RN  VTE Prevention/Management:   bilateral   sequential compression devices on  Taken 4/27/2023 0800 by Margo Love RN  VTE Prevention/Management:   bilateral   sequential compression devices on  Intervention: Prevent Infection  Recent Flowsheet Documentation  Taken 4/27/2023 1613 by Margo Love RN  Infection Prevention: environmental surveillance performed  Taken 4/27/2023 1430 by Margo Love RN  Infection Prevention: environmental surveillance performed  Taken 4/27/2023 1200 by Margo Love RN  Infection Prevention: environmental surveillance performed  Taken 4/27/2023 1000 by Margo Love RN  Infection Prevention: environmental surveillance performed  Taken 4/27/2023 0800 by Margo Love RN  Infection Prevention: environmental surveillance performed  Goal: Optimal Comfort and Wellbeing  Outcome: Ongoing, Progressing  Intervention: Provide Person-Centered Care  Recent Flowsheet Documentation  Taken 4/27/2023 1613 by Margo Love RN  Trust Relationship/Rapport:   care explained   choices provided  Taken 4/27/2023 1430 by Margo Love RN  Trust Relationship/Rapport: care explained  Taken 4/27/2023 1200 by Margo Love RN  Trust Relationship/Rapport:   care explained   choices provided  Taken 4/27/2023 1000 by Margo Love RN  Trust Relationship/Rapport: care explained  Taken 4/27/2023 0800 by Margo Love RN  Trust Relationship/Rapport:   care explained   choices provided   questions encouraged   questions answered   reassurance provided   thoughts/feelings acknowledged  Goal: Readiness for Transition of Care  Outcome: Ongoing, Progressing     Problem: Adjustment to Injury (Hip Fracture Medical Management)  Goal: Optimal Coping with Change in Health Status  Outcome: Ongoing, Progressing  Intervention: Support Psychosocial Response to  Injury  Recent Flowsheet Documentation  Taken 4/27/2023 1613 by Margo Love, RN  Family/Support System Care: support provided  Taken 4/27/2023 1430 by Margo Love RN  Family/Support System Care: support provided  Taken 4/27/2023 1200 by Margo Love, RN  Family/Support System Care: support provided  Taken 4/27/2023 1000 by Margo Love, RN  Family/Support System Care: support provided  Taken 4/27/2023 0800 by Margo Love, RN  Family/Support System Care:   self-care encouraged   support provided     Problem: Bleeding (Hip Fracture Medical Management)  Goal: Absence of Bleeding  Outcome: Ongoing, Progressing     Problem: Bowel Elimination Impaired (Hip Fracture Medical Management)  Goal: Effective Bowel Elimination  Outcome: Ongoing, Progressing     Problem: Cognitive Decline Risk (Hip Fracture Medical Management)  Goal: Baseline Cognitive Function Maintained  Outcome: Ongoing, Progressing     Problem: Embolism (Hip Fracture Medical Management)  Goal: Absence of Embolism  Outcome: Ongoing, Progressing  Intervention: Prevent or Manage Embolism Risk  Recent Flowsheet Documentation  Taken 4/27/2023 1613 by Margo Love RN  VTE Prevention/Management:   bilateral   sequential compression devices on  Taken 4/27/2023 1430 by Margo Love RN  VTE Prevention/Management:   bilateral   sequential compression devices on  Taken 4/27/2023 1200 by Margo Love RN  VTE Prevention/Management:   bilateral   sequential compression devices on  Taken 4/27/2023 1000 by Margo Love RN  VTE Prevention/Management:   bilateral   sequential compression devices on  Taken 4/27/2023 0800 by Margo Love RN  VTE Prevention/Management:   bilateral   sequential compression devices on     Problem: Fracture Stabilization and Management (Hip Fracture Medical Management)  Goal: Fracture Stability  Outcome: Ongoing, Progressing     Problem: Functional Ability Impaired (Hip Fracture Medical  Management)  Goal: Optimal Functional Performance  Outcome: Ongoing, Progressing     Problem: Pain (Hip Fracture Medical Management)  Goal: Acceptable Pain Level  Outcome: Ongoing, Progressing     Problem: Urinary Elimination Impaired (Hip Fracture Medical Management)  Goal: Effective Urinary Elimination  Outcome: Ongoing, Progressing   Goal Outcome Evaluation:  Plan of Care Reviewed With: patient        Progress: improving  Outcome Evaluation: Patient VSS, 1.5L NC, NSR on tele, A&Ox4, no confusion today. C/o pain, PO pain meds given. Prevena wound vac in place/intact. Up in chair most of shift. Voiding per purewick. No skin issues, waffle in place. Continue to monitor.

## 2023-04-27 NOTE — PROGRESS NOTES
Orthopedic Progress Note      Patient: Angelica Spivey  YOB: 1940     Date of Admission: 4/24/2023  7:24 PM Medical Record Number: [id number]     Attending Physician: Sunshine Lambert DO    Status Post:  Procedure(s):  TOTAL HIP ARTHROPLASTY ANTERIOR Post Operative Day Number: 1    Subjective : No new orthopaedic complaints     Pain Relief: some relief with present medication.     Systemic Complaints: No Complaints  Vitals:    04/26/23 2331 04/27/23 0400 04/27/23 0716 04/27/23 0921   BP: 102/80 106/67 117/64 115/52   BP Location: Right arm Right arm Right arm    Patient Position: Lying Lying Lying    Pulse: 72 74 73 82   Resp: 20 20 16    Temp: 98.2 °F (36.8 °C) 98 °F (36.7 °C) 97.7 °F (36.5 °C)    TempSrc: Oral Oral Oral    SpO2: 93% 96% 96%    Weight:       Height:           Physical Exam: 83 y.o. female    General Appearance:       Alert, cooperative, in no acute distress                  Extremities:    Dressing Clean, Dry and Intact             No clinical sign of DVT        Able to do good movements of digits    Pulses:   Pulses palpable and equal bilaterally           Diagnostic Tests:     Results from last 7 days   Lab Units 04/27/23 0455 04/26/23 0419 04/25/23 0355   WBC 10*3/mm3 10.10 8.65 9.50   HEMOGLOBIN g/dL 8.5* 11.1* 11.1*   HEMATOCRIT % 27.4* 34.8 34.7   PLATELETS 10*3/mm3 163 208 208     Results from last 7 days   Lab Units 04/27/23 0455 04/26/23 0419 04/25/23 0355   SODIUM mmol/L 139 141 139   POTASSIUM mmol/L 4.0 4.2 4.1   CHLORIDE mmol/L 103 105 103   CO2 mmol/L 26.0 28.0 25.0   BUN mg/dL 18 9 15   CREATININE mg/dL 0.85 0.63 0.69   GLUCOSE mg/dL 171* 115* 180*   CALCIUM mg/dL 8.2* 9.0 9.5     Results from last 7 days   Lab Units 04/25/23 0355 04/24/23 2012   INR  1.03 1.02     No results found for: URICACID  No results found for: CRYSTAL  Microbiology Results (last 10 days)     ** No results found for the last 240 hours. **        XR Femur 2 View Right    Result Date:  4/24/2023  Impression: Acute impacted likely subcapital fracture of the right femoral neck. Electronically Signed: Krishan Yuen  4/24/2023 8:34 PM EDT  Workstation ID: [id number]    XR Chest 1 View    Result Date: 4/24/2023  Impression: No acute cardiopulmonary findings. Electronically Signed: Krishan Yuen  4/24/2023 8:36 PM EDT  Workstation ID: [id number]    FL C Arm During Surgery    Result Date: 4/26/2023  Impression: Fluoroscopic imaging obtained without a radiologist present. Please see performing provider notes for full detail. Electronically Signed: Raul Guy  4/26/2023 4:08 PM EDT  Workstation ID: [id number]    XR Hip With or Without Pelvis 2 - 3 View Right    Result Date: 4/26/2023  Impression: Expected postoperative appearance of newly placed right total hip arthroplasty. Electronically Signed: Krishan Yuen  4/26/2023 4:27 PM EDT  Workstation ID: [id number]    XR Hip With or Without Pelvis 2 - 3 View Right    Result Date: 4/24/2023  Impression: Acute impacted likely subcapital fracture of the right femoral neck. Electronically Signed: Krishan Yuen  4/24/2023 8:34 PM EDT  Workstation ID: [id number]        Current Medications:  Scheduled Meds:acetaminophen, 1,000 mg, Oral, Q8H  aspirin, 81 mg, Oral, Q12H  carvedilol, 12.5 mg, Oral, BID With Meals  famotidine, 40 mg, Oral, Daily  fluticasone, 2 spray, Each Nare, BID  insulin detemir, 5 Units, Subcutaneous, Nightly  insulin lispro, 0-9 Units, Subcutaneous, Q6H  levothyroxine, 88 mcg, Oral, QAM  meloxicam, 15 mg, Oral, Daily  montelukast, 10 mg, Oral, Nightly  pantoprazole, 40 mg, Oral, BID AC  Scopolamine, 1 patch, Transdermal, Q72H  senna-docusate sodium, 2 tablet, Oral, BID  sodium chloride, 10 mL, Intravenous, Q12H      Continuous Infusions:lactated ringers, 9 mL/hr, Last Rate: 9 mL/hr (04/26/23 1215)  lactated ringers, 100 mL/hr, Last Rate: 100 mL/hr (04/27/23 0515)  ropivacaine,       PRN Meds:.•  acetaminophen **OR** acetaminophen **OR**  acetaminophen  •  senna-docusate sodium **AND** polyethylene glycol **AND** bisacodyl **AND** bisacodyl  •  Calcium Replacement - Follow Nurse / BPA Driven Protocol  •  dextrose  •  dextrose  •  glucagon (human recombinant)  •  hydrALAZINE  •  HYDROcodone-acetaminophen  •  HYDROmorphone **AND** naloxone  •  ketorolac  •  Magnesium Standard Dose Replacement - Follow Nurse / BPA Driven Protocol  •  melatonin  •  ondansetron **OR** ondansetron  •  oxyCODONE  •  oxyCODONE  •  Phosphorus Replacement - Follow Nurse / BPA Driven Protocol  •  Potassium Replacement - Follow Nurse / BPA Driven Protocol  •  [COMPLETED] Insert Peripheral IV **AND** sodium chloride  •  sodium chloride  •  sodium chloride  •  traMADol    Assessment: Status post  No admission procedures for hospital encounter.    Patient Active Problem List   Diagnosis   • Abnormal stress test   • Mixed hyperlipidemia   • Coronary artery disease involving native coronary artery of native heart   • Type 2 diabetes mellitus with hyperglycemia, with long-term current use of insulin   • Acquired hypothyroidism   • Chronic GERD   • Unstable angina   • Closed fracture of right hip   • Essential hypertension       PLAN:   Continues current post-op course  Anticoagulation: Aspirin started will need 6 week course  Mobilize with PT as tolerated per protocol  F/u in office in 6 weeks    Weight Bearing: WBAT  Discharge Plan: OK to plan for discharge in  tomorrow to rehab  from orthopaedic perspective.    Rodo Jennings MD    Date: 4/27/2023    Time: 09:26 EDT

## 2023-04-27 NOTE — CASE MANAGEMENT/SOCIAL WORK
Discharge Planning Assessment  [facility]     Patient Name: Angelica Spivey  MRN: [id number]  Today's Date: 4/27/2023    Admit Date: 4/24/2023    Plan: IPR   Discharge Needs Assessment    No documentation.                Discharge Plan     Row Name 04/27/23 0934       Plan    Plan IPR    Patient/Family in Agreement with Plan yes    Plan Comments Spoke to Ms. Spivey and Daughter at the bedside. Discussed PT recommendations for IPR. Asked which facility they would be interested in going to. First choice is [facility], but they do not have any Female beds available. Their second choice is [facility]. Referral given to April at [phone]. They also mentioned interest in [facility]. Called and left message for a callback for referral. CM will continue to follow up for DC needs.    Final Discharge Disposition Code 62 - inpatient rehab facility              Continued Care and Services - Admitted Since 4/24/2023    Coordination has not been started for this encounter.       Expected Discharge Date and Time     Expected Discharge Date Expected Discharge Time    Apr 28, 2023           Mercedes Castorena RN

## 2023-04-27 NOTE — PLAN OF CARE
Problem: Skin Injury Risk Increased  Goal: Skin Health and Integrity  Outcome: Ongoing, Progressing  Intervention: Optimize Skin Protection  Recent Flowsheet Documentation  Taken 4/27/2023 0600 by Rosalinda Roberts RN  Pressure Reduction Techniques: frequent weight shift encouraged  Head of Bed (HOB) Positioning: HOB at 30 degrees  Pressure Reduction Devices: positioning supports utilized  Skin Protection:   adhesive use limited   incontinence pads utilized   transparent dressing maintained  Taken 4/27/2023 0400 by Rosalinda Roberts RN  Pressure Reduction Techniques: frequent weight shift encouraged  Head of Bed (HOB) Positioning: HOB at 20-30 degrees  Pressure Reduction Devices: positioning supports utilized  Skin Protection:   adhesive use limited   incontinence pads utilized   transparent dressing maintained  Taken 4/27/2023 0200 by Rosalinda Roberts RN  Pressure Reduction Techniques: frequent weight shift encouraged  Head of Bed (HOB) Positioning: HOB at 20-30 degrees  Pressure Reduction Devices:   positioning supports utilized   pressure-redistributing mattress utilized  Skin Protection:   adhesive use limited   incontinence pads utilized   transparent dressing maintained  Taken 4/27/2023 0000 by Rosalinda Roberts RN  Pressure Reduction Techniques: frequent weight shift encouraged  Head of Bed (HOB) Positioning: HOB at 30-45 degrees  Pressure Reduction Devices:   pressure-redistributing mattress utilized   positioning supports utilized  Skin Protection:   adhesive use limited   incontinence pads utilized   transparent dressing maintained  Taken 4/26/2023 2214 by Rosalinda Roberts RN  Pressure Reduction Techniques: frequent weight shift encouraged  Pressure Reduction Devices:   pressure-redistributing mattress utilized   positioning supports utilized  Skin Protection:   adhesive use limited   incontinence pads utilized   transparent dressing maintained  Taken 4/26/2023 2000 by Rosalinda Roberts  RN  Pressure Reduction Techniques: frequent weight shift encouraged  Head of Bed (HOB) Positioning: HOB at 30-45 degrees  Pressure Reduction Devices:   pressure-redistributing mattress utilized   positioning supports utilized  Skin Protection:   adhesive use limited   incontinence pads utilized   transparent dressing maintained   Goal Outcome Evaluation:

## 2023-04-27 NOTE — PLAN OF CARE
Goal Outcome Evaluation:  Plan of Care Reviewed With: patient, daughter        Progress: no change  Outcome Evaluation: Pt presents below her functional baseline with deficits including generalized weakness, impaired balance and mobility, situational confusion, impaired ADLs, and decreased activity tolerance warranting skilled OT services. Pt tolerated OOB to BSC and chair using RW with assist x 2 limited by pain. Increased time and max cues for sequencing of steps throughout due to impaired cognition. Rec IPR at dc.

## 2023-04-27 NOTE — PROGRESS NOTES
[facility] Medicine Services  PROGRESS NOTE    Patient Name: Angelica Spivey  : 1940  MRN: [id number]    Date of Admission: 2023  Primary Care Physician: Abimael Matthews MD    Subjective   Subjective     CC:  fall    HPI:  No acute events. Pain improved. Nausea improved.     ROS:  Gen- No fevers, chills  CV- No chest pain, palpitations  Resp- No cough, dyspnea  GI- No N/V/D, abd pain     Objective   Objective     Vital Signs:   Temp:  [97.7 °F (36.5 °C)-99.1 °F (37.3 °C)] 99.1 °F (37.3 °C)  Heart Rate:  [72-90] 82  Resp:  [16-20] 16  BP: (102-154)/(52-83) 131/68  Flow (L/min):  [2] 2     Physical Exam:  Constitutional: No acute distress, awake, alert  HENT: NCAT, mucous membranes moist  Respiratory: Clear to auscultation bilaterally, respiratory effort normal   Cardiovascular: RRR, no murmurs, rubs, or gallops  Gastrointestinal: Positive bowel sounds, soft, nontender, nondistended  Musculoskeletal: No bilateral ankle edema  Psychiatric: Appropriate affect, cooperative  Neurologic: Oriented x 3, strength symmetric in all extremities, Cranial Nerves grossly intact to confrontation, speech clear  Skin: No rashes    Results Reviewed:  LAB RESULTS:      Lab 23   WBC 10.10 8.65 9.50 11.68*   HEMOGLOBIN 8.5* 11.1* 11.1* 11.4*   HEMATOCRIT 27.4* 34.8 34.7 36.0   PLATELETS 163 208 208 229   NEUTROS ABS  --   --  6.32 8.70*   IMMATURE GRANS (ABS)  --   --  0.05 0.06*   LYMPHS ABS  --   --  2.21 2.03   MONOS ABS  --   --  0.74 0.63   EOS ABS  --   --  0.13 0.19   MCV 87.8 86.4 85.9 86.5   PROTIME  --   --  13.5 13.3         Lab 23   SODIUM 139 141 139 139   POTASSIUM 4.0 4.2 4.1 4.3   CHLORIDE 103 105 103 104   CO2 26.0 28.0 25.0 26.0   ANION GAP 10.0 8.0 11.0 9.0   BUN 18 9 15 17   CREATININE 0.85 0.63 0.69 0.82   EGFR 68.9 89.3 87.3 72.0   GLUCOSE 171* 115* 180* 214*    CALCIUM 8.2* 9.0 9.5 9.4   MAGNESIUM  --   --  1.8  --    PHOSPHORUS  --   --  3.7  --    HEMOGLOBIN A1C  --   --   --  8.20*   TSH  --   --   --  3.940         Lab 04/24/23 2012   TOTAL PROTEIN 6.5   ALBUMIN 3.8   GLOBULIN 2.7   ALT (SGPT) 14   AST (SGOT) 18   BILIRUBIN 0.2   ALK PHOS 71         Lab 04/25/23  0355 04/24/23 2012   PROTIME 13.5 13.3   INR 1.03 1.02             Lab 04/25/23 0355   ABO TYPING A   RH TYPING Positive   ANTIBODY SCREEN Negative         Brief Urine Lab Results     None          Microbiology Results Abnormal     None          FL C Arm During Surgery    Result Date: 4/26/2023  FL C ARM DURING SURGERY Date of Exam: 4/26/2023 2:06 PM EDT Indication: TOTAL HIP ARTHROPLASTY ANTERIOR. Comparison: None available. Technique: 1 fluoroscopic spot image of the right hip obtained over 34.9 seconds of fluoroscopy time Fluoroscopic Time: 34.9 seconds Findings: Fluoroscopic imaging obtained for the purpose of guidance of right hip arthroplasty.     Impression: Impression: Fluoroscopic imaging obtained without a radiologist present. Please see performing provider notes for full detail. Electronically Signed: Raul Guy  4/26/2023 4:08 PM EDT  Workstation ID: [id number]    XR Hip With or Without Pelvis 2 - 3 View Right    Result Date: 4/26/2023  XR HIP W OR WO PELVIS 2-3 VIEW RIGHT Date of Exam: 4/26/2023 4:06 PM EDT Indication: Post-Op Hip Arthroplasty Comparison: Right hip radiographs 4/24/2023 Findings: Unremarkable appearance of newly placed right total hip arthroplasty which appears in satisfactory alignment without hardware fracture, perihardware lucency, or periprosthetic fracture. There are expected postsurgical soft tissue changes of the right hip, with wound VAC device. The bones are demineralized.     Impression: Impression: Expected postoperative appearance of newly placed right total hip arthroplasty. Electronically Signed: Krishan Yuen  4/26/2023 4:27 PM EDT  Workstation ID:  [id number]          Current medications:  Scheduled Meds:acetaminophen, 1,000 mg, Oral, Q8H  aspirin, 81 mg, Oral, Q12H  carvedilol, 12.5 mg, Oral, BID With Meals  famotidine, 40 mg, Oral, Daily  fluticasone, 2 spray, Each Nare, BID  insulin detemir, 5 Units, Subcutaneous, Nightly  insulin lispro, 0-9 Units, Subcutaneous, 4x Daily AC & at Bedtime  levothyroxine, 88 mcg, Oral, QAM  meloxicam, 15 mg, Oral, Daily  montelukast, 10 mg, Oral, Nightly  pantoprazole, 40 mg, Oral, BID AC  Scopolamine, 1 patch, Transdermal, Q72H  senna-docusate sodium, 2 tablet, Oral, BID  sodium chloride, 10 mL, Intravenous, Q12H      Continuous Infusions:lactated ringers, 9 mL/hr, Last Rate: 9 mL/hr (04/26/23 1215)  lactated ringers, 100 mL/hr, Last Rate: 100 mL/hr (04/27/23 0515)  ropivacaine,       PRN Meds:.•  acetaminophen **OR** acetaminophen **OR** acetaminophen  •  senna-docusate sodium **AND** polyethylene glycol **AND** bisacodyl **AND** bisacodyl  •  Calcium Replacement - Follow Nurse / BPA Driven Protocol  •  dextrose  •  dextrose  •  glucagon (human recombinant)  •  hydrALAZINE  •  HYDROcodone-acetaminophen  •  HYDROmorphone **AND** naloxone  •  ketorolac  •  Magnesium Standard Dose Replacement - Follow Nurse / BPA Driven Protocol  •  melatonin  •  ondansetron **OR** ondansetron  •  oxyCODONE  •  oxyCODONE  •  Phosphorus Replacement - Follow Nurse / BPA Driven Protocol  •  Potassium Replacement - Follow Nurse / BPA Driven Protocol  •  [COMPLETED] Insert Peripheral IV **AND** sodium chloride  •  sodium chloride  •  sodium chloride  •  traMADol    Assessment & Plan   Assessment & Plan     Active Hospital Problems    Diagnosis  POA   • **Closed fracture of right hip [S72.001A]  Yes   • Essential hypertension [I10]  Unknown   • Acquired hypothyroidism [E03.9]  Yes   • Type 2 diabetes mellitus with hyperglycemia, with long-term current use of insulin [E11.65, Z79.4]  Not Applicable   • Mixed hyperlipidemia [E78.2]  Yes   • Coronary  artery disease involving native coronary artery of native heart [I25.10]  Yes      Resolved Hospital Problems   No resolved problems to display.        Brief Hospital Course to date:  Angelica Spivey is a 83 y.o. female with medical history significant for HTN, HLD, T2DM, CAD s/p prior LAD stent (~ 2018) on DAPT who presents to the ED for evaluation after slipping and falling onto her right hip day of admission. Imaging with acute impacted likely subcapital fracture of the right femoral neck.      Right hip fracture  Mechanical fall   - Imaging per above   - Ortho consulted -- s/p total right hip arthroplasty 4/26 with Dr. Jennings   - PT/OT with rehab recs   - ASA x 6 weeks  - PRN pain control     Acute blood loss anemia  - Following surgery  - AM labs      CAD / HTN / HLD  - on DAPT, LAD stent ~ 2018, last heart cath by Dr. Deng ~ 2020  - continue aspirin - now on BID -- resume plavix once BID dosing is complete in 6 weeks   - Continue coreg. Holding losartan/HCTZ perioperatively and now normotensive  - PRN hydralazine   - on repatha, intolerant to statins     T2DM - insulin dependent  - levemir 5 units HS  - fsbg achs w/ moderate dose ssi  - A1C 8.2  - Adjust PRN      Hypothyroidism  -  TSH within limits   - continue levothyroxine     Nausea  - improved. Feels it was related to pain medication     Expected Discharge Location and Transportation: rehab recs   Expected Discharge   Expected Discharge Date: 04/28/23       DVT prophylaxis:  Mechanical DVT prophylaxis orders are present.     AM-PAC 6 Clicks Score (PT): 16 (04/27/23 1016)    CODE STATUS:   Code Status and Medical Interventions:   Ordered at: 04/26/23 3988     Level Of Support Discussed With:    Patient     Code Status (Patient has no pulse and is not breathing):    CPR (Attempt to Resuscitate)     Medical Interventions (Patient has pulse or is breathing):    Full       Sunshine Lambert, DO  04/27/23

## 2023-04-27 NOTE — PLAN OF CARE
Goal Outcome Evaluation:  Plan of Care Reviewed With: patient, daughter        Progress: improving  Outcome Evaluation: Pt. presents below baseline function w/generalized weakness, decreased functional endurance, balance deficits and acute pain affecting her ability to safely participate in functional mobility. She performed bed mobility and transfers w/min assist of 2. Pt c/o dizziness/lightheadedness (VSS) and requested to return to supine. She tolerated ther-ex well to improve strength. Will continue IPPT to progress as able. Recommend IPR upon discharge.

## 2023-04-27 NOTE — THERAPY TREATMENT NOTE
Patient Name: Angelica Spivey  : 1940    MRN: [id number]                              Today's Date: 2023       Admit Date: 2023    Visit Dx:     ICD-10-CM ICD-9-CM   1. Closed right hip fracture, initial encounter  S72.001A 820.8   2. Hyperglycemia  R73.9 790.29     Patient Active Problem List   Diagnosis   • Abnormal stress test   • Mixed hyperlipidemia   • Coronary artery disease involving native coronary artery of native heart   • Type 2 diabetes mellitus with hyperglycemia, with long-term current use of insulin   • Acquired hypothyroidism   • Chronic GERD   • Unstable angina   • Closed fracture of right hip   • Essential hypertension   • Acute blood loss anemia     Past Medical History:   Diagnosis Date   • Anxiety    • Arthritis    • Coronary artery disease    • Diabetes mellitus    • Disease of thyroid gland    • Elevated cholesterol    • GERD (gastroesophageal reflux disease)    • Hearing aid worn    • Heart attack    • History of stomach ulcers    • Hypertension    • PONV (postoperative nausea and vomiting)    • Wears glasses      Past Surgical History:   Procedure Laterality Date   • BLADDER SUSPENSION     • CARDIAC CATHETERIZATION N/A 08/15/2018    Procedure: Left Heart Cath;  Surgeon: David Burnett MD;  Location:  [facility];  Service: Cardiology   • CARDIAC CATHETERIZATION N/A 2020    Procedure: LEFT HEART CATH;  Surgeon: David Subramanian MD;  Location:  [facility];  Service: Cardiology   • CARDIAC CATHETERIZATION N/A 01/15/2020    Procedure: CBI to the LAD;  Surgeon: Phil Deng MD;  Location:  [facility];  Service: Cardiovascular   • CARDIAC CATHETERIZATION N/A 2020    Procedure: Left Heart Cath 99880;  Surgeon: Phil Deng MD;  Location:  [facility];  Service: Cardiovascular;  Laterality: N/A;   • CATARACT EXTRACTION Bilateral    • COLONOSCOPY     • ENDOSCOPY     •  ENDOSCOPY N/A 12/06/2019    Procedure: ESOPHAGOGASTRODUODENOSCOPY;  Surgeon: Burak Patino MD;  Location:  [facility];  Service: Gastroenterology   • HYSTERECTOMY  2014    PARTIAL   • OOPHORECTOMY Bilateral 2014   • WI RT/LT HEART CATHETERS N/A 10/05/2018    Procedure: Percutaneous Coronary Intervention;  Surgeon: David Burnett MD;  Location:  [facility];  Service: Cardiology   • TOTAL HIP ARTHROPLASTY Right 4/26/2023    Procedure: TOTAL HIP ARTHROPLASTY ANTERIOR RIGHT;  Surgeon: Rodo Jennings MD;  Location:  [facility];  Service: Orthopedics;  Laterality: Right;   • VULVECTOMY        General Information     Row Name 04/27/23 1458 04/27/23 0935       Physical Therapy Time and Intention    Document Type therapy note (daily note)  -SS therapy note (daily note)  -    Mode of Treatment physical therapy  - physical therapy  -SS    Row Name 04/27/23 1458 04/27/23 0935       General Information    Patient Profile Reviewed yes  -SS yes  -SS    Existing Precautions/Restrictions fall;right;hip, anterior;other (see comments);oxygen therapy device and L/min  wound vac  -SS fall;right;hip, anterior;other (see comments)  wound vac  -SS    Barriers to Rehab medically complex;cognitive status  -SS previous functional deficit  -SS    Row Name 04/27/23 1458 04/27/23 0935       Cognition    Orientation Status (Cognition) oriented x 3  -SS oriented x 3;other (see comments)  situational confusion noted  -SS    Row Name 04/27/23 1458 04/27/23 0935       Safety Issues, Functional Mobility    Safety Issues Affecting Function (Mobility) awareness of need for assistance;insight into deficits/self-awareness;judgment;positioning of assistive device;problem-solving;safety precaution awareness;safety precautions follow-through/compliance;sequencing abilities;steps too close to assistive device  -SS awareness of need for assistance;insight into deficits/self-awareness;judgment;positioning of assistive  device;problem-solving;safety precaution awareness;safety precautions follow-through/compliance;sequencing abilities  -    Impairments Affecting Function (Mobility) balance;cognition;endurance/activity tolerance;pain;strength;postural/trunk control;range of motion (ROM)  - balance;cognition;endurance/activity tolerance;pain;strength;postural/trunk control  -    Cognitive Impairments, Mobility Safety/Performance awareness, need for assistance;insight into deficits/self-awareness;judgment;problem-solving/reasoning;safety precaution awareness;safety precaution follow-through;sequencing abilities  -SS awareness, need for assistance;insight into deficits/self-awareness;judgment;problem-solving/reasoning;safety precaution awareness;safety precaution follow-through;sequencing abilities  -          User Key  (r) = Recorded By, (t) = Taken By, (c) = Cosigned By    Initials Name Provider Type     Fara Alatorre PT Physical Therapist               Mobility     Row Name 04/27/23 1503 04/27/23 0937       Bed Mobility    Bed Mobility -- supine-sit;sit-supine;scooting/bridging  -    Scooting/Bridging [place] (Bed Mobility) -- minimum assist (75% patient effort);verbal cues;2 person assist  -    Supine-Sit [place] (Bed Mobility) -- 2 person assist;verbal cues;nonverbal cues (demo/gesture);minimum assist (75% patient effort)  -    Sit-Supine [place] (Bed Mobility) -- minimum assist (75% patient effort);2 person assist;verbal cues;nonverbal cues (demo/gesture)  -    Assistive Device (Bed Mobility) -- head of bed elevated;draw sheet;bed rails  -    Comment, (Bed Mobility) up in chair  - VC for sequencing; mild dizziness that improved with time  -    Row Name 04/27/23 1503 04/27/23 0937       Sit-Stand Transfer    Sit-Stand [place] (Transfers) verbal cues;nonverbal cues (demo/gesture);minimum assist (75% patient effort);2 person assist  - 2 person assist;nonverbal cues  (demo/gesture);verbal cues;minimum assist (75% patient effort)  -    Assistive Device (Sit-Stand Transfers) walker, front-wheeled  -SS walker, front-wheeled  -SS    Comment, (Sit-Stand Transfer) VC for hand placemnet, stepping out RLE, lowering with eccentric control  - VC for hand placement, stepping out RLE, lowering w/eccentric control; pt. c/o dizziness/lightheadedness that does not improve with time; pt. requested to return to supine vs. SPt to chair  -    Row Name 04/27/23 1503 04/27/23 0937       Gait/Stairs (Locomotion)    [place] Level (Gait) 2 person assist;verbal cues;nonverbal cues (demo/gesture);minimum assist (75% patient effort)  - --    Assistive Device (Gait) walker, front-wheeled  -SS --    Distance in Feet (Gait) 25  10+15  - --    Deviations/Abnormal Patterns (Gait) bilateral deviations;base of support, narrow;stride length decreased;weight shifting decreased;gait speed decreased;[place] decreased  - --    Bilateral Gait Deviations forward flexed posture;weight shift ability decreased;heel strike decreased  - --    Comment, (Gait/Stairs) Pt. ambulated with a step through gait pattern. VC for upright posture, AD management, heel toe gait pattern. Activity limited by fatigue. Seated rest break required between trials.  - pt declined  -    Row Name 04/27/23 1503 04/27/23 0937       Mobility    Extremity Weight-bearing Status right lower extremity  -SS right lower extremity  -SS    Right Lower Extremity (Weight-bearing Status) weight-bearing as tolerated (WBAT)  - weight-bearing as tolerated (WBAT)  -          User Key  (r) = Recorded By, (t) = Taken By, (c) = Cosigned By    Initials Name Provider Type     Fara Alatorre PT Physical Therapist               Obj/Interventions     Row Name 04/27/23 1505 04/27/23 0942       Motor Skills    Therapeutic Exercise hip;knee;ankle  - hip;knee;ankle  -    Row Name 04/27/23 1505 04/27/23 0942       Hip (Therapeutic Exercise)     Hip (Therapeutic Exercise) isometric exercises;strengthening exercise  - isometric exercises;strengthening exercise  -    Hip Isometrics (Therapeutic Exercise) bilateral;gluteal sets;10 repetitions  -SS bilateral;gluteal sets;10 repetitions  -SS    Hip Strengthening (Therapeutic Exercise) right;aBduction;aDduction;heel slides;marching while seated;marching while standing;10 repetitions  -SS right;aBduction;heel slides;10 repetitions  -SS    Row Name 04/27/23 1505 04/27/23 0942       Knee (Therapeutic Exercise)    Knee (Therapeutic Exercise) isometric exercises;strengthening exercise  - isometric exercises;strengthening exercise  -    Knee Isometrics (Therapeutic Exercise) bilateral;quad sets;10 repetitions  -SS bilateral;quad sets;10 repetitions  -SS    Knee Strengthening (Therapeutic Exercise) bilateral;LAQ (long arc quad);10 repetitions  -SS right;LAQ (long arc quad);10 repetitions  -SS    Row Name 04/27/23 1505 04/27/23 0942       Ankle (Therapeutic Exercise)    Ankle (Therapeutic Exercise) AROM (active range of motion)  - AROM (active range of motion)  -    Ankle AROM (Therapeutic Exercise) bilateral;dorsiflexion;plantarflexion;10 repetitions  -SS bilateral;dorsiflexion;plantarflexion;10 repetitions  -SS    Row Name 04/27/23 1505 04/27/23 0942       Balance    Balance Assessment sitting static balance;sitting dynamic balance;sit to stand dynamic balance;standing static balance;standing dynamic balance  - sitting static balance;sitting dynamic balance;sit to stand dynamic balance;standing static balance;standing dynamic balance  -    Static Sitting Balance standby assist  - contact guard  -SS    Dynamic Sitting Balance contact guard  -SS contact guard  -SS    Position, Sitting Balance unsupported;sitting edge of bed  -SS unsupported;sitting edge of bed  -SS    Sit to Stand Dynamic Balance minimal assist;2-person assist  -SS minimal assist;2-person assist  -SS    Static Standing Balance minimal  assist;2-person assist  -SS minimal assist;2-person assist  -SS    Dynamic Standing Balance minimal assist;2-person assist  -SS minimal assist;2-person assist  -SS    Position/Device Used, Standing Balance supported;walker, front-wheeled  -SS supported;walker, front-wheeled  -SS    Balance Interventions sitting;standing;sit to stand;supported;static;dynamic  -SS sitting;standing;sit to stand;supported;dynamic;static  -SS          User Key  (r) = Recorded By, (t) = Taken By, (c) = Cosigned By    Initials Name Provider Type    SS Fara Alatorre, HAROLDO Physical Therapist               Goals/Plan    No documentation.                Clinical Impression     Row Name 04/27/23 1508 04/27/23 0943       Pain    Pretreatment Pain Rating 5/10  -SS 5/10  -SS    Posttreatment Pain Rating 4/10  -SS 7/10  -SS    Pain Location - Side/Orientation Right  -SS Right  -SS    Pain Location generalized  -SS lateral  -    Pain Location - hip  -SS hip  -SS    Pain Intervention(s) Repositioned;Ambulation/increased activity;Elevated  declined cold pack  -SS Repositioned;Ambulation/increased activity;Elevated  pt declined cold pack  -SS    Additional Documentation Pain Scale: Numbers Pre/Post-Treatment (Group)  -SS Pain Scale: Numbers Pre/Post-Treatment (Group)  -SS    Row Name 04/27/23 1508 04/27/23 0943       Plan of Care Review    Plan of Care Reviewed With patient;daughter  - patient;daughter  -    Progress improving  - improving  -    Outcome Evaluation Pt. presents below baseline function w/generalized weaknes, balance deficits, acute pain, and decreased functional endurance affecting her ability to safely participate in functional mobility. She performed transfers and ambulated 10+15' w/front wheeled walker, min assist of 2. Activity limited by fatigue. She tolerated ther-ex well to improve strength. Will continue IPPT to progress as tolerated.  -SS Pt. presents below baseline function w/generalized weakness, decreased functional  endurance, balance deficits and acute pain affecting her ability to safely participate in functional mobility. She performed bed mobility and transfers w/min assist of 2. Pt c/o dizziness/lightheadedness (VSS) and requested to return to supine. She tolerated ther-ex well to improve strength. Will continue IPPT to progress as able. Recommend IPR upon discharge.  -    Row Name 04/27/23 1508 04/27/23 0943       Therapy Assessment/Plan (PT)    Rehab Potential (PT) good, to achieve stated therapy goals  -SS good, to achieve stated therapy goals  -SS    Criteria for Skilled Interventions Met (PT) yes;meets criteria;skilled treatment is necessary  -SS yes;meets criteria;skilled treatment is necessary  -SS    Therapy Frequency (PT) 2 times/day  -SS 2 times/day  -    Row Name 04/27/23 1508 04/27/23 0943       Vital Signs    Pre Systolic BP Rehab --  VSS, RN cleared  -  -SS    Pre Treatment Diastolic BP -- 64  -SS    Post Systolic BP Rehab -- 125  after returning to sitting w/c/o dizziness  -SS    Post Treatment Diastolic BP -- 63  -SS    Pretreatment Heart Rate (beats/min) -- 79  -SS    Pre SpO2 (%) -- 94  -SS    O2 Delivery Pre Treatment -- room air  NC on pt face but inappropriately positioned - not in nostrils  -SS    Intra SpO2 (%) -- 88  -SS    O2 Delivery Intra Treatment -- room air  -SS    Post SpO2 (%) -- 91  -SS    O2 Delivery Post Treatment -- nasal cannula  -SS    Pre Patient Position -- Supine  -SS    Intra Patient Position -- Sitting  -SS    Post Patient Position -- Supine  -SS    Row Name 04/27/23 1508 04/27/23 0943       Positioning and Restraints    Pre-Treatment Position sitting in chair/recliner  -SS in bed  -SS    Post Treatment Position chair  -SS bed  -SS    In Bed -- notified nsg;fowlers;call light within reach;encouraged to call for assist;exit alarm on;with family/caregiver;SCD pump applied;pillow between legs  -SS    In Chair notified nsg;reclined;call light within reach;encouraged to call  for assist;exit alarm on;with family/caregiver;waffle cushion;on mechanical lift sling;legs elevated;heels elevated  -SS --          User Key  (r) = Recorded By, (t) = Taken By, (c) = Cosigned By    Initials Name Provider Type    SS Fara Alatorre, HAROLDO Physical Therapist               Outcome Measures     Row Name 04/27/23 1511 04/27/23 1016       How much help from another person do you currently need...    Turning from your back to your side while in flat bed without using bedrails? 3  -SS 3  -SS    Moving from lying on back to sitting on the side of a flat bed without bedrails? 3  -SS 3  -SS    Moving to and from a bed to a chair (including a wheelchair)? 3  -SS 3  -SS    Standing up from a chair using your arms (e.g., wheelchair, bedside chair)? 3  -SS 3  -SS    Climbing 3-5 steps with a railing? 2  -SS 2  -SS    To walk in hospital room? 3  -SS 2  -SS    AM-PAC 6 Clicks Score (PT) 17  -SS 16  -SS    Highest level of mobility 5 --> Static standing  -SS 5 --> Static standing  -SS    Row Name 04/27/23 0800          How much help from another person do you currently need...    Turning from your back to your side while in flat bed without using bedrails? 3  -TS     Moving from lying on back to sitting on the side of a flat bed without bedrails? 3  -TS     Moving to and from a bed to a chair (including a wheelchair)? 3  -TS     Standing up from a chair using your arms (e.g., wheelchair, bedside chair)? 3  -TS     Climbing 3-5 steps with a railing? 2  -TS     To walk in hospital room? 2  -TS     AM-PAC 6 Clicks Score (PT) 16  -TS     Highest level of mobility 5 --> Static standing  -TS     Row Name 04/27/23 1511 04/27/23 1204       Functional Assessment    Outcome Measure Options AM-PAC 6 Clicks Basic Mobility (PT)  -SS AM-PAC 6 Clicks Daily Activity (OT)  -AN    Row Name 04/27/23 1016          Functional Assessment    Outcome Measure Options AM-PAC 6 Clicks Basic Mobility (PT)  -SS           User Key  (r) = Recorded  By, (t) = Taken By, (c) = Cosigned By    Initials Name Provider Type    Margo Angeles RN Registered Nurse    Fara Adler, PT Physical Therapist    Cassi Hair, OT Occupational Therapist                             Physical Therapy Education     Title: PT OT SLP Therapies (In Progress)     Topic: Physical Therapy (Done)     Point: Mobility training (Done)     Learning Progress Summary           Patient Eager, E, VU,DU,NR by  at 4/27/2023 1512    Comment: Reviewed safety/technique w/transfers, ambulation, HEP, PT POC    Eager, E, VU,DU,NR by  at 4/27/2023 1017    Comment: Reviewed safety/technique w/bed mobility, transfers, HEP, PT POC    Acceptance, E,D, NR by  at 4/26/2023 1902   Family Eager, E, VU,DU,NR by  at 4/27/2023 1017    Comment: Reviewed safety/technique w/bed mobility, transfers, HEP, PT POC                   Point: Home exercise program (Done)     Learning Progress Summary           Patient Eager, E, VU,DU,NR by  at 4/27/2023 1512    Comment: Reviewed safety/technique w/transfers, ambulation, HEP, PT POC    Eager, E, VU,DU,NR by  at 4/27/2023 1017    Comment: Reviewed safety/technique w/bed mobility, transfers, HEP, PT POC    Acceptance, E,D, NR by  at 4/26/2023 1902   Family Eager, E, VU,DU,NR by  at 4/27/2023 1017    Comment: Reviewed safety/technique w/bed mobility, transfers, HEP, PT POC                   Point: Body mechanics (Done)     Learning Progress Summary           Patient Eager, E, VU,DU,NR by  at 4/27/2023 1512    Comment: Reviewed safety/technique w/transfers, ambulation, HEP, PT POC    Eager, E, VU,DU,NR by  at 4/27/2023 1017    Comment: Reviewed safety/technique w/bed mobility, transfers, HEP, PT POC    Acceptance, E,D, NR by  at 4/26/2023 1902   Family Eager, E, VU,DU,NR by  at 4/27/2023 1017    Comment: Reviewed safety/technique w/bed mobility, transfers, HEP, PT POC                   Point: Precautions (Done)     Learning Progress Summary            Patient Eager, LINDSAY, VU,DU,NR by  at 4/27/2023 1512    Comment: Reviewed safety/technique w/transfers, ambulation, HEP, PT POC    Eager, LINDSAY, VU,DU,NR by  at 4/27/2023 1017    Comment: Reviewed safety/technique w/bed mobility, transfers, HEP, PT POC    Acceptance, E,D, NR by  at 4/26/2023 1902   Family Augie, LINDSAY, VU,DU,NR by  at 4/27/2023 1017    Comment: Reviewed safety/technique w/bed mobility, transfers, HEP, PT POC                               User Key     Initials Effective Dates Name Provider Type Discipline     06/01/21 -  Krystle Wynn, PT Physical Therapist PT     06/01/21 -  Fara Alatorre, PT Physical Therapist PT              PT Recommendation and Plan     Plan of Care Reviewed With: patient, daughter  Progress: improving  Outcome Evaluation: Pt. presents below baseline function w/generalized weaknes, balance deficits, acute pain, and decreased functional endurance affecting her ability to safely participate in functional mobility. She performed transfers and ambulated 10+15' w/front wheeled walker, min assist of 2. Activity limited by fatigue. She tolerated ther-ex well to improve strength. Will continue IPPT to progress as tolerated.     Time Calculation:    PT Charges     Row Name 04/27/23 1515 04/27/23 1018          Time Calculation    Start Time 1415  -SS 0830  -     Stop Time 1438  -SS 0853  -SS     Time Calculation (min) 23 min  -SS 23 min  -SS     PT Received On 04/27/23  - 04/27/23  -        Time Calculation- PT    Total Timed Code Minutes- PT 23 minute(s)  -SS 23 minute(s)  -SS        Timed Charges    94109 - PT Therapeutic Exercise Minutes 12  -SS 10  -SS     77031 - Gait Training Minutes  8  -SS --     21495 - PT Therapeutic Activity Minutes 3  -SS 13  -SS        Total Minutes    Timed Charges Total Minutes 23  -SS 23  -SS      Total Minutes 23  -SS 23  -SS           User Key  (r) = Recorded By, (t) = Taken By, (c) = Cosigned By    Initials Name Provider Type      Fara Alatorre, PT Physical Therapist              Therapy Charges for Today     Code Description Service Date Service Provider Modifiers Qty    33166053277 HC PT THER PROC EA 15 MIN 4/27/2023 Fara Alatorre, PT GP 1    99623103832 HC PT THERAPEUTIC ACT EA 15 MIN 4/27/2023 Fara Alatorre, PT GP 1    97647188033 HC PT THER SUPP EA 15 MIN 4/27/2023 Fara Alatorre, PT GP 2    30821636068 HC PT THER PROC EA 15 MIN 4/27/2023 Fara Alatorre, PT GP 1    12856485254 HC GAIT TRAINING EA 15 MIN 4/27/2023 Fara Alatorre, PT GP 1    10040653971 HC PT THER SUPP EA 15 MIN 4/27/2023 Fara Alatorre, PT GP 2          PT G-Codes  Outcome Measure Options: AM-PAC 6 Clicks Basic Mobility (PT)  AM-PAC 6 Clicks Score (PT): 17  AM-PAC 6 Clicks Score (OT): 15  PT Discharge Summary  Anticipated Discharge Disposition (PT): inpatient rehabilitation facility    Fara Alatorre PT  4/27/2023

## 2023-04-27 NOTE — PLAN OF CARE
Goal Outcome Evaluation:  Plan of Care Reviewed With: patient, daughter        Progress: improving  Outcome Evaluation: Pt. presents below baseline function w/generalized weaknes, balance deficits, acute pain, and decreased functional endurance affecting her ability to safely participate in functional mobility. She performed transfers and ambulated 10+15' w/front wheeled walker, min assist of 2. Activity limited by fatigue. She tolerated ther-ex well to improve strength. Will continue IPPT to progress as tolerated.

## 2023-04-27 NOTE — THERAPY EVALUATION
Patient Name: Angelica Spivey  : 1940    MRN: [id number]                              Today's Date: 2023       Admit Date: 2023    Visit Dx:     ICD-10-CM ICD-9-CM   1. Closed right hip fracture, initial encounter  S72.001A 820.8   2. Hyperglycemia  R73.9 790.29     Patient Active Problem List   Diagnosis   • Abnormal stress test   • Mixed hyperlipidemia   • Coronary artery disease involving native coronary artery of native heart   • Type 2 diabetes mellitus with hyperglycemia, with long-term current use of insulin   • Acquired hypothyroidism   • Chronic GERD   • Unstable angina   • Closed fracture of right hip   • Essential hypertension     Past Medical History:   Diagnosis Date   • Anxiety    • Arthritis    • Coronary artery disease    • Diabetes mellitus    • Disease of thyroid gland    • Elevated cholesterol    • GERD (gastroesophageal reflux disease)    • Hearing aid worn    • Heart attack    • History of stomach ulcers    • Hypertension    • PONV (postoperative nausea and vomiting)    • Wears glasses      Past Surgical History:   Procedure Laterality Date   • BLADDER SUSPENSION     • CARDIAC CATHETERIZATION N/A 08/15/2018    Procedure: Left Heart Cath;  Surgeon: David Burnett MD;  Location:  [facility];  Service: Cardiology   • CARDIAC CATHETERIZATION N/A 2020    Procedure: LEFT HEART CATH;  Surgeon: David Subramanian MD;  Location:  [facility];  Service: Cardiology   • CARDIAC CATHETERIZATION N/A 01/15/2020    Procedure: CBI to the LAD;  Surgeon: Phil Deng MD;  Location:  [facility];  Service: Cardiovascular   • CARDIAC CATHETERIZATION N/A 2020    Procedure: Left Heart Cath 49592;  Surgeon: Phil Deng MD;  Location:  [facility];  Service: Cardiovascular;  Laterality: N/A;   • CATARACT EXTRACTION Bilateral    • COLONOSCOPY     • ENDOSCOPY     • ENDOSCOPY N/A 2019     Procedure: ESOPHAGOGASTRODUODENOSCOPY;  Surgeon: Burak Patino MD;  Location:  [facility];  Service: Gastroenterology   • HYSTERECTOMY  2014    PARTIAL   • OOPHORECTOMY Bilateral 2014   • MN RT/LT HEART CATHETERS N/A 10/05/2018    Procedure: Percutaneous Coronary Intervention;  Surgeon: David Burnett MD;  Location:  [facility];  Service: Cardiology   • TOTAL HIP ARTHROPLASTY Right 4/26/2023    Procedure: TOTAL HIP ARTHROPLASTY ANTERIOR RIGHT;  Surgeon: Rodo Jennings MD;  Location:  [facility];  Service: Orthopedics;  Laterality: Right;   • VULVECTOMY        General Information     Row Name 04/27/23 1153          OT Time and Intention    Document Type evaluation  -AN     Mode of Treatment occupational therapy  -AN     Row Name 04/27/23 1153          General Information    Patient Profile Reviewed yes  -AN     Prior Level of Function independent:;all household mobility;community mobility;gait;ADL's;work  works as a   -AN     Existing Precautions/Restrictions fall;right;hip, anterior;other (see comments)  wound vac  -AN     Barriers to Rehab medically complex;cognitive status  -AN     Row Name 04/27/23 1153          Living Environment    People in Home child(anisha), adult  -AN     Row Name 04/27/23 1153          Home Main Entrance    Number of Stairs, Main Entrance four  -AN     Row Name 04/27/23 1153          Stairs Within Home, Primary    Number of Stairs, Within Home, Primary none  -AN     Stairs Comment, Within Home, Primary walk in shower  -AN     Row Name 04/27/23 1153          Cognition    Orientation Status (Cognition) oriented x 3  -AN     Row Name 04/27/23 1153          Safety Issues, Functional Mobility    Safety Issues Affecting Function (Mobility) awareness of need for assistance;safety precaution awareness;problem-solving;positioning of assistive device;judgment;insight into deficits/self-awareness;safety precautions follow-through/compliance;sequencing  abilities  -AN     Impairments Affecting Function (Mobility) balance;cognition;endurance/activity tolerance;pain;strength;postural/trunk control  -AN     Cognitive Impairments, Mobility Safety/Performance awareness, need for assistance;safety precaution awareness;safety precaution follow-through;insight into deficits/self-awareness;sequencing abilities;judgment;problem-solving/reasoning  -AN     Comment, Safety Issues/Impairments (Mobility) cues for sequencing of steps throughout with all mobility  -AN           User Key  (r) = Recorded By, (t) = Taken By, (c) = Cosigned By    Initials Name Provider Type    AN Cassi Conklin OT Occupational Therapist                 Mobility/ADL's     Row Name 04/27/23 1155          Bed Mobility    Bed Mobility supine-sit  -AN     Supine-Sit [place] (Bed Mobility) minimum assist (75% patient effort);1 person assist;verbal cues  -AN     Assistive Device (Bed Mobility) head of bed elevated;draw sheet  -AN     Comment, (Bed Mobility) cues for sequencing of steps, assist provided at BLE and trunk  -AN     Row Name 04/27/23 1155          Transfers    Transfers sit-stand transfer;stand-sit transfer;toilet transfer;bed-chair transfer  -AN     Comment, (Transfers) Pt performed STS, SPT to BSC, and then ambulated short distance from BSC to bedside chair using the RW with cues for hand placement and transfer technique. cues to correct forward flexed posture and for RW positioning  -AN     Row Name 04/27/23 1155          Bed-Chair Transfer    Bed-Chair [place] (Transfers) minimum assist (75% patient effort);2 person assist;verbal cues;nonverbal cues (demo/gesture)  -AN     Assistive Device (Bed-Chair Transfers) walker, front-wheeled  -AN     Row Name 04/27/23 1155          Sit-Stand Transfer    Sit-Stand [place] (Transfers) verbal cues;nonverbal cues (demo/gesture);minimum assist (75% patient effort);2 person assist  -AN     Assistive Device (Sit-Stand Transfers) walker,  front-wheeled  -AN     Row Name 04/27/23 1155          Stand-Sit Transfer    Stand-Sit [place] (Transfers) verbal cues;nonverbal cues (demo/gesture);minimum assist (75% patient effort);2 person assist  -AN     Assistive Device (Stand-Sit Transfers) walker, front-wheeled  -AN     Row Name 04/27/23 1155          Toilet Transfer    Type (Toilet Transfer) sit-stand;stand-sit  -AN     [place] Level (Toilet Transfer) minimum assist (75% patient effort);2 person assist;verbal cues;nonverbal cues (demo/gesture)  -AN     Assistive Device (Toilet Transfer) commode, bedside without drop arms  -AN     Row Name 04/27/23 1155          Functional Mobility    Functional Mobility- Ind. Level minimum assist (75% patient effort);2 person assist required;verbal cues required  -AN     Functional Mobility-Distance (Feet) --  <household distance  -AN     Functional Mobility- Safety Issues sequencing ability decreased;step length decreased;balance decreased during turns  -AN     Functional Mobility- Comment pt ambulated short distance from [facility] to bedside chair using the RW with Min A x 2 at a slow pace with cues for upright posture and RW positioning  -AN     Row Name 04/27/23 1155          Activities of Daily Living    BADL Assessment/Intervention lower body dressing;toileting  -AN     Row Name 04/27/23 115          Mobility    Extremity Weight-bearing Status right lower extremity  -AN     Right Lower Extremity (Weight-bearing Status) weight-bearing as tolerated (WBAT)  -AN     Row Name 04/27/23 [facility]          Lower Body Dressing Assessment/Training    [place] Level (Lower Body Dressing) don;socks;dependent (less than 25% patient effort)  -AN     Position (Lower Body Dressing) supine  -AN     Row Name 04/27/23 1155          Toileting Assessment/Training    [place] Level (Toileting) adjust/manage clothing;verbal cues;maximum assist (25% patient effort)  -AN     Assistive Devices (Toileting) commode, bedside without drop  arms  -AN     Position (Toileting) unsupported sitting;supported standing  -AN           User Key  (r) = Recorded By, (t) = Taken By, (c) = Cosigned By    Initials Name Provider Type    AN Cassi Conklin OT Occupational Therapist               Obj/Interventions     Row Name 04/27/23 1159          Sensory Assessment (Somatosensory)    Sensory Assessment (Somatosensory) UE sensation intact  -AN     Row Name 04/27/23 1159          Vision Assessment/Intervention    Visual Impairment/Limitations WFL  -AN     Row Name 04/27/23 1159          Range of Motion Comprehensive    General Range of Motion bilateral upper extremity ROM WFL;bilateral lower extremity ROM WFL  -AN     Row Name 04/27/23 1159          Strength Comprehensive (MMT)    General Manual Muscle Testing (MMT) Assessment upper extremity strength deficits identified;lower extremity strength deficits identified  -AN     Comment, General Manual Muscle Testing (MMT) Assessment BUE grossly 4/5, BLE weakness R>L observed with mobility  -AN     Row Name 04/27/23 1159          Balance    Balance Assessment sitting static balance;sitting dynamic balance;sit to stand dynamic balance;standing static balance;standing dynamic balance  -AN     Static Sitting Balance standby assist  -AN     Dynamic Sitting Balance contact guard  -AN     Position, Sitting Balance sitting edge of bed  -AN     Sit to Stand Dynamic Balance verbal cues;minimal assist;2-person assist;non-verbal cues (demo/gesture)  -AN     Static Standing Balance verbal cues;minimal assist;2-person assist  -AN     Dynamic Standing Balance verbal cues;minimal assist;2-person assist;non-verbal cues (demo/gesture)  -AN     Position/Device Used, Standing Balance supported;walker, rolling  -AN     Balance Interventions standing;sit to stand;supported;static;dynamic;moderate challenge;occupation based/functional task;narrowed base of support  -AN           User Key  (r) = Recorded By, (t) = Taken By, (c) = Cosigned By     Initials Name Provider Type    Cassi Hair, OT Occupational Therapist               Goals/Plan     Row Name 04/27/23 1203          Bed Mobility Goal 1 (OT)    Activity/Assistive Device (Bed Mobility Goal 1, OT) sit to supine/supine to sit  -AN     Wood Level/Cues Needed (Bed Mobility Goal 1, OT) contact guard required  -AN     Time Frame (Bed Mobility Goal 1, OT) long term goal (LTG);10 days  -AN     Row Name 04/27/23 [facility]          Transfer Goal 1 (OT)    Activity/Assistive Device (Transfer Goal 1, OT) sit-to-stand/stand-to-sit;bed-to-chair/chair-to-bed;commode, bedside without drop arms;walker, rolling  -AN     Wood Level/Cues Needed (Transfer Goal 1, OT) contact guard required  -AN     Time Frame (Transfer Goal 1, OT) long term goal (LTG);10 days  -AN     Row Name 04/27/23 1203          Dressing Goal 1 (OT)    Activity/Device (Dressing Goal 1, OT) lower body dressing  AE PRN  -AN     Wood/Cues Needed (Dressing Goal 1, OT) contact guard required  -AN     Time Frame (Dressing Goal 1, OT) long term goal (LTG);10 days  -AN     Row Name 04/27/23 [facility]          Toileting Goal 1 (OT)    Activity/Device (Toileting Goal 1, OT) adjust/manage clothing;perform perineal hygiene;commode, bedside without drop arms  -AN     Wood Level/Cues Needed (Toileting Goal 1, OT) contact guard required  -AN     Time Frame (Toileting Goal 1, OT) long term goal (LTG);10 days  -AN     Row Name 04/27/23 1203          Therapy Assessment/Plan (OT)    Planned Therapy Interventions (OT) activity tolerance training;adaptive equipment training;BADL retraining;cognitive/visual perception retraining;edema control/reduction;functional balance retraining;occupation/activity based interventions;patient/caregiver education/training;transfer/mobility retraining;strengthening exercise  -AN           User Key  (r) = Recorded By, (t) = Taken By, (c) = Cosigned By    Initials Name Provider Type    Cassi Hair,  OT Occupational Therapist               Clinical Impression     Row Name 04/27/23 1200          Pain Assessment    Additional Documentation Pain Scale: FACES Pre/Post-Treatment (Group)  -AN     Row Name 04/27/23 1200          Pain Scale: FACES Pre/Post-Treatment    Pain: FACES Scale, Pretreatment 2-->hurts little bit  -AN     Posttreatment Pain Rating 2-->hurts little bit  -AN     Pain Location - Side/Orientation Right  -AN     Pain Location lower  -AN     Pain Location - hip  -AN     Pre/Posttreatment Pain Comment tolerated  -AN     Row Name 04/27/23 1200          Plan of Care Review    Plan of Care Reviewed With patient;daughter  -AN     Progress no change  -AN     Outcome Evaluation Pt presents below her functional baseline with deficits including generalized weakness, impaired balance and mobility, situational confusion, and decreased activity tolerance warranting skilled OT services. Pt tolerated OOB to BSC and chair using RW with assist x 2 limited by pain. Increased time and max cues for sequencing of steps throughout due to impaired cognition. Rec IPR at dc.  -AN     Row Name 04/27/23 1200          Therapy Assessment/Plan (OT)    Patient/Family Therapy Goal Statement (OT) Return to PLOF  -AN     Rehab Potential (OT) good, to achieve stated therapy goals  -AN     Criteria for Skilled Therapeutic Interventions Met (OT) yes;skilled treatment is necessary  -AN     Therapy Frequency (OT) daily  -AN     Row Name 04/27/23 1200          Therapy Plan Review/Discharge Plan (OT)    Anticipated Discharge Disposition (OT) inpatient rehabilitation facility  -AN     Row Name 04/27/23 1200          Vital Signs    Pre Systolic BP Rehab 115  -AN     Pre Treatment Diastolic BP 52  -AN     Post Systolic BP Rehab 134  -AN     Post Treatment Diastolic BP 66  -AN     O2 Delivery Pre Treatment nasal cannula  -AN     Intra SpO2 (%) 89  -AN     O2 Delivery Intra Treatment room air  -AN     O2 Delivery Post Treatment nasal cannula   -AN     Pre Patient Position Supine  -AN     Intra Patient Position Standing  -AN     Post Patient Position Sitting  -AN     Row Name 04/27/23 1200          Positioning and Restraints    Pre-Treatment Position in bed  -AN     Post Treatment Position chair  -AN     In Chair notified nsg;reclined;call light within reach;encouraged to call for assist;exit alarm on;with family/caregiver;waffle cushion;on mechanical lift sling;legs elevated  -AN           User Key  (r) = Recorded By, (t) = Taken By, (c) = Cosigned By    Initials Name Provider Type    Cassi Hair, KATIA Occupational Therapist               Outcome Measures     Row Name 04/27/23 1204          How much help from another is currently needed...    Putting on and taking off regular lower body clothing? 2  -AN     Bathing (including washing, rinsing, and drying) 2  -AN     Toileting (which includes using toilet bed pan or urinal) 2  -AN     Putting on and taking off regular upper body clothing 3  -AN     Taking care of personal grooming (such as brushing teeth) 3  -AN     Eating meals 3  -AN     AM-PAC 6 Clicks Score (OT) 15  -AN     Row Name 04/27/23 1016 04/27/23 0800       How much help from another person do you currently need...    Turning from your back to your side while in flat bed without using bedrails? 3  -SS 3  -TS    Moving from lying on back to sitting on the side of a flat bed without bedrails? 3  -SS 3  -TS    Moving to and from a bed to a chair (including a wheelchair)? 3  -SS 3  -TS    Standing up from a chair using your arms (e.g., wheelchair, bedside chair)? 3  -SS 3  -TS    Climbing 3-5 steps with a railing? 2  -SS 2  -TS    To walk in hospital room? 2  -SS 2  -TS    AM-PAC 6 Clicks Score (PT) 16  -SS 16  -TS    Highest level of mobility 5 --> Static standing  -SS 5 --> Static standing  -TS    Row Name 04/27/23 1204 04/27/23 1016       Functional Assessment    Outcome Measure Options AM-PAC 6 Clicks Daily Activity (OT)  -AN AM-PAC 6  Clicks Basic Mobility (PT)  -SS          User Key  (r) = Recorded By, (t) = Taken By, (c) = Cosigned By    Initials Name Provider Type    TS Margo Love, RN Registered Nurse    SS Fara Alatorre, PT Physical Therapist    Cassi Hair OT Occupational Therapist                Occupational Therapy Education     Title: PT OT SLP Therapies (In Progress)     Topic: Occupational Therapy (In Progress)     Point: ADL training (Done)     Description:   Instruct learner(s) on proper safety adaptation and remediation techniques during self care or transfers.   Instruct in proper use of assistive devices.              Learning Progress Summary           Patient Acceptance, E, VU,NR by AN at 4/27/2023 1204   Family Acceptance, E, VU,NR by AN at 4/27/2023 1204                   Point: Home exercise program (Not Started)     Description:   Instruct learner(s) on appropriate technique for monitoring, assisting and/or progressing therapeutic exercises/activities.              Learner Progress:  Not documented in this visit.          Point: Precautions (Done)     Description:   Instruct learner(s) on prescribed precautions during self-care and functional transfers.              Learning Progress Summary           Patient Acceptance, E, VU,NR by AN at 4/27/2023 1204   Family Acceptance, E, VU,NR by AN at 4/27/2023 1204                   Point: Body mechanics (Done)     Description:   Instruct learner(s) on proper positioning and spine alignment during self-care, functional mobility activities and/or exercises.              Learning Progress Summary           Patient Acceptance, E, VU,NR by AN at 4/27/2023 1204   Family Acceptance, E, VU,NR by AN at 4/27/2023 1204                               User Key     Initials Effective Dates Name Provider Type Discipline    ADRYAN 09/21/21 -  Cassi Conklin OT Occupational Therapist OT              OT Recommendation and Plan  Planned Therapy Interventions (OT): activity tolerance  training, adaptive equipment training, BADL retraining, cognitive/visual perception retraining, edema control/reduction, functional balance retraining, occupation/activity based interventions, patient/caregiver education/training, transfer/mobility retraining, strengthening exercise  Therapy Frequency (OT): daily  Plan of Care Review  Plan of Care Reviewed With: patient, daughter  Progress: no change  Outcome Evaluation: Pt presents below her functional baseline with deficits including generalized weakness, impaired balance and mobility, situational confusion, and decreased activity tolerance warranting skilled OT services. Pt tolerated OOB to BSC and chair using RW with assist x 2 limited by pain. Increased time and max cues for sequencing of steps throughout due to impaired cognition. Rec IPR at dc.     Time Calculation:    Time Calculation- OT     Row Name 04/27/23 1205             Time Calculation- OT    OT Start Time 1015  -AN      OT Received On 04/27/23  -AN      OT Goal Re-Cert Due Date 05/07/23  -AN         Timed Charges    94316 - OT Self Care/Mgmt Minutes 8  -AN         Untimed Charges    OT Eval/Re-eval Minutes 46  -AN         Total Minutes    Timed Charges Total Minutes 8  -AN      Untimed Charges Total Minutes 46  -AN       Total Minutes 54  -AN            User Key  (r) = Recorded By, (t) = Taken By, (c) = Cosigned By    Initials Name Provider Type    AN Cassi Conklin, OT Occupational Therapist              Therapy Charges for Today     Code Description Service Date Service Provider Modifiers Qty    73918278760 HC OT SELF CARE/MGMT/TRAIN EA 15 MIN 4/27/2023 NeAnika kimgail, OT GO 1    66455795010 HC OT EVAL MOD COMPLEXITY 4 4/27/2023 Imelda Conklinil, OT GO 1    73546583844 HC OT THER SUPP EA 15 MIN 4/27/2023 Nejudy Cassi, OT GO 3               Cassi Nedeljkena, OT  4/27/2023

## 2023-04-27 NOTE — THERAPY TREATMENT NOTE
Patient Name: Angelica Spivey  : 1940    MRN: [id number]                              Today's Date: 2023       Admit Date: 2023    Visit Dx:     ICD-10-CM ICD-9-CM   1. Closed right hip fracture, initial encounter  S72.001A 820.8   2. Hyperglycemia  R73.9 790.29     Patient Active Problem List   Diagnosis   • Abnormal stress test   • Mixed hyperlipidemia   • Coronary artery disease involving native coronary artery of native heart   • Type 2 diabetes mellitus with hyperglycemia, with long-term current use of insulin   • Acquired hypothyroidism   • Chronic GERD   • Unstable angina   • Closed fracture of right hip   • Essential hypertension     Past Medical History:   Diagnosis Date   • Anxiety    • Arthritis    • Coronary artery disease    • Diabetes mellitus    • Disease of thyroid gland    • Elevated cholesterol    • GERD (gastroesophageal reflux disease)    • Hearing aid worn    • Heart attack    • History of stomach ulcers    • Hypertension    • PONV (postoperative nausea and vomiting)    • Wears glasses      Past Surgical History:   Procedure Laterality Date   • BLADDER SUSPENSION     • CARDIAC CATHETERIZATION N/A 08/15/2018    Procedure: Left Heart Cath;  Surgeon: David Burnett MD;  Location:  [facility];  Service: Cardiology   • CARDIAC CATHETERIZATION N/A 2020    Procedure: LEFT HEART CATH;  Surgeon: David Subramanian MD;  Location:  [facility];  Service: Cardiology   • CARDIAC CATHETERIZATION N/A 01/15/2020    Procedure: CBI to the LAD;  Surgeon: Phil Deng MD;  Location:  [facility];  Service: Cardiovascular   • CARDIAC CATHETERIZATION N/A 2020    Procedure: Left Heart Cath 46088;  Surgeon: Phil Deng MD;  Location:  [facility];  Service: Cardiovascular;  Laterality: N/A;   • CATARACT EXTRACTION Bilateral    • COLONOSCOPY     • ENDOSCOPY     • ENDOSCOPY N/A 2019     Procedure: ESOPHAGOGASTRODUODENOSCOPY;  Surgeon: Burak Patino MD;  Location:  [email or web address];  Service: Gastroenterology   • HYSTERECTOMY  2014    PARTIAL   • OOPHORECTOMY Bilateral 2014   • [place] RT/LT HEART CATHETERS N/A 10/05/2018    Procedure: Percutaneous Coronary Intervention;  Surgeon: David Burnett MD;  Location:  [email or web address];  Service: Cardiology   • VULVECTOMY        General Information     Row Name 04/27/23 0935          Physical Therapy Time and Intention    Document Type therapy note (daily note)  -     Mode of Treatment physical therapy  -     Row Name 04/27/23 0935          General Information    Patient Profile Reviewed yes  -SS     Existing Precautions/Restrictions fall;right;hip, anterior;other (see comments)  wound vac  -     Barriers to Rehab previous functional deficit  -     Row Name 04/27/23 0935          Cognition    Orientation Status (Cognition) oriented x 3;other (see comments)  situational confusion noted  -     Row Name 04/27/23 0935          Safety Issues, Functional Mobility    Safety Issues Affecting Function (Mobility) awareness of need for assistance;insight into deficits/self-awareness;judgment;positioning of assistive device;problem-solving;safety precaution awareness;safety precautions follow-through/compliance;sequencing abilities  -     Impairments Affecting Function (Mobility) balance;cognition;endurance/activity tolerance;pain;strength;postural/trunk control  -     Cognitive Impairments, Mobility Safety/Performance awareness, need for assistance;insight into deficits/self-awareness;judgment;problem-solving/reasoning;safety precaution awareness;safety precaution follow-through;sequencing abilities  -SS           User Key  (r) = Recorded By, (t) = Taken By, (c) = Cosigned By    Initials Name Provider Type    SS Fara Alatorre PT Physical Therapist               Mobility     Row Name 04/27/23 0937          Bed Mobility    Bed Mobility  supine-sit;sit-supine;scooting/bridging  -     Scooting/Bridging [place] (Bed Mobility) minimum assist (75% patient effort);verbal cues;2 person assist  -     Supine-Sit [place] (Bed Mobility) 2 person assist;verbal cues;nonverbal cues (demo/gesture);minimum assist (75% patient effort)  -     Sit-Supine [place] (Bed Mobility) minimum assist (75% patient effort);2 person assist;verbal cues;nonverbal cues (demo/gesture)  -     Assistive Device (Bed Mobility) head of bed elevated;draw sheet;bed rails  -     Comment, (Bed Mobility) VC for sequencing; mild dizziness that improved with time  -     Row Name 04/27/23 0937          Sit-Stand Transfer    Sit-Stand [place] (Transfers) 2 person assist;nonverbal cues (demo/gesture);verbal cues;minimum assist (75% patient effort)  -     Assistive Device (Sit-Stand Transfers) walker, front-wheeled  -     Comment, (Sit-Stand Transfer) VC for hand placement, stepping out RLE, lowering w/eccentric control; pt. c/o dizziness/lightheadedness that does not improve with time; pt. requested to return to supine vs. SPt to chair  -     Row Name 04/27/23 0937          Gait/Stairs (Locomotion)    Comment, (Gait/Stairs) pt declined  -     Row Name 04/27/23 0937          Mobility    Extremity Weight-bearing Status right lower extremity  -     Right Lower Extremity (Weight-bearing Status) weight-bearing as tolerated (WBAT)  -           User Key  (r) = Recorded By, (t) = Taken By, (c) = Cosigned By    Initials Name Provider Type     Fara Alatorre, PT Physical Therapist               Obj/Interventions     Row Name 04/27/23 0942          Motor Skills    Therapeutic Exercise hip;knee;ankle  -     Row Name 04/27/23 0942          Hip (Therapeutic Exercise)    Hip (Therapeutic Exercise) isometric exercises;strengthening exercise  -     Hip Isometrics (Therapeutic Exercise) bilateral;gluteal sets;10 repetitions  -     Hip Strengthening (Therapeutic  Exercise) right;aBduction;heel slides;10 repetitions  -     Row Name 04/27/23 0942          Knee (Therapeutic Exercise)    Knee (Therapeutic Exercise) isometric exercises;strengthening exercise  -     Knee Isometrics (Therapeutic Exercise) bilateral;quad sets;10 repetitions  -     Knee Strengthening (Therapeutic Exercise) right;LAQ (long arc quad);10 repetitions  -     Row Name 04/27/23 0942          Ankle (Therapeutic Exercise)    Ankle (Therapeutic Exercise) AROM (active range of motion)  -     Ankle AROM (Therapeutic Exercise) bilateral;dorsiflexion;plantarflexion;10 repetitions  -     Row Name 04/27/23 0942          Balance    Balance Assessment sitting static balance;sitting dynamic balance;sit to stand dynamic balance;standing static balance;standing dynamic balance  -     Static Sitting Balance contact guard  -     Dynamic Sitting Balance contact guard  -     Position, Sitting Balance unsupported;sitting edge of bed  -     Sit to Stand Dynamic Balance minimal assist;2-person assist  -     Static Standing Balance minimal assist;2-person assist  -     Dynamic Standing Balance minimal assist;2-person assist  -     Position/Device Used, Standing Balance supported;walker, front-wheeled  -     Balance Interventions sitting;standing;sit to stand;supported;dynamic;static  -           User Key  (r) = Recorded By, (t) = Taken By, (c) = Cosigned By    Initials Name Provider Type    SS Fara Alatorre PT Physical Therapist               Goals/Plan    No documentation.                Clinical Impression     Row Name 04/27/23 0943          Pain    Pretreatment Pain Rating 5/10  -SS     Posttreatment Pain Rating 7/10  -SS     Pain Location - Side/Orientation Right  -SS     Pain Location lateral  -     Pain Location - hip  -SS     Pain Intervention(s) Repositioned;Ambulation/increased activity;Elevated  pt declined cold pack  -SS     Additional Documentation Pain Scale: Numbers  Pre/Post-Treatment (Group)  -     Row Name 04/27/23 0943          Plan of Care Review    Plan of Care Reviewed With patient;daughter  -     Progress improving  -     Outcome Evaluation Pt. presents below baseline function w/generalized weakness, decreased functional endurance, balance deficits and acute pain affecting her ability to safely participate in functional mobility. She performed bed mobility and transfers w/min assist of 2. Pt c/o dizziness/lightheadedness (VSS) and requested to return to supine. She tolerated ther-ex well to improve strength. Will continue IPPT to progress as able. Recommend IPR upon discharge.  -     Row Name 04/27/23 0943          Therapy Assessment/Plan (PT)    Rehab Potential (PT) good, to achieve stated therapy goals  -     Criteria for Skilled Interventions Met (PT) yes;meets criteria;skilled treatment is necessary  -     Therapy Frequency (PT) 2 times/day  -     Row Name 04/27/23 0943          Vital Signs    Pre Systolic BP Rehab 117  -SS     Pre Treatment Diastolic BP 64  -SS     Post Systolic BP Rehab 125  after returning to sitting w/c/o dizziness  -SS     Post Treatment Diastolic BP 63  -SS     Pretreatment Heart Rate (beats/min) 79  -SS     Pre SpO2 (%) 94  -SS     O2 Delivery Pre Treatment room air  NC on pt face but inappropriately positioned - not in nostrils  -SS     Intra SpO2 (%) 88  -SS     O2 Delivery Intra Treatment room air  -SS     Post SpO2 (%) 91  -SS     O2 Delivery Post Treatment nasal cannula  -SS     Pre Patient Position Supine  -SS     Intra Patient Position Sitting  -SS     Post Patient Position Supine  -     Row Name 04/27/23 0943          Positioning and Restraints    Pre-Treatment Position in bed  -SS     Post Treatment Position bed  -SS     In Bed notified nsg;fowlers;call light within reach;encouraged to call for assist;exit alarm on;with family/caregiver;SCD pump applied;pillow between legs  -           User Key  (r) = Recorded By,  (t) = Taken By, (c) = Cosigned By    Initials Name Provider Type    Fara Adler PT Physical Therapist               Outcome Measures     Row Name 04/27/23 1016          How much help from another person do you currently need...    Turning from your back to your side while in flat bed without using bedrails? 3  -SS     Moving from lying on back to sitting on the side of a flat bed without bedrails? 3  -SS     Moving to and from a bed to a chair (including a wheelchair)? 3  -SS     Standing up from a chair using your arms (e.g., wheelchair, bedside chair)? 3  -SS     Climbing 3-5 steps with a railing? 2  -SS     To walk in hospital room? 2  -SS     AM-PAC 6 Clicks Score (PT) 16  -SS     Highest level of mobility 5 --> Static standing  -SS     Row Name 04/27/23 1016          Functional Assessment    Outcome Measure Options AM-PAC 6 Clicks Basic Mobility (PT)  -SS           User Key  (r) = Recorded By, (t) = Taken By, (c) = Cosigned By    Initials Name Provider Type    Fara Adler PT Physical Therapist                             Physical Therapy Education     Title: PT OT SLP Therapies (Done)     Topic: Physical Therapy (Done)     Point: Mobility training (Done)     Learning Progress Summary           Patient Eager, E, VU,DU,NR by  at 4/27/2023 1017    Comment: Reviewed safety/technique w/bed mobility, transfers, HEP, PT POC    Acceptance, E,D, NR by  at 4/26/2023 1902   Family Eager, E, VU,DU,NR by  at 4/27/2023 1017    Comment: Reviewed safety/technique w/bed mobility, transfers, HEP, PT POC                   Point: Home exercise program (Done)     Learning Progress Summary           Patient Eager, E, VU,DU,NR by  at 4/27/2023 1017    Comment: Reviewed safety/technique w/bed mobility, transfers, HEP, PT POC    Acceptance, E,D, NR by  at 4/26/2023 1902   Family Eager, E, VU,DU,NR by  at 4/27/2023 1017    Comment: Reviewed safety/technique w/bed mobility, transfers, HEP, PT POC                    Point: Body mechanics (Done)     Learning Progress Summary           Patient Eager, E, VU,DU,NR by  at 4/27/2023 1017    Comment: Reviewed safety/technique w/bed mobility, transfers, HEP, PT POC    Acceptance, E,D, NR by  at 4/26/2023 1902   Family Eager, E, VU,DU,NR by  at 4/27/2023 1017    Comment: Reviewed safety/technique w/bed mobility, transfers, HEP, PT POC                   Point: Precautions (Done)     Learning Progress Summary           Patient Eager, E, VU,DU,NR by  at 4/27/2023 1017    Comment: Reviewed safety/technique w/bed mobility, transfers, HEP, PT POC    Acceptance, E,D, NR by  at 4/26/2023 1902   Family Eager, E, VU,DU,NR by  at 4/27/2023 1017    Comment: Reviewed safety/technique w/bed mobility, transfers, HEP, PT POC                               User Key     Initials Effective Dates Name Provider Type Discipline     06/01/21 -  Krystle Wynn, PT Physical Therapist PT     06/01/21 -  Fara Alatorre, HAROLDO Physical Therapist PT              PT Recommendation and Plan     Plan of Care Reviewed With: patient, daughter  Progress: improving  Outcome Evaluation: Pt. presents below baseline function w/generalized weakness, decreased functional endurance, balance deficits and acute pain affecting her ability to safely participate in functional mobility. She performed bed mobility and transfers w/min assist of 2. Pt c/o dizziness/lightheadedness (VSS) and requested to return to supine. She tolerated ther-ex well to improve strength. Will continue IPPT to progress as able. Recommend IPR upon discharge.     Time Calculation:    PT Charges     Row Name 04/27/23 1018             Time Calculation    Start Time 0830  -      Stop Time 0853  -      Time Calculation (min) 23 min  -      PT Received On 04/27/23  -         Time Calculation- PT    Total Timed Code Minutes- PT 23 minute(s)  -SS         Timed Charges    51863 - PT Therapeutic Exercise Minutes 10  -SS      24118 - PT  Therapeutic Activity Minutes 13  -SS         Total Minutes    Timed Charges Total Minutes 23  -SS       Total Minutes 23  -SS            User Key  (r) = Recorded By, (t) = Taken By, (c) = Cosigned By    Initials Name Provider Type    SS Fara Alatorre, PT Physical Therapist              Therapy Charges for Today     Code Description Service Date Service Provider Modifiers Qty    26480669947  PT THER PROC EA 15 MIN 4/27/2023 Fara Alatorre, PT GP 1    [id number] HC PT THERAPEUTIC ACT EA 15 MIN 4/27/2023 Fara Alatorre, PT GP 1    [id number]  PT THER SUPP EA 15 MIN 4/27/2023 Fara Alatorre, PT GP 2          PT G-Codes  Outcome Measure Options: AM-PAC 6 Clicks Basic Mobility (PT)  AM-PAC 6 Clicks Score (PT): 16  PT Discharge Summary  Anticipated Discharge Disposition (PT): inpatient rehabilitation facility    Fara Alatorre PT  4/27/2023

## 2023-04-28 LAB
ANION GAP SERPL CALCULATED.3IONS-SCNC: 7 MMOL/L (ref 5–15)
BUN SERPL-MCNC: 14 MG/DL (ref 8–23)
BUN/CREAT SERPL: 19.4 (ref 7–25)
CALCIUM SPEC-SCNC: 8.5 MG/DL (ref 8.6–10.5)
CHLORIDE SERPL-SCNC: 106 MMOL/L (ref 98–107)
CO2 SERPL-SCNC: 28 MMOL/L (ref 22–29)
CREAT SERPL-MCNC: 0.72 MG/DL (ref 0.57–1)
DEPRECATED RDW RBC AUTO: 46.3 FL (ref 37–54)
EGFRCR SERPLBLD CKD-EPI 2021: 84.1 ML/MIN/1.73
ERYTHROCYTE [DISTWIDTH] IN BLOOD BY AUTOMATED COUNT: 14.5 % (ref 12.3–15.4)
FLUAV RNA RESP QL NAA+PROBE: NOT DETECTED
FLUBV RNA RESP QL NAA+PROBE: NOT DETECTED
GLUCOSE BLDC GLUCOMTR-MCNC: 140 MG/DL (ref 70–130)
GLUCOSE BLDC GLUCOMTR-MCNC: 179 MG/DL (ref 70–130)
GLUCOSE BLDC GLUCOMTR-MCNC: 219 MG/DL (ref 70–130)
GLUCOSE BLDC GLUCOMTR-MCNC: 392 MG/DL (ref 70–130)
GLUCOSE SERPL-MCNC: 151 MG/DL (ref 65–99)
HCT VFR BLD AUTO: 27.4 % (ref 34–46.6)
HGB BLD-MCNC: 8.5 G/DL (ref 12–15.9)
MCH RBC QN AUTO: 27.7 PG (ref 26.6–33)
MCHC RBC AUTO-ENTMCNC: 31 G/DL (ref 31.5–35.7)
MCV RBC AUTO: 89.3 FL (ref 79–97)
PLATELET # BLD AUTO: 136 10*3/MM3 (ref 140–450)
PMV BLD AUTO: 10.1 FL (ref 6–12)
POTASSIUM SERPL-SCNC: 4.1 MMOL/L (ref 3.5–5.2)
RBC # BLD AUTO: 3.07 10*6/MM3 (ref 3.77–5.28)
SARS-COV-2 RNA RESP QL NAA+PROBE: NOT DETECTED
SODIUM SERPL-SCNC: 141 MMOL/L (ref 136–145)
WBC NRBC COR # BLD: 6.46 10*3/MM3 (ref 3.4–10.8)

## 2023-04-28 PROCEDURE — 85027 COMPLETE CBC AUTOMATED: CPT | Performed by: INTERNAL MEDICINE

## 2023-04-28 PROCEDURE — 63710000001 INSULIN LISPRO (HUMAN) PER 5 UNITS

## 2023-04-28 PROCEDURE — 82948 REAGENT STRIP/BLOOD GLUCOSE: CPT

## 2023-04-28 PROCEDURE — 87636 SARSCOV2 & INF A&B AMP PRB: CPT | Performed by: INTERNAL MEDICINE

## 2023-04-28 PROCEDURE — 99232 SBSQ HOSP IP/OBS MODERATE 35: CPT | Performed by: INTERNAL MEDICINE

## 2023-04-28 PROCEDURE — 25010000002 KETOROLAC TROMETHAMINE PER 15 MG: Performed by: ORTHOPAEDIC SURGERY

## 2023-04-28 PROCEDURE — 97110 THERAPEUTIC EXERCISES: CPT

## 2023-04-28 PROCEDURE — 80048 BASIC METABOLIC PNL TOTAL CA: CPT | Performed by: ORTHOPAEDIC SURGERY

## 2023-04-28 PROCEDURE — 97116 GAIT TRAINING THERAPY: CPT

## 2023-04-28 PROCEDURE — 63710000001 INSULIN DETEMIR PER 5 UNITS: Performed by: ORTHOPAEDIC SURGERY

## 2023-04-28 RX ORDER — LUBIPROSTONE 8 UG/1
8 CAPSULE ORAL 2 TIMES DAILY WITH MEALS
Status: COMPLETED | OUTPATIENT
Start: 2023-04-28 | End: 2023-04-28

## 2023-04-28 RX ORDER — LOSARTAN POTASSIUM 25 MG/1
25 TABLET ORAL
Status: DISCONTINUED | OUTPATIENT
Start: 2023-04-29 | End: 2023-04-29 | Stop reason: HOSPADM

## 2023-04-28 RX ADMIN — ALIROCUMAB 150 MG: 150 INJECTION, SOLUTION SUBCUTANEOUS at 11:54

## 2023-04-28 RX ADMIN — ASPIRIN 81 MG: 81 TABLET, COATED ORAL at 08:37

## 2023-04-28 RX ADMIN — LUBIPROSTONE 8 MCG: 8 CAPSULE, GELATIN COATED ORAL at 17:50

## 2023-04-28 RX ADMIN — PANTOPRAZOLE SODIUM 40 MG: 40 TABLET, DELAYED RELEASE ORAL at 08:38

## 2023-04-28 RX ADMIN — CARVEDILOL 12.5 MG: 12.5 TABLET, FILM COATED ORAL at 17:51

## 2023-04-28 RX ADMIN — BISACODYL 5 MG: 5 TABLET, COATED ORAL at 08:38

## 2023-04-28 RX ADMIN — LEVOTHYROXINE SODIUM 88 MCG: 0.09 TABLET ORAL at 05:51

## 2023-04-28 RX ADMIN — ACETAMINOPHEN 1000 MG: 500 TABLET ORAL at 05:51

## 2023-04-28 RX ADMIN — MONTELUKAST 10 MG: 10 TABLET, FILM COATED ORAL at 20:46

## 2023-04-28 RX ADMIN — SODIUM CHLORIDE, POTASSIUM CHLORIDE, SODIUM LACTATE AND CALCIUM CHLORIDE 100 ML/HR: 600; 310; 30; 20 INJECTION, SOLUTION INTRAVENOUS at 03:53

## 2023-04-28 RX ADMIN — ASPIRIN 81 MG: 81 TABLET, COATED ORAL at 20:46

## 2023-04-28 RX ADMIN — Medication 10 ML: at 08:47

## 2023-04-28 RX ADMIN — FLUTICASONE PROPIONATE 2 SPRAY: 50 SPRAY, METERED NASAL at 08:37

## 2023-04-28 RX ADMIN — INSULIN DETEMIR 5 UNITS: 100 INJECTION, SOLUTION SUBCUTANEOUS at 20:44

## 2023-04-28 RX ADMIN — LUBIPROSTONE 8 MCG: 8 CAPSULE, GELATIN COATED ORAL at 11:49

## 2023-04-28 RX ADMIN — Medication 10 ML: at 20:46

## 2023-04-28 RX ADMIN — ACETAMINOPHEN 1000 MG: 500 TABLET ORAL at 13:45

## 2023-04-28 RX ADMIN — INSULIN LISPRO 4 UNITS: 100 INJECTION, SOLUTION INTRAVENOUS; SUBCUTANEOUS at 17:52

## 2023-04-28 RX ADMIN — MELOXICAM 15 MG: 15 TABLET ORAL at 08:38

## 2023-04-28 RX ADMIN — KETOROLAC TROMETHAMINE 15 MG: 15 INJECTION, SOLUTION INTRAMUSCULAR; INTRAVENOUS at 03:53

## 2023-04-28 RX ADMIN — FAMOTIDINE 40 MG: 20 TABLET, FILM COATED ORAL at 08:38

## 2023-04-28 RX ADMIN — CARVEDILOL 12.5 MG: 12.5 TABLET, FILM COATED ORAL at 08:38

## 2023-04-28 RX ADMIN — SENNOSIDES AND DOCUSATE SODIUM 2 TABLET: 8.6; 5 TABLET ORAL at 20:46

## 2023-04-28 RX ADMIN — INSULIN LISPRO 8 UNITS: 100 INJECTION, SOLUTION INTRAVENOUS; SUBCUTANEOUS at 11:49

## 2023-04-28 RX ADMIN — SENNOSIDES AND DOCUSATE SODIUM 2 TABLET: 8.6; 5 TABLET ORAL at 08:38

## 2023-04-28 RX ADMIN — PANTOPRAZOLE SODIUM 40 MG: 40 TABLET, DELAYED RELEASE ORAL at 17:51

## 2023-04-28 RX ADMIN — ACETAMINOPHEN 1000 MG: 500 TABLET ORAL at 20:45

## 2023-04-28 NOTE — PAYOR COMM NOTE
"Gretchen Mcgill, RN  Utilization Management  P:[phone]  F:[phone]    Auth# [id number]  Angelica Spivey (83 y.o. Female)     Date of Birth   1940    Social Security Number       Address   [address] [place] [address] [address] [address]    Home Phone   [phone]    MRN   [id number]       [facility]    Marital Status                               Admission Date   4/24/23    Admission Type   Emergency    Admitting Provider   Melvin Little DO    Attending Provider   Melvin Little DO    Department, Room/Bed   [facility] 3H, S377/1       Discharge Date       Discharge Disposition       Discharge Destination                               Attending Provider: Melvin Little DO    Allergies: Biaxin [Clarithromycin], Allegra [Fexofenadine], Repatha [Evolocumab], Sulfa Antibiotics    Isolation: None   Infection: None   Code Status: CPR    Ht: 162.6 cm (64\")   Wt: 70.8 kg (156 lb)    Admission Cmt: None   Principal Problem: Closed fracture of right hip [S72.001A]                 Active Insurance as of 4/24/2023     Primary Coverage     Payor Plan Insurance Group Employer/Plan Group    ANTHEM MEDICARE REPLACEMENT ANTHEM MEDICARE ADVANTAGE [id number]     Payor Plan Address Payor Plan Phone Number Payor Plan Fax Number Effective Dates    [address] [phone]  1/1/2022 - None Entered    [facility] [id number]       Subscriber Name Subscriber Birth Date Member ID       ANGELICA SPIVEY 1940 [id number]                 Emergency Contacts      (Rel.) Home Phone Work Phone Mobile Phone    Sofia Spivey (Daughter) -- -- [phone]    Keshav Spivey (Spouse) [phone] -- [phone]    Kiki Spivey (Daughter) -- -- [phone]              Current Facility-Administered Medications   Medication Dose Route Frequency Provider Last Rate Last Admin   • acetaminophen (TYLENOL) tablet 650 mg  650 mg Oral Q4H PRN Rodo Jennings MD   650 mg at 04/25/23 0204    Or "   • acetaminophen (TYLENOL) 160 MG/5ML solution 650 mg  650 mg Oral Q4H PRN Rodo Jennings MD        Or   • acetaminophen (TYLENOL) suppository 650 mg  650 mg Rectal Q4H PRN Rodo Jennings MD       • acetaminophen (TYLENOL) tablet 1,000 mg  1,000 mg Oral Q8H Rodo Jennings MD   1,000 mg at 04/28/23 0551   • Alirocumab (PRALUENT) injection pen 150 mg  150 mg Subcutaneous Once Melvin Little DO       • aspirin EC tablet 81 mg  81 mg Oral Q12H Rodo Jennings MD   81 mg at 04/28/23 0837   • sennosides-docusate (PERICOLACE) 8.6-50 MG per tablet 2 tablet  2 tablet Oral BID Rodo Jennings MD   2 tablet at 04/28/23 0838    And   • polyethylene glycol (MIRALAX) packet 17 g  17 g Oral Daily PRN Rodo Jennings MD        And   • bisacodyl (DULCOLAX) EC tablet 5 mg  5 mg Oral Daily PRN Rodo Jennings MD   5 mg at 04/28/23 0838    And   • bisacodyl (DULCOLAX) suppository 10 mg  10 mg Rectal Daily PRN Rodo Jennings MD       • Calcium Replacement - Follow Nurse / BPA Driven Protocol   Does not apply PRN Rodo Jennings MD       • carvedilol (COREG) tablet 12.5 mg  12.5 mg Oral BID With Meals Rodo Jennings MD   12.5 mg at 04/28/23 0838   • dextrose (D50W) (25 g/50 mL) IV injection 25 g  25 g Intravenous Q15 Min PRN Rodo Jennings MD       • dextrose (GLUTOSE) oral gel 15 g  15 g Oral Q15 Min PRN Rodo Jennings MD       • [START ON 4/29/2023] Dulaglutide solution pen-injector 1.5 mg  1.5 mg Subcutaneous Once Melvin Little DO       • famotidine (PEPCID) tablet 40 mg  40 mg Oral Daily Rodo Jennings MD   40 mg at 04/28/23 0838   • fluticasone (FLONASE) 50 MCG/ACT nasal spray 2 spray  2 spray Each Nare BID Rodo Jennings MD   2 spray at 04/28/23 0837   • glucagon (GLUCAGEN) injection 1 mg  1 mg Intramuscular Q15 Min PRN Rodo Jennings MD       • hydrALAZINE (APRESOLINE) injection 10 mg  10 mg Intravenous Q6H PRN Rodo Jennings MD       • HYDROcodone-acetaminophen (NORCO) 5-325 MG per  tablet 1 tablet  1 tablet Oral Q6H PRN Rodo Jennings MD   1 tablet at 04/25/23 1227   • HYDROmorphone (DILAUDID) injection 0.5 mg  0.5 mg Intravenous Q2H PRN Rodo Jennings MD        And   • naloxone (NARCAN) injection 0.1 mg  0.1 mg Intravenous Q5 Min PRN Rodo Jennings MD       • insulin detemir (LEVEMIR) injection 5 Units  5 Units Subcutaneous Nightly Rodo Jennings MD   5 Units at 04/27/23 2104   • Insulin Lispro (humaLOG) injection 0-9 Units  0-9 Units Subcutaneous 4x Daily AC & at Bedtime Porfirio Waite PharmD   6 Units at 04/27/23 2248   • ketorolac (TORADOL) injection 15 mg  15 mg Intravenous Q6H PRN Rodo Jennings MD   15 mg at 04/28/23 0353   • lactated ringers infusion  9 mL/hr Intravenous Continuous Rodo Jennings MD 9 mL/hr at 04/26/23 1215 Restarted at 04/26/23 1345   • lactated ringers infusion  100 mL/hr Intravenous Continuous Rodo Jennings  mL/hr at 04/28/23 0353 100 mL/hr at 04/28/23 0353   • levothyroxine (SYNTHROID, LEVOTHROID) tablet 88 mcg  88 mcg Oral QAM Rodo Jennings MD   88 mcg at 04/28/23 0551   • lubiprostone (AMITIZA) capsule 8 mcg  8 mcg Oral BID With Meals Melvin Little DO       • Magnesium Standard Dose Replacement - Follow Nurse / BPA Driven Protocol   Does not apply PRN Rodo Jennings MD       • melatonin tablet 5 mg  5 mg Oral Nightly PRN Rodo Jennings MD       • meloxicam (MOBIC) tablet 15 mg  15 mg Oral Daily Rodo Jennings MD   15 mg at 04/28/23 0838   • montelukast (SINGULAIR) tablet 10 mg  10 mg Oral Nightly Rodo Jennings MD   10 mg at 04/27/23 2052   • ondansetron (ZOFRAN) tablet 4 mg  4 mg Oral Q6H PRN Rodo Jennings MD        Or   • ondansetron (ZOFRAN) injection 4 mg  4 mg Intravenous Q6H PRN Rodo Jennings MD   4 mg at 04/26/23 2352   • oxyCODONE (ROXICODONE) immediate release tablet 10 mg  10 mg Oral Q4H PRN Rodo Jennings MD   10 mg at 04/26/23 2144   • oxyCODONE (ROXICODONE) immediate release tablet 5 mg  5 mg Oral Q4H  Rodo Green MD       • pantoprazole (PROTONIX) EC tablet 40 mg  40 mg Oral BID AC Rodo Jennings MD   40 mg at 04/28/23 0838   • Phosphorus Replacement - Follow Nurse / BPA Driven Protocol   Does not apply Rodo Green MD       • Potassium Replacement - Follow Nurse / BPA Driven Protocol   Does not apply Rodo Green MD       • ropivacaine (NAROPIN) 0.2 % infusion (INFUSYSTEM)   Peripheral Nerve Continuous Rodo Jennings MD   1,000 mg at 04/25/23 2007   • scopolamine patch 1 mg/72 hr  1 patch Transdermal Q72H Sunshine Lambert DO   1 patch at 04/26/23 1813   • sodium chloride 0.9 % flush 10 mL  10 mL Intravenous PRN Rodo Jennings MD       • sodium chloride 0.9 % flush 10 mL  10 mL Intravenous Q12H Rodo Jennings MD   10 mL at 04/28/23 0847   • sodium chloride 0.9 % flush 10 mL  10 mL Intravenous PRN Rodo Jennings MD       • sodium chloride 0.9 % infusion 40 mL  40 mL Intravenous Rodo Green MD       • traMADol (ULTRAM) tablet 50 mg  50 mg Oral Q8H PRN Rodo Jennings MD   50 mg at 04/27/23 0921     Lab Results (last 24 hours)     Procedure Component Value Units Date/Time    POC Glucose Once [[id number]]  (Abnormal) Collected: 04/28/23 1120    Specimen: Blood Updated: 04/28/23 1123     Glucose 392 mg/dL      Comment: Meter: [id number] : [id number] [place]       POC Glucose Once [[id number]]  (Abnormal) Collected: 04/28/23 0736    Specimen: Blood Updated: 04/28/23 0739     Glucose 140 mg/dL      Comment: Meter: [id number] : [id number] [place]       Basic Metabolic Panel [[id number]]  (Abnormal) Collected: 04/28/23 0549    Specimen: Blood Updated: 04/28/23 0637     Glucose 151 mg/dL      BUN 14 mg/dL      Creatinine 0.72 mg/dL      Sodium 141 mmol/L      Potassium 4.1 mmol/L      Chloride 106 mmol/L      CO2 28.0 mmol/L      Calcium 8.5 mg/dL      BUN/Creatinine Ratio 19.4     Anion Gap 7.0 mmol/L      eGFR 84.1 mL/min/1.73     Narrative:      GFR  Normal >60  Chronic Kidney Disease <60  Kidney Failure <15    The GFR formula is only valid for adults with stable renal function between ages 18 and 70.    CBC (No Diff) [[id number]]  (Abnormal) Collected: 23 0549    Specimen: Blood Updated: 23 0618     WBC 6.46 10*3/mm3      RBC 3.07 10*6/mm3      Hemoglobin 8.5 g/dL      Hematocrit 27.4 %      MCV 89.3 fL      MCH 27.7 pg      MCHC 31.0 g/dL      RDW 14.5 %      RDW-SD 46.3 fl      MPV 10.1 fL      Platelets 136 10*3/mm3     POC Glucose Once [[id number]]  (Abnormal) Collected: 23    Specimen: Blood Updated: 23     Glucose 251 mg/dL      Comment: Meter: [id number] : [id number] Cale Remi       POC Glucose Once [[id number]]  (Abnormal) Collected: 23 1647    Specimen: Blood Updated: 23 1651     Glucose 257 mg/dL      Comment: Meter: [id number] : [id number] Luis Armando Zacarias                    Physician Progress Notes (last 24 hours)      Sunshine Lambert DO at 23 1454              [facility] Medicine Services  PROGRESS NOTE    Patient Name: Angelica Spivey  : 1940  MRN: [id number]    Date of Admission: 2023  Primary Care Physician: Abimael Matthews MD    Subjective   Subjective     CC:  fall    HPI:  No acute events. Pain improved. Nausea improved.     ROS:  Gen- No fevers, chills  CV- No chest pain, palpitations  Resp- No cough, dyspnea  GI- No N/V/D, abd pain     Objective   Objective     Vital Signs:   Temp:  [97.7 °F (36.5 °C)-99.1 °F (37.3 °C)] 99.1 °F (37.3 °C)  Heart Rate:  [72-90] 82  Resp:  [16-20] 16  BP: (102-154)/(52-83) 131/68  Flow (L/min):  [2] 2     Physical Exam:  Constitutional: No acute distress, awake, alert  HENT: NCAT, mucous membranes moist  Respiratory: Clear to auscultation bilaterally, respiratory effort normal   Cardiovascular: RRR, no murmurs, rubs, or gallops  Gastrointestinal: Positive bowel sounds, soft, nontender,  nondistended  Musculoskeletal: No bilateral ankle edema  Psychiatric: Appropriate affect, cooperative  Neurologic: Oriented x 3, strength symmetric in all extremities, Cranial Nerves grossly intact to confrontation, speech clear  Skin: No rashes    Results Reviewed:  LAB RESULTS:      Lab 04/27/23 0455 04/26/23 0419 04/25/23 0355 04/24/23 2012   WBC 10.10 8.65 9.50 11.68*   HEMOGLOBIN 8.5* 11.1* 11.1* 11.4*   HEMATOCRIT 27.4* 34.8 34.7 36.0   PLATELETS 163 208 208 229   NEUTROS ABS  --   --  6.32 8.70*   IMMATURE GRANS (ABS)  --   --  0.05 0.06*   LYMPHS ABS  --   --  2.21 2.03   MONOS ABS  --   --  0.74 0.63   EOS ABS  --   --  0.13 0.19   MCV 87.8 86.4 85.9 86.5   PROTIME  --   --  13.5 13.3         Lab 04/27/23 0455 04/26/23 0419 04/25/23 0355 04/24/23 2012   SODIUM 139 141 139 139   POTASSIUM 4.0 4.2 4.1 4.3   CHLORIDE 103 105 103 104   CO2 26.0 28.0 25.0 26.0   ANION GAP 10.0 8.0 11.0 9.0   BUN 18 9 15 17   CREATININE 0.85 0.63 0.69 0.82   EGFR 68.9 89.3 87.3 72.0   GLUCOSE 171* 115* 180* 214*   CALCIUM 8.2* 9.0 9.5 9.4   MAGNESIUM  --   --  1.8  --    PHOSPHORUS  --   --  3.7  --    HEMOGLOBIN A1C  --   --   --  8.20*   TSH  --   --   --  3.940         Lab 04/24/23 2012   TOTAL PROTEIN 6.5   ALBUMIN 3.8   GLOBULIN 2.7   ALT (SGPT) 14   AST (SGOT) 18   BILIRUBIN 0.2   ALK PHOS 71         Lab 04/25/23 0355 04/24/23 2012   PROTIME 13.5 13.3   INR 1.03 1.02             Lab 04/25/23 0355   ABO TYPING A   RH TYPING Positive   ANTIBODY SCREEN Negative         Brief Urine Lab Results     None          Microbiology Results Abnormal     None          FL C Arm During Surgery    Result Date: 4/26/2023  FL C ARM DURING SURGERY Date of Exam: 4/26/2023 2:06 PM EDT Indication: TOTAL HIP ARTHROPLASTY ANTERIOR. Comparison: None available. Technique: 1 fluoroscopic spot image of the right hip obtained over 34.9 seconds of fluoroscopy time Fluoroscopic Time: 34.9 seconds Findings: Fluoroscopic imaging obtained for the  purpose of guidance of right hip arthroplasty.     Impression: Impression: Fluoroscopic imaging obtained without a radiologist present. Please see performing provider notes for full detail. Electronically Signed: Raul Brooks  4/26/2023 4:08 PM EDT  Workstation ID: [id number]    XR Hip With or Without Pelvis 2 - 3 View Right    Result Date: 4/26/2023  XR HIP W OR WO PELVIS 2-3 VIEW RIGHT Date of Exam: 4/26/2023 4:06 PM EDT Indication: Post-Op Hip Arthroplasty Comparison: Right hip radiographs 4/24/2023 Findings: Unremarkable appearance of newly placed right total hip arthroplasty which appears in satisfactory alignment without hardware fracture, perihardware lucency, or periprosthetic fracture. There are expected postsurgical soft tissue changes of the right hip, with wound VAC device. The bones are demineralized.     Impression: Impression: Expected postoperative appearance of newly placed right total hip arthroplasty. Electronically Signed: Krishan Yuen  4/26/2023 4:27 PM EDT  Workstation ID: [id number]          Current medications:  Scheduled Meds:acetaminophen, 1,000 mg, Oral, Q8H  aspirin, 81 mg, Oral, Q12H  carvedilol, 12.5 mg, Oral, BID With Meals  famotidine, 40 mg, Oral, Daily  fluticasone, 2 spray, Each Nare, BID  insulin detemir, 5 Units, Subcutaneous, Nightly  insulin lispro, 0-9 Units, Subcutaneous, 4x Daily AC & at Bedtime  levothyroxine, 88 mcg, Oral, QAM  meloxicam, 15 mg, Oral, Daily  montelukast, 10 mg, Oral, Nightly  pantoprazole, 40 mg, Oral, BID AC  Scopolamine, 1 patch, Transdermal, Q72H  senna-docusate sodium, 2 tablet, Oral, BID  sodium chloride, 10 mL, Intravenous, Q12H      Continuous Infusions:lactated ringers, 9 mL/hr, Last Rate: 9 mL/hr (04/26/23 1215)  lactated ringers, 100 mL/hr, Last Rate: 100 mL/hr (04/27/23 0515)  ropivacaine,       PRN Meds:.•  acetaminophen **OR** acetaminophen **OR** acetaminophen  •  senna-docusate sodium **AND** polyethylene glycol **AND** bisacodyl **AND**  bisacodyl  •  Calcium Replacement - Follow Nurse / BPA Driven Protocol  •  dextrose  •  dextrose  •  glucagon (human recombinant)  •  hydrALAZINE  •  HYDROcodone-acetaminophen  •  HYDROmorphone **AND** naloxone  •  ketorolac  •  Magnesium Standard Dose Replacement - Follow Nurse / BPA Driven Protocol  •  melatonin  •  ondansetron **OR** ondansetron  •  oxyCODONE  •  oxyCODONE  •  Phosphorus Replacement - Follow Nurse / BPA Driven Protocol  •  Potassium Replacement - Follow Nurse / BPA Driven Protocol  •  [COMPLETED] Insert Peripheral IV **AND** sodium chloride  •  sodium chloride  •  sodium chloride  •  traMADol    Assessment & Plan   Assessment & Plan     Active Hospital Problems    Diagnosis  POA   • **Closed fracture of right hip [S72.001A]  Yes   • Essential hypertension [I10]  Unknown   • Acquired hypothyroidism [E03.9]  Yes   • Type 2 diabetes mellitus with hyperglycemia, with long-term current use of insulin [E11.65, Z79.4]  Not Applicable   • Mixed hyperlipidemia [E78.2]  Yes   • Coronary artery disease involving native coronary artery of native heart [I25.10]  Yes      Resolved Hospital Problems   No resolved problems to display.        Brief Hospital Course to date:  Angelica Spivey is a 83 y.o. female with medical history significant for HTN, HLD, T2DM, CAD s/p prior LAD stent (~ 2018) on DAPT who presents to the ED for evaluation after slipping and falling onto her right hip day of admission. Imaging with acute impacted likely subcapital fracture of the right femoral neck.      Right hip fracture  Mechanical fall   - Imaging per above   - Ortho consulted -- s/p total right hip arthroplasty 4/26 with Dr. Jennings   - PT/OT with rehab recs   - ASA x 6 weeks  - PRN pain control     Acute blood loss anemia  - Following surgery  - AM labs      CAD / HTN / HLD  - on DAPT, LAD stent ~ 2018, last heart cath by Dr. Deng ~ 2020  - continue aspirin - now on BID -- resume plavix once BID dosing is complete in 6  weeks   - Continue coreg. Holding losartan/HCTZ perioperatively and now normotensive  - PRN hydralazine   - on repatha, intolerant to statins     T2DM - insulin dependent  - levemir 5 units HS  - fsbg achs w/ moderate dose ssi  - A1C 8.2  - Adjust PRN      Hypothyroidism  -  TSH within limits   - continue levothyroxine     Nausea  - improved. Feels it was related to pain medication     Expected Discharge Location and Transportation: rehab recs   Expected Discharge   Expected Discharge Date: 04/28/23       DVT prophylaxis:  Mechanical DVT prophylaxis orders are present.     AM-PAC 6 Clicks Score (PT): 16 (04/27/23 1016)    CODE STATUS:   Code Status and Medical Interventions:   Ordered at: 04/26/23 1702     Level Of Support Discussed With:    Patient     Code Status (Patient has no pulse and is not breathing):    CPR (Attempt to Resuscitate)     Medical Interventions (Patient has pulse or is breathing):    Full       Sunshine Lambert DO  04/27/23        Electronically signed by Sunshine Lambert DO at 04/27/23 5446

## 2023-04-28 NOTE — THERAPY TREATMENT NOTE
Patient Name: Angelica Spivey  : 1940    MRN: [id number]                              Today's Date: 2023       Admit Date: 2023    Visit Dx:     ICD-10-CM ICD-9-CM   1. Closed right hip fracture, initial encounter  S72.001A 820.8   2. Hyperglycemia  R73.9 790.29     Patient Active Problem List   Diagnosis   • Abnormal stress test   • Mixed hyperlipidemia   • Coronary artery disease involving native coronary artery of native heart   • Type 2 diabetes mellitus with hyperglycemia, with long-term current use of insulin   • Acquired hypothyroidism   • Chronic GERD   • Unstable angina   • Closed fracture of right hip   • Essential hypertension   • Acute blood loss anemia     Past Medical History:   Diagnosis Date   • Anxiety    • Arthritis    • Coronary artery disease    • Diabetes mellitus    • Disease of thyroid gland    • Elevated cholesterol    • GERD (gastroesophageal reflux disease)    • Hearing aid worn    • Heart attack    • History of stomach ulcers    • Hypertension    • PONV (postoperative nausea and vomiting)    • Wears glasses      Past Surgical History:   Procedure Laterality Date   • BLADDER SUSPENSION     • CARDIAC CATHETERIZATION N/A 08/15/2018    Procedure: Left Heart Cath;  Surgeon: David Burnett MD;  Location:  [facility];  Service: Cardiology   • CARDIAC CATHETERIZATION N/A 2020    Procedure: LEFT HEART CATH;  Surgeon: David Subramanian MD;  Location:  [facility];  Service: Cardiology   • CARDIAC CATHETERIZATION N/A 01/15/2020    Procedure: CBI to the LAD;  Surgeon: Phil Deng MD;  Location:  [facility];  Service: Cardiovascular   • CARDIAC CATHETERIZATION N/A 2020    Procedure: Left Heart Cath 52865;  Surgeon: Phil Deng MD;  Location:  [facility];  Service: Cardiovascular;  Laterality: N/A;   • CATARACT EXTRACTION Bilateral    • COLONOSCOPY     • ENDOSCOPY     •  ENDOSCOPY N/A 12/06/2019    Procedure: ESOPHAGOGASTRODUODENOSCOPY;  Surgeon: Burak Patino MD;  Location:  [facility];  Service: Gastroenterology   • HYSTERECTOMY  2014    PARTIAL   • OOPHORECTOMY Bilateral 2014   • MD RT/LT HEART CATHETERS N/A 10/05/2018    Procedure: Percutaneous Coronary Intervention;  Surgeon: David Burnett MD;  Location:  [facility];  Service: Cardiology   • TOTAL HIP ARTHROPLASTY Right 4/26/2023    Procedure: TOTAL HIP ARTHROPLASTY ANTERIOR RIGHT;  Surgeon: Rodo Jennings MD;  Location:  [facility];  Service: Orthopedics;  Laterality: Right;   • VULVECTOMY        General Information     Row Name 04/28/23 1129          Physical Therapy Time and Intention    Document Type therapy note (daily note)  -AE     Mode of Treatment physical therapy  -AE     Row Name 04/28/23 1129          General Information    Patient Profile Reviewed yes  -AE     Existing Precautions/Restrictions fall;right;hip, anterior;other (see comments);oxygen therapy device and L/min  wound vac  -AE     Barriers to Rehab medically complex  -AE     Row Name 04/28/23 1129          Cognition    Orientation Status (Cognition) oriented x 3  -AE     Row Name 04/28/23 1129          Safety Issues, Functional Mobility    Safety Issues Affecting Function (Mobility) awareness of need for assistance;insight into deficits/self-awareness;safety precaution awareness;safety precautions follow-through/compliance;sequencing abilities  -AE     Impairments Affecting Function (Mobility) balance;cognition;endurance/activity tolerance;pain;strength;postural/trunk control;range of motion (ROM)  -AE     Cognitive Impairments, Mobility Safety/Performance awareness, need for assistance;safety precaution awareness;safety precaution follow-through  -AE           User Key  (r) = Recorded By, (t) = Taken By, (c) = Cosigned By    Initials Name Provider Type    AE Poli Razo PT Physical Therapist               Mobility      Row Name 04/28/23 1129          Bed Mobility    Comment, (Bed Mobility) Received sitting on BSC, left UIC.  -AE     Row Name 04/28/23 1129          Transfers    Comment, (Transfers) VCs for hand placement and sequencing. Pt required increased cues for pushing up from BSC.  -AE     Row Name 04/28/23 1129          Sit-Stand Transfer    Sit-Stand Leslie (Transfers) minimum assist (75% patient effort);1 person assist;verbal cues  -AE     Assistive Device (Sit-Stand Transfers) walker, front-wheeled  -AE     Row Name 04/28/23 1129          Gait/Stairs (Locomotion)    Leslie Level (Gait) minimum assist (75% patient effort);1 person assist;1 person to manage equipment;verbal cues  -AE     Assistive Device (Gait) walker, front-wheeled  -AE     Distance in Feet (Gait) 35  -AE     Deviations/Abnormal Patterns (Gait) bilateral deviations;base of support, narrow;urbano decreased;gait speed decreased;stride length decreased  -AE     Bilateral Gait Deviations forward flexed posture;heel strike decreased;weight shift ability decreased  -AE     Comment, (Gait/Stairs) Pt demo step through gait pattern with slowed urbano, forward flexed posture, and decreased sequencing of AD. Pt required increased cues to improve forward gaze and step length during session. Pt required 2 standing rest breaks 2/2 fatigue this session, stating her arms were getting tired. Further distance limited by fatigue.  -AE     Row Name 04/28/23 1129          Mobility    Extremity Weight-bearing Status right lower extremity  -AE     Right Lower Extremity (Weight-bearing Status) weight-bearing as tolerated (WBAT)  -AE           User Key  (r) = Recorded By, (t) = Taken By, (c) = Cosigned By    Initials Name Provider Type    AE Poli Razo PT Physical Therapist               Obj/Interventions     Row Name 04/28/23 1139          Motor Skills    Therapeutic Exercise hip;knee;ankle  -AE     Row Name 04/28/23 1139          Hip (Therapeutic  Exercise)    Hip (Therapeutic Exercise) strengthening exercise  -AE     Hip Strengthening (Therapeutic Exercise) right;aBduction;aDduction;heel slides;sitting;10 repetitions  -AE     Row Name 04/28/23 1139          Knee (Therapeutic Exercise)    Knee (Therapeutic Exercise) isometric exercises;strengthening exercise  -AE     Knee Isometrics (Therapeutic Exercise) bilateral;quad sets;10 repetitions;sitting  -AE     Knee Strengthening (Therapeutic Exercise) bilateral;LAQ (long arc quad);sitting;10 repetitions  -AE     Row Name 04/28/23 1139          Ankle (Therapeutic Exercise)    Ankle (Therapeutic Exercise) AROM (active range of motion)  -AE     Ankle AROM (Therapeutic Exercise) bilateral;dorsiflexion;plantarflexion;10 repetitions;sitting  -AE     Row Name 04/28/23 1139          Balance    Balance Assessment sitting static balance;sitting dynamic balance;sit to stand dynamic balance;standing static balance;standing dynamic balance  -AE     Static Sitting Balance contact guard  -AE     Dynamic Sitting Balance contact guard  -AE     Position, Sitting Balance unsupported;other (see comments)  BSC  -AE     Sit to Stand Dynamic Balance minimal assist;1-person assist;verbal cues;non-verbal cues (demo/gesture)  -AE     Static Standing Balance contact guard;1-person assist;verbal cues  -AE     Dynamic Standing Balance minimal assist;1-person assist;verbal cues;1 person to manage equipment  -AE     Position/Device Used, Standing Balance supported;walker, front-wheeled  -AE           User Key  (r) = Recorded By, (t) = Taken By, (c) = Cosigned By    Initials Name Provider Type    AE Poli Razo PT Physical Therapist               Goals/Plan    No documentation.                Clinical Impression     Row Name 04/28/23 1141          Pain    Pretreatment Pain Rating 4/10  -AE     Posttreatment Pain Rating 5/10  -AE     Pain Location - Side/Orientation Right  -AE     Pain Location generalized  -AE     Pain Location - hip  -AE      Pre/Posttreatment Pain Comment tolerated; cold pack applied  -AE     Pain Intervention(s) Repositioned;Ambulation/increased activity;Cold pack  -AE     Row Name 04/28/23 1141          Plan of Care Review    Plan of Care Reviewed With patient;daughter  -AE     Progress improving  -AE     Outcome Evaluation Pt continues to present with decreased functional mobility and decreased endurance with mobility. Pt ambulated 35ft with min A x1 +1 chair follow. Pt demo improved sequencing of AD but continues to be limited by fatigue. Continue to progress per pt tolerance.  -AE     Row Name 04/28/23 1141          Vital Signs    Pre Systolic BP Rehab 137  -AE     Pre Treatment Diastolic BP 62  -AE     Pretreatment Heart Rate (beats/min) 90  -AE     Posttreatment Heart Rate (beats/min) 86  -AE     O2 Delivery Pre Treatment room air  -AE     O2 Delivery Intra Treatment room air  -AE     Post SpO2 (%) 95  -AE     O2 Delivery Post Treatment room air  -AE     Pre Patient Position Sitting  -AE     Intra Patient Position Standing  -AE     Post Patient Position Sitting  -AE     Row Name 04/28/23 1141          Positioning and Restraints    Pre-Treatment Position bedside commode  -AE     Post Treatment Position chair  -AE     In Chair notified nsg;reclined;call light within reach;encouraged to call for assist;exit alarm on;with family/caregiver;waffle cushion;on mechanical lift sling;legs elevated  -AE           User Key  (r) = Recorded By, (t) = Taken By, (c) = Cosigned By    Initials Name Provider Type    AE Poli Razo, PT Physical Therapist               Outcome Measures     Row Name 04/28/23 1143 04/28/23 0800       How much help from another person do you currently need...    Turning from your back to your side while in flat bed without using bedrails? 3  -AE 3  -BC    Moving from lying on back to sitting on the side of a flat bed without bedrails? 3  -AE 3  -BC    Moving to and from a bed to a chair (including a  wheelchair)? 3  -AE 3  -BC    Standing up from a chair using your arms (e.g., wheelchair, bedside chair)? 3  -AE 3  -BC    Climbing 3-5 steps with a railing? 2  -AE 2  -BC    To walk in hospital room? 3  -AE 3  -BC    AM-PAC 6 Clicks Score (PT) 17  -AE 17  -BC    Highest level of mobility 5 --> Static standing  -AE 5 --> Static standing  -BC    Row Name 04/28/23 1143          Functional Assessment    Outcome Measure Options AM-PAC 6 Clicks Basic Mobility (PT)  -AE           User Key  (r) = Recorded By, (t) = Taken By, (c) = Cosigned By    Initials Name Provider Type    BC Jessi Villegas, RN Registered Nurse    Poli Samuels, PT Physical Therapist                             Physical Therapy Education     Title: PT OT SLP Therapies (In Progress)     Topic: Physical Therapy (Done)     Point: Mobility training (Done)     Learning Progress Summary           Patient Acceptance, E, VU by AE at 4/28/2023 1022    Eager, E, VU,DU,NR by  at 4/27/2023 1512    Comment: Reviewed safety/technique w/transfers, ambulation, HEP, PT POC    Eager, E, VU,DU,NR by  at 4/27/2023 1017    Comment: Reviewed safety/technique w/bed mobility, transfers, HEP, PT POC    Acceptance, E,D, NR by  at 4/26/2023 1902   Family Eager, E, VU,DU,NR by  at 4/27/2023 1017    Comment: Reviewed safety/technique w/bed mobility, transfers, HEP, PT POC                   Point: Home exercise program (Done)     Learning Progress Summary           Patient Acceptance, E, VU by AE at 4/28/2023 1022    Eager, E, VU,DU,NR by  at 4/27/2023 1512    Comment: Reviewed safety/technique w/transfers, ambulation, HEP, PT POC    Eager, E, VU,DU,NR by  at 4/27/2023 1017    Comment: Reviewed safety/technique w/bed mobility, transfers, HEP, PT POC    Acceptance, E,D, NR by  at 4/26/2023 1902   Family Eager, E, VU,DU,NR by  at 4/27/2023 1017    Comment: Reviewed safety/technique w/bed mobility, transfers, HEP, PT POC                   Point: Body mechanics  (Done)     Learning Progress Summary           Patient Acceptance, E, VU by AE at 4/28/2023 1022    Eager, E, VU,DU,NR by  at 4/27/2023 1512    Comment: Reviewed safety/technique w/transfers, ambulation, HEP, PT POC    Eager, E, VU,DU,NR by  at 4/27/2023 1017    Comment: Reviewed safety/technique w/bed mobility, transfers, HEP, PT POC    Acceptance, E,D, NR by  at 4/26/2023 1902   Family Eager, E, VU,DU,NR by  at 4/27/2023 1017    Comment: Reviewed safety/technique w/bed mobility, transfers, HEP, PT POC                   Point: Precautions (Done)     Learning Progress Summary           Patient Acceptance, E, VU by AE at 4/28/2023 1022    Eager, E, VU,DU,NR by  at 4/27/2023 1512    Comment: Reviewed safety/technique w/transfers, ambulation, HEP, PT POC    Eager, E, VU,DU,NR by  at 4/27/2023 1017    Comment: Reviewed safety/technique w/bed mobility, transfers, HEP, PT POC    Acceptance, E,D, NR by  at 4/26/2023 1902   Family Eager, E, VU,DU,NR by  at 4/27/2023 1017    Comment: Reviewed safety/technique w/bed mobility, transfers, HEP, PT POC                               User Key     Initials Effective Dates Name Provider Type Discipline     06/01/21 -  Krystle Wynn, PT Physical Therapist PT    SS 06/01/21 -  Fara Alatorre, PT Physical Therapist PT    AE 09/21/21 -  Poli Razo, PT Physical Therapist PT              PT Recommendation and Plan     Plan of Care Reviewed With: patient, daughter  Progress: improving  Outcome Evaluation: Pt continues to present with decreased functional mobility and decreased endurance with mobility. Pt ambulated 35ft with min A x1 +1 chair follow. Pt demo improved sequencing of AD but continues to be limited by fatigue. Continue to progress per pt tolerance.     Time Calculation:    PT Charges     Row Name 04/28/23 1145             Time Calculation    Start Time 1022  -AE      PT Received On 04/28/23  -AE      PT Goal Re-Cert Due Date 05/06/23  -AE         Time  Calculation- PT    Total Timed Code Minutes- PT 23 minute(s)  -AE         Timed Charges    61736 - PT Therapeutic Exercise Minutes 10  -AE      75083 - Gait Training Minutes  13  -AE         Total Minutes    Timed Charges Total Minutes 23  -AE       Total Minutes 23  -AE            User Key  (r) = Recorded By, (t) = Taken By, (c) = Cosigned By    Initials Name Provider Type    AE Poli Razo, HAROLDO Physical Therapist              Therapy Charges for Today     Code Description Service Date Service Provider Modifiers Qty    05458274095 HC PT THER PROC EA 15 MIN 4/28/2023 Poli Razo, PT GP 1    83290567777 HC GAIT TRAINING EA 15 MIN 4/28/2023 Poli Razo, PT GP 1    03328449402 HC PT THER SUPP EA 15 MIN 4/28/2023 Poli Razo, PT GP 2          PT G-Codes  Outcome Measure Options: AM-PAC 6 Clicks Basic Mobility (PT)  AM-PAC 6 Clicks Score (PT): 17  AM-PAC 6 Clicks Score (OT): 15  PT Discharge Summary  Anticipated Discharge Disposition (PT): inpatient rehabilitation facility    Poli Razo PT  4/28/2023

## 2023-04-28 NOTE — PROGRESS NOTES
[facility] Medicine Services  PROGRESS NOTE    Patient Name: Angelica Spivey  : 1940  MRN: [id number]    Date of Admission: 2023  Primary Care Physician: Abimael Matthews MD    Subjective   Subjective     CC:  Follow up hip fracture    HPI:  Reports she has not had a BM in ~5 days. Hip is sore but less painful than pre-op. Has questions/concerns about aspirin therapy; has Hx of gastritis from the same    ROS:  Gen- No fevers, chills  CV- No chest pain, palpitations  Resp- No cough, dyspnea  GI- No N/V/D, abd pain     Objective   Objective     Vital Signs:   Temp:  [98 °F (36.7 °C)-98.5 °F (36.9 °C)] 98.3 °F (36.8 °C)  Heart Rate:  [74-93] 93  Resp:  [16-18] 18  BP: (102-137)/(54-71) 137/62  Flow (L/min):  [1.5-2] 2     Physical Exam:  Constitutional: Awake, alert, elderly female sitting on bedside commode  HENT: NCAT, mucous membranes moist  Respiratory: Clear to auscultation bilaterally, respiratory effort normal   Cardiovascular: RRR, palpable radial pulses  Gastrointestinal: Positive bowel sounds, soft, nontender, nondistended  Musculoskeletal: No bilateral ankle edema  Psychiatric: Appropriate affect, cooperative  Neurologic: Speech clear and fluent    Results Reviewed:  LAB RESULTS:      Lab 23  0549 23  0455 23  0419 23  0355 23   WBC 6.46 10.10 8.65 9.50 11.68*   HEMOGLOBIN 8.5* 8.5* 11.1* 11.1* 11.4*   HEMATOCRIT 27.4* 27.4* 34.8 34.7 36.0   PLATELETS 136* 163 208 208 229   NEUTROS ABS  --   --   --  6.32 8.70*   IMMATURE GRANS (ABS)  --   --   --  0.05 0.06*   LYMPHS ABS  --   --   --  2.21 2.03   MONOS ABS  --   --   --  0.74 0.63   EOS ABS  --   --   --  0.13 0.19   MCV 89.3 87.8 86.4 85.9 86.5   PROTIME  --   --   --  13.5 13.3         Lab 23  0549 23  0455 23  0419 23  0355 23   SODIUM 141 139 141 139 139   POTASSIUM 4.1 4.0 4.2 4.1 4.3   CHLORIDE 106 103 105 103 104   CO2 28.0 26.0 28.0  25.0 26.0   ANION GAP 7.0 10.0 8.0 11.0 9.0   BUN 14 18 9 15 17   CREATININE 0.72 0.85 0.63 0.69 0.82   EGFR 84.1 68.9 89.3 87.3 72.0   GLUCOSE 151* 171* 115* 180* 214*   CALCIUM 8.5* 8.2* 9.0 9.5 9.4   MAGNESIUM  --   --   --  1.8  --    PHOSPHORUS  --   --   --  3.7  --    HEMOGLOBIN A1C  --   --   --   --  8.20*   TSH  --   --   --   --  3.940         Lab 04/24/23 2012   TOTAL PROTEIN 6.5   ALBUMIN 3.8   GLOBULIN 2.7   ALT (SGPT) 14   AST (SGOT) 18   BILIRUBIN 0.2   ALK PHOS 71         Lab 04/25/23  0355 04/24/23 2012   PROTIME 13.5 13.3   INR 1.03 1.02             Lab 04/25/23 0355   ABO TYPING A   RH TYPING Positive   ANTIBODY SCREEN Negative         Brief Urine Lab Results     None          Microbiology Results Abnormal     None          XR Hip With or Without Pelvis 2 - 3 View Right    Result Date: 4/26/2023  XR HIP W OR WO PELVIS 2-3 VIEW RIGHT Date of Exam: 4/26/2023 4:06 PM EDT Indication: Post-Op Hip Arthroplasty Comparison: Right hip radiographs 4/24/2023 Findings: Unremarkable appearance of newly placed right total hip arthroplasty which appears in satisfactory alignment without hardware fracture, perihardware lucency, or periprosthetic fracture. There are expected postsurgical soft tissue changes of the right hip, with wound VAC device. The bones are demineralized.     Impression: Impression: Expected postoperative appearance of newly placed right total hip arthroplasty. Electronically Signed: Krishan Yuen  4/26/2023 4:27 PM EDT  Workstation ID: [id number]          Current medications:  Scheduled Meds:acetaminophen, 1,000 mg, Oral, Q8H  aspirin, 81 mg, Oral, Q12H  carvedilol, 12.5 mg, Oral, BID With Meals  [START ON 4/29/2023] Dulaglutide, 1.5 mg, Subcutaneous, Once  famotidine, 40 mg, Oral, Daily  fluticasone, 2 spray, Each Nare, BID  insulin detemir, 5 Units, Subcutaneous, Nightly  insulin lispro, 0-9 Units, Subcutaneous, 4x Daily AC & at Bedtime  levothyroxine, 88 mcg, Oral, QAM  lubiprostone, 8  mcg, Oral, BID With Meals  meloxicam, 15 mg, Oral, Daily  montelukast, 10 mg, Oral, Nightly  pantoprazole, 40 mg, Oral, BID AC  Scopolamine, 1 patch, Transdermal, Q72H  senna-docusate sodium, 2 tablet, Oral, BID  sodium chloride, 10 mL, Intravenous, Q12H      Continuous Infusions:lactated ringers, 9 mL/hr, Last Rate: 9 mL/hr (04/26/23 1215)  lactated ringers, 100 mL/hr, Last Rate: 100 mL/hr (04/28/23 0353)  ropivacaine,       PRN Meds:.•  acetaminophen **OR** acetaminophen **OR** acetaminophen  •  senna-docusate sodium **AND** polyethylene glycol **AND** bisacodyl **AND** bisacodyl  •  Calcium Replacement - Follow Nurse / BPA Driven Protocol  •  dextrose  •  dextrose  •  glucagon (human recombinant)  •  hydrALAZINE  •  HYDROcodone-acetaminophen  •  HYDROmorphone **AND** naloxone  •  ketorolac  •  Magnesium Standard Dose Replacement - Follow Nurse / BPA Driven Protocol  •  melatonin  •  ondansetron **OR** ondansetron  •  oxyCODONE  •  oxyCODONE  •  Phosphorus Replacement - Follow Nurse / BPA Driven Protocol  •  Potassium Replacement - Follow Nurse / BPA Driven Protocol  •  [COMPLETED] Insert Peripheral IV **AND** sodium chloride  •  sodium chloride  •  sodium chloride  •  traMADol    Assessment & Plan   Assessment & Plan     Active Hospital Problems    Diagnosis  POA   • **Closed fracture of right hip [S72.001A]  Yes   • Acute blood loss anemia [D62]  Unknown   • Essential hypertension [I10]  Unknown   • Acquired hypothyroidism [E03.9]  Yes   • Type 2 diabetes mellitus with hyperglycemia, with long-term current use of insulin [E11.65, Z79.4]  Not Applicable   • Mixed hyperlipidemia [E78.2]  Yes   • Coronary artery disease involving native coronary artery of native heart [I25.10]  Yes      Resolved Hospital Problems   No resolved problems to display.        Brief Hospital Course to date:  Angelica Spivey is a 83 y.o. female w/ HTN, HLD, DM2, GERD, CAD s/p prior LAD stenting 2018 now on plavix monotherapy, who  sustained a fall and was admitted for acute hip fracture and post-op for the same    The following problems are new to me today    Assessment/Plan    Acute RT impacted likely subcapital femoral neck Fx  - s/p total hip arthroplasty 4/26/23 w/ Dr. Jennings  -per ortho, WBAT  -she does NOT take ASA at baseline due to prior severe gastritis (home MAR is inaccurate); pt/dtr are agreeable to 6wks ASA for dvt ppx as rec'd per ortho, but after that time she will need to return to plavix monotherapy  -cont pain control  -rehab pending    Acute expected blood loss anemia  -H&H drop post-op, stable this AM    CAD  HTN  HLD  -on plavix monotherapy at baseline (intolerant of ASA 2/2 gastritis); this is on hold while on ASA for dvt ppx; needs to resume plavix after ASA therapy  -cont coreg  -Hyzaar held earlier perioperatively, restart losartan in the AM at reduced dose  -statin intolerant, wanting to take dose of Praluent, patient will provide home supply  -would avoid significant NSAIDs w/ Hx CAD - DC IV Toradol and po mobic    DM type 2, a1c 8.2%, w/ long term use of insulin  -asking to take home dose of Dulaglutide, will provide home supply  -cont levemir, ssi    Hypothyroidism  -cont levothyroxine    Constipation  -no BM in 5 days, bowel regimen being utilized, added amitiza today    GERD  -takes dexilant at home  -cont ppi, pepcid    Expected Discharge Location and Transportation: short term rehab  Expected Discharge   Expected Discharge Date: 04/29/23       DVT prophylaxis:  Mechanical DVT prophylaxis orders are present.     AM-PAC 6 Clicks Score (PT): 17 (04/28/23 1143)    CODE STATUS:   Code Status and Medical Interventions:   Ordered at: 04/26/23 8291     Level Of Support Discussed With:    Patient     Code Status (Patient has no pulse and is not breathing):    CPR (Attempt to Resuscitate)     Medical Interventions (Patient has pulse or is breathing):    Full       Melvin Little, DO  04/28/23

## 2023-04-28 NOTE — PLAN OF CARE
Goal Outcome Evaluation:  Plan of Care Reviewed With: patient, daughter        Progress: improving  Outcome Evaluation: Pt continues to present with generalized weakness and decreased functional endurance. Pt ambulated 50ft with min A x1 +1 chair follow. Pt required multiple standing rest breaks this session d/t increased fatigue. Continue d/c recommendation to IRF. Continue to progress.

## 2023-04-28 NOTE — THERAPY TREATMENT NOTE
Patient Name: Angelica Spivey  : 1940    MRN: [id number]                              Today's Date: 2023       Admit Date: 2023    Visit Dx:     ICD-10-CM ICD-9-CM   1. Closed right hip fracture, initial encounter  S72.001A 820.8   2. Hyperglycemia  R73.9 790.29     Patient Active Problem List   Diagnosis   • Abnormal stress test   • Mixed hyperlipidemia   • Coronary artery disease involving native coronary artery of native heart   • Type 2 diabetes mellitus with hyperglycemia, with long-term current use of insulin   • Acquired hypothyroidism   • Chronic GERD   • Unstable angina   • Closed fracture of right hip   • Essential hypertension   • Acute blood loss anemia     Past Medical History:   Diagnosis Date   • Anxiety    • Arthritis    • Coronary artery disease    • Diabetes mellitus    • Disease of thyroid gland    • Elevated cholesterol    • GERD (gastroesophageal reflux disease)    • Hearing aid worn    • Heart attack    • History of stomach ulcers    • Hypertension    • PONV (postoperative nausea and vomiting)    • Wears glasses      Past Surgical History:   Procedure Laterality Date   • BLADDER SUSPENSION     • CARDIAC CATHETERIZATION N/A 08/15/2018    Procedure: Left Heart Cath;  Surgeon: David Burnett MD;  Location:  [facility];  Service: Cardiology   • CARDIAC CATHETERIZATION N/A 2020    Procedure: LEFT HEART CATH;  Surgeon: David Subramanian MD;  Location:  [facility];  Service: Cardiology   • CARDIAC CATHETERIZATION N/A 01/15/2020    Procedure: CBI to the LAD;  Surgeon: Phil Deng MD;  Location:  [facility];  Service: Cardiovascular   • CARDIAC CATHETERIZATION N/A 2020    Procedure: Left Heart Cath 70889;  Surgeon: Phil Deng MD;  Location:  [facility];  Service: Cardiovascular;  Laterality: N/A;   • CATARACT EXTRACTION Bilateral    • COLONOSCOPY     • ENDOSCOPY     •  ENDOSCOPY N/A 12/06/2019    Procedure: ESOPHAGOGASTRODUODENOSCOPY;  Surgeon: Burak Patino MD;  Location:  [facility];  Service: Gastroenterology   • HYSTERECTOMY  2014    PARTIAL   • OOPHORECTOMY Bilateral 2014   • NC RT/LT HEART CATHETERS N/A 10/05/2018    Procedure: Percutaneous Coronary Intervention;  Surgeon: David Burnett MD;  Location:  [facility];  Service: Cardiology   • TOTAL HIP ARTHROPLASTY Right 4/26/2023    Procedure: TOTAL HIP ARTHROPLASTY ANTERIOR RIGHT;  Surgeon: Rodo Jennings MD;  Location:  [facility];  Service: Orthopedics;  Laterality: Right;   • VULVECTOMY        General Information     Row Name 04/28/23 1525 04/28/23 1129       Physical Therapy Time and Intention    Document Type therapy note (daily note)  -AE therapy note (daily note)  -AE    Mode of Treatment physical therapy  -AE physical therapy  -AE    Row Name 04/28/23 1525 04/28/23 1129       General Information    Patient Profile Reviewed yes  -AE yes  -AE    Existing Precautions/Restrictions fall;right;hip, anterior;other (see comments);oxygen therapy device and L/min  wound vac  -AE fall;right;hip, anterior;other (see comments);oxygen therapy device and L/min  wound vac  -AE    Barriers to Rehab medically complex  -AE medically complex  -AE    Row Name 04/28/23 1525 04/28/23 1129       Cognition    Orientation Status (Cognition) oriented x 4  -AE oriented x 3  -AE    Row Name 04/28/23 1525 04/28/23 1129       Safety Issues, Functional Mobility    Safety Issues Affecting Function (Mobility) awareness of need for assistance;insight into deficits/self-awareness;safety precaution awareness;safety precautions follow-through/compliance;sequencing abilities  -AE awareness of need for assistance;insight into deficits/self-awareness;safety precaution awareness;safety precautions follow-through/compliance;sequencing abilities  -AE    Impairments Affecting Function (Mobility)  balance;cognition;endurance/activity tolerance;pain;strength;postural/trunk control;range of motion (ROM)  -AE balance;cognition;endurance/activity tolerance;pain;strength;postural/trunk control;range of motion (ROM)  -AE    Cognitive Impairments, Mobility Safety/Performance awareness, need for assistance;safety precaution awareness;safety precaution follow-through;sequencing abilities  -AE awareness, need for assistance;safety precaution awareness;safety precaution follow-through  -AE          User Key  (r) = Recorded By, (t) = Taken By, (c) = Cosigned By    Initials Name Provider Type    AE Poli Razo, PT Physical Therapist               Mobility     Row Name 04/28/23 1526 04/28/23 1129       Bed Mobility    Bed Mobility supine-sit;sit-supine  -AE --    Supine-Sit [place] (Bed Mobility) minimum assist (75% patient effort);1 person assist;verbal cues  -AE --    Sit-Supine [place] (Bed Mobility) minimum assist (75% patient effort);1 person assist;verbal cues;nonverbal cues (demo/gesture)  -AE --    Assistive Device (Bed Mobility) head of bed elevated;bed rails  -AE --    Comment, (Bed Mobility) VCs for hand placement and sequencing. Pt required increased assistance to advance RLE to EOB. Decreased strength of RLE limiting independence with mobility.  -AE Received sitting on BSC, left UIC.  -AE    Row Name 04/28/23 1526 04/28/23 1129       Transfers    Comment, (Transfers) VCs for hand placement and sequencing. Initial posterior LOB into EOB but able to recover with time.  -AE VCs for hand placement and sequencing. Pt required increased cues for pushing up from BSC.  -AE    Row Name 04/28/23 1526 04/28/23 1129       Sit-Stand Transfer    Sit-Stand [place] (Transfers) minimum assist (75% patient effort);1 person assist;verbal cues;nonverbal cues (demo/gesture)  -AE minimum assist (75% patient effort);1 person assist;verbal cues  -AE    Assistive Device (Sit-Stand Transfers) walker, front-wheeled   -AE walker, front-wheeled  -AE    Row Name 04/28/23 1526 04/28/23 1129       Gait/Stairs (Locomotion)    [place] Level (Gait) minimum assist (75% patient effort);1 person to manage equipment;1 person assist;verbal cues  -AE minimum assist (75% patient effort);1 person assist;1 person to manage equipment;verbal cues  -AE    Assistive Device (Gait) walker, front-wheeled  -AE walker, front-wheeled  -AE    Distance in Feet (Gait) 50  -AE 35  -AE    Deviations/Abnormal Patterns (Gait) bilateral deviations;urbano decreased;gait speed decreased;stride length decreased;base of support, narrow  -AE bilateral deviations;base of support, narrow;urbano decreased;gait speed decreased;stride length decreased  -AE    Bilateral Gait Deviations forward flexed posture;heel strike decreased  -AE forward flexed posture;heel strike decreased;weight shift ability decreased  -AE    Comment, (Gait/Stairs) Pt demo step through gait pattern with slowed urbano, forward flexed posture, and decreased endurance with mobility. Pt required multiple brief standing rest breaks this session, stating she felt increasingly fatigued. Pt very determined to continue ambulation despite fatigue.  -AE Pt demo step through gait pattern with slowed urbano, forward flexed posture, and decreased sequencing of AD. Pt required increased cues to improve forward gaze and step length during session. Pt required 2 standing rest breaks 2/2 fatigue this session, stating her arms were getting tired. Further distance limited by fatigue.  -AE    Row Name 04/28/23 1526 04/28/23 1129       Mobility    Extremity Weight-bearing Status right lower extremity  -AE right lower extremity  -AE    Right Lower Extremity (Weight-bearing Status) weight-bearing as tolerated (WBAT)  -AE weight-bearing as tolerated (WBAT)  -AE          User Key  (r) = Recorded By, (t) = Taken By, (c) = Cosigned By    Initials Name Provider Type    AE Poli Razo, PT Physical Therapist                Obj/Interventions     Row Name 04/28/23 1535 04/28/23 1139       Motor Skills    Therapeutic Exercise hip;knee;ankle  -AE hip;knee;ankle  -AE    Row Name 04/28/23 1535 04/28/23 1139       Hip (Therapeutic Exercise)    Hip (Therapeutic Exercise) strengthening exercise  -AE strengthening exercise  -AE    Hip Strengthening (Therapeutic Exercise) right;aBduction;aDduction;heel slides;10 repetitions  -AE right;aBduction;aDduction;heel slides;sitting;10 repetitions  -AE    Row Name 04/28/23 1535 04/28/23 1139       Knee (Therapeutic Exercise)    Knee (Therapeutic Exercise) strengthening exercise  -AE isometric exercises;strengthening exercise  -AE    Knee Isometrics (Therapeutic Exercise) -- bilateral;quad sets;10 repetitions;sitting  -AE    Knee Strengthening (Therapeutic Exercise) right;LAQ (long arc quad);heel slides;10 repetitions  -AE bilateral;LAQ (long arc quad);sitting;10 repetitions  -AE    Row Name 04/28/23 1535 04/28/23 1139       Ankle (Therapeutic Exercise)    Ankle (Therapeutic Exercise) AROM (active range of motion)  -AE AROM (active range of motion)  -AE    Ankle AROM (Therapeutic Exercise) bilateral;dorsiflexion;plantarflexion;15 repititions;sitting  -AE bilateral;dorsiflexion;plantarflexion;10 repetitions;sitting  -AE    Row Name 04/28/23 1535 04/28/23 1139       Balance    Balance Assessment sitting static balance;sitting dynamic balance;sit to stand dynamic balance;standing static balance;standing dynamic balance  -AE sitting static balance;sitting dynamic balance;sit to stand dynamic balance;standing static balance;standing dynamic balance  -AE    Static Sitting Balance contact guard  -AE contact guard  -AE    Dynamic Sitting Balance contact guard  -AE contact guard  -AE    Position, Sitting Balance unsupported;sitting edge of bed  -AE unsupported;other (see comments)  BSC  -AE    Sit to Stand Dynamic Balance minimal assist;1-person assist;verbal cues;non-verbal cues (demo/gesture)  -AE  minimal assist;1-person assist;verbal cues;non-verbal cues (demo/gesture)  -AE    Static Standing Balance contact guard;1-person assist  -AE contact guard;1-person assist;verbal cues  -AE    Dynamic Standing Balance minimal assist;1-person assist;1 person to manage equipment;verbal cues  -AE minimal assist;1-person assist;verbal cues;1 person to manage equipment  -AE    Position/Device Used, Standing Balance supported;walker, front-wheeled  -AE supported;walker, front-wheeled  -AE          User Key  (r) = Recorded By, (t) = Taken By, (c) = Cosigned By    Initials Name Provider Type    AE Poli Razo, PT Physical Therapist               Goals/Plan    No documentation.                Clinical Impression     Row Name 04/28/23 1536 04/28/23 1141       Pain    Pretreatment Pain Rating 3/10  -AE 4/10  -AE    Posttreatment Pain Rating 5/10  -AE 5/10  -AE    Pain Location - Side/Orientation -- Right  -AE    Pain Location generalized  -AE generalized  -AE    Pain Location - hip  -AE hip  -AE    Pre/Posttreatment Pain Comment tolerated  -AE tolerated; cold pack applied  -AE    Pain Intervention(s) Repositioned;Ambulation/increased activity  -AE Repositioned;Ambulation/increased activity;Cold pack  -AE    Row Name 04/28/23 1536 04/28/23 1141       Plan of Care Review    Plan of Care Reviewed With patient;daughter  -AE patient;daughter  -AE    Progress improving  -AE improving  -AE    Outcome Evaluation Pt continues to present with generalized weakness and decreased functional endurance. Pt ambulated 50ft with min A x1 +1 chair follow. Pt required multiple standing rest breaks this session d/t increased fatigue. Continue d/c recommendation to IRF. Continue to progress.  -AE Pt continues to present with decreased functional mobility and decreased endurance with mobility. Pt ambulated 35ft with min A x1 +1 chair follow. Pt demo improved sequencing of AD but continues to be limited by fatigue. Continue to progress per pt  tolerance.  -AE    Row Name 04/28/23 1536 04/28/23 1141       Vital Signs    Pre Systolic BP Rehab 137  -  -AE    Pre Treatment Diastolic BP 62  -AE 62  -AE    Pretreatment Heart Rate (beats/min) 82  -AE 90  -AE    Posttreatment Heart Rate (beats/min) 84  -AE 86  -AE    Pre SpO2 (%) 98  -AE --    O2 Delivery Pre Treatment nasal cannula  -AE room air  -AE    O2 Delivery Intra Treatment room air  -AE room air  -AE    Post SpO2 (%) 98  -AE 95  -AE    O2 Delivery Post Treatment nasal cannula  -AE room air  -AE    Pre Patient Position Supine  -AE Sitting  -AE    Intra Patient Position Standing  -AE Standing  -AE    Post Patient Position Supine  -AE Sitting  -AE    Row Name 04/28/23 1536 04/28/23 1141       Positioning and Restraints    Pre-Treatment Position in bed  -AE bedside commode  -AE    Post Treatment Position bed  -AE chair  -AE    In Bed notified nsg;fowlers;call light within reach;encouraged to call for assist;exit alarm on;with family/caregiver;legs elevated  -AE --    In Chair -- notified nsg;reclined;call light within reach;encouraged to call for assist;exit alarm on;with family/caregiver;waffle cushion;on mechanical lift sling;legs elevated  -AE          User Key  (r) = Recorded By, (t) = Taken By, (c) = Cosigned By    Initials Name Provider Type    AE Poli Razo, PT Physical Therapist               Outcome Measures     Row Name 04/28/23 1539 04/28/23 1143       How much help from another person do you currently need...    Turning from your back to your side while in flat bed without using bedrails? 3  -AE 3  -AE    Moving from lying on back to sitting on the side of a flat bed without bedrails? 2  -AE 3  -AE    Moving to and from a bed to a chair (including a wheelchair)? 3  -AE 3  -AE    Standing up from a chair using your arms (e.g., wheelchair, bedside chair)? 3  -AE 3  -AE    Climbing 3-5 steps with a railing? 2  -AE 2  -AE    To walk in hospital room? 3  -AE 3  -AE    AM-PAC 6 Clicks Score  (PT) 16  -AE 17  -AE    Highest level of mobility 5 --> Static standing  -AE 5 --> Static standing  -AE    Row Name 04/28/23 0800          How much help from another person do you currently need...    Turning from your back to your side while in flat bed without using bedrails? 3  -BC     Moving from lying on back to sitting on the side of a flat bed without bedrails? 3  -BC     Moving to and from a bed to a chair (including a wheelchair)? 3  -BC     Standing up from a chair using your arms (e.g., wheelchair, bedside chair)? 3  -BC     Climbing 3-5 steps with a railing? 2  -BC     To walk in hospital room? 3  -BC     AM-PAC 6 Clicks Score (PT) 17  -BC     Highest level of mobility 5 --> Static standing  -BC     Row Name 04/28/23 1539 04/28/23 1143       Functional Assessment    Outcome Measure Options AM-PAC 6 Clicks Basic Mobility (PT)  -AE AM-PAC 6 Clicks Basic Mobility (PT)  -AE          User Key  (r) = Recorded By, (t) = Taken By, (c) = Cosigned By    Initials Name Provider Type    BC Jessi Villegas, RN Registered Nurse    Poli Samuels, HAROLDO Physical Therapist                             Physical Therapy Education     Title: PT OT SLP Therapies (In Progress)     Topic: Physical Therapy (In Progress)     Point: Mobility training (In Progress)     Learning Progress Summary           Patient Acceptance, E, NR by AE at 4/28/2023 1422    Acceptance, E, VU by AE at 4/28/2023 1022    Augie, LINDSAY, VU,DU,NR by  at 4/27/2023 1512    Comment: Reviewed safety/technique w/transfers, ambulation, HEP, PT POC    Poojaer, LINDSAY, VU,DU,NR by SS at 4/27/2023 1017    Comment: Reviewed safety/technique w/bed mobility, transfers, HEP, PT POC    Acceptance, E,D, NR by  at 4/26/2023 1902   Family Augie, LINDSAY, VU,DU,NR by SS at 4/27/2023 1017    Comment: Reviewed safety/technique w/bed mobility, transfers, HEP, PT POC                   Point: Home exercise program (In Progress)     Learning Progress Summary           Patient Acceptance, E,  NR by AE at 4/28/2023 1422    Acceptance, E, VU by AE at 4/28/2023 1022    Eager, E, VU,DU,NR by SS at 4/27/2023 1512    Comment: Reviewed safety/technique w/transfers, ambulation, HEP, PT POC    Eager, E, VU,DU,NR by SS at 4/27/2023 1017    Comment: Reviewed safety/technique w/bed mobility, transfers, HEP, PT POC    Acceptance, E,D, NR by HP at 4/26/2023 1902   Family Eager, E, VU,DU,NR by SS at 4/27/2023 1017    Comment: Reviewed safety/technique w/bed mobility, transfers, HEP, PT POC                   Point: Body mechanics (In Progress)     Learning Progress Summary           Patient Acceptance, E, NR by AE at 4/28/2023 1422    Acceptance, E, VU by AE at 4/28/2023 1022    Eager, E, VU,DU,NR by SS at 4/27/2023 1512    Comment: Reviewed safety/technique w/transfers, ambulation, HEP, PT POC    Eager, E, VU,DU,NR by SS at 4/27/2023 1017    Comment: Reviewed safety/technique w/bed mobility, transfers, HEP, PT POC    Acceptance, E,D, NR by HP at 4/26/2023 1902   Family Eager, E, VU,DU,NR by SS at 4/27/2023 1017    Comment: Reviewed safety/technique w/bed mobility, transfers, HEP, PT POC                   Point: Precautions (In Progress)     Learning Progress Summary           Patient Acceptance, E, NR by AE at 4/28/2023 1422    Acceptance, E, VU by AE at 4/28/2023 1022    Eager, E, VU,DU,NR by SS at 4/27/2023 1512    Comment: Reviewed safety/technique w/transfers, ambulation, HEP, PT POC    Eager, E, VU,DU,NR by SS at 4/27/2023 1017    Comment: Reviewed safety/technique w/bed mobility, transfers, HEP, PT POC    Acceptance, E,D, NR by  at 4/26/2023 1902   Family Eager, E, VU,DU,NR by SS at 4/27/2023 1017    Comment: Reviewed safety/technique w/bed mobility, transfers, HEP, PT POC                               User Key     Initials Effective Dates Name Provider Type Discipline    HP 06/01/21 -  Krystle Wynn, PT Physical Therapist PT    SS 06/01/21 -  Fara Alatorre, HAROLDO Physical Therapist PT    AE 09/21/21 -   Poli Razo PT Physical Therapist PT              PT Recommendation and Plan     Plan of Care Reviewed With: patient, daughter  Progress: improving  Outcome Evaluation: Pt continues to present with generalized weakness and decreased functional endurance. Pt ambulated 50ft with min A x1 +1 chair follow. Pt required multiple standing rest breaks this session d/t increased fatigue. Continue d/c recommendation to IRF. Continue to progress.     Time Calculation:    PT Charges     Row Name 04/28/23 1543 04/28/23 1145          Time Calculation    Start Time 1422  -AE 1022  -AE     PT Received On 04/28/23  -AE 04/28/23  -AE     PT Goal Re-Cert Due Date 05/06/23  -AE 05/06/23  -AE        Time Calculation- PT    Total Timed Code Minutes- PT 25 minute(s)  -AE 23 minute(s)  -AE        Timed Charges    10006 - PT Therapeutic Exercise Minutes 10  -AE 10  -AE     97532 - Gait Training Minutes  15  -AE 13  -AE        Total Minutes    Timed Charges Total Minutes 25  -AE 23  -AE      Total Minutes 25  -AE 23  -AE           User Key  (r) = Recorded By, (t) = Taken By, (c) = Cosigned By    Initials Name Provider Type    AE Poli Razo PT Physical Therapist              Therapy Charges for Today     Code Description Service Date Service Provider Modifiers Qty    83113985275 HC PT THER PROC EA 15 MIN 4/28/2023 Poli Razo, PT GP 1    72795246037 HC GAIT TRAINING EA 15 MIN 4/28/2023 Poli Razo, PT GP 1    29824238007 HC PT THER SUPP EA 15 MIN 4/28/2023 Poli Razo, PT GP 2    65757678235 HC PT THER PROC EA 15 MIN 4/28/2023 Poli Razo, PT GP 1    41115187548 HC GAIT TRAINING EA 15 MIN 4/28/2023 Poli Razo, PT GP 1          PT G-Codes  Outcome Measure Options: AM-PAC 6 Clicks Basic Mobility (PT)  AM-PAC 6 Clicks Score (PT): 16  AM-PAC 6 Clicks Score (OT): 15  PT Discharge Summary  Anticipated Discharge Disposition (PT): inpatient rehabilitation facility    Poli Razo PT  4/28/2023

## 2023-04-28 NOTE — CASE MANAGEMENT/SOCIAL WORK
Case Management Discharge Note      Final Note: Pt has been approved to transfer to [facility] on 4/29 with transport at 1330 via [facility] [address] [facility]. She will need to be in front of the [address] building by 1315. Report can be called to [phone] and CM will fax the dc summary to [phone]. Pt and family in agreement with plan    Provided Post Acute Provider List?: Yes    Selected Continued Care - Admitted Since 4/24/2023     Destination    No services have been selected for the patient.              Durable Medical Equipment    No services have been selected for the patient.              Dialysis/Infusion    No services have been selected for the patient.              Home Medical Care    No services have been selected for the patient.              Therapy    No services have been selected for the patient.              Community Resources    No services have been selected for the patient.              Community & DME    No services have been selected for the patient.                       Final Discharge Disposition Code: 03 - skilled nursing facility (SNF)

## 2023-04-28 NOTE — PLAN OF CARE
Goal Outcome Evaluation:  Plan of Care Reviewed With: patient, daughter        Progress: improving  Outcome Evaluation: Pt continues to present with decreased functional mobility and decreased endurance with mobility. Pt ambulated 35ft with min A x1 +1 chair follow. Pt demo improved sequencing of AD but continues to be limited by fatigue. Continue to progress per pt tolerance.

## 2023-04-28 NOTE — PLAN OF CARE
Goal Outcome Evaluation:  Plan of Care Reviewed With: patient           Outcome Evaluation: VSS. Alert oriented x4. Voiding well per BSC. Ambulates assist x2.

## 2023-04-28 NOTE — CASE MANAGEMENT/SOCIAL WORK
Continued Stay Note  [facility]     Patient Name: Angelica Spivey  MRN: [id number]  Today's Date: 4/28/2023    Admit Date: 4/24/2023    Plan: IPR   Discharge Plan     Row Name 04/28/23 1122       Plan    Plan Comments April with [facility] reports they have started precert for SRU. Cm will continue to follow and will assist in transportation if needed if pt is approved for [facility]. CM to follow.               Discharge Codes    No documentation.               Expected Discharge Date and Time     Expected Discharge Date Expected Discharge Time    Apr 28, 2023             Alice Velasco RN

## 2023-04-29 VITALS
HEART RATE: 81 BPM | WEIGHT: 156 LBS | RESPIRATION RATE: 18 BRPM | TEMPERATURE: 97.6 F | OXYGEN SATURATION: 94 % | BODY MASS INDEX: 26.63 KG/M2 | HEIGHT: 64 IN | SYSTOLIC BLOOD PRESSURE: 141 MMHG | DIASTOLIC BLOOD PRESSURE: 66 MMHG

## 2023-04-29 LAB
GLUCOSE BLDC GLUCOMTR-MCNC: 177 MG/DL (ref 70–130)
GLUCOSE BLDC GLUCOMTR-MCNC: 203 MG/DL (ref 70–130)
HCT VFR BLD AUTO: 26 % (ref 34–46.6)
HGB BLD-MCNC: 8.3 G/DL (ref 12–15.9)

## 2023-04-29 PROCEDURE — 82948 REAGENT STRIP/BLOOD GLUCOSE: CPT

## 2023-04-29 PROCEDURE — 85014 HEMATOCRIT: CPT | Performed by: INTERNAL MEDICINE

## 2023-04-29 PROCEDURE — 99239 HOSP IP/OBS DSCHRG MGMT >30: CPT | Performed by: INTERNAL MEDICINE

## 2023-04-29 PROCEDURE — 97530 THERAPEUTIC ACTIVITIES: CPT

## 2023-04-29 PROCEDURE — 97110 THERAPEUTIC EXERCISES: CPT

## 2023-04-29 PROCEDURE — 85018 HEMOGLOBIN: CPT | Performed by: INTERNAL MEDICINE

## 2023-04-29 PROCEDURE — 63710000001 INSULIN LISPRO (HUMAN) PER 5 UNITS

## 2023-04-29 RX ORDER — INSULIN GLARGINE 100 [IU]/ML
14 INJECTION, SOLUTION SUBCUTANEOUS NIGHTLY
Qty: 6 ML | Refills: 0
Start: 2023-04-29

## 2023-04-29 RX ORDER — GLIMEPIRIDE 4 MG/1
4 TABLET ORAL NIGHTLY PRN
Qty: 60 TABLET | Refills: 6 | Status: SHIPPED | OUTPATIENT
Start: 2023-04-29

## 2023-04-29 RX ORDER — CLOPIDOGREL BISULFATE 75 MG/1
75 TABLET ORAL DAILY
Qty: 30 TABLET
Start: 2023-06-08

## 2023-04-29 RX ORDER — METFORMIN HYDROCHLORIDE 500 MG/1
1000 TABLET, EXTENDED RELEASE ORAL
Start: 2023-04-29

## 2023-04-29 RX ORDER — LOSARTAN POTASSIUM 100 MG/1
100 TABLET ORAL
Start: 2023-04-30

## 2023-04-29 RX ORDER — ASPIRIN 81 MG/1
81 TABLET ORAL EVERY 12 HOURS SCHEDULED
Start: 2023-04-29

## 2023-04-29 RX ORDER — HYDROCODONE BITARTRATE AND ACETAMINOPHEN 5; 325 MG/1; MG/1
1 TABLET ORAL EVERY 6 HOURS PRN
Refills: 0
Start: 2023-04-29 | End: 2023-05-02

## 2023-04-29 RX ORDER — ACETAMINOPHEN 325 MG/1
650 TABLET ORAL EVERY 4 HOURS PRN
Start: 2023-04-29

## 2023-04-29 RX ADMIN — PANTOPRAZOLE SODIUM 40 MG: 40 TABLET, DELAYED RELEASE ORAL at 05:59

## 2023-04-29 RX ADMIN — LOSARTAN POTASSIUM 25 MG: 25 TABLET, FILM COATED ORAL at 08:39

## 2023-04-29 RX ADMIN — ACETAMINOPHEN 1000 MG: 500 TABLET ORAL at 05:59

## 2023-04-29 RX ADMIN — FAMOTIDINE 40 MG: 20 TABLET, FILM COATED ORAL at 08:39

## 2023-04-29 RX ADMIN — DULAGLUTIDE 1.5 MG: 0.75 INJECTION, SOLUTION SUBCUTANEOUS at 08:47

## 2023-04-29 RX ADMIN — LEVOTHYROXINE SODIUM 88 MCG: 0.09 TABLET ORAL at 05:59

## 2023-04-29 RX ADMIN — INSULIN LISPRO 4 UNITS: 100 INJECTION, SOLUTION INTRAVENOUS; SUBCUTANEOUS at 11:49

## 2023-04-29 RX ADMIN — SCOPALAMINE 1 PATCH: 1 PATCH, EXTENDED RELEASE TRANSDERMAL at 08:43

## 2023-04-29 RX ADMIN — INSULIN LISPRO 2 UNITS: 100 INJECTION, SOLUTION INTRAVENOUS; SUBCUTANEOUS at 08:39

## 2023-04-29 RX ADMIN — CARVEDILOL 12.5 MG: 12.5 TABLET, FILM COATED ORAL at 08:39

## 2023-04-29 RX ADMIN — FLUTICASONE PROPIONATE 2 SPRAY: 50 SPRAY, METERED NASAL at 08:45

## 2023-04-29 RX ADMIN — ASPIRIN 81 MG: 81 TABLET, COATED ORAL at 08:38

## 2023-04-29 RX ADMIN — SENNOSIDES AND DOCUSATE SODIUM 2 TABLET: 8.6; 5 TABLET ORAL at 08:38

## 2023-04-29 NOTE — PLAN OF CARE
Patient with No changes this night shift. VSS. Dressing CDI. She was able to sleep well. Pain controlled. Plan is for transfer to [facility] today at 1330. Daughter has been at the bedside.

## 2023-04-29 NOTE — PROGRESS NOTES
Orthopedic Progress Note      Patient: Angelica Spivey  YOB: 1940     Date of Admission: 4/24/2023  7:24 PM Medical Record Number: [id number]     Attending Physician: Melvin Little DO    Status Post:  Procedure(s):  TOTAL HIP ARTHROPLASTY ANTERIOR Post Operative Day Number: 3    Subjective : No new orthopaedic complaints     Pain Relief: some relief with present medication.     Systemic Complaints: No Complaints  Vitals:    04/28/23 2024 04/29/23 0349 04/29/23 0717 04/29/23 0838   BP: 127/61 146/71 125/70 141/66   BP Location: Right arm Right arm Right arm    Patient Position: Lying Lying Lying    Pulse: 76 72 74 81   Resp: 16 16 18    Temp: 98.2 °F (36.8 °C) 97.9 °F (36.6 °C) 97.6 °F (36.4 °C)    TempSrc: Oral Oral Oral    SpO2:  97% 94%    Weight:       Height:           Physical Exam: 83 y.o. female    General Appearance:       Alert, cooperative, in no acute distress                  Extremities:    Dressing Clean, Dry and Intact             No clinical sign of DVT        Able to do good movements of digits    Pulses:   Pulses palpable and equal bilaterally           Diagnostic Tests:     Results from last 7 days   Lab Units 04/29/23  0515 04/28/23  0549 04/27/23 0455 04/26/23 0419   WBC 10*3/mm3  --  6.46 10.10 8.65   HEMOGLOBIN g/dL 8.3* 8.5* 8.5* 11.1*   HEMATOCRIT % 26.0* 27.4* 27.4* 34.8   PLATELETS 10*3/mm3  --  136* 163 208     Results from last 7 days   Lab Units 04/28/23  0549 04/27/23 0455 04/26/23 0419   SODIUM mmol/L 141 139 141   POTASSIUM mmol/L 4.1 4.0 4.2   CHLORIDE mmol/L 106 103 105   CO2 mmol/L 28.0 26.0 28.0   BUN mg/dL 14 18 9   CREATININE mg/dL 0.72 0.85 0.63   GLUCOSE mg/dL 151* 171* 115*   CALCIUM mg/dL 8.5* 8.2* 9.0     Results from last 7 days   Lab Units 04/25/23  0355 04/24/23 2012   INR  1.03 1.02     No results found for: URICACID  No results found for: CRYSTAL  Microbiology Results (last 10 days)     Procedure Component Value - Date/Time    COVID  PRE-OP / PRE-PROCEDURE SCREENING ORDER (NO ISOLATION) - Swab, Nasopharynx [[id number]]  (Normal) Collected: 04/28/23 1620    Lab Status: Final result Specimen: Swab from Nasopharynx Updated: 04/28/23 1657    Narrative:      The following orders were created for panel order COVID PRE-OP / PRE-PROCEDURE SCREENING ORDER (NO ISOLATION) - Swab, Nasopharynx.  Procedure                               Abnormality         Status                     ---------                               -----------         ------                     COVID-19 and FLU A/B PCR...[[id number]]  Normal              Final result                 Please view results for these tests on the individual orders.    COVID-19 and FLU A/B PCR - Swab, Nasopharynx [[id number]]  (Normal) Collected: 04/28/23 1620    Lab Status: Final result Specimen: Swab from Nasopharynx Updated: 04/28/23 1657     COVID19 Not Detected     Influenza A PCR Not Detected     Influenza B PCR Not Detected    Narrative:      Fact sheet for providers: https://www.fda.gov/media/477114/download    Fact sheet for patients: https://www.fda.gov/media/799856/download    Test performed by PCR.        XR Femur 2 View Right    Result Date: 4/24/2023  Impression: Acute impacted likely subcapital fracture of the right femoral neck. Electronically Signed: Krishan Yuen  4/24/2023 8:34 PM EDT  Workstation ID: [id number]    XR Chest 1 View    Result Date: 4/24/2023  Impression: No acute cardiopulmonary findings. Electronically Signed: Krishan Yuen  4/24/2023 8:36 PM EDT  Workstation ID: [id number]    FL C Arm During Surgery    Result Date: 4/26/2023  Impression: Fluoroscopic imaging obtained without a radiologist present. Please see performing provider notes for full detail. Electronically Signed: Raul Guy  4/26/2023 4:08 PM EDT  Workstation ID: [id number]    XR Hip With or Without Pelvis 2 - 3 View Right    Result Date: 4/26/2023  Impression: Expected postoperative appearance of newly placed right  total hip arthroplasty. Electronically Signed: Krishan Yuen  4/26/2023 4:27 PM EDT  Workstation ID: [id number]    XR Hip With or Without Pelvis 2 - 3 View Right    Result Date: 4/24/2023  Impression: Acute impacted likely subcapital fracture of the right femoral neck. Electronically Signed: Krishan Yuen  4/24/2023 8:34 PM EDT  Workstation ID: [id number]        Current Medications:  Scheduled Meds:acetaminophen, 1,000 mg, Oral, Q8H  aspirin, 81 mg, Oral, Q12H  carvedilol, 12.5 mg, Oral, BID With Meals  famotidine, 40 mg, Oral, Daily  fluticasone, 2 spray, Each Nare, BID  insulin detemir, 5 Units, Subcutaneous, Nightly  insulin lispro, 0-9 Units, Subcutaneous, 4x Daily AC & at Bedtime  levothyroxine, 88 mcg, Oral, QAM  losartan, 25 mg, Oral, Q24H  montelukast, 10 mg, Oral, Nightly  pantoprazole, 40 mg, Oral, BID AC  Scopolamine, 1 patch, Transdermal, Q72H  senna-docusate sodium, 2 tablet, Oral, BID  sodium chloride, 10 mL, Intravenous, Q12H      Continuous Infusions:lactated ringers, 9 mL/hr, Last Rate: 9 mL/hr (04/26/23 1215)  ropivacaine,       PRN Meds:.•  acetaminophen **OR** acetaminophen **OR** acetaminophen  •  senna-docusate sodium **AND** polyethylene glycol **AND** bisacodyl **AND** bisacodyl  •  Calcium Replacement - Follow Nurse / BPA Driven Protocol  •  dextrose  •  dextrose  •  glucagon (human recombinant)  •  HYDROcodone-acetaminophen  •  HYDROmorphone **AND** naloxone  •  Magnesium Standard Dose Replacement - Follow Nurse / BPA Driven Protocol  •  melatonin  •  ondansetron **OR** ondansetron  •  oxyCODONE  •  oxyCODONE  •  Phosphorus Replacement - Follow Nurse / BPA Driven Protocol  •  Potassium Replacement - Follow Nurse / BPA Driven Protocol  •  [COMPLETED] Insert Peripheral IV **AND** sodium chloride  •  sodium chloride  •  sodium chloride  •  traMADol    Assessment: Status post  No admission procedures for hospital encounter.    Patient Active Problem List   Diagnosis   • Abnormal stress test   •  Mixed hyperlipidemia   • Coronary artery disease involving native coronary artery of native heart   • Type 2 diabetes mellitus with hyperglycemia, with long-term current use of insulin   • Acquired hypothyroidism   • Chronic GERD   • Unstable angina   • Closed fracture of right hip   • Essential hypertension   • Acute blood loss anemia       PLAN:   Continues current post-op course  Anticoagulation: Aspirin started will need 6 week course  Mobilize with PT as tolerated per protocol  F/u in office in 6 weeks    Weight Bearing: WBAT  Discharge Plan: OK to plan for discharge in  Today to rehab  from orthopaedic perspective.    Rodo Jennings MD    Date: 4/29/2023    Time: 09:52 EDT

## 2023-04-29 NOTE — THERAPY TREATMENT NOTE
Patient Name: Angelica Spivey  : 1940    MRN: [id number]                              Today's Date: 2023       Admit Date: 2023    Visit Dx:     ICD-10-CM ICD-9-CM   1. Closed right hip fracture, initial encounter  S72.001A 820.8   2. Hyperglycemia  R73.9 790.29   3. Uncontrolled type 2 diabetes mellitus with hyperglycemia  E11.65 250.02   4. Closed fracture of right hip, initial encounter  S72.001A 820.8     Patient Active Problem List   Diagnosis   • Abnormal stress test   • Mixed hyperlipidemia   • Coronary artery disease involving native coronary artery of native heart   • Type 2 diabetes mellitus with hyperglycemia, with long-term current use of insulin   • Acquired hypothyroidism   • Chronic GERD   • Unstable angina   • Closed fracture of right hip   • Essential hypertension   • Acute blood loss anemia     Past Medical History:   Diagnosis Date   • Anxiety    • Arthritis    • Coronary artery disease    • Diabetes mellitus    • Disease of thyroid gland    • Elevated cholesterol    • GERD (gastroesophageal reflux disease)    • Hearing aid worn    • Heart attack    • History of stomach ulcers    • Hypertension    • PONV (postoperative nausea and vomiting)    • Wears glasses      Past Surgical History:   Procedure Laterality Date   • BLADDER SUSPENSION     • CARDIAC CATHETERIZATION N/A 08/15/2018    Procedure: Left Heart Cath;  Surgeon: David Burnett MD;  Location:  [facility];  Service: Cardiology   • CARDIAC CATHETERIZATION N/A 2020    Procedure: LEFT HEART CATH;  Surgeon: David Subramanian MD;  Location:  [facility];  Service: Cardiology   • CARDIAC CATHETERIZATION N/A 01/15/2020    Procedure: CBI to the LAD;  Surgeon: Phil Deng MD;  Location:  [facility];  Service: Cardiovascular   • CARDIAC CATHETERIZATION N/A 2020    Procedure: Left Heart Cath 90191;  Surgeon: Phil Deng MD;  Location:  [facility];  Service: Cardiovascular;  Laterality: N/A;   • CATARACT EXTRACTION Bilateral    • COLONOSCOPY     • ENDOSCOPY     • ENDOSCOPY N/A 12/06/2019    Procedure: ESOPHAGOGASTRODUODENOSCOPY;  Surgeon: Burak Patino MD;  Location:  [facility];  Service: Gastroenterology   • HYSTERECTOMY  2014    PARTIAL   • OOPHORECTOMY Bilateral 2014   • DC RT/LT HEART CATHETERS N/A 10/05/2018    Procedure: Percutaneous Coronary Intervention;  Surgeon: David Burnett MD;  Location:  [facility];  Service: Cardiology   • TOTAL HIP ARTHROPLASTY Right 4/26/2023    Procedure: TOTAL HIP ARTHROPLASTY ANTERIOR RIGHT;  Surgeon: Rodo Jennings MD;  Location:  [facility];  Service: Orthopedics;  Laterality: Right;   • VULVECTOMY        General Information     Row Name 04/29/23 1033          Physical Therapy Time and Intention    Document Type therapy note (daily note)  -LR     Mode of Treatment physical therapy;individual therapy  -LR     Row Name 04/29/23 1033          General Information    Patient Profile Reviewed yes  -LR     Existing Precautions/Restrictions fall;right;hip, anterior;other (see comments)  R hip wound vac  -LR     Barriers to Rehab medically complex  -LR     Row Name 04/29/23 1033          Cognition    Orientation Status (Cognition) oriented x 4  -LR     Row Name 04/29/23 1033          Safety Issues, Functional Mobility    Safety Issues Affecting Function (Mobility) safety precautions follow-through/compliance;safety precaution awareness;positioning of assistive device;insight into deficits/self-awareness;awareness of need for assistance  -LR     Impairments Affecting Function (Mobility) balance;endurance/activity tolerance;postural/trunk control;cognition;pain;strength;range of motion (ROM)  -LR           User Key  (r) = Recorded By, (t) = Taken By, (c) = Cosigned By    Initials Name Provider Type    LR Elena Mccord, PT Physical Therapist               Mobility      Row Name 04/29/23 1033          Bed Mobility    Supine-Sit [place] (Bed Mobility) not tested  -LR     Sit-Supine [place] (Bed Mobility) not tested  -LR     Comment, (Bed Mobility) In shower on arrival. [facility] at end of treatment.  -LR     Row Name 04/29/23 1033          Transfers    Comment, (Transfers) Verbal cues to push up from shower chair to stand and to reach back for chair to lower into sitting. Verbal cues to step R LE out before t/f for comfort and to keep L LE under her prior to t/f to power into standing. Initially leaning posteriorly, bracing back of her legs against shower chair. Improved with cue to shift hips forward. Assisted patient in donning underwear and pants in standing. Increased assist requird to rise off of shower chair.  -LR     Row Name 04/29/23 1033          Bed-Chair Transfer    Bed-Chair [place] (Transfers) not tested  -LR     Row Name 04/29/23 1033          Sit-Stand Transfer    Sit-Stand [place] (Transfers) verbal cues;moderate assist (50% patient effort)  -LR     Assistive Device (Sit-Stand Transfers) walker, front-wheeled  -LR     Row Name 04/29/23 1033          Gait/Stairs (Locomotion)    [place] Level (Gait) verbal cues;contact guard  -LR     Assistive Device (Gait) walker, front-wheeled  -LR     Distance in Feet (Gait) 20  -LR     Deviations/Abnormal Patterns (Gait) bilateral deviations;urbano decreased;gait speed decreased;stride length decreased;right sided deviations;antalgic  -LR     Bilateral Gait Deviations forward flexed posture;heel strike decreased  -LR     Right Sided Gait Deviations weight shift ability decreased  -LR     [place] Level (Stairs) not tested  -LR     Comment, (Gait/Stairs) Patient ambulated with step to gait pattern at slow pace. Verbal cues for correct sequencing of steps, increased R LE weight bearing/stance phase, decreased UE weight bearing, increased step length. Improved with cues for correction. Cues to keep RW closer  and to stay within RW. Gait limited by fatigue and weakness.  -LR     Row Name 04/29/23 1033          Mobility    Extremity Weight-bearing Status right lower extremity  -LR     Right Lower Extremity (Weight-bearing Status) weight-bearing as tolerated (WBAT)  -LR           User Key  (r) = Recorded By, (t) = Taken By, (c) = Cosigned By    Initials Name Provider Type    LR Elena Mccord, PT Physical Therapist               Obj/Interventions     Row Name 04/29/23 1033          Motor Skills    Therapeutic Exercise ankle;knee;hip;other (see comments)  cues for technique; min assist R heel slides, R hip abd, mod assist R SLR  -LR     Row Name 04/29/23 1033          Hip (Therapeutic Exercise)    Hip (Therapeutic Exercise) strengthening exercise;isometric exercises  -     Hip Isometrics (Therapeutic Exercise) bilateral;gluteal sets;sitting;10 repetitions  -     Hip Strengthening (Therapeutic Exercise) bilateral;heel slides;aBduction;sitting;10 repetitions  -     Row Name 04/29/23 1033          Knee (Therapeutic Exercise)    Knee (Therapeutic Exercise) strengthening exercise;isometric exercises  -     Knee Isometrics (Therapeutic Exercise) bilateral;quad sets;sitting;10 repetitions  -     Knee Strengthening (Therapeutic Exercise) bilateral;SLR (straight leg raise);SAQ (short arc quad);LAQ (long arc quad);sitting;10 repetitions  -     Row Name 04/29/23 1033          Ankle (Therapeutic Exercise)    Ankle (Therapeutic Exercise) AROM (active range of motion)  -     Ankle AROM (Therapeutic Exercise) bilateral;dorsiflexion;plantarflexion;sitting;10 repetitions  -     Row Name 04/29/23 1033          Balance    Balance Assessment sitting static balance;sitting dynamic balance;standing static balance;standing dynamic balance  -LR     Static Sitting Balance standby assist  -LR     Dynamic Sitting Balance contact guard  -LR     Position, Sitting Balance unsupported;sitting in chair  -LR     Static Standing  Balance contact guard  -LR     Dynamic Standing Balance contact guard  -LR     Position/Device Used, Standing Balance supported;walker, rolling  -LR           User Key  (r) = Recorded By, (t) = Taken By, (c) = Cosigned By    Initials Name Provider Type    LR Elena Mccord, PT Physical Therapist               Goals/Plan     Row Name 04/29/23 1033          Bed Mobility Goal 1 (PT)    Activity/Assistive Device (Bed Mobility Goal 1, PT) sit to supine/supine to sit  -LR     [place] Level/Cues Needed (Bed Mobility Goal 1, PT) contact guard required  -LR     Time Frame (Bed Mobility Goal 1, PT) long term goal (LTG);5 days  -LR     Progress/Outcomes (Bed Mobility Goal 1, PT) goal ongoing  -LR     Row Name 04/29/23 1033          Transfer Goal 1 (PT)    Activity/Assistive Device (Transfer Goal 1, PT) sit-to-stand/stand-to-sit;bed-to-chair/chair-to-bed;walker, rolling  -LR     [place] Level/Cues Needed (Transfer Goal 1, PT) contact guard required  -LR     Time Frame (Transfer Goal 1, PT) long term goal (LTG);5 days  -LR     Progress/Outcome (Transfer Goal 1, PT) continuing progress toward goal;goal ongoing  -LR     Row Name 04/29/23 1033          Gait Training Goal 1 (PT)    Activity/Assistive Device (Gait Training Goal 1, PT) gait (walking locomotion);walker, rolling  -LR     [place] Level (Gait Training Goal 1, PT) contact guard required  -LR     Distance (Gait Training Goal 1, PT) 150 feet  -LR     Time Frame (Gait Training Goal 1, PT) long term goal (LTG);5 days  -LR     Progress/Outcome (Gait Training Goal 1, PT) progress slower than expected;goal ongoing  -LR           User Key  (r) = Recorded By, (t) = Taken By, (c) = Cosigned By    Initials Name Provider Type    Elena Senior PT Physical Therapist               Clinical Impression     Row Name 04/29/23 1033          Pain    Pain Intervention(s) Ambulation/increased activity;Repositioned  -LR     Additional Documentation Pain Scale:  FACES Pre/Post-Treatment (Group)  -LR     Row Name 04/29/23 1033          Pain Scale: FACES Pre/Post-Treatment    Pain: FACES Scale, Pretreatment 2-->hurts little bit  -LR     Posttreatment Pain Rating 0-->no hurt  -LR     Pain Location - Side/Orientation Right  -LR     Pain Location - hip  -LR     Row Name 04/29/23 1033          Plan of Care Review    Plan of Care Reviewed With patient;daughter  -LR     Progress improving  -LR     Outcome Evaluation Patient ambulated 20 feet with RW, step to gait pattern, CGA, limited by fatigue and weakness. Required increased assist to rise into standing today d/t being on shower chair. Good effort with LE ther ex. Continues to be below baseline, demonstrating decreased functional mobility status, impaired balance, and decreased R hip strength/ROM. Will address these deficits to promote return to PLOF. Recommend IRF at d/c.  -LR     Row Name 04/29/23 1033          Therapy Assessment/Plan (PT)    Rehab Potential (PT) good, to achieve stated therapy goals  -LR     Criteria for Skilled Interventions Met (PT) yes;meets criteria;skilled treatment is necessary  -LR     Therapy Frequency (PT) 2 times/day  -LR     Row Name 04/29/23 1033          Positioning and Restraints    Pre-Treatment Position bathroom  -LR     Post Treatment Position chair  -LR     In Chair notified nsg;reclined;sitting;call light within reach;encouraged to call for assist;exit alarm on;with family/caregiver;legs elevated;waffle cushion;on mechanical lift sling  SCDs not on on arrival  -LR           User Key  (r) = Recorded By, (t) = Taken By, (c) = Cosigned By    Initials Name Provider Type    LR Elena Mccord, PT Physical Therapist               Outcome Measures     Row Name 04/29/23 1033 04/29/23 0830       How much help from another person do you currently need...    Turning from your back to your side while in flat bed without using bedrails? 3  -LR 3  -BC    Moving from lying on back to sitting on the  side of a flat bed without bedrails? 2  -LR 2  -BC    Moving to and from a bed to a chair (including a wheelchair)? 3  -LR 3  -BC    Standing up from a chair using your arms (e.g., wheelchair, bedside chair)? 2  -LR 3  -BC    Climbing 3-5 steps with a railing? 1  -LR 2  -BC    To walk in hospital room? 3  -LR 2  -BC    AM-PAC 6 Clicks Score (PT) 14  -LR 15  -BC    Highest level of mobility 4 --> Transferred to chair/commode  -LR 4 --> Transferred to chair/commode  -BC    Row Name 04/29/23 1033          Functional Assessment    Outcome Measure Options AM-PAC 6 Clicks Basic Mobility (PT)  -LR           User Key  (r) = Recorded By, (t) = Taken By, (c) = Cosigned By    Initials Name Provider Type    LR Elena Mccord, PT Physical Therapist    Jessi Sanchez, RN Registered Nurse                             Physical Therapy Education     Title: PT OT SLP Therapies (In Progress)     Topic: Physical Therapy (Done)     Point: Mobility training (Done)     Learning Progress Summary           Patient Acceptance, E,D, VU,NR by LR at 4/29/2023 1033    Comment: Educated on anterior hip precautions, weight bearing status, HEP, safety with mobility, correct sit<->stand t/f technique, correct gait mechanics, and progression of POC.    Acceptance, E, NR by AE at 4/28/2023 1422    Acceptance, E, VU by AE at 4/28/2023 1022    Eager, E, VU,DU,NR by SS at 4/27/2023 1512    Comment: Reviewed safety/technique w/transfers, ambulation, HEP, PT POC    Eager, E, VU,DU,NR by SS at 4/27/2023 1017    Comment: Reviewed safety/technique w/bed mobility, transfers, HEP, PT POC    Acceptance, E,D, NR by HP at 4/26/2023 1902   Family Acceptance, E,D, VU,NR by LR at 4/29/2023 1033    Comment: Educated on anterior hip precautions, weight bearing status, HEP, safety with mobility, correct sit<->stand t/f technique, correct gait mechanics, and progression of POC.    Eager, E, VU,DU,NR by SS at 4/27/2023 1017    Comment: Reviewed safety/technique  w/bed mobility, transfers, HEP, PT POC                   Point: Home exercise program (Done)     Learning Progress Summary           Patient Acceptance, E,D, VU,NR by LR at 4/29/2023 1033    Comment: Educated on anterior hip precautions, weight bearing status, HEP, safety with mobility, correct sit<->stand t/f technique, correct gait mechanics, and progression of POC.    Acceptance, E, NR by AE at 4/28/2023 1422    Acceptance, E, VU by AE at 4/28/2023 1022    Eager, E, VU,DU,NR by SS at 4/27/2023 1512    Comment: Reviewed safety/technique w/transfers, ambulation, HEP, PT POC    Eager, E, VU,DU,NR by SS at 4/27/2023 1017    Comment: Reviewed safety/technique w/bed mobility, transfers, HEP, PT POC    Acceptance, E,D, NR by HP at 4/26/2023 1902   Family Acceptance, E,D, VU,NR by LR at 4/29/2023 1033    Comment: Educated on anterior hip precautions, weight bearing status, HEP, safety with mobility, correct sit<->stand t/f technique, correct gait mechanics, and progression of POC.    Eager, E, VU,DU,NR by SS at 4/27/2023 1017    Comment: Reviewed safety/technique w/bed mobility, transfers, HEP, PT POC                   Point: Body mechanics (Done)     Learning Progress Summary           Patient Acceptance, E,D, VU,NR by LR at 4/29/2023 1033    Comment: Educated on anterior hip precautions, weight bearing status, HEP, safety with mobility, correct sit<->stand t/f technique, correct gait mechanics, and progression of POC.    Acceptance, E, NR by AE at 4/28/2023 1422    Acceptance, E, VU by AE at 4/28/2023 1022    Eager, E, VU,DU,NR by SS at 4/27/2023 1512    Comment: Reviewed safety/technique w/transfers, ambulation, HEP, PT POC    Eager, E, VU,DU,NR by SS at 4/27/2023 1017    Comment: Reviewed safety/technique w/bed mobility, transfers, HEP, PT POC    Acceptance, E,D, NR by HP at 4/26/2023 1902   Family Acceptance, E,D, VU,NR by LR at 4/29/2023 1033    Comment: Educated on anterior hip precautions, weight bearing status,  HEP, safety with mobility, correct sit<->stand t/f technique, correct gait mechanics, and progression of POC.    Eager, E, VU,DU,NR by SS at 4/27/2023 1017    Comment: Reviewed safety/technique w/bed mobility, transfers, HEP, PT POC                   Point: Precautions (Done)     Learning Progress Summary           Patient Acceptance, E,D, VU,NR by LR at 4/29/2023 1033    Comment: Educated on anterior hip precautions, weight bearing status, HEP, safety with mobility, correct sit<->stand t/f technique, correct gait mechanics, and progression of POC.    Acceptance, E, NR by AE at 4/28/2023 1422    Acceptance, E, VU by AE at 4/28/2023 1022    Eager, E, VU,DU,NR by SS at 4/27/2023 1512    Comment: Reviewed safety/technique w/transfers, ambulation, HEP, PT POC    Eager, E, VU,DU,NR by SS at 4/27/2023 1017    Comment: Reviewed safety/technique w/bed mobility, transfers, HEP, PT POC    Acceptance, E,D, NR by HP at 4/26/2023 1902   Family Acceptance, E,D, VU,NR by LR at 4/29/2023 1033    Comment: Educated on anterior hip precautions, weight bearing status, HEP, safety with mobility, correct sit<->stand t/f technique, correct gait mechanics, and progression of POC.    Eager, E, VU,DU,NR by SS at 4/27/2023 1017    Comment: Reviewed safety/technique w/bed mobility, transfers, HEP, PT POC                               User Key     Initials Effective Dates Name Provider Type Discipline    LR 02/03/23 -  Elena Mccord, PT Physical Therapist PT    HP 06/01/21 -  Krystle Wynn, PT Physical Therapist PT    SS 06/01/21 -  Fara Alatorre, PT Physical Therapist PT    AE 09/21/21 -  Poli Razo, PT Physical Therapist PT              PT Recommendation and Plan     Plan of Care Reviewed With: patient, daughter  Progress: improving  Outcome Evaluation: Patient ambulated 20 feet with RW, step to gait pattern, CGA, limited by fatigue and weakness. Required increased assist to rise into standing today d/t being on shower chair.  Good effort with LE ther ex. Continues to be below baseline, demonstrating decreased functional mobility status, impaired balance, and decreased R hip strength/ROM. Will address these deficits to promote return to PLOF. Recommend IRF at d/c.     Time Calculation:    PT Charges     Row Name 04/29/23 1033             Time Calculation    Start Time 1033  -LR      PT Received On 04/29/23  -LR      PT Goal Re-Cert Due Date 05/06/23  -LR         Timed Charges    05747 - PT Therapeutic Exercise Minutes 12  -LR      33058 - Gait Training Minutes  5  -LR      15212 - PT Therapeutic Activity Minutes 6  -LR         Total Minutes    Timed Charges Total Minutes 23  -LR       Total Minutes 23  -LR            User Key  (r) = Recorded By, (t) = Taken By, (c) = Cosigned By    Initials Name Provider Type    LR Elena Mccord, PT Physical Therapist              Therapy Charges for Today     Code Description Service Date Service Provider Modifiers Qty    27299960006  PT THERAPEUTIC ACT EA 15 MIN 4/29/2023 Elena Mccord, PT GP 1    35821843081 HC PT THER PROC EA 15 MIN 4/29/2023 Elena Mccord, PT GP 1          PT G-Codes  Outcome Measure Options: AM-PAC 6 Clicks Basic Mobility (PT)  AM-PAC 6 Clicks Score (PT): 14  AM-PAC 6 Clicks Score (OT): 15  PT Discharge Summary  Anticipated Discharge Disposition (PT): inpatient rehabilitation facility    Elena Mccord, PT  4/29/2023

## 2023-04-29 NOTE — PLAN OF CARE
Goal Outcome Evaluation:  Plan of Care Reviewed With: patient, daughter        Progress: improving  Outcome Evaluation: Patient ambulated 20 feet with RW, step to gait pattern, CGA, limited by fatigue and weakness. Required increased assist to rise into standing today d/t being on shower chair. Good effort with LE ther ex. Continues to be below baseline, demonstrating decreased functional mobility status, impaired balance, and decreased R hip strength/ROM. Will address these deficits to promote return to PLOF. Recommend IRF at d/c.

## 2023-04-29 NOTE — DISCHARGE SUMMARY
[facility] Medicine Services  DISCHARGE SUMMARY    Patient Name: Angelica Spivey  : 1940  MRN: [id number]    Date of Admission: 2023  7:24 PM  Date of Discharge:  2023  Primary Care Physician: Abimael Matthews MD    Consults     Date and Time Order Name Status Description    2023 12:53 AM Inpatient Orthopedic Surgery Consult Completed           Hospital Course     Presenting Problem:   Hip fracture [S72.009A]    Active Hospital Problems    Diagnosis  POA   • **Closed fracture of right hip [S72.001A]  Yes   • Acute blood loss anemia [D62]  Unknown   • Essential hypertension [I10]  Unknown   • Acquired hypothyroidism [E03.9]  Yes   • Type 2 diabetes mellitus with hyperglycemia, with long-term current use of insulin [E11.65, Z79.4]  Not Applicable   • Mixed hyperlipidemia [E78.2]  Yes   • Coronary artery disease involving native coronary artery of native heart [I25.10]  Yes      Resolved Hospital Problems   No resolved problems to display.          Hospital Course:  Angelica Spivey is a 83 y.o. female with HTN, HLD, DM 2, GERD, CAD s/p prior LAD stenting 2018 now on Plavix monotherapy; she sustained a fall on a slick floor without known LOC, and was unable to get up.  Imaging in the ED demonstrated RT femoral neck fracture, orthopedics was called and she was admitted to the hospital.  On 2023 she underwent operative repair (total hip arthroplasty) with Dr. Jennings who recommends 6 weeks ASA bid for DVT PPx, her Plavix will be on hold until completion of aspirin therapy.  She is WBAT per orthopedic recommendations and will follow up with them in the clinic in 6 weeks.    *Her home medication list from admission was woefully inaccurate, her daughter provided me with a list of actual/active home medications this AM and I have reconciled her DC meds according w/ any exceptions noted below    Acute RT impacted likely subcapital femoral neck Fx  - s/p total hip  arthroplasty 4/26/23 w/ Dr. Jennings  -per ortho, WBAT  -NOT on ASA at baseline (prior severe gastritis even w/ enteric coated); pt/dtr are agreeable to 6wks ASA for dvt ppx as rec'd per ortho, but after that time she will need to return to plavix monotherapy (stop ASA and restart plavix 6/9/23)  -requiring very minimal pain control  -Discharging to rehab this afternoon     Acute expected blood loss anemia  -Postoperative H&H drop remains stable x3 days     CAD  HTN  HLD  -on plavix monotherapy at baseline (intolerant of ASA 2/2 gastritis); this is on hold while on ASA for dvt ppx; needs to resume plavix after ASA therapy  -cont coreg, losartan  -statin intolerant, took regular dose of Praluent during this stay per family request     DM type 2, a1c 8.2%, w/ long term use of insulin  -took home dose of Dulaglutide during this stay  -home insulins reconciled for clarity; during her stay she has only been on levemir 5U qHS w/ a sliding scale     Hypothyroidism  -cont levothyroxine     Constipation  -no BM x5 days, successful x2 BMs on 4/28 after bowel regimen     GERD  -cont home dexilant; ordered pepcid while on ASA      Discharge Follow Up Recommendations for outpatient labs/diagnostics:   PCP 1-2 weeks via telemedicine, otherwise 1-2 weeks post rehab DC in person  Dr. Jennings, orthopedics, 6 weeks    Day of Discharge     HPI:   Minimal pain. 2 successful BM's yesterday    Review of Systems  Gen- No fevers, chills  CV- No chest pain, palpitations  Resp- No cough, dyspnea  GI- No N/V/D, abd pain    Vital Signs:   Temp:  [97.6 °F (36.4 °C)-98.2 °F (36.8 °C)] 97.6 °F (36.4 °C)  Heart Rate:  [72-84] 81  Resp:  [16-18] 18  BP: (124-146)/(59-81) 141/66  Flow (L/min):  [1-2] 1      Physical Exam:  Constitutional: Awake, alert, elderly female laying in bed in NAD  HENT: NCAT, mucous membranes moist  Respiratory: Clear to auscultation bilaterally, respiratory effort normal   Cardiovascular: RRR, palpable radial  pulses  Gastrointestinal: Positive bowel sounds, soft, nontender, nondistended  Musculoskeletal: No bilateral ankle edema  Psychiatric: Appropriate affect, cooperative  Neurologic: Speech clear and fluent    Pertinent  and/or Most Recent Results     LAB RESULTS:      Lab 04/29/23  0515 04/28/23  0549 04/27/23 0455 04/26/23 0419 04/25/23 0355 04/24/23 2012   WBC  --  6.46 10.10 8.65 9.50 11.68*   HEMOGLOBIN 8.3* 8.5* 8.5* 11.1* 11.1* 11.4*   HEMATOCRIT 26.0* 27.4* 27.4* 34.8 34.7 36.0   PLATELETS  --  136* 163 208 208 229   NEUTROS ABS  --   --   --   --  6.32 8.70*   IMMATURE GRANS (ABS)  --   --   --   --  0.05 0.06*   LYMPHS ABS  --   --   --   --  2.21 2.03   MONOS ABS  --   --   --   --  0.74 0.63   EOS ABS  --   --   --   --  0.13 0.19   MCV  --  89.3 87.8 86.4 85.9 86.5   PROTIME  --   --   --   --  13.5 13.3         Lab 04/28/23  0549 04/27/23 0455 04/26/23 0419 04/25/23 0355 04/24/23 2012   SODIUM 141 139 141 139 139   POTASSIUM 4.1 4.0 4.2 4.1 4.3   CHLORIDE 106 103 105 103 104   CO2 28.0 26.0 28.0 25.0 26.0   ANION GAP 7.0 10.0 8.0 11.0 9.0   BUN 14 18 9 15 17   CREATININE 0.72 0.85 0.63 0.69 0.82   EGFR 84.1 68.9 89.3 87.3 72.0   GLUCOSE 151* 171* 115* 180* 214*   CALCIUM 8.5* 8.2* 9.0 9.5 9.4   MAGNESIUM  --   --   --  1.8  --    PHOSPHORUS  --   --   --  3.7  --    HEMOGLOBIN A1C  --   --   --   --  8.20*   TSH  --   --   --   --  3.940         Lab 04/24/23 2012   TOTAL PROTEIN 6.5   ALBUMIN 3.8   GLOBULIN 2.7   ALT (SGPT) 14   AST (SGOT) 18   BILIRUBIN 0.2   ALK PHOS 71         Lab 04/25/23  0355 04/24/23 2012   PROTIME 13.5 13.3   INR 1.03 1.02             Lab 04/25/23 0355   ABO TYPING A   RH TYPING Positive   ANTIBODY SCREEN Negative         Brief Urine Lab Results     None        Microbiology Results (last 10 days)     Procedure Component Value - Date/Time    COVID PRE-OP / PRE-PROCEDURE SCREENING ORDER (NO ISOLATION) - Swab, Nasopharynx [[id number]]  (Normal) Collected: 04/28/23 8477     Lab Status: Final result Specimen: Swab from Nasopharynx Updated: 04/28/23 1657    Narrative:      The following orders were created for panel order COVID PRE-OP / PRE-PROCEDURE SCREENING ORDER (NO ISOLATION) - Swab, Nasopharynx.  Procedure                               Abnormality         Status                     ---------                               -----------         ------                     COVID-19 and FLU A/B PCR...[[id number]]  Normal              Final result                 Please view results for these tests on the individual orders.    COVID-19 and FLU A/B PCR - Swab, Nasopharynx [[id number]]  (Normal) Collected: 04/28/23 1620    Lab Status: Final result Specimen: Swab from Nasopharynx Updated: 04/28/23 1657     COVID19 Not Detected     Influenza A PCR Not Detected     Influenza B PCR Not Detected    Narrative:      Fact sheet for providers: https://www.fda.gov/media/154281/download    Fact sheet for patients: https://www.fda.gov/media/238575/download    Test performed by PCR.          XR Femur 2 View Right    Result Date: 4/24/2023  XR FEMUR 2 VW RIGHT, XR HIP W OR WO PELVIS 2-3 VIEW RIGHT Date of Exam: 4/24/2023 7:33 PM EDT Indication: fall Comparison: None available. Findings: The bones are demineralized limiting assessment for occult nondisplaced fractures. Right hip: There is an acute impacted fracture through the femoral neck (likely subcapital). Femoral head maintains articulation with the acetabulum. Right femur: No acute fracture or malalignment of the mid or distal femur.     Impression: Acute impacted likely subcapital fracture of the right femoral neck. Electronically Signed: Krishan Yuen  4/24/2023 8:34 PM EDT  Workstation ID: [id number]    XR Chest 1 View    Result Date: 4/24/2023  XR CHEST 1 VW Date of Exam: 4/24/2023 7:32 PM EDT Indication: fall Comparison: Chest x-ray 8/15/2018 Findings: Coronary artery stent is noted. Normal cardiomediastinal silhouette. The lungs are clear. No  pleural effusion or pneumothorax. No acute osseous findings.     Impression: No acute cardiopulmonary findings. Electronically Signed: Krishan Yuen  4/24/2023 8:36 PM EDT  Workstation ID: [id number]    Fasia Iliacus Block    Result Date: 4/25/2023  Steven Houston CRNA     4/25/2023  4:50 PM Fasia Iliacus Block Pre-sedation assessment completed: 4/25/2023 2:15 PM Patient reassessed immediately prior to procedure Start time: 4/25/2023 2:15 PM Reason for block: at surgeon's request and post-op pain management Performed by CRNA/CAA: Steven Houston, JOSÉ MIGUEL Assisted by: Jessica Maldonado RN Preanesthetic Checklist Completed: patient identified, IV checked, site marked, risks and benefits discussed, surgical consent, monitors and equipment checked, pre-op evaluation and timeout performed Prep: Pt Position: supine Sterile barriers:cap, gloves, mask and washed/disinfected hands Prep: ChloraPrep Patient monitoring: blood pressure monitoring, continuous pulse oximetry and EKG Procedure Sedation: no Performed under: local infiltration Guidance:ultrasound guided ULTRASOUND INTERPRETATION.  Using ultrasound guidance a 20 G gauge needle was placed in close proximity to the nerve, at which point, under ultrasound guidance anesthetic was injected in the area of the nerve and spread of the anesthesia was seen on ultrasound in close proximity thereto.  There were no abnormalities seen on ultrasound; a digital image was taken; and the patient tolerated the procedure with no complications. Images:still images obtained, printed/placed on chart Laterality:right Block Type:fascia iliaca compartment Injection Technique:catheter Needle Type:echogenic and Tuohy Needle Gauge:18 G Resistance on Injection: none Catheter Size:20 G (20g) Cath Depth at skin: 13 cm Medications Used: ropivacaine (NAROPIN) 0.5 % injection - Injection  25 mL - 4/25/2023 2:15:00 PM Medications Preservative Free Saline:25ml Post Assessment Injection Assessment: negative  "aspiration for heme, no paresthesia on injection and incremental injection Patient Tolerance:comfortable throughout block Complications:no Additional Notes CKAFASCIAILIACA: CATHETER A high-frequency linear transducer, with sterile cover, was placed in parasagittal plane on top of the Anterior Superior Iliac Spine (ASIS) and moved medially to identify the Internal Oblique muscle, Sartorius muscle, Iliacus Muscle, Fascia Iliaca (FI) and Fascia Latae. The insertion site was prepped and draped in sterile fashion. Skin and cutaneous tissue was infiltrated with 2-5 ml of 1% Lidocaine. Using ultrasound-guidance, an 18-gauge Contiplex Ultra 360 Touhy needle was advanced in plane from caudad to cephalad. Preservative-free normal saline was utilized for hydro-dissection of tissue, advancement of Touhy, and to confirm final needle placement below FI. Local anesthetic in incremental 3-5 ml injections. Aspiration every 5 ml to prevent intravascular injection. Injection was completed with negative aspiration of blood and negative intravascular injection. Injection pressures were normal with minimal resistance. A 20-gauge Contiplex Echo catheter was placed through the needle and advance out the tip of the Touhy 3-5 cm. The Touhy needle was then removed, and final catheter position verified below the FI. The catheter was secured in the usual fashion with skin glue, benzoin, steri-strips, CHG tegaderm and Label noting \"Nerve Block Catheter\". Jerk tape applied at yellow connector and catheter connection.     FL C Arm During Surgery    Result Date: 4/26/2023  FL C ARM DURING SURGERY Date of Exam: 4/26/2023 2:06 PM EDT Indication: TOTAL HIP ARTHROPLASTY ANTERIOR. Comparison: None available. Technique: 1 fluoroscopic spot image of the right hip obtained over 34.9 seconds of fluoroscopy time Fluoroscopic Time: 34.9 seconds Findings: Fluoroscopic imaging obtained for the purpose of guidance of right hip arthroplasty.     Impression: " Fluoroscopic imaging obtained without a radiologist present. Please see performing provider notes for full detail. Electronically Signed: Raulmadonna Connellykin  4/26/2023 4:08 PM EDT  Workstation ID: [id number]    XR Hip With or Without Pelvis 2 - 3 View Right    Result Date: 4/26/2023  XR HIP W OR WO PELVIS 2-3 VIEW RIGHT Date of Exam: 4/26/2023 4:06 PM EDT Indication: Post-Op Hip Arthroplasty Comparison: Right hip radiographs 4/24/2023 Findings: Unremarkable appearance of newly placed right total hip arthroplasty which appears in satisfactory alignment without hardware fracture, perihardware lucency, or periprosthetic fracture. There are expected postsurgical soft tissue changes of the right hip, with wound VAC device. The bones are demineralized.     Impression: Expected postoperative appearance of newly placed right total hip arthroplasty. Electronically Signed: Krishan Yuen  4/26/2023 4:27 PM EDT  Workstation ID: [id number]    XR Hip With or Without Pelvis 2 - 3 View Right    Result Date: 4/24/2023  XR FEMUR 2 VW RIGHT, XR HIP W OR WO PELVIS 2-3 VIEW RIGHT Date of Exam: 4/24/2023 7:33 PM EDT Indication: fall Comparison: None available. Findings: The bones are demineralized limiting assessment for occult nondisplaced fractures. Right hip: There is an acute impacted fracture through the femoral neck (likely subcapital). Femoral head maintains articulation with the acetabulum. Right femur: No acute fracture or malalignment of the mid or distal femur.     Impression: Acute impacted likely subcapital fracture of the right femoral neck. Electronically Signed: Krishan Yuen  4/24/2023 8:34 PM EDT  Workstation ID: [id number]        Discharge Details        Discharge Medications      New Medications      Instructions Start Date   acetaminophen 325 MG tablet  Commonly known as: TYLENOL   650 mg, Oral, Every 4 Hours PRN      HYDROcodone-acetaminophen 5-325 MG per tablet  Commonly known as: NORCO   1 tablet, Oral, Every 6 Hours  PRN      losartan 100 MG tablet  Commonly known as: COZAAR   100 mg, Oral, Every 24 Hours Scheduled   Start Date: April 30, 2023        Changes to Medications      Instructions Start Date   aspirin 81 MG EC tablet  What changed: when to take this   81 mg, Oral, Every 12 Hours Scheduled      clopidogrel 75 MG tablet  Commonly known as: PLAVIX  What changed: These instructions start on June 8, 2023. If you are unsure what to do until then, ask your doctor or other care provider.   75 mg, Oral, Daily   Start Date: June 8, 2023     glimepiride 4 MG tablet  Commonly known as: AMARYL  What changed:   · when to take this  · reasons to take this   4 mg, Oral, Nightly PRN      Lantus SoloStar 100 UNIT/ML injection pen  Generic drug: Insulin Glargine  What changed:   · See the new instructions.  · Another medication with the same name was removed. Continue taking this medication, and follow the directions you see here.   14 Units, Subcutaneous, Nightly      metFORMIN  MG 24 hr tablet  Commonly known as: GLUCOPHAGE-XR  What changed:   · See the new instructions.  · Another medication with the same name was removed. Continue taking this medication, and follow the directions you see here.   1,000 mg, Oral, Daily With Breakfast         Continue These Medications      Instructions Start Date   Accu-Chek Ericka Plus w/Device kit   Use device to check blood sugar 3 times a day      carvedilol 6.25 MG tablet  Commonly known as: COREG   12.5 mg, Oral, 2 Times Daily With Meals      dexlansoprazole 60 MG capsule  Commonly known as: DEXILANT   60 mg, Oral, Nightly      Dulaglutide 0.75 MG/0.5ML solution pen-injector   Subcutaneous      estradiol 0.1 MG/GM vaginal cream  Commonly known as: ESTRACE   1 g, Vaginal, Every 3 Days      Estrogens Conjugated 0.625 MG/GM vaginal cream  Commonly known as: PREMARIN   0.5 g, Vaginal, 2 Times Weekly      famotidine 40 MG tablet  Commonly known as: PEPCID   1 tablet, Oral, Daily      fluticasone  "50 MCG/ACT nasal spray  Commonly known as: FLONASE   SMARTSIG:Both Nares      glucose blood test strip  Commonly known as: Accu-Chek Ericka Plus   Use as instructed to test blood sugar 3 times daily      levothyroxine 88 MCG tablet  Commonly known as: SYNTHROID, LEVOTHROID   88 mcg, Oral, Every Morning      nitroglycerin 0.4 MG SL tablet  Commonly known as: NITROSTAT   0.4 mg, Oral, Every 5 Minutes PRN      Praluent 150 MG/ML injection pen  Generic drug: Alirocumab   150 mg, Subcutaneous      ReliOn Pen Needles 32G X 4 MM misc  Generic drug: Insulin Pen Needle   Use daily with insulin         Stop These Medications    Claritin 10 MG tablet  Generic drug: loratadine     clobetasol 0.05 % ointment  Commonly known as: TEMOVATE     linagliptin 5 MG tablet tablet  Commonly known as: TRADJENTA     losartan 50 MG tablet 100 mg, hydroCHLOROthiazide 12.5 MG 12.5 mg     losartan-hydrochlorothiazide 100-12.5 MG per tablet  Commonly known as: HYZAAR     potassium chloride 10 MEQ CR tablet            Allergies   Allergen Reactions   • Biaxin [Clarithromycin] Anaphylaxis     \"BLISTERS AND THROAT SWELLING\"   • Allegra [Fexofenadine] Other (See Comments)     \"MAKES ME FEEL FUNNY\"   • Repatha [Evolocumab] Other (See Comments)     Chest pain   • Sulfa Antibiotics Nausea Only         Discharge Disposition:  Rehab Facility or Unit (DC - External)    Diet:  Hospital:  Diet Order   Procedures   • Diet: Regular/House Diet; Texture: Regular Texture (IDDSI 7); Fluid Consistency: Thin (IDDSI 0)          CODE STATUS:    Code Status and Medical Interventions:   Ordered at: 04/26/23 6638     Level Of Support Discussed With:    Patient     Code Status (Patient has no pulse and is not breathing):    CPR (Attempt to Resuscitate)     Medical Interventions (Patient has pulse or is breathing):    Full       No future appointments.    Additional Instructions for the Follow-ups that You Need to Schedule     Discharge Follow-up with PCP   As directed   "     Currently Documented PCP:    Abimael Matthews MD    PCP Phone Number:    [phone]     Follow Up Details: 1-2 weeks via telemedicine, otherwise 1-2 weeks in person after rehab discharge         Discharge Follow-up with Specified Provider: Dr. Jennings 6 weeks   As directed      To: Dr. Jennings 6 weeks    Follow Up Details: Post-op follow up                     Melvin Little DO  04/29/23      Time Spent on Discharge:  I spent  40  minutes on this discharge activity which included: face-to-face encounter with the patient, reviewing the data in the system, coordination of the care with the nursing staff as well as consultants, documentation, and entering orders.

## 2023-05-01 NOTE — PAYOR COMM NOTE
"Gretchen Mcgill, RN  Utilization Management  P:[phone]  F:[phone]    Auth# [id number]  Angelica Spivey (83 y.o. Female)     Date of Birth   1940    Social Security Number       Address   [address] [place] [address] [address] [place] [address]    Home Phone   [phone]    MRN   [id number]       [facility]    Marital Status                               Admission Date   23    Admission Type   Emergency    Admitting Provider   Melvin Little DO    Attending Provider       Department, Room/Bed   [facility] 3H, S377/1       Discharge Date   2023    Discharge Disposition   Rehab Facility or Unit (DC - External)    Discharge Destination                               Attending Provider: (none)   Allergies: Biaxin [Clarithromycin], Allegra [Fexofenadine], Repatha [Evolocumab], Sulfa Antibiotics    Isolation: None   Infection: None   Code Status: Prior    Ht: 162.6 cm (64\")   Wt: 70.8 kg (156 lb)    Admission Cmt: None   Principal Problem: Closed fracture of right hip [S72.001A]                 Active Insurance as of 2023     Primary Coverage     Payor Plan Insurance Group Employer/Plan Group    ANTHEM MEDICARE REPLACEMENT ANTHEM MEDICARE ADVANTAGE [id number]     Payor Plan Address Payor Plan Phone Number Payor Plan Fax Number Effective Dates    [address] [phone]  2022 - None Entered    [facility] [address]       Subscriber Name Subscriber Birth Date Member ID       ANGELICA SPIVEY 1940 [id number]                 Emergency Contacts      (Rel.) Home Phone Work Phone Mobile Phone    Sofia Spivey (Daughter) -- -- [phone]    Keshav Spivey (Spouse) [phone] -- [phone]    Kiki Spivey (Daughter) -- -- [phone]               Discharge Summary      Melvin Little DO at 23 0825              [facility] Medicine Services  DISCHARGE SUMMARY    Patient Name: Angelica Spivey  : 1940  MRN: " [id number]    Date of Admission: 4/24/2023  7:24 PM  Date of Discharge:  4/29/2023  Primary Care Physician: Abimael Matthews MD    Consults     Date and Time Order Name Status Description    4/25/2023 12:53 AM Inpatient Orthopedic Surgery Consult Completed           Hospital Course     Presenting Problem:   Hip fracture [S72.009A]    Active Hospital Problems    Diagnosis  POA   • **Closed fracture of right hip [S72.001A]  Yes   • Acute blood loss anemia [D62]  Unknown   • Essential hypertension [I10]  Unknown   • Acquired hypothyroidism [E03.9]  Yes   • Type 2 diabetes mellitus with hyperglycemia, with long-term current use of insulin [E11.65, Z79.4]  Not Applicable   • Mixed hyperlipidemia [E78.2]  Yes   • Coronary artery disease involving native coronary artery of native heart [I25.10]  Yes      Resolved Hospital Problems   No resolved problems to display.          Hospital Course:  Angelica Spivey is a 83 y.o. female with HTN, HLD, DM 2, GERD, CAD s/p prior LAD stenting 2018 now on Plavix monotherapy; she sustained a fall on a slick floor without known LOC, and was unable to get up.  Imaging in the ED demonstrated RT femoral neck fracture, orthopedics was called and she was admitted to the hospital.  On 4/26/2023 she underwent operative repair (total hip arthroplasty) with Dr. Jennings who recommends 6 weeks ASA bid for DVT PPx, her Plavix will be on hold until completion of aspirin therapy.  She is WBAT per orthopedic recommendations and will follow up with them in the clinic in 6 weeks.    *Her home medication list from admission was woefully inaccurate, her daughter provided me with a list of actual/active home medications this AM and I have reconciled her DC meds according w/ any exceptions noted below    Acute RT impacted likely subcapital femoral neck Fx  - s/p total hip arthroplasty 4/26/23 w/ Dr. Jennings  -per ortho, WBAT  -NOT on ASA at baseline (prior severe gastritis even w/ enteric coated); pt/dtr are  agreeable to 6wks ASA for dvt ppx as rec'd per ortho, but after that time she will need to return to plavix monotherapy (stop ASA and restart plavix 6/9/23)  -requiring very minimal pain control  -Discharging to rehab this afternoon     Acute expected blood loss anemia  -Postoperative H&H drop remains stable x3 days     CAD  HTN  HLD  -on plavix monotherapy at baseline (intolerant of ASA 2/2 gastritis); this is on hold while on ASA for dvt ppx; needs to resume plavix after ASA therapy  -cont coreg, losartan  -statin intolerant, took regular dose of Praluent during this stay per family request     DM type 2, a1c 8.2%, w/ long term use of insulin  -took home dose of Dulaglutide during this stay  -home insulins reconciled for clarity; during her stay she has only been on levemir 5U qHS w/ a sliding scale     Hypothyroidism  -cont levothyroxine     Constipation  -no BM x5 days, successful x2 BMs on 4/28 after bowel regimen     GERD  -cont home dexilant; ordered pepcid while on ASA      Discharge Follow Up Recommendations for outpatient labs/diagnostics:   PCP 1-2 weeks via telemedicine, otherwise 1-2 weeks post rehab DC in person  Dr. Jennings, orthopedics, 6 weeks    Day of Discharge     HPI:   Minimal pain. 2 successful BM's yesterday    Review of Systems  Gen- No fevers, chills  CV- No chest pain, palpitations  Resp- No cough, dyspnea  GI- No N/V/D, abd pain    Vital Signs:   Temp:  [97.6 °F (36.4 °C)-98.2 °F (36.8 °C)] 97.6 °F (36.4 °C)  Heart Rate:  [72-84] 81  Resp:  [16-18] 18  BP: (124-146)/(59-81) 141/66  Flow (L/min):  [1-2] 1      Physical Exam:  Constitutional: Awake, alert, elderly female laying in bed in NAD  HENT: NCAT, mucous membranes moist  Respiratory: Clear to auscultation bilaterally, respiratory effort normal   Cardiovascular: RRR, palpable radial pulses  Gastrointestinal: Positive bowel sounds, soft, nontender, nondistended  Musculoskeletal: No bilateral ankle edema  Psychiatric: Appropriate affect,  cooperative  Neurologic: Speech clear and fluent    Pertinent  and/or Most Recent Results     LAB RESULTS:      Lab 04/29/23  0515 04/28/23  0549 04/27/23  0455 04/26/23 0419 04/25/23 0355 04/24/23 2012   WBC  --  6.46 10.10 8.65 9.50 11.68*   HEMOGLOBIN 8.3* 8.5* 8.5* 11.1* 11.1* 11.4*   HEMATOCRIT 26.0* 27.4* 27.4* 34.8 34.7 36.0   PLATELETS  --  136* 163 208 208 229   NEUTROS ABS  --   --   --   --  6.32 8.70*   IMMATURE GRANS (ABS)  --   --   --   --  0.05 0.06*   LYMPHS ABS  --   --   --   --  2.21 2.03   MONOS ABS  --   --   --   --  0.74 0.63   EOS ABS  --   --   --   --  0.13 0.19   MCV  --  89.3 87.8 86.4 85.9 86.5   PROTIME  --   --   --   --  13.5 13.3         Lab 04/28/23  0549 04/27/23 0455 04/26/23 0419 04/25/23 0355 04/24/23 2012   SODIUM 141 139 141 139 139   POTASSIUM 4.1 4.0 4.2 4.1 4.3   CHLORIDE 106 103 105 103 104   CO2 28.0 26.0 28.0 25.0 26.0   ANION GAP 7.0 10.0 8.0 11.0 9.0   BUN 14 18 9 15 17   CREATININE 0.72 0.85 0.63 0.69 0.82   EGFR 84.1 68.9 89.3 87.3 72.0   GLUCOSE 151* 171* 115* 180* 214*   CALCIUM 8.5* 8.2* 9.0 9.5 9.4   MAGNESIUM  --   --   --  1.8  --    PHOSPHORUS  --   --   --  3.7  --    HEMOGLOBIN A1C  --   --   --   --  8.20*   TSH  --   --   --   --  3.940         Lab 04/24/23 2012   TOTAL PROTEIN 6.5   ALBUMIN 3.8   GLOBULIN 2.7   ALT (SGPT) 14   AST (SGOT) 18   BILIRUBIN 0.2   ALK PHOS 71         Lab 04/25/23  0355 04/24/23 2012   PROTIME 13.5 13.3   INR 1.03 1.02             Lab 04/25/23  0355   ABO TYPING A   RH TYPING Positive   ANTIBODY SCREEN Negative         Brief Urine Lab Results     None        Microbiology Results (last 10 days)     Procedure Component Value - Date/Time    COVID PRE-OP / PRE-PROCEDURE SCREENING ORDER (NO ISOLATION) - Swab, Nasopharynx [[id number]]  (Normal) Collected: 04/28/23 1620    Lab Status: Final result Specimen: Swab from Nasopharynx Updated: 04/28/23 1657    Narrative:      The following orders were created for panel order COVID  PRE-OP / PRE-PROCEDURE SCREENING ORDER (NO ISOLATION) - Swab, Nasopharynx.  Procedure                               Abnormality         Status                     ---------                               -----------         ------                     COVID-19 and FLU A/B PCR...[[id number]]  Normal              Final result                 Please view results for these tests on the individual orders.    COVID-19 and FLU A/B PCR - Swab, Nasopharynx [[id number]]  (Normal) Collected: 04/28/23 1620    Lab Status: Final result Specimen: Swab from Nasopharynx Updated: 04/28/23 1657     COVID19 Not Detected     Influenza A PCR Not Detected     Influenza B PCR Not Detected    Narrative:      Fact sheet for providers: https://www.fda.gov/media/282618/download    Fact sheet for patients: https://www.fda.gov/media/952205/download    Test performed by PCR.          XR Femur 2 View Right    Result Date: 4/24/2023  XR FEMUR 2 VW RIGHT, XR HIP W OR WO PELVIS 2-3 VIEW RIGHT Date of Exam: 4/24/2023 7:33 PM EDT Indication: fall Comparison: None available. Findings: The bones are demineralized limiting assessment for occult nondisplaced fractures. Right hip: There is an acute impacted fracture through the femoral neck (likely subcapital). Femoral head maintains articulation with the acetabulum. Right femur: No acute fracture or malalignment of the mid or distal femur.     Impression: Acute impacted likely subcapital fracture of the right femoral neck. Electronically Signed: Krishan Yuen  4/24/2023 8:34 PM EDT  Workstation ID: [id number]    XR Chest 1 View    Result Date: 4/24/2023  XR CHEST 1 VW Date of Exam: 4/24/2023 7:32 PM EDT Indication: fall Comparison: Chest x-ray 8/15/2018 Findings: Coronary artery stent is noted. Normal cardiomediastinal silhouette. The lungs are clear. No pleural effusion or pneumothorax. No acute osseous findings.     Impression: No acute cardiopulmonary findings. Electronically Signed: Krishan Yuen   4/24/2023 8:36 PM EDT  Workstation ID: [id number]    Fasia Iliacus Block    Result Date: 4/25/2023  Steven Houston CRNA     4/25/2023  4:50 PM Fasia Iliacus Block Pre-sedation assessment completed: 4/25/2023 2:15 PM Patient reassessed immediately prior to procedure Start time: 4/25/2023 2:15 PM Reason for block: at surgeon's request and post-op pain management Performed by CRNA/CAA: Steven Houston, JOSÉ MIGUEL Assisted by: Jessica Maldonado RN Preanesthetic Checklist Completed: patient identified, IV checked, site marked, risks and benefits discussed, surgical consent, monitors and equipment checked, pre-op evaluation and timeout performed Prep: Pt Position: supine Sterile barriers:cap, gloves, mask and washed/disinfected hands Prep: ChloraPrep Patient monitoring: blood pressure monitoring, continuous pulse oximetry and EKG Procedure Sedation: no Performed under: local infiltration Guidance:ultrasound guided ULTRASOUND INTERPRETATION.  Using ultrasound guidance a 20 G gauge needle was placed in close proximity to the nerve, at which point, under ultrasound guidance anesthetic was injected in the area of the nerve and spread of the anesthesia was seen on ultrasound in close proximity thereto.  There were no abnormalities seen on ultrasound; a digital image was taken; and the patient tolerated the procedure with no complications. Images:still images obtained, printed/placed on chart Laterality:right Block Type:fascia iliaca compartment Injection Technique:catheter Needle Type:echogenic and Tuohy Needle Gauge:18 G Resistance on Injection: none Catheter Size:20 G (20g) Cath Depth at skin: 13 cm Medications Used: ropivacaine (NAROPIN) 0.5 % injection - Injection  25 mL - 4/25/2023 2:15:00 PM Medications Preservative Free Saline:25ml Post Assessment Injection Assessment: negative aspiration for heme, no paresthesia on injection and incremental injection Patient Tolerance:comfortable throughout block Complications:no Additional  "Notes CKAFASCIAILIACA: CATHETER A high-frequency linear transducer, with sterile cover, was placed in parasagittal plane on top of the Anterior Superior Iliac Spine (ASIS) and moved medially to identify the Internal Oblique muscle, Sartorius muscle, Iliacus Muscle, Fascia Iliaca (FI) and Fascia Latae. The insertion site was prepped and draped in sterile fashion. Skin and cutaneous tissue was infiltrated with 2-5 ml of 1% Lidocaine. Using ultrasound-guidance, an 18-gauge Contiplex Ultra 360 Touhy needle was advanced in plane from caudad to cephalad. Preservative-free normal saline was utilized for hydro-dissection of tissue, advancement of Touhy, and to confirm final needle placement below FI. Local anesthetic in incremental 3-5 ml injections. Aspiration every 5 ml to prevent intravascular injection. Injection was completed with negative aspiration of blood and negative intravascular injection. Injection pressures were normal with minimal resistance. A 20-gauge Contiplex Echo catheter was placed through the needle and advance out the tip of the Touhy 3-5 cm. The Touhy needle was then removed, and final catheter position verified below the FI. The catheter was secured in the usual fashion with skin glue, benzoin, steri-strips, CHG tegaderm and Label noting \"Nerve Block Catheter\". Jerk tape applied at yellow connector and catheter connection.     FL C Arm During Surgery    Result Date: 4/26/2023  FL C ARM DURING SURGERY Date of Exam: 4/26/2023 2:06 PM EDT Indication: TOTAL HIP ARTHROPLASTY ANTERIOR. Comparison: None available. Technique: 1 fluoroscopic spot image of the right hip obtained over 34.9 seconds of fluoroscopy time Fluoroscopic Time: 34.9 seconds Findings: Fluoroscopic imaging obtained for the purpose of guidance of right hip arthroplasty.     Impression: Fluoroscopic imaging obtained without a radiologist present. Please see performing provider notes for full detail. Electronically Signed: Raul Guy  " 4/26/2023 4:08 PM EDT  Workstation ID: [id number]    XR Hip With or Without Pelvis 2 - 3 View Right    Result Date: 4/26/2023  XR HIP W OR WO PELVIS 2-3 VIEW RIGHT Date of Exam: 4/26/2023 4:06 PM EDT Indication: Post-Op Hip Arthroplasty Comparison: Right hip radiographs 4/24/2023 Findings: Unremarkable appearance of newly placed right total hip arthroplasty which appears in satisfactory alignment without hardware fracture, perihardware lucency, or periprosthetic fracture. There are expected postsurgical soft tissue changes of the right hip, with wound VAC device. The bones are demineralized.     Impression: Expected postoperative appearance of newly placed right total hip arthroplasty. Electronically Signed: Krishan Yuen  4/26/2023 4:27 PM EDT  Workstation ID: [id number]    XR Hip With or Without Pelvis 2 - 3 View Right    Result Date: 4/24/2023  XR FEMUR 2 VW RIGHT, XR HIP W OR WO PELVIS 2-3 VIEW RIGHT Date of Exam: 4/24/2023 7:33 PM EDT Indication: fall Comparison: None available. Findings: The bones are demineralized limiting assessment for occult nondisplaced fractures. Right hip: There is an acute impacted fracture through the femoral neck (likely subcapital). Femoral head maintains articulation with the acetabulum. Right femur: No acute fracture or malalignment of the mid or distal femur.     Impression: Acute impacted likely subcapital fracture of the right femoral neck. Electronically Signed: Krishan Yuen  4/24/2023 8:34 PM EDT  Workstation ID: [id number]        Discharge Details        Discharge Medications      New Medications      Instructions Start Date   acetaminophen 325 MG tablet  Commonly known as: TYLENOL   650 mg, Oral, Every 4 Hours PRN      HYDROcodone-acetaminophen 5-325 MG per tablet  Commonly known as: NORCO   1 tablet, Oral, Every 6 Hours PRN      losartan 100 MG tablet  Commonly known as: COZAAR   100 mg, Oral, Every 24 Hours Scheduled   Start Date: April 30, 2023        Changes to Medications       Instructions Start Date   aspirin 81 MG EC tablet  What changed: when to take this   81 mg, Oral, Every 12 Hours Scheduled      clopidogrel 75 MG tablet  Commonly known as: PLAVIX  What changed: These instructions start on June 8, 2023. If you are unsure what to do until then, ask your doctor or other care provider.   75 mg, Oral, Daily   Start Date: June 8, 2023     glimepiride 4 MG tablet  Commonly known as: AMARYL  What changed:   · when to take this  · reasons to take this   4 mg, Oral, Nightly PRN      Lantus SoloStar 100 UNIT/ML injection pen  Generic drug: Insulin Glargine  What changed:   · See the new instructions.  · Another medication with the same name was removed. Continue taking this medication, and follow the directions you see here.   14 Units, Subcutaneous, Nightly      metFORMIN  MG 24 hr tablet  Commonly known as: GLUCOPHAGE-XR  What changed:   · See the new instructions.  · Another medication with the same name was removed. Continue taking this medication, and follow the directions you see here.   1,000 mg, Oral, Daily With Breakfast         Continue These Medications      Instructions Start Date   Accu-Chek Ericka Plus w/Device kit   Use device to check blood sugar 3 times a day      carvedilol 6.25 MG tablet  Commonly known as: COREG   12.5 mg, Oral, 2 Times Daily With Meals      dexlansoprazole 60 MG capsule  Commonly known as: DEXILANT   60 mg, Oral, Nightly      Dulaglutide 0.75 MG/0.5ML solution pen-injector   Subcutaneous      estradiol 0.1 MG/GM vaginal cream  Commonly known as: ESTRACE   1 g, Vaginal, Every 3 Days      Estrogens Conjugated 0.625 MG/GM vaginal cream  Commonly known as: PREMARIN   0.5 g, Vaginal, 2 Times Weekly      famotidine 40 MG tablet  Commonly known as: PEPCID   1 tablet, Oral, Daily      fluticasone 50 MCG/ACT nasal spray  Commonly known as: FLONASE   SMARTSIG:Both Nares      glucose blood test strip  Commonly known as: Accu-Chek Ericka Plus   Use as  "instructed to test blood sugar 3 times daily      levothyroxine 88 MCG tablet  Commonly known as: SYNTHROID, LEVOTHROID   88 mcg, Oral, Every Morning      nitroglycerin 0.4 MG SL tablet  Commonly known as: NITROSTAT   0.4 mg, Oral, Every 5 Minutes PRN      Praluent 150 MG/ML injection pen  Generic drug: Alirocumab   150 mg, Subcutaneous      ReliOn Pen Needles 32G X 4 MM misc  Generic drug: Insulin Pen Needle   Use daily with insulin         Stop These Medications    Claritin 10 MG tablet  Generic drug: loratadine     clobetasol 0.05 % ointment  Commonly known as: TEMOVATE     linagliptin 5 MG tablet tablet  Commonly known as: TRADJENTA     losartan 50 MG tablet 100 mg, hydroCHLOROthiazide 12.5 MG 12.5 mg     losartan-hydrochlorothiazide 100-12.5 MG per tablet  Commonly known as: HYZAAR     potassium chloride 10 MEQ CR tablet            Allergies   Allergen Reactions   • Biaxin [Clarithromycin] Anaphylaxis     \"BLISTERS AND THROAT SWELLING\"   • Allegra [Fexofenadine] Other (See Comments)     \"MAKES ME FEEL FUNNY\"   • Repatha [Evolocumab] Other (See Comments)     Chest pain   • Sulfa Antibiotics Nausea Only         Discharge Disposition:  Rehab Facility or Unit (DC - External)    Diet:  Hospital:  Diet Order   Procedures   • Diet: Regular/House Diet; Texture: Regular Texture (IDDSI 7); Fluid Consistency: Thin (IDDSI 0)          CODE STATUS:    Code Status and Medical Interventions:   Ordered at: 04/26/23 1706     Level Of Support Discussed With:    Patient     Code Status (Patient has no pulse and is not breathing):    CPR (Attempt to Resuscitate)     Medical Interventions (Patient has pulse or is breathing):    Full       No future appointments.    Additional Instructions for the Follow-ups that You Need to Schedule     Discharge Follow-up with PCP   As directed       Currently Documented PCP:    Abimael Matthews MD    PCP Phone Number:    [phone]     Follow Up Details: 1-2 weeks via telemedicine, otherwise " 1-2 weeks in person after rehab discharge         Discharge Follow-up with Specified Provider: Dr. Jennings 6 weeks   As directed      To: Dr. Jennings 6 weeks    Follow Up Details: Post-op follow up                     Melvin Little DO  04/29/23      Time Spent on Discharge:  I spent  40  minutes on this discharge activity which included: face-to-face encounter with the patient, reviewing the data in the system, coordination of the care with the nursing staff as well as consultants, documentation, and entering orders.            Electronically signed by Melvin Little DO at 04/29/23 0918

## 2023-12-27 ENCOUNTER — TRANSCRIBE ORDERS (OUTPATIENT)
Dept: ADMINISTRATIVE | Facility: HOSPITAL | Age: 83
End: 2023-12-27
Payer: MEDICARE

## 2023-12-27 DIAGNOSIS — Z12.31 VISIT FOR SCREENING MAMMOGRAM: Primary | ICD-10-CM

## 2024-04-23 ENCOUNTER — PRE-ADMISSION TESTING (OUTPATIENT)
Dept: PREADMISSION TESTING | Facility: HOSPITAL | Age: 84
End: 2024-04-23
Payer: MEDICARE

## 2024-04-23 VITALS — WEIGHT: 153.66 LBS | HEIGHT: 64 IN | BODY MASS INDEX: 26.23 KG/M2

## 2024-04-23 LAB
25(OH)D3 SERPL-MCNC: 9.9 NG/ML (ref 30–100)
ALBUMIN SERPL-MCNC: 4 G/DL (ref 3.5–5.2)
ALBUMIN/GLOB SERPL: 1.5 G/DL
ALP SERPL-CCNC: 72 U/L (ref 39–117)
ALT SERPL W P-5'-P-CCNC: 8 U/L (ref 1–33)
ANION GAP SERPL CALCULATED.3IONS-SCNC: 12 MMOL/L (ref 5–15)
APTT PPP: 25.1 SECONDS (ref 22–39)
AST SERPL-CCNC: 17 U/L (ref 1–32)
BASOPHILS # BLD AUTO: 0.04 10*3/MM3 (ref 0–0.2)
BASOPHILS NFR BLD AUTO: 0.6 % (ref 0–1.5)
BILIRUB SERPL-MCNC: 0.2 MG/DL (ref 0–1.2)
BUN SERPL-MCNC: 17 MG/DL (ref 8–23)
BUN/CREAT SERPL: 23 (ref 7–25)
CALCIUM SPEC-SCNC: 8.8 MG/DL (ref 8.6–10.5)
CHLORIDE SERPL-SCNC: 105 MMOL/L (ref 98–107)
CO2 SERPL-SCNC: 23 MMOL/L (ref 22–29)
CREAT SERPL-MCNC: 0.74 MG/DL (ref 0.57–1)
CRP SERPL-MCNC: <0.3 MG/DL (ref 0–0.5)
DEPRECATED RDW RBC AUTO: 43.7 FL (ref 37–54)
EGFRCR SERPLBLD CKD-EPI 2021: 80.9 ML/MIN/1.73
EOSINOPHIL # BLD AUTO: 0.19 10*3/MM3 (ref 0–0.4)
EOSINOPHIL NFR BLD AUTO: 2.9 % (ref 0.3–6.2)
ERYTHROCYTE [DISTWIDTH] IN BLOOD BY AUTOMATED COUNT: 16.6 % (ref 12.3–15.4)
ERYTHROCYTE [SEDIMENTATION RATE] IN BLOOD: 15 MM/HR (ref 0–30)
GLOBULIN UR ELPH-MCNC: 2.7 GM/DL
GLUCOSE SERPL-MCNC: 108 MG/DL (ref 65–99)
HBA1C MFR BLD: 6.7 % (ref 4.8–5.6)
HCT VFR BLD AUTO: 29.6 % (ref 34–46.6)
HGB BLD-MCNC: 8.5 G/DL (ref 12–15.9)
IMM GRANULOCYTES # BLD AUTO: 0.05 10*3/MM3 (ref 0–0.05)
IMM GRANULOCYTES NFR BLD AUTO: 0.8 % (ref 0–0.5)
INR PPP: 1.1 (ref 0.89–1.12)
LYMPHOCYTES # BLD AUTO: 2.25 10*3/MM3 (ref 0.7–3.1)
LYMPHOCYTES NFR BLD AUTO: 34.2 % (ref 19.6–45.3)
MCH RBC QN AUTO: 21 PG (ref 26.6–33)
MCHC RBC AUTO-ENTMCNC: 28.7 G/DL (ref 31.5–35.7)
MCV RBC AUTO: 73.1 FL (ref 79–97)
MONOCYTES # BLD AUTO: 0.44 10*3/MM3 (ref 0.1–0.9)
MONOCYTES NFR BLD AUTO: 6.7 % (ref 5–12)
MRSA DNA SPEC QL NAA+PROBE: NEGATIVE
NEUTROPHILS NFR BLD AUTO: 3.6 10*3/MM3 (ref 1.7–7)
NEUTROPHILS NFR BLD AUTO: 54.8 % (ref 42.7–76)
NRBC BLD AUTO-RTO: 0 /100 WBC (ref 0–0.2)
PLATELET # BLD AUTO: 289 10*3/MM3 (ref 140–450)
PMV BLD AUTO: 9.4 FL (ref 6–12)
POTASSIUM SERPL-SCNC: 3.6 MMOL/L (ref 3.5–5.2)
PROT SERPL-MCNC: 6.7 G/DL (ref 6–8.5)
PROTHROMBIN TIME: 14.4 SECONDS (ref 12.2–14.5)
RBC # BLD AUTO: 4.05 10*6/MM3 (ref 3.77–5.28)
SODIUM SERPL-SCNC: 140 MMOL/L (ref 136–145)
WBC NRBC COR # BLD AUTO: 6.57 10*3/MM3 (ref 3.4–10.8)

## 2024-04-23 PROCEDURE — 86140 C-REACTIVE PROTEIN: CPT

## 2024-04-23 PROCEDURE — 82306 VITAMIN D 25 HYDROXY: CPT

## 2024-04-23 PROCEDURE — 85730 THROMBOPLASTIN TIME PARTIAL: CPT

## 2024-04-23 PROCEDURE — 83036 HEMOGLOBIN GLYCOSYLATED A1C: CPT

## 2024-04-23 PROCEDURE — 85610 PROTHROMBIN TIME: CPT

## 2024-04-23 PROCEDURE — 80053 COMPREHEN METABOLIC PANEL: CPT

## 2024-04-23 PROCEDURE — 85025 COMPLETE CBC W/AUTO DIFF WBC: CPT

## 2024-04-23 PROCEDURE — G0480 DRUG TEST DEF 1-7 CLASSES: HCPCS

## 2024-04-23 PROCEDURE — 82985 ASSAY OF GLYCATED PROTEIN: CPT

## 2024-04-23 PROCEDURE — 85652 RBC SED RATE AUTOMATED: CPT

## 2024-04-23 PROCEDURE — 87641 MR-STAPH DNA AMP PROBE: CPT

## 2024-04-23 PROCEDURE — 93005 ELECTROCARDIOGRAM TRACING: CPT

## 2024-04-23 PROCEDURE — 36415 COLL VENOUS BLD VENIPUNCTURE: CPT

## 2024-04-23 RX ORDER — ACETAMINOPHEN 500 MG
1000 TABLET ORAL ONCE
Status: CANCELLED | OUTPATIENT
Start: 2024-04-23 | End: 2024-04-23

## 2024-04-23 RX ORDER — TRANEXAMIC ACID 10 MG/ML
1000 INJECTION, SOLUTION INTRAVENOUS ONCE
Status: CANCELLED | OUTPATIENT
Start: 2024-04-23 | End: 2024-04-23

## 2024-04-23 RX ORDER — MELOXICAM 15 MG/1
15 TABLET ORAL ONCE
Status: CANCELLED | OUTPATIENT
Start: 2024-04-23 | End: 2024-04-23

## 2024-04-23 NOTE — PAT
Patient to apply Chlorhexadine wipes  to surgical area (as instructed) the night before procedure and the AM of procedure. Wipes provided.   Patient instructed to drink 20 ounces of Gatorade or Gatorlyte (if diabetic) and it needs to be completed 1 hour (for Main OR patients) or 2 hours (scheduled  section & [facility] patients) before given arrival time for procedure (NO RED Gatorade and NO Gatorade Zero).    Patient verbalized understanding.   Per Anesthesia Request, patient instructed not to take their ACE/ARB medications on the AM of surgery.

## 2024-04-24 LAB — FRUCTOSAMINE SERPL-SCNC: 243 UMOL/L (ref 0–285)

## 2024-04-25 LAB
QT INTERVAL: 356 MS
QTC INTERVAL: 405 MS

## 2024-04-30 DIAGNOSIS — D62 ACUTE BLOOD LOSS ANEMIA: Primary | ICD-10-CM

## 2024-05-02 LAB
COTININE SERPL-MCNC: <1 NG/ML
NICOTINE SERPL-MCNC: <1 NG/ML

## 2024-05-08 ENCOUNTER — TELEPHONE (OUTPATIENT)
Dept: ONCOLOGY | Facility: CLINIC | Age: 84
End: 2024-05-08
Payer: MEDICARE

## 2024-05-08 DIAGNOSIS — K90.9 MALABSORPTION OF IRON: ICD-10-CM

## 2024-05-08 DIAGNOSIS — D50.8 IRON DEFICIENCY ANEMIA SECONDARY TO INADEQUATE DIETARY IRON INTAKE: Primary | ICD-10-CM

## 2024-05-08 RX ORDER — SODIUM CHLORIDE 9 MG/ML
20 INJECTION, SOLUTION INTRAVENOUS ONCE
OUTPATIENT
Start: 2024-05-16

## 2024-05-15 NOTE — PROGRESS NOTES
"  Subjective     PROBLEM LIST:  Anemia  CAD  DM  Thyroid disease  GERD  HTN    CHIEF COMPLAINT: anemia      HISTORY OF PRESENT ILLNESS:  The patient is a 84 y.o. female, referred for evaluation of anemia.    Labs:  5/6/24 - wbc 6.3, hgb 8.4, mcv 69, plt 248, iron 15, tibc 492, ferritin 10    4/27/23 - hgb 8.5    12/3/20 hgb 11.6    She is accompanied by her daughter today.  Ms. Spivey had emergency hip surgery in April 2023.  After she left the hospital from that surgery her hemoglobin was around 8.5.  Subsequently her primary care doctor had her try iron pills but she was unable to tolerate this due to nausea.  The daughter believes that the hemoglobin remained between 8 and 9 over the past year.  She now has a need for a hip revision and preop testing because the surgery to be canceled because of anemia.  She also had a fall in February with a head wound and did lose some blood around that time.    REVIEW OF SYSTEMS:  A 14 point review of systems was performed and is negative except as noted above.    Past Medical History:   Diagnosis Date    Anxiety     Arthritis     Coronary artery disease     Diabetes mellitus     Disease of thyroid gland     Elevated cholesterol     GERD (gastroesophageal reflux disease)     Hearing aid worn     Heart attack     History of stomach ulcers     Hypertension     PONV (postoperative nausea and vomiting)     Wears glasses              Objective     BP (!) 181/81   Pulse 79   Temp 97.7 °F (36.5 °C)   Resp 16   Ht 162.6 cm (64.02\")   Wt 68.9 kg (152 lb)   SpO2 96%   BMI 26.08 kg/m²   Performance Status:1              General: well appearing female in no acute distress  Neuro: alert and oriented  HEENT: sclerae anicteric, oropharynx clear  Extremities: no lower extremity edema  Skin: no rashes, lesions, bruising, or petechiae  Psych: mood and affect appropriate    Lab Results   Component Value Date    WBC 6.57 04/23/2024    HGB 8.5 (L) 04/23/2024    HCT 29.6 (L) 04/23/2024    MCV " 73.1 (L) 04/23/2024     04/23/2024     Lab Results   Component Value Date    GLUCOSE 108 (H) 04/23/2024    BUN 17 04/23/2024    CREATININE 0.74 04/23/2024    EGFRIFNONA 76 12/03/2020    BCR 23.0 04/23/2024    K 3.6 04/23/2024    CO2 23.0 04/23/2024    CALCIUM 8.8 04/23/2024    ALBUMIN 4.0 04/23/2024    AST 17 04/23/2024    ALT 8 04/23/2024       XR Hip With or Without Pelvis 2 - 3 View Right  Narrative: XR HIP W OR WO PELVIS 2-3 VIEW RIGHT    Date of Exam: 4/26/2023 4:06 PM EDT    Indication: Post-Op Hip Arthroplasty    Comparison: Right hip radiographs 4/24/2023    Findings:  Unremarkable appearance of newly placed right total hip arthroplasty which appears in satisfactory alignment without hardware fracture, perihardware lucency, or periprosthetic fracture. There are expected postsurgical soft tissue changes of the right   hip, with wound VAC device. The bones are demineralized.  Impression: Impression:  Expected postoperative appearance of newly placed right total hip arthroplasty.    Electronically Signed: Krishan Yuen    4/26/2023 4:27 PM EDT    Workstation ID: [id number]  FL C Arm During Surgery  Narrative: FL C ARM DURING SURGERY    Date of Exam: 4/26/2023 2:06 PM EDT    Indication: TOTAL HIP ARTHROPLASTY ANTERIOR.    Comparison: None available.    Technique: 1 fluoroscopic spot image of the right hip obtained over 34.9 seconds of fluoroscopy time    Fluoroscopic Time: 34.9 seconds    Findings:  Fluoroscopic imaging obtained for the purpose of guidance of right hip arthroplasty.  Impression: Impression:    Fluoroscopic imaging obtained without a radiologist present. Please see performing provider notes for full detail.    Electronically Signed: Raul Guy    4/26/2023 4:08 PM EDT    Workstation ID: [id number]            ASSESSMENT AND PLAN:     Angelica Spivey is a 84 y.o. female with microcytic anemia and iron deficiency.    It appears that her anemia may be related to blood loss with her surgery  last year and more recent trauma.  She did have a prior upper endoscopy in 2019.    We have arranged for her to receive Feraheme today.  I will have her return in 1 month with repeat labs.  I would feel comfortable with her moving forward with scheduling the surgery once her hemoglobin gets above 10.    She can be monitored for a subsequent decline in her hemoglobin by her primary care doctor since she does not live close to [place].  We be happy to see her back at any point after that if needed.           A total greater than 45 mins minutes was spent in face to face patient time, examination, counseling, charting, reviewing test results, and reviewing outside records.    Shanti Phillips MD    5/16/2024

## 2024-05-16 ENCOUNTER — CONSULT (OUTPATIENT)
Dept: ONCOLOGY | Facility: CLINIC | Age: 84
End: 2024-05-16
Payer: MEDICARE

## 2024-05-16 ENCOUNTER — HOSPITAL ENCOUNTER (OUTPATIENT)
Dept: ONCOLOGY | Facility: HOSPITAL | Age: 84
Discharge: HOME | End: 2024-05-16
Payer: MEDICARE

## 2024-05-16 VITALS
WEIGHT: 152 LBS | RESPIRATION RATE: 16 BRPM | DIASTOLIC BLOOD PRESSURE: 81 MMHG | BODY MASS INDEX: 25.95 KG/M2 | SYSTOLIC BLOOD PRESSURE: 181 MMHG | TEMPERATURE: 97.7 F | HEART RATE: 79 BPM | OXYGEN SATURATION: 96 % | HEIGHT: 64 IN

## 2024-05-16 VITALS — HEART RATE: 72 BPM | SYSTOLIC BLOOD PRESSURE: 141 MMHG | DIASTOLIC BLOOD PRESSURE: 72 MMHG

## 2024-05-16 DIAGNOSIS — D50.8 IRON DEFICIENCY ANEMIA SECONDARY TO INADEQUATE DIETARY IRON INTAKE: ICD-10-CM

## 2024-05-16 DIAGNOSIS — D50.8 IRON DEFICIENCY ANEMIA SECONDARY TO INADEQUATE DIETARY IRON INTAKE: Primary | ICD-10-CM

## 2024-05-16 DIAGNOSIS — K90.9 MALABSORPTION OF IRON: Primary | ICD-10-CM

## 2024-05-16 PROCEDURE — 96365 THER/PROPH/DIAG IV INF INIT: CPT

## 2024-05-16 PROCEDURE — 1126F AMNT PAIN NOTED NONE PRSNT: CPT | Performed by: INTERNAL MEDICINE

## 2024-05-16 PROCEDURE — 3079F DIAST BP 80-89 MM HG: CPT | Performed by: INTERNAL MEDICINE

## 2024-05-16 PROCEDURE — 25810000003 SODIUM CHLORIDE 0.9 % SOLUTION: Performed by: INTERNAL MEDICINE

## 2024-05-16 PROCEDURE — 3077F SYST BP >= 140 MM HG: CPT | Performed by: INTERNAL MEDICINE

## 2024-05-16 PROCEDURE — 96374 THER/PROPH/DIAG INJ IV PUSH: CPT

## 2024-05-16 PROCEDURE — 25010000002 FERUMOXYTOL 510 MG/17ML SOLUTION 17 ML VIAL: Performed by: INTERNAL MEDICINE

## 2024-05-16 PROCEDURE — 99204 OFFICE O/P NEW MOD 45 MIN: CPT | Performed by: INTERNAL MEDICINE

## 2024-05-16 RX ORDER — SODIUM CHLORIDE 9 MG/ML
20 INJECTION, SOLUTION INTRAVENOUS ONCE
Status: COMPLETED | OUTPATIENT
Start: 2024-05-16 | End: 2024-05-16

## 2024-05-16 RX ADMIN — FERUMOXYTOL 1020 MG: 510 INJECTION INTRAVENOUS at 13:50

## 2024-05-16 RX ADMIN — SODIUM CHLORIDE 20 ML/HR: 9 INJECTION, SOLUTION INTRAVENOUS at 13:49

## 2024-05-16 NOTE — LETTER
May 16, 2024     Rodo Jennings MD  [address] [facility] [address]    Patient: Angelica Spivey   YOB: 1940   Date of Visit: 5/16/2024       Dear Rodo Jennings MD    Angelica Spivey was in my office today. Below is a copy of my note.    If you have questions, please do not hesitate to call me. I look forward to following Angelica along with you.         Sincerely,        Shanti Phillips MD        CC: Abimael Matthews MD      Subjective    PROBLEM LIST:  Anemia  CAD  DM  Thyroid disease  GERD  HTN    CHIEF COMPLAINT: anemia      HISTORY OF PRESENT ILLNESS:  The patient is a 84 y.o. female, referred for evaluation of anemia.    Labs:  5/6/24 - wbc 6.3, hgb 8.4, mcv 69, plt 248, iron 15, tibc 492, ferritin 10    4/27/23 - hgb 8.5    12/3/20 hgb 11.6    She is accompanied by her daughter today.  Ms. Spivey had emergency hip surgery in April 2023.  After she left the hospital from that surgery her hemoglobin was around 8.5.  Subsequently her primary care doctor had her try iron pills but she was unable to tolerate this due to nausea.  The daughter believes that the hemoglobin remained between 8 and 9 over the past year.  She now has a need for a hip revision and preop testing because the surgery to be canceled because of anemia.  She also had a fall in February with a head wound and did lose some blood around that time.    REVIEW OF SYSTEMS:  A 14 point review of systems was performed and is negative except as noted above.    Past Medical History:   Diagnosis Date   • Anxiety    • Arthritis    • Coronary artery disease    • Diabetes mellitus    • Disease of thyroid gland    • Elevated cholesterol    • GERD (gastroesophageal reflux disease)    • Hearing aid worn    • Heart attack    • History of stomach ulcers    • Hypertension    • PONV (postoperative nausea and vomiting)    • Wears glasses              Objective    BP (!) 181/81   Pulse 79   Temp 97.7 °F (36.5 °C)   Resp 16   Ht 162.6 cm  "(64.02\")   Wt 68.9 kg (152 lb)   SpO2 96%   BMI 26.08 kg/m²   Performance Status:1              General: well appearing female in no acute distress  Neuro: alert and oriented  HEENT: sclerae anicteric, oropharynx clear  Extremities: no lower extremity edema  Skin: no rashes, lesions, bruising, or petechiae  Psych: mood and affect appropriate    Lab Results   Component Value Date    WBC 6.57 04/23/2024    HGB 8.5 (L) 04/23/2024    HCT 29.6 (L) 04/23/2024    MCV 73.1 (L) 04/23/2024     04/23/2024     Lab Results   Component Value Date    GLUCOSE 108 (H) 04/23/2024    BUN 17 04/23/2024    CREATININE 0.74 04/23/2024    EGFRIFNONA 76 12/03/2020    BCR 23.0 04/23/2024    K 3.6 04/23/2024    CO2 23.0 04/23/2024    CALCIUM 8.8 04/23/2024    ALBUMIN 4.0 04/23/2024    AST 17 04/23/2024    ALT 8 04/23/2024       XR Hip With or Without Pelvis 2 - 3 View Right  Narrative: XR HIP W OR WO PELVIS 2-3 VIEW RIGHT    Date of Exam: 4/26/2023 4:06 PM EDT    Indication: Post-Op Hip Arthroplasty    Comparison: Right hip radiographs 4/24/2023    Findings:  Unremarkable appearance of newly placed right total hip arthroplasty which appears in satisfactory alignment without hardware fracture, perihardware lucency, or periprosthetic fracture. There are expected postsurgical soft tissue changes of the right   hip, with wound VAC device. The bones are demineralized.  Impression: Impression:  Expected postoperative appearance of newly placed right total hip arthroplasty.    Electronically Signed: Krishan Yuen    4/26/2023 4:27 PM EDT    Workstation ID: [id number]  FL C Arm During Surgery  Narrative: FL C ARM DURING SURGERY    Date of Exam: 4/26/2023 2:06 PM EDT    Indication: TOTAL HIP ARTHROPLASTY ANTERIOR.    Comparison: None available.    Technique: 1 fluoroscopic spot image of the right hip obtained over 34.9 seconds of fluoroscopy time    Fluoroscopic Time: 34.9 seconds    Findings:  Fluoroscopic imaging obtained for the purpose of " guidance of right hip arthroplasty.  Impression: Impression:    Fluoroscopic imaging obtained without a radiologist present. Please see performing provider notes for full detail.    Electronically Signed: Raul Guy    4/26/2023 4:08 PM EDT    Workstation ID: [id number]            ASSESSMENT AND PLAN:     Angelica Spivey is a 84 y.o. female with microcytic anemia and iron deficiency.    It appears that her anemia may be related to blood loss with her surgery last year and more recent trauma.  She did have a prior upper endoscopy in 2019.    We have arranged for her to receive Feraheme today.  I will have her return in 1 month with repeat labs.  I would feel comfortable with her moving forward with scheduling the surgery once her hemoglobin gets above 10.    She can be monitored for a subsequent decline in her hemoglobin by her primary care doctor since she does not live close to [place].  We be happy to see her back at any point after that if needed.           A total greater than 45 mins minutes was spent in face to face patient time, examination, counseling, charting, reviewing test results, and reviewing outside records.    Shanti Phillips MD    5/16/2024

## 2024-06-20 ENCOUNTER — OFFICE VISIT (OUTPATIENT)
Dept: ONCOLOGY | Facility: CLINIC | Age: 84
End: 2024-06-20
Payer: MEDICARE

## 2024-06-20 ENCOUNTER — LAB (OUTPATIENT)
Dept: LAB | Facility: HOSPITAL | Age: 84
End: 2024-06-20
Payer: MEDICARE

## 2024-06-20 VITALS
OXYGEN SATURATION: 97 % | SYSTOLIC BLOOD PRESSURE: 132 MMHG | DIASTOLIC BLOOD PRESSURE: 81 MMHG | TEMPERATURE: 97.8 F | HEART RATE: 77 BPM | BODY MASS INDEX: 25.61 KG/M2 | WEIGHT: 150 LBS | HEIGHT: 64 IN | RESPIRATION RATE: 16 BRPM

## 2024-06-20 DIAGNOSIS — D50.8 IRON DEFICIENCY ANEMIA SECONDARY TO INADEQUATE DIETARY IRON INTAKE: Primary | ICD-10-CM

## 2024-06-20 DIAGNOSIS — D50.8 IRON DEFICIENCY ANEMIA SECONDARY TO INADEQUATE DIETARY IRON INTAKE: ICD-10-CM

## 2024-06-20 LAB
BASOPHILS # BLD AUTO: 0.05 10*3/MM3 (ref 0–0.2)
BASOPHILS NFR BLD AUTO: 0.8 % (ref 0–1.5)
DEPRECATED RDW RBC AUTO: ABNORMAL FL
DIMORPHIC RBC: PRESENT
EOSINOPHIL # BLD AUTO: 0.17 10*3/MM3 (ref 0–0.4)
EOSINOPHIL NFR BLD AUTO: 2.7 % (ref 0.3–6.2)
ERYTHROCYTE [DISTWIDTH] IN BLOOD BY AUTOMATED COUNT: ABNORMAL %
FERRITIN SERPL-MCNC: 169.3 NG/ML (ref 13–150)
HCT VFR BLD AUTO: 38.2 % (ref 34–46.6)
HGB BLD-MCNC: 12 G/DL (ref 12–15.9)
HYPOCHROMIA BLD QL: NORMAL
IMM GRANULOCYTES # BLD AUTO: 0.01 10*3/MM3 (ref 0–0.05)
IMM GRANULOCYTES NFR BLD AUTO: 0.2 % (ref 0–0.5)
IRON 24H UR-MRATE: 70 MCG/DL (ref 37–145)
IRON SATN MFR SERPL: 20 % (ref 20–50)
LYMPHOCYTES # BLD AUTO: 2.43 10*3/MM3 (ref 0.7–3.1)
LYMPHOCYTES NFR BLD AUTO: 38.4 % (ref 19.6–45.3)
MCH RBC QN AUTO: 25.6 PG (ref 26.6–33)
MCHC RBC AUTO-ENTMCNC: 31.4 G/DL (ref 31.5–35.7)
MCV RBC AUTO: 81.4 FL (ref 79–97)
MICROCYTES BLD QL: NORMAL
MONOCYTES # BLD AUTO: 0.47 10*3/MM3 (ref 0.1–0.9)
MONOCYTES NFR BLD AUTO: 7.4 % (ref 5–12)
NEUTROPHILS NFR BLD AUTO: 3.19 10*3/MM3 (ref 1.7–7)
NEUTROPHILS NFR BLD AUTO: 50.5 % (ref 42.7–76)
NRBC BLD AUTO-RTO: 0 /100 WBC (ref 0–0.2)
PLAT MORPH BLD: NORMAL
PLATELET # BLD AUTO: 219 10*3/MM3 (ref 140–450)
PMV BLD AUTO: 9.5 FL (ref 6–12)
RBC # BLD AUTO: 4.69 10*6/MM3 (ref 3.77–5.28)
TIBC SERPL-MCNC: 350 MCG/DL (ref 298–536)
TRANSFERRIN SERPL-MCNC: 235 MG/DL (ref 200–360)
WBC MORPH BLD: NORMAL
WBC NRBC COR # BLD AUTO: 6.32 10*3/MM3 (ref 3.4–10.8)

## 2024-06-20 PROCEDURE — 99213 OFFICE O/P EST LOW 20 MIN: CPT | Performed by: NURSE PRACTITIONER

## 2024-06-20 PROCEDURE — 83540 ASSAY OF IRON: CPT

## 2024-06-20 PROCEDURE — 1126F AMNT PAIN NOTED NONE PRSNT: CPT | Performed by: NURSE PRACTITIONER

## 2024-06-20 PROCEDURE — 36415 COLL VENOUS BLD VENIPUNCTURE: CPT

## 2024-06-20 PROCEDURE — 1159F MED LIST DOCD IN RCRD: CPT | Performed by: NURSE PRACTITIONER

## 2024-06-20 PROCEDURE — 85007 BL SMEAR W/DIFF WBC COUNT: CPT

## 2024-06-20 PROCEDURE — 3075F SYST BP GE 130 - 139MM HG: CPT | Performed by: NURSE PRACTITIONER

## 2024-06-20 PROCEDURE — 84466 ASSAY OF TRANSFERRIN: CPT

## 2024-06-20 PROCEDURE — 85025 COMPLETE CBC W/AUTO DIFF WBC: CPT

## 2024-06-20 PROCEDURE — 1160F RVW MEDS BY RX/DR IN RCRD: CPT | Performed by: NURSE PRACTITIONER

## 2024-06-20 PROCEDURE — 82728 ASSAY OF FERRITIN: CPT

## 2024-06-20 PROCEDURE — 3079F DIAST BP 80-89 MM HG: CPT | Performed by: NURSE PRACTITIONER

## 2024-06-20 RX ORDER — AMOXICILLIN 500 MG/1
CAPSULE ORAL
COMMUNITY
Start: 2024-06-14

## 2024-06-20 RX ORDER — AMLODIPINE BESYLATE 2.5 MG/1
2.5 TABLET ORAL
COMMUNITY
Start: 2024-06-19

## 2024-06-20 RX ORDER — HYDROCODONE BITARTRATE AND ACETAMINOPHEN 5; 325 MG/1; MG/1
TABLET ORAL
COMMUNITY
Start: 2024-06-18

## 2024-06-20 RX ORDER — OXYBUTYNIN CHLORIDE 5 MG/1
5 TABLET ORAL
COMMUNITY
Start: 2024-06-19

## 2024-06-20 NOTE — PROGRESS NOTES
"      PROBLEM LIST:  Anemia  CAD  DM  Thyroid disease  GERD  HTN       Subjective     CHIEF COMPLAINT: Anemia    HISTORY OF PRESENT ILLNESS:   Angelica Spivey returns for follow-up.   She received IV Feraheme 5/16/2024 for iron deficiency anemia.      She says her energy has been better since receiving the IV Feraheme.  She denies any chest pain or heart palpitations.  She denies any blood in her urine or stool.        Objective      /81   Pulse 77   Temp 97.8 °F (36.6 °C)   Resp 16   Ht 162.6 cm (64.02\")   Wt 68 kg (150 lb)   SpO2 97%   BMI 25.73 kg/m²   Vitals:    06/20/24 1451   PainSc: 0-No pain               ECOG score: 2             General: well appearing female in no acute distress  Neuro: alert and oriented  HEENT: sclera anicteric, oropharynx clear  Lymphatics: no cervical, supraclavicular, or axillary adenopathy  Cardiovascular: regular rate and rhythm, no murmurs  Lungs: clear to auscultation bilaterally  Abdomen: soft, nontender, nondistended.  No palpable organomegaly  Extremeties: no lower extremity edema  Skin: no rashes, lesions, bruising, or petechiae  Psych: mood and affect appropriate    I have reexamined the patient and the results are consistent with the previously documented exam. Kaylyn Ayers, BUDDY        RECENT LABS:  Lab Results   Component Value Date    WBC 6.57 04/23/2024    HGB 8.5 (L) 04/23/2024    HCT 29.6 (L) 04/23/2024    MCV 73.1 (L) 04/23/2024     04/23/2024       Lab Results   Component Value Date    GLUCOSE 108 (H) 04/23/2024    BUN 17 04/23/2024    CREATININE 0.74 04/23/2024    EGFRIFNONA 76 12/03/2020    BCR 23.0 04/23/2024    K 3.6 04/23/2024    CO2 23.0 04/23/2024    CALCIUM 8.8 04/23/2024    ALBUMIN 4.0 04/23/2024    AST 17 04/23/2024    ALT 8 04/23/2024       XR Hip With or Without Pelvis 2 - 3 View Right  Narrative: XR HIP W OR WO PELVIS 2-3 VIEW RIGHT    Date of Exam: 4/26/2023 4:06 PM EDT    Indication: Post-Op Hip Arthroplasty    Comparison: " Right hip radiographs 4/24/2023    Findings:  Unremarkable appearance of newly placed right total hip arthroplasty which appears in satisfactory alignment without hardware fracture, perihardware lucency, or periprosthetic fracture. There are expected postsurgical soft tissue changes of the right   hip, with wound VAC device. The bones are demineralized.  Impression: Impression:  Expected postoperative appearance of newly placed right total hip arthroplasty.    Electronically Signed: Krishan Yuen    4/26/2023 4:27 PM EDT    Workstation ID: [id number]  FL C Arm During Surgery  Narrative: FL C ARM DURING SURGERY    Date of Exam: 4/26/2023 2:06 PM EDT    Indication: TOTAL HIP ARTHROPLASTY ANTERIOR.    Comparison: None available.    Technique: 1 fluoroscopic spot image of the right hip obtained over 34.9 seconds of fluoroscopy time    Fluoroscopic Time: 34.9 seconds    Findings:  Fluoroscopic imaging obtained for the purpose of guidance of right hip arthroplasty.  Impression: Impression:    Fluoroscopic imaging obtained without a radiologist present. Please see performing provider notes for full detail.    Electronically Signed: Raul Guy    4/26/2023 4:08 PM EDT    Workstation ID: [id number]              ASSESSMENT AND PLAN:     Angelica Spivey is a 84 y.o. female with microcytic anemia and iron deficiency.    Iron deficiency anemia: She received IV Feraheme 5/16/2024.  She is unable to tolerate oral iron.  If she needs iron replacement in the future her primary care provider can contact us and we can set her up for IV Feraheme.  Repeat hemoglobin today is 12.  Dr. Phillips feels comfortable with her moving forward with scheduling surgery now that her hemoglobin is above 10.     She can be monitored for a subsequent decline in her hemoglobin by her primary care doctor since she does not live close to [place].  Will see her back as needed.                       Kaylyn Ayers APRSHARAN  [facility]  On[facility]    6/20/2024          CC:

## 2024-07-03 ENCOUNTER — PRE-ADMISSION TESTING (OUTPATIENT)
Dept: PREADMISSION TESTING | Facility: HOSPITAL | Age: 84
End: 2024-07-03
Payer: MEDICARE

## 2024-07-03 VITALS — HEIGHT: 61 IN | WEIGHT: 152.34 LBS | BODY MASS INDEX: 28.76 KG/M2

## 2024-07-03 LAB
25(OH)D3 SERPL-MCNC: 14.5 NG/ML (ref 30–100)
ALBUMIN SERPL-MCNC: 4.2 G/DL (ref 3.5–5.2)
ALBUMIN/GLOB SERPL: 1.3 G/DL
ALP SERPL-CCNC: 87 U/L (ref 39–117)
ALT SERPL W P-5'-P-CCNC: 11 U/L (ref 1–33)
ANION GAP SERPL CALCULATED.3IONS-SCNC: 11 MMOL/L (ref 5–15)
APTT PPP: 24.7 SECONDS (ref 22–39)
AST SERPL-CCNC: 22 U/L (ref 1–32)
BASOPHILS # BLD AUTO: 0.05 10*3/MM3 (ref 0–0.2)
BASOPHILS NFR BLD AUTO: 0.8 % (ref 0–1.5)
BILIRUB SERPL-MCNC: 0.3 MG/DL (ref 0–1.2)
BUN SERPL-MCNC: 15 MG/DL (ref 8–23)
BUN/CREAT SERPL: 24.6 (ref 7–25)
CALCIUM SPEC-SCNC: 9.6 MG/DL (ref 8.6–10.5)
CHLORIDE SERPL-SCNC: 98 MMOL/L (ref 98–107)
CO2 SERPL-SCNC: 27 MMOL/L (ref 22–29)
CREAT SERPL-MCNC: 0.61 MG/DL (ref 0.57–1)
CRP SERPL-MCNC: <0.3 MG/DL (ref 0–0.5)
DEPRECATED RDW RBC AUTO: ABNORMAL FL
DIMORPHIC RBC: PRESENT
EGFRCR SERPLBLD CKD-EPI 2021: 89.4 ML/MIN/1.73
EOSINOPHIL # BLD AUTO: 0.25 10*3/MM3 (ref 0–0.4)
EOSINOPHIL NFR BLD AUTO: 4 % (ref 0.3–6.2)
ERYTHROCYTE [DISTWIDTH] IN BLOOD BY AUTOMATED COUNT: ABNORMAL %
ERYTHROCYTE [SEDIMENTATION RATE] IN BLOOD: 36 MM/HR (ref 0–30)
GLOBULIN UR ELPH-MCNC: 3.3 GM/DL
GLUCOSE SERPL-MCNC: 168 MG/DL (ref 65–99)
HBA1C MFR BLD: 6 % (ref 4.8–5.6)
HCT VFR BLD AUTO: 42.7 % (ref 34–46.6)
HGB BLD-MCNC: 13.4 G/DL (ref 12–15.9)
IMM GRANULOCYTES # BLD AUTO: 0.03 10*3/MM3 (ref 0–0.05)
IMM GRANULOCYTES NFR BLD AUTO: 0.5 % (ref 0–0.5)
INR PPP: 0.95 (ref 0.89–1.12)
LYMPHOCYTES # BLD AUTO: 2.05 10*3/MM3 (ref 0.7–3.1)
LYMPHOCYTES NFR BLD AUTO: 33 % (ref 19.6–45.3)
MCH RBC QN AUTO: 26.5 PG (ref 26.6–33)
MCHC RBC AUTO-ENTMCNC: 31.4 G/DL (ref 31.5–35.7)
MCV RBC AUTO: 84.6 FL (ref 79–97)
MONOCYTES # BLD AUTO: 0.35 10*3/MM3 (ref 0.1–0.9)
MONOCYTES NFR BLD AUTO: 5.6 % (ref 5–12)
NEUTROPHILS NFR BLD AUTO: 3.48 10*3/MM3 (ref 1.7–7)
NEUTROPHILS NFR BLD AUTO: 56.1 % (ref 42.7–76)
NRBC BLD AUTO-RTO: 0.3 /100 WBC (ref 0–0.2)
PLAT MORPH BLD: NORMAL
PLATELET # BLD AUTO: 207 10*3/MM3 (ref 140–450)
PMV BLD AUTO: 10.2 FL (ref 6–12)
POTASSIUM SERPL-SCNC: 4.6 MMOL/L (ref 3.5–5.2)
PROT SERPL-MCNC: 7.5 G/DL (ref 6–8.5)
PROTHROMBIN TIME: 12.7 SECONDS (ref 12.2–14.5)
RBC # BLD AUTO: 5.05 10*6/MM3 (ref 3.77–5.28)
SODIUM SERPL-SCNC: 136 MMOL/L (ref 136–145)
WBC MORPH BLD: NORMAL
WBC NRBC COR # BLD AUTO: 6.21 10*3/MM3 (ref 3.4–10.8)

## 2024-07-03 PROCEDURE — 85652 RBC SED RATE AUTOMATED: CPT

## 2024-07-03 PROCEDURE — 36415 COLL VENOUS BLD VENIPUNCTURE: CPT

## 2024-07-03 PROCEDURE — 82306 VITAMIN D 25 HYDROXY: CPT

## 2024-07-03 PROCEDURE — 85610 PROTHROMBIN TIME: CPT

## 2024-07-03 PROCEDURE — G0480 DRUG TEST DEF 1-7 CLASSES: HCPCS

## 2024-07-03 PROCEDURE — 82985 ASSAY OF GLYCATED PROTEIN: CPT

## 2024-07-03 PROCEDURE — 85025 COMPLETE CBC W/AUTO DIFF WBC: CPT

## 2024-07-03 PROCEDURE — 87081 CULTURE SCREEN ONLY: CPT

## 2024-07-03 PROCEDURE — 80053 COMPREHEN METABOLIC PANEL: CPT

## 2024-07-03 PROCEDURE — 85007 BL SMEAR W/DIFF WBC COUNT: CPT

## 2024-07-03 PROCEDURE — 85730 THROMBOPLASTIN TIME PARTIAL: CPT

## 2024-07-03 PROCEDURE — 83036 HEMOGLOBIN GLYCOSYLATED A1C: CPT

## 2024-07-03 PROCEDURE — 86140 C-REACTIVE PROTEIN: CPT

## 2024-07-03 NOTE — PAT
Patient did not review general PAT education video as instructed in their preoperative information received from their surgeon.  One-on-one Pre Admission Testing general education provided during PAT visit.  Copies of PAT general education handouts (Incentive Spirometry, Meds to Beds Program, Patient Belongings, Pre-op skin preparation instructions, Blood Glucose testing, Visitor policy, Surgery FAQ, Code H) distributed to patient. Encouraged patient/family to read PAT general education handouts thoroughly and notify PAT staff with any questions or concerns. Patient instructed to bring PAT pass and completed skin prep sheet (if applicable) on the day of procedure. Patient verbalized understanding of all information and priority content.     Patient to apply Chlorhexadine wipes  to surgical area (as instructed) the night before procedure and the AM of procedure. Wipes provided.    Patient instructed to drink 20 ounces of Gatorade or Gatorlyte (if diabetic) and it needs to be completed 1 hour (for Main OR patients) or 2 hours (scheduled  section & [facility] patients) before given arrival time for procedure (NO RED Gatorade and NO Gatorade Zero).    Patient verbalized understanding.    Per Anesthesia Request, patient instructed not to take their ACE/ARB medications on the AM of surgery.    Clean catch urinalysis not indicated because patient denied recent urinary frequency, urinary urgency, burning/pain upon urination, or flank pain. No recent UTIs.    Cardiac clearance on chart from 3-7-2024, permission to hold Plavix 5 days.  Last dose will be 2024 prior to surgery.     Hematology clearance on chart 2024.

## 2024-07-04 LAB
FRUCTOSAMINE SERPL-SCNC: 285 UMOL/L (ref 0–285)
MRSA SPEC QL CULT: NORMAL

## 2024-07-09 ENCOUNTER — ANESTHESIA EVENT (OUTPATIENT)
Dept: PERIOP | Facility: HOSPITAL | Age: 84
End: 2024-07-09
Payer: MEDICARE

## 2024-07-09 RX ORDER — FAMOTIDINE 10 MG/ML
20 INJECTION, SOLUTION INTRAVENOUS ONCE
Status: CANCELLED | OUTPATIENT
Start: 2024-07-09 | End: 2024-07-09

## 2024-07-09 RX ORDER — SODIUM CHLORIDE 9 MG/ML
40 INJECTION, SOLUTION INTRAVENOUS AS NEEDED
Status: CANCELLED | OUTPATIENT
Start: 2024-07-09

## 2024-07-09 RX ORDER — SODIUM CHLORIDE 0.9 % (FLUSH) 0.9 %
10 SYRINGE (ML) INJECTION AS NEEDED
Status: CANCELLED | OUTPATIENT
Start: 2024-07-09

## 2024-07-09 RX ORDER — SODIUM CHLORIDE 0.9 % (FLUSH) 0.9 %
10 SYRINGE (ML) INJECTION EVERY 12 HOURS SCHEDULED
Status: CANCELLED | OUTPATIENT
Start: 2024-07-09

## 2024-07-10 ENCOUNTER — APPOINTMENT (OUTPATIENT)
Dept: GENERAL RADIOLOGY | Facility: HOSPITAL | Age: 84
End: 2024-07-10
Payer: MEDICARE

## 2024-07-10 ENCOUNTER — HOSPITAL ENCOUNTER (OUTPATIENT)
Facility: HOSPITAL | Age: 84
Discharge: HOME | End: 2024-07-11
Attending: ORTHOPAEDIC SURGERY | Admitting: ORTHOPAEDIC SURGERY
Payer: MEDICARE

## 2024-07-10 ENCOUNTER — ANESTHESIA (OUTPATIENT)
Dept: PERIOP | Facility: HOSPITAL | Age: 84
End: 2024-07-10
Payer: MEDICARE

## 2024-07-10 DIAGNOSIS — M25.551 RIGHT HIP PAIN: ICD-10-CM

## 2024-07-10 PROBLEM — M89.8X9 HETEROTOPIC OSSIFICATION: Status: ACTIVE | Noted: 2024-07-10

## 2024-07-10 PROBLEM — M25.651: Status: ACTIVE | Noted: 2024-07-10

## 2024-07-10 LAB
GLUCOSE BLDC GLUCOMTR-MCNC: 138 MG/DL (ref 70–130)
GLUCOSE BLDC GLUCOMTR-MCNC: 190 MG/DL (ref 70–130)
GLUCOSE BLDC GLUCOMTR-MCNC: 249 MG/DL (ref 70–130)

## 2024-07-10 PROCEDURE — 25010000002 DROPERIDOL PER 5 MG: Performed by: NURSE ANESTHETIST, CERTIFIED REGISTERED

## 2024-07-10 PROCEDURE — 87176 TISSUE HOMOGENIZATION CULTR: CPT | Performed by: ORTHOPAEDIC SURGERY

## 2024-07-10 PROCEDURE — 87102 FUNGUS ISOLATION CULTURE: CPT | Performed by: ORTHOPAEDIC SURGERY

## 2024-07-10 PROCEDURE — 87206 SMEAR FLUORESCENT/ACID STAI: CPT | Performed by: ORTHOPAEDIC SURGERY

## 2024-07-10 PROCEDURE — 87205 SMEAR GRAM STAIN: CPT | Performed by: ORTHOPAEDIC SURGERY

## 2024-07-10 PROCEDURE — 25010000002 CEFAZOLIN PER 500 MG: Performed by: ORTHOPAEDIC SURGERY

## 2024-07-10 PROCEDURE — 63710000001 FAMOTIDINE 20 MG TABLET: Performed by: ORTHOPAEDIC SURGERY

## 2024-07-10 PROCEDURE — A9270 NON-COVERED ITEM OR SERVICE: HCPCS | Performed by: ORTHOPAEDIC SURGERY

## 2024-07-10 PROCEDURE — 97110 THERAPEUTIC EXERCISES: CPT

## 2024-07-10 PROCEDURE — 87015 SPECIMEN INFECT AGNT CONCNTJ: CPT | Performed by: ORTHOPAEDIC SURGERY

## 2024-07-10 PROCEDURE — 25010000002 VANCOMYCIN 1 G RECONSTITUTED SOLUTION: Performed by: ORTHOPAEDIC SURGERY

## 2024-07-10 PROCEDURE — 25810000003 LACTATED RINGERS PER 1000 ML: Performed by: ANESTHESIOLOGY

## 2024-07-10 PROCEDURE — 25010000002 BUPIVACAINE (PF) 0.25 % SOLUTION 30 ML VIAL: Performed by: ORTHOPAEDIC SURGERY

## 2024-07-10 PROCEDURE — 76000 FLUOROSCOPY <1 HR PHYS/QHP: CPT

## 2024-07-10 PROCEDURE — 82948 REAGENT STRIP/BLOOD GLUCOSE: CPT

## 2024-07-10 PROCEDURE — 87070 CULTURE OTHR SPECIMN AEROBIC: CPT | Performed by: ORTHOPAEDIC SURGERY

## 2024-07-10 PROCEDURE — 25010000002 ONDANSETRON PER 1 MG: Performed by: ANESTHESIOLOGY

## 2024-07-10 PROCEDURE — 97162 PT EVAL MOD COMPLEX 30 MIN: CPT

## 2024-07-10 PROCEDURE — A9270 NON-COVERED ITEM OR SERVICE: HCPCS | Performed by: NURSE PRACTITIONER

## 2024-07-10 PROCEDURE — 25010000002 FENTANYL CITRATE (PF) 100 MCG/2ML SOLUTION: Performed by: ANESTHESIOLOGY

## 2024-07-10 PROCEDURE — 87075 CULTR BACTERIA EXCEPT BLOOD: CPT | Performed by: ORTHOPAEDIC SURGERY

## 2024-07-10 PROCEDURE — 63710000001 ACETAMINOPHEN EXTRA STRENGTH 500 MG TABLET: Performed by: ORTHOPAEDIC SURGERY

## 2024-07-10 PROCEDURE — 25010000002 CLONIDINE PER 1 MG: Performed by: ORTHOPAEDIC SURGERY

## 2024-07-10 PROCEDURE — 73502 X-RAY EXAM HIP UNI 2-3 VIEWS: CPT

## 2024-07-10 PROCEDURE — 25010000002 PROPOFOL 10 MG/ML EMULSION: Performed by: ANESTHESIOLOGY

## 2024-07-10 PROCEDURE — 63710000001 INSULIN GLARGINE PER 5 UNITS: Performed by: ORTHOPAEDIC SURGERY

## 2024-07-10 PROCEDURE — 87116 MYCOBACTERIA CULTURE: CPT | Performed by: ORTHOPAEDIC SURGERY

## 2024-07-10 PROCEDURE — 25010000002 KETOROLAC TROMETHAMINE PER 15 MG: Performed by: ORTHOPAEDIC SURGERY

## 2024-07-10 PROCEDURE — 25010000002 ESMOLOL 100 MG/10ML SOLUTION: Performed by: ANESTHESIOLOGY

## 2024-07-10 PROCEDURE — 25010000002 SUGAMMADEX 200 MG/2ML SOLUTION: Performed by: ANESTHESIOLOGY

## 2024-07-10 PROCEDURE — 63710000001 OXYBUTYNIN 5 MG TABLET: Performed by: ORTHOPAEDIC SURGERY

## 2024-07-10 PROCEDURE — 63710000001 INSULIN LISPRO (HUMAN) PER 5 UNITS: Performed by: NURSE PRACTITIONER

## 2024-07-10 PROCEDURE — 25810000003 LACTATED RINGERS PER 1000 ML: Performed by: ORTHOPAEDIC SURGERY

## 2024-07-10 PROCEDURE — 25010000002 DEXAMETHASONE PER 1 MG: Performed by: ANESTHESIOLOGY

## 2024-07-10 DEVICE — VIOLET ANTIBACTERIAL POLYDIOXANONE, KNOTLESS TISSUE CONTROL DEVICE
Type: IMPLANTABLE DEVICE | Site: HIP | Status: FUNCTIONAL
Brand: STRATAFIX

## 2024-07-10 RX ORDER — MELOXICAM 15 MG/1
15 TABLET ORAL ONCE
Status: COMPLETED | OUTPATIENT
Start: 2024-07-10 | End: 2024-07-10

## 2024-07-10 RX ORDER — VANCOMYCIN HYDROCHLORIDE 1 G/20ML
INJECTION, POWDER, LYOPHILIZED, FOR SOLUTION INTRAVENOUS AS NEEDED
Status: DISCONTINUED | OUTPATIENT
Start: 2024-07-10 | End: 2024-07-10 | Stop reason: HOSPADM

## 2024-07-10 RX ORDER — HYDROCODONE BITARTRATE AND ACETAMINOPHEN 5; 325 MG/1; MG/1
1 TABLET ORAL ONCE AS NEEDED
Status: DISCONTINUED | OUTPATIENT
Start: 2024-07-10 | End: 2024-07-10 | Stop reason: HOSPADM

## 2024-07-10 RX ORDER — ROCURONIUM BROMIDE 10 MG/ML
INJECTION, SOLUTION INTRAVENOUS AS NEEDED
Status: DISCONTINUED | OUTPATIENT
Start: 2024-07-10 | End: 2024-07-10 | Stop reason: SURG

## 2024-07-10 RX ORDER — SODIUM CHLORIDE 9 MG/ML
40 INJECTION, SOLUTION INTRAVENOUS AS NEEDED
Status: DISCONTINUED | OUTPATIENT
Start: 2024-07-10 | End: 2024-07-10 | Stop reason: HOSPADM

## 2024-07-10 RX ORDER — MIDAZOLAM HYDROCHLORIDE 1 MG/ML
0.5 INJECTION INTRAMUSCULAR; INTRAVENOUS
Status: DISCONTINUED | OUTPATIENT
Start: 2024-07-10 | End: 2024-07-10 | Stop reason: HOSPADM

## 2024-07-10 RX ORDER — ESMOLOL HYDROCHLORIDE 10 MG/ML
INJECTION INTRAVENOUS AS NEEDED
Status: DISCONTINUED | OUTPATIENT
Start: 2024-07-10 | End: 2024-07-10 | Stop reason: SURG

## 2024-07-10 RX ORDER — NALOXONE HCL 0.4 MG/ML
0.1 VIAL (ML) INJECTION
Status: DISCONTINUED | OUTPATIENT
Start: 2024-07-10 | End: 2024-07-11 | Stop reason: HOSPADM

## 2024-07-10 RX ORDER — LEVOTHYROXINE SODIUM 88 UG/1
88 TABLET ORAL EVERY MORNING
Status: DISCONTINUED | OUTPATIENT
Start: 2024-07-11 | End: 2024-07-11 | Stop reason: HOSPADM

## 2024-07-10 RX ORDER — ONDANSETRON 2 MG/ML
4 INJECTION INTRAMUSCULAR; INTRAVENOUS EVERY 6 HOURS PRN
Status: DISCONTINUED | OUTPATIENT
Start: 2024-07-10 | End: 2024-07-11 | Stop reason: HOSPADM

## 2024-07-10 RX ORDER — DEXTROSE MONOHYDRATE 25 G/50ML
25 INJECTION, SOLUTION INTRAVENOUS
Status: DISCONTINUED | OUTPATIENT
Start: 2024-07-10 | End: 2024-07-11 | Stop reason: HOSPADM

## 2024-07-10 RX ORDER — NICOTINE POLACRILEX 4 MG
15 LOZENGE BUCCAL
Status: DISCONTINUED | OUTPATIENT
Start: 2024-07-10 | End: 2024-07-11 | Stop reason: HOSPADM

## 2024-07-10 RX ORDER — ONDANSETRON 2 MG/ML
INJECTION INTRAMUSCULAR; INTRAVENOUS AS NEEDED
Status: DISCONTINUED | OUTPATIENT
Start: 2024-07-10 | End: 2024-07-10 | Stop reason: SURG

## 2024-07-10 RX ORDER — NALOXONE HCL 0.4 MG/ML
0.4 VIAL (ML) INJECTION AS NEEDED
Status: DISCONTINUED | OUTPATIENT
Start: 2024-07-10 | End: 2024-07-10 | Stop reason: HOSPADM

## 2024-07-10 RX ORDER — HYDRALAZINE HYDROCHLORIDE 20 MG/ML
5 INJECTION INTRAMUSCULAR; INTRAVENOUS
Status: DISCONTINUED | OUTPATIENT
Start: 2024-07-10 | End: 2024-07-10 | Stop reason: HOSPADM

## 2024-07-10 RX ORDER — PROMETHAZINE HYDROCHLORIDE 25 MG/1
25 TABLET ORAL ONCE AS NEEDED
Status: DISCONTINUED | OUTPATIENT
Start: 2024-07-10 | End: 2024-07-10 | Stop reason: HOSPADM

## 2024-07-10 RX ORDER — HYDROMORPHONE HYDROCHLORIDE 1 MG/ML
0.25 INJECTION, SOLUTION INTRAMUSCULAR; INTRAVENOUS; SUBCUTANEOUS
Status: DISCONTINUED | OUTPATIENT
Start: 2024-07-10 | End: 2024-07-10 | Stop reason: HOSPADM

## 2024-07-10 RX ORDER — OXYCODONE HYDROCHLORIDE 10 MG/1
10 TABLET ORAL EVERY 4 HOURS PRN
Status: DISCONTINUED | OUTPATIENT
Start: 2024-07-10 | End: 2024-07-11 | Stop reason: HOSPADM

## 2024-07-10 RX ORDER — LABETALOL HYDROCHLORIDE 5 MG/ML
5 INJECTION, SOLUTION INTRAVENOUS
Status: DISCONTINUED | OUTPATIENT
Start: 2024-07-10 | End: 2024-07-10 | Stop reason: HOSPADM

## 2024-07-10 RX ORDER — SODIUM CHLORIDE 0.9 % (FLUSH) 0.9 %
3-10 SYRINGE (ML) INJECTION AS NEEDED
Status: DISCONTINUED | OUTPATIENT
Start: 2024-07-10 | End: 2024-07-10 | Stop reason: HOSPADM

## 2024-07-10 RX ORDER — OXYBUTYNIN CHLORIDE 5 MG/1
5 TABLET ORAL 2 TIMES DAILY
Status: DISCONTINUED | OUTPATIENT
Start: 2024-07-10 | End: 2024-07-11 | Stop reason: HOSPADM

## 2024-07-10 RX ORDER — LIDOCAINE HYDROCHLORIDE 10 MG/ML
0.5 INJECTION, SOLUTION EPIDURAL; INFILTRATION; INTRACAUDAL; PERINEURAL ONCE AS NEEDED
Status: COMPLETED | OUTPATIENT
Start: 2024-07-10 | End: 2024-07-10

## 2024-07-10 RX ORDER — HYDROMORPHONE HYDROCHLORIDE 1 MG/ML
0.5 INJECTION, SOLUTION INTRAMUSCULAR; INTRAVENOUS; SUBCUTANEOUS
Status: DISCONTINUED | OUTPATIENT
Start: 2024-07-10 | End: 2024-07-11 | Stop reason: HOSPADM

## 2024-07-10 RX ORDER — TRAMADOL HYDROCHLORIDE 50 MG/1
50 TABLET ORAL EVERY 8 HOURS PRN
Status: DISCONTINUED | OUTPATIENT
Start: 2024-07-10 | End: 2024-07-11 | Stop reason: HOSPADM

## 2024-07-10 RX ORDER — DEXAMETHASONE SODIUM PHOSPHATE 4 MG/ML
INJECTION, SOLUTION INTRA-ARTICULAR; INTRALESIONAL; INTRAMUSCULAR; INTRAVENOUS; SOFT TISSUE AS NEEDED
Status: DISCONTINUED | OUTPATIENT
Start: 2024-07-10 | End: 2024-07-10 | Stop reason: SURG

## 2024-07-10 RX ORDER — DROPERIDOL 2.5 MG/ML
0.62 INJECTION, SOLUTION INTRAMUSCULAR; INTRAVENOUS ONCE AS NEEDED
Status: DISCONTINUED | OUTPATIENT
Start: 2024-07-10 | End: 2024-07-10 | Stop reason: HOSPADM

## 2024-07-10 RX ORDER — SODIUM CHLORIDE 0.9 % (FLUSH) 0.9 %
3 SYRINGE (ML) INJECTION EVERY 12 HOURS SCHEDULED
Status: DISCONTINUED | OUTPATIENT
Start: 2024-07-10 | End: 2024-07-10 | Stop reason: HOSPADM

## 2024-07-10 RX ORDER — TRANEXAMIC ACID 10 MG/ML
1000 INJECTION, SOLUTION INTRAVENOUS ONCE
Status: COMPLETED | OUTPATIENT
Start: 2024-07-10 | End: 2024-07-10

## 2024-07-10 RX ORDER — MAGNESIUM HYDROXIDE 1200 MG/15ML
LIQUID ORAL AS NEEDED
Status: DISCONTINUED | OUTPATIENT
Start: 2024-07-10 | End: 2024-07-10 | Stop reason: HOSPADM

## 2024-07-10 RX ORDER — OXYCODONE HYDROCHLORIDE 5 MG/1
5 TABLET ORAL EVERY 4 HOURS PRN
Status: DISCONTINUED | OUTPATIENT
Start: 2024-07-10 | End: 2024-07-11 | Stop reason: HOSPADM

## 2024-07-10 RX ORDER — FAMOTIDINE 20 MG/1
20 TABLET, FILM COATED ORAL ONCE
Status: COMPLETED | OUTPATIENT
Start: 2024-07-10 | End: 2024-07-10

## 2024-07-10 RX ORDER — ACETAMINOPHEN 500 MG
1000 TABLET ORAL ONCE
Status: COMPLETED | OUTPATIENT
Start: 2024-07-10 | End: 2024-07-10

## 2024-07-10 RX ORDER — PROPOFOL 10 MG/ML
VIAL (ML) INTRAVENOUS AS NEEDED
Status: DISCONTINUED | OUTPATIENT
Start: 2024-07-10 | End: 2024-07-10 | Stop reason: SURG

## 2024-07-10 RX ORDER — FAMOTIDINE 20 MG/1
20 TABLET, FILM COATED ORAL 2 TIMES DAILY
Status: DISCONTINUED | OUTPATIENT
Start: 2024-07-10 | End: 2024-07-11 | Stop reason: HOSPADM

## 2024-07-10 RX ORDER — SODIUM CHLORIDE, SODIUM LACTATE, POTASSIUM CHLORIDE, CALCIUM CHLORIDE 600; 310; 30; 20 MG/100ML; MG/100ML; MG/100ML; MG/100ML
9 INJECTION, SOLUTION INTRAVENOUS CONTINUOUS
Status: DISCONTINUED | OUTPATIENT
Start: 2024-07-10 | End: 2024-07-11

## 2024-07-10 RX ORDER — LOSARTAN POTASSIUM 50 MG/1
100 TABLET ORAL
Status: DISCONTINUED | OUTPATIENT
Start: 2024-07-10 | End: 2024-07-11 | Stop reason: HOSPADM

## 2024-07-10 RX ORDER — IPRATROPIUM BROMIDE AND ALBUTEROL SULFATE 2.5; .5 MG/3ML; MG/3ML
3 SOLUTION RESPIRATORY (INHALATION) ONCE AS NEEDED
Status: DISCONTINUED | OUTPATIENT
Start: 2024-07-10 | End: 2024-07-10 | Stop reason: HOSPADM

## 2024-07-10 RX ORDER — INSULIN LISPRO 100 [IU]/ML
2-7 INJECTION, SOLUTION INTRAVENOUS; SUBCUTANEOUS
Status: DISCONTINUED | OUTPATIENT
Start: 2024-07-10 | End: 2024-07-11 | Stop reason: HOSPADM

## 2024-07-10 RX ORDER — LABETALOL HYDROCHLORIDE 5 MG/ML
10 INJECTION, SOLUTION INTRAVENOUS EVERY 4 HOURS PRN
Status: DISCONTINUED | OUTPATIENT
Start: 2024-07-10 | End: 2024-07-11 | Stop reason: HOSPADM

## 2024-07-10 RX ORDER — FENTANYL CITRATE 50 UG/ML
25 INJECTION, SOLUTION INTRAMUSCULAR; INTRAVENOUS
Status: DISCONTINUED | OUTPATIENT
Start: 2024-07-10 | End: 2024-07-10 | Stop reason: HOSPADM

## 2024-07-10 RX ORDER — IBUPROFEN 600 MG/1
1 TABLET ORAL
Status: DISCONTINUED | OUTPATIENT
Start: 2024-07-10 | End: 2024-07-11 | Stop reason: HOSPADM

## 2024-07-10 RX ORDER — ONDANSETRON 2 MG/ML
4 INJECTION INTRAMUSCULAR; INTRAVENOUS ONCE AS NEEDED
Status: DISCONTINUED | OUTPATIENT
Start: 2024-07-10 | End: 2024-07-10 | Stop reason: HOSPADM

## 2024-07-10 RX ORDER — PROMETHAZINE HYDROCHLORIDE 25 MG/1
25 SUPPOSITORY RECTAL ONCE AS NEEDED
Status: DISCONTINUED | OUTPATIENT
Start: 2024-07-10 | End: 2024-07-10 | Stop reason: HOSPADM

## 2024-07-10 RX ORDER — FENTANYL CITRATE 50 UG/ML
INJECTION, SOLUTION INTRAMUSCULAR; INTRAVENOUS AS NEEDED
Status: DISCONTINUED | OUTPATIENT
Start: 2024-07-10 | End: 2024-07-10 | Stop reason: SURG

## 2024-07-10 RX ORDER — TRANEXAMIC ACID 10 MG/ML
1000 INJECTION, SOLUTION INTRAVENOUS ONCE
Status: DISCONTINUED | OUTPATIENT
Start: 2024-07-10 | End: 2024-07-10 | Stop reason: HOSPADM

## 2024-07-10 RX ORDER — PANTOPRAZOLE SODIUM 40 MG/1
40 TABLET, DELAYED RELEASE ORAL
Status: DISCONTINUED | OUTPATIENT
Start: 2024-07-10 | End: 2024-07-10

## 2024-07-10 RX ORDER — DROPERIDOL 2.5 MG/ML
0.62 INJECTION, SOLUTION INTRAMUSCULAR; INTRAVENOUS
Status: DISCONTINUED | OUTPATIENT
Start: 2024-07-10 | End: 2024-07-10 | Stop reason: HOSPADM

## 2024-07-10 RX ORDER — LIDOCAINE HYDROCHLORIDE 10 MG/ML
INJECTION, SOLUTION EPIDURAL; INFILTRATION; INTRACAUDAL; PERINEURAL AS NEEDED
Status: DISCONTINUED | OUTPATIENT
Start: 2024-07-10 | End: 2024-07-10 | Stop reason: SURG

## 2024-07-10 RX ORDER — ACETAMINOPHEN 500 MG
1000 TABLET ORAL EVERY 8 HOURS
Status: DISCONTINUED | OUTPATIENT
Start: 2024-07-10 | End: 2024-07-11 | Stop reason: HOSPADM

## 2024-07-10 RX ORDER — CARVEDILOL 6.25 MG/1
6.25 TABLET ORAL 2 TIMES DAILY WITH MEALS
Status: DISCONTINUED | OUTPATIENT
Start: 2024-07-10 | End: 2024-07-11 | Stop reason: HOSPADM

## 2024-07-10 RX ORDER — AMLODIPINE BESYLATE 5 MG/1
5 TABLET ORAL DAILY
Status: DISCONTINUED | OUTPATIENT
Start: 2024-07-10 | End: 2024-07-11 | Stop reason: HOSPADM

## 2024-07-10 RX ORDER — SODIUM CHLORIDE, SODIUM LACTATE, POTASSIUM CHLORIDE, CALCIUM CHLORIDE 600; 310; 30; 20 MG/100ML; MG/100ML; MG/100ML; MG/100ML
100 INJECTION, SOLUTION INTRAVENOUS CONTINUOUS
Status: DISCONTINUED | OUTPATIENT
Start: 2024-07-10 | End: 2024-07-11 | Stop reason: HOSPADM

## 2024-07-10 RX ADMIN — FAMOTIDINE 20 MG: 20 TABLET, FILM COATED ORAL at 20:08

## 2024-07-10 RX ADMIN — PROPOFOL 150 MCG/KG/MIN: 10 INJECTION, EMULSION INTRAVENOUS at 12:30

## 2024-07-10 RX ADMIN — TRANEXAMIC ACID 1000 MG: 10 INJECTION, SOLUTION INTRAVENOUS at 13:36

## 2024-07-10 RX ADMIN — FENTANYL CITRATE 50 MCG: 50 INJECTION, SOLUTION INTRAMUSCULAR; INTRAVENOUS at 14:25

## 2024-07-10 RX ADMIN — DEXAMETHASONE SODIUM PHOSPHATE 8 MG: 4 INJECTION INTRA-ARTICULAR; INTRALESIONAL; INTRAMUSCULAR; INTRAVENOUS; SOFT TISSUE at 12:42

## 2024-07-10 RX ADMIN — DROPERIDOL 0.62 MG: 2.5 INJECTION, SOLUTION INTRAMUSCULAR; INTRAVENOUS at 14:44

## 2024-07-10 RX ADMIN — INSULIN LISPRO 2 UNITS: 100 INJECTION, SOLUTION INTRAVENOUS; SUBCUTANEOUS at 17:57

## 2024-07-10 RX ADMIN — ROCURONIUM BROMIDE 40 MG: 10 INJECTION INTRAVENOUS at 12:21

## 2024-07-10 RX ADMIN — SODIUM CHLORIDE, POTASSIUM CHLORIDE, SODIUM LACTATE AND CALCIUM CHLORIDE 9 ML/HR: 600; 310; 30; 20 INJECTION, SOLUTION INTRAVENOUS at 10:45

## 2024-07-10 RX ADMIN — SODIUM CHLORIDE, SODIUM LACTATE, POTASSIUM CHLORIDE, AND CALCIUM CHLORIDE 100 ML/HR: .6; .31; .03; .02 INJECTION, SOLUTION INTRAVENOUS at 17:03

## 2024-07-10 RX ADMIN — LIDOCAINE HYDROCHLORIDE 0.5 ML: 10 INJECTION, SOLUTION EPIDURAL; INFILTRATION; INTRACAUDAL; PERINEURAL at 10:45

## 2024-07-10 RX ADMIN — PROPOFOL 100 MG: 10 INJECTION, EMULSION INTRAVENOUS at 12:21

## 2024-07-10 RX ADMIN — LIDOCAINE HYDROCHLORIDE 50 MG: 10 SOLUTION INTRAVENOUS at 12:21

## 2024-07-10 RX ADMIN — OXYBUTYNIN CHLORIDE 5 MG: 5 TABLET ORAL at 20:08

## 2024-07-10 RX ADMIN — ESMOLOL HYDROCHLORIDE 10 MG: 10 INJECTION, SOLUTION INTRAVENOUS at 13:19

## 2024-07-10 RX ADMIN — TRANEXAMIC ACID 1000 MG: 10 INJECTION, SOLUTION INTRAVENOUS at 12:44

## 2024-07-10 RX ADMIN — SODIUM CHLORIDE 2000 MG: 900 INJECTION INTRAVENOUS at 20:08

## 2024-07-10 RX ADMIN — SODIUM CHLORIDE 2000 MG: 900 INJECTION INTRAVENOUS at 12:30

## 2024-07-10 RX ADMIN — INSULIN LISPRO 3 UNITS: 100 INJECTION, SOLUTION INTRAVENOUS; SUBCUTANEOUS at 20:11

## 2024-07-10 RX ADMIN — ONDANSETRON 4 MG: 2 INJECTION INTRAMUSCULAR; INTRAVENOUS at 13:38

## 2024-07-10 RX ADMIN — ACETAMINOPHEN 1000 MG: 500 TABLET ORAL at 10:52

## 2024-07-10 RX ADMIN — FENTANYL CITRATE 25 MCG: 50 INJECTION, SOLUTION INTRAMUSCULAR; INTRAVENOUS at 13:10

## 2024-07-10 RX ADMIN — ESMOLOL HYDROCHLORIDE 10 MG: 10 INJECTION, SOLUTION INTRAVENOUS at 13:29

## 2024-07-10 RX ADMIN — PROPOFOL 30 MG: 10 INJECTION, EMULSION INTRAVENOUS at 12:32

## 2024-07-10 RX ADMIN — INSULIN GLARGINE 10 UNITS: 100 INJECTION, SOLUTION SUBCUTANEOUS at 20:11

## 2024-07-10 RX ADMIN — FENTANYL CITRATE 25 MCG: 50 INJECTION, SOLUTION INTRAMUSCULAR; INTRAVENOUS at 13:52

## 2024-07-10 RX ADMIN — SUGAMMADEX 200 MG: 100 INJECTION, SOLUTION INTRAVENOUS at 14:18

## 2024-07-10 RX ADMIN — FAMOTIDINE 20 MG: 20 TABLET, FILM COATED ORAL at 10:52

## 2024-07-10 RX ADMIN — ACETAMINOPHEN 1000 MG: 500 TABLET ORAL at 18:00

## 2024-07-10 RX ADMIN — MELOXICAM 15 MG: 15 TABLET ORAL at 10:52

## 2024-07-10 NOTE — H&P
"  Pre-Op H&P  Angelica Spivey  [id number]  1940      Chief complaint: Right Hip Pain      Subjective:  Patient is a 84 y.o.female presents for scheduled surgery by Dr. Jennings. 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  The patient had a fall in 2023 and injured her right hip.  Shortly after, a heterotopic ossification was found.  Since that time, she has had progressively worsening right hip pain and soreness.  Up to this point, conservative treatment methods have failed to alleviate her pain.  She is unable to complete any basic ADLs because of the pain.    The last dose of Plavix was on Thursday, 7/4/2024.  + Cardiac clearance in chart from 3/7/24.  + Oncology clearance in chart from 6/20/2024.    Review of Systems:  Constitutional-- No fever, chills or sweats. No fatigue.  CV-- No chest pain, palpitation or syncope. +HTN, HLD.  Resp-- No SOB, cough, hemoptysis  Skin--No rashes or lesions      Allergies:   Allergies   Allergen Reactions    Biaxin [Clarithromycin] Anaphylaxis     \"BLISTERS AND THROAT SWELLING\"    Aspirin Unknown - Low Severity    Elemental Sulfur Unknown - Low Severity    Metformin Diarrhea    Allegra [Fexofenadine] Other (See Comments)     \"MAKES ME FEEL FUNNY\"    Citalopram Unknown - Low Severity and Other (See Comments)    Levofloxacin Other (See Comments) and Nausea And Vomiting     Other reaction(s): Vomiting, vomiting, diarrhea    Rosuvastatin Other (See Comments)     Other reaction(s): leg cramps    Saxagliptin Unknown - Low Severity and Other (See Comments)    Sulfa Antibiotics Nausea Only         Home Meds:  Medications Prior to Admission   Medication Sig Dispense Refill Last Dose    acetaminophen (TYLENOL) 325 MG tablet Take 2 tablets by mouth Every 4 (Four) Hours As Needed for Mild Pain.       Alirocumab (Praluent) 150 MG/ML injection pen Inject 1 mL under the skin into the appropriate area as directed Every 14 (Fourteen) Days.       " amLODIPine (NORVASC) 2.5 MG tablet 1 tablet.       Blood Glucose Monitoring Suppl (ACCU-CHEK LEXIE PLUS) w/Device kit Use device to check blood sugar 3 times a day 1 kit 0     carvedilol (COREG) 6.25 MG tablet Take 1 tablet by mouth 2 (Two) Times a Day With Meals.       clopidogrel (PLAVIX) 75 MG tablet Take 1 tablet by mouth Daily. (Patient taking differently: Take 1 tablet by mouth Daily. Dr. Deng okayed to stop prior to procedure up to five days) 30 tablet      dexlansoprazole (DEXILANT) 60 MG capsule Take 1 capsule by mouth Every Night.       famotidine (PEPCID) 40 MG tablet Take 1 tablet by mouth Daily.       fluticasone (FLONASE) 50 MCG/ACT nasal spray 1 spray into the nostril(s) as directed by provider Daily As Needed for Rhinitis.       glimepiride (AMARYL) 4 MG tablet Take 1 tablet by mouth At Night As Needed (Takes in the evening of BG >/= 200). (Patient taking differently: Take 1 tablet by mouth Every Night.) 60 tablet 6     glucose blood (ACCU-CHEK LEXIE PLUS) test strip Use as instructed to test blood sugar 3 times daily 100 each 12     Insulin Glargine (Lantus SoloStar) 100 UNIT/ML injection pen Inject 14 Units under the skin into the appropriate area as directed Every Night. (Patient taking differently: Inject  under the skin into the appropriate area as directed Every Night. Sliding Scale, if BS > 200 take 10 Units, if < 200 hold.) 6 mL 0     levothyroxine (SYNTHROID, LEVOTHROID) 88 MCG tablet Take 1 tablet by mouth Every Morning. 30 tablet 6     linagliptin (TRADJENTA) 5 MG tablet tablet Take 1 tablet by mouth Daily.       losartan (COZAAR) 100 MG tablet Take 1 tablet by mouth Daily.       metFORMIN ER (GLUCOPHAGE-XR) 500 MG 24 hr tablet Take 2 tablets by mouth Daily With Breakfast. (Patient taking differently: Take 1 tablet by mouth 2 (Two) Times a Day.)       nitroglycerin (NITROSTAT) 0.4 MG SL tablet Take 1 tablet by mouth Every 5 (Five) Minutes As Needed for Chest Pain.       oxybutynin  (DITROPAN) 5 MG tablet 1 tablet.       RELION PEN NEEDLES 32G X 4 MM misc Use daily with insulin 50 each 11          PMH:   Past Medical History:   Diagnosis Date    Anxiety     Arthritis     Coronary artery disease     Diabetes mellitus     Disease of thyroid gland     Elevated cholesterol     GERD (gastroesophageal reflux disease)     Hearing aid worn     Heart attack     History of stomach ulcers     Hypertension     PONV (postoperative nausea and vomiting)     Wears glasses      PSH:    Past Surgical History:   Procedure Laterality Date    BLADDER SUSPENSION      CARDIAC CATHETERIZATION N/A 08/15/2018    Procedure: Left Heart Cath;  Surgeon: David Burnett MD;  Location:  [facility];  Service: Cardiology    CARDIAC CATHETERIZATION N/A 01/08/2020    Procedure: LEFT HEART CATH;  Surgeon: David Subramanian MD;  Location:  [facility];  Service: Cardiology    CARDIAC CATHETERIZATION N/A 01/15/2020    Procedure: CBI to the LAD;  Surgeon: Phil Deng MD;  Location:  [facility];  Service: Cardiovascular    CARDIAC CATHETERIZATION N/A 12/03/2020    Procedure: Left Heart Cath 63196;  Surgeon: Phil Deng MD;  Location:  [facility];  Service: Cardiovascular;  Laterality: N/A;    CATARACT EXTRACTION Bilateral     COLONOSCOPY      CORONARY ANGIOPLASTY WITH STENT PLACEMENT      ENDOSCOPY      ENDOSCOPY N/A 12/06/2019    Procedure: ESOPHAGOGASTRODUODENOSCOPY;  Surgeon: Burak Patino MD;  Location:  [facility];  Service: Gastroenterology    HYSTERECTOMY  2014    PARTIAL    OOPHORECTOMY Bilateral 2014    NC RT/LT HEART CATHETERS N/A 10/05/2018    Procedure: Percutaneous Coronary Intervention;  Surgeon: David Burnett MD;  Location:  [facility];  Service: Cardiology    TOTAL HIP ARTHROPLASTY Right 04/26/2023    Procedure: TOTAL HIP ARTHROPLASTY ANTERIOR RIGHT;  Surgeon: Rodo Jennings MD;   Location: [facility];  Service: Orthopedics;  Laterality: Right;    VULVECTOMY         Immunization History:  Influenza: Yes  Pneumococcal: Yes  Tetanus: No  Covid : x3    Social History:   Tobacco:   Social History     Tobacco Use   Smoking Status Never   Smokeless Tobacco Never      Alcohol:     Social History     Substance and Sexual Activity   Alcohol Use No         Physical Exam:/75 (BP Location: Right arm, Patient Position: Lying)   Pulse 78   Temp 98.3 °F (36.8 °C) (Temporal)   Resp 16   SpO2 96%       General Appearance:    Alert, cooperative, no distress, appears stated age   Head:    Normocephalic, without obvious abnormality, atraumatic   Lungs:     Clear to auscultation bilaterally, respirations unlabored    Heart:   Regular rate and rhythm, S1 and S2 normal    Abdomen:    Soft without tenderness   Extremities:   Extremities normal, atraumatic, no cyanosis or edema   Skin:   Skin color, texture, turgor normal, no rashes or lesions   Neurologic:   Grossly intact     Results Review:     LABS:  Lab Results   Component Value Date    WBC 6.21 07/03/2024    HGB 13.4 07/03/2024    HCT 42.7 07/03/2024    MCV 84.6 07/03/2024     07/03/2024    NEUTROABS 3.48 07/03/2024    GLUCOSE 168 (H) 07/03/2024    BUN 15 07/03/2024    CREATININE 0.61 07/03/2024    EGFRIFNONA 76 12/03/2020     07/03/2024    K 4.6 07/03/2024    CL 98 07/03/2024    CO2 27.0 07/03/2024    MG 1.8 (L) 02/28/2024    PHOS 3.7 04/25/2023    CALCIUM 9.6 07/03/2024    ALBUMIN 4.2 07/03/2024    AST 22 07/03/2024    ALT 11 07/03/2024    BILITOT 0.3 07/03/2024       RADIOLOGY:  Imaging Results (Last 72 Hours)       ** No results found for the last 72 hours. **            I reviewed the patient's new clinical results.    Cancer Staging (if applicable)  Cancer Patient: __ yes _x_no __unknown; If yes, clinical stage T:__ N:__M:__, stage group or __N/A      Impression: Heterotopic Ossification      Plan: REMOVAL OF HETEROTOPIC OSSIFICATION  OF THE HIP - RIGHT - Right       Jesus Kirkpatrick, APRN   7/10/2024   11:01 EDT

## 2024-07-10 NOTE — CONSULTS
"CONSULTATION NOTE    NAME:      Angelica Spivey  :                                                          1940  DATE OF CONSULTATION:                       7/10/2024   REQUESTING PHYSICIAN:                   Dr. Rodo Jennings  REASON FOR CONSULTATION:           Heterotopic Ossification of Right Hip       BRIEF HISTORY:  Angelica Spivey  is a very pleasant 84 y.o. female with a history of hyperlipidemia, coronary artery disease, type 2 diabetes, hypertension, acquired hypothyroidism, and is s/p right hip replacement 1 year ago, is seen for radiation consultation of heterotopic ossification of right hip.    He has a history of a fall on 23 and fractured her right hip so she underwent total hip arthroplasty on 23. She has continued to have significant pain and difficulty with weight bearing. She ambulates with a walker and reports recent falls. She underwent repeat which revealed heterotopic ossification anterior to the right hip.  Today, on 7/10/2024 she underwent total hip arthroplasty for removal of the heterotopic ossification, right hip with Dr. Rodo Jennings.      Allergies   Allergen Reactions    Biaxin [Clarithromycin] Anaphylaxis     \"BLISTERS AND THROAT SWELLING\"    Aspirin Unknown - Low Severity    Elemental Sulfur Unknown - Low Severity    Metformin Diarrhea    Allegra [Fexofenadine] Other (See Comments)     \"MAKES ME FEEL FUNNY\"    Citalopram Unknown - Low Severity and Other (See Comments)    Levofloxacin Other (See Comments) and Nausea And Vomiting     Other reaction(s): Vomiting, vomiting, diarrhea    Rosuvastatin Other (See Comments)     Other reaction(s): leg cramps    Saxagliptin Unknown - Low Severity and Other (See Comments)    Sulfa Antibiotics Nausea Only       Social History     Tobacco Use    Smoking status: Never    Smokeless tobacco: Never   Vaping Use    Vaping status: Never Used   Substance Use Topics    Alcohol use: No    Drug use: No       Past Medical History: "   Diagnosis Date    Anxiety     Arthritis     Coronary artery disease     Diabetes mellitus     Disease of thyroid gland     Elevated cholesterol     GERD (gastroesophageal reflux disease)     Hearing aid worn     Heart attack     History of stomach ulcers     Hypertension     PONV (postoperative nausea and vomiting)     Wears glasses        family history is not on file.     Past Surgical History:   Procedure Laterality Date    BLADDER SUSPENSION      CARDIAC CATHETERIZATION N/A 08/15/2018    Procedure: Left Heart Cath;  Surgeon: David Burnett MD;  Location:  [facility];  Service: Cardiology    CARDIAC CATHETERIZATION N/A 01/08/2020    Procedure: LEFT HEART CATH;  Surgeon: David Subramanian MD;  Location:  [facility];  Service: Cardiology    CARDIAC CATHETERIZATION N/A 01/15/2020    Procedure: CBI to the LAD;  Surgeon: Phil Deng MD;  Location:  [facility];  Service: Cardiovascular    CARDIAC CATHETERIZATION N/A 12/03/2020    Procedure: Left Heart Cath 35483;  Surgeon: Phil Deng MD;  Location:  [facility];  Service: Cardiovascular;  Laterality: N/A;    CATARACT EXTRACTION Bilateral     COLONOSCOPY      CORONARY ANGIOPLASTY WITH STENT PLACEMENT      ENDOSCOPY      ENDOSCOPY N/A 12/06/2019    Procedure: ESOPHAGOGASTRODUODENOSCOPY;  Surgeon: Burak Patino MD;  Location:  [facility];  Service: Gastroenterology    HYSTERECTOMY  2014    PARTIAL    OOPHORECTOMY Bilateral 2014    CT RT/LT HEART CATHETERS N/A 10/05/2018    Procedure: Percutaneous Coronary Intervention;  Surgeon: David Burnett MD;  Location:  [facility];  Service: Cardiology    TOTAL HIP ARTHROPLASTY Right 04/26/2023    Procedure: TOTAL HIP ARTHROPLASTY ANTERIOR RIGHT;  Surgeon: Rodo Jennings MD;  Location:  [facility];  Service: Orthopedics;  Laterality: Right;    VULVECTOMY          Review of Systems   Reason unable to  perform ROS: Patient seen in PACU Post surgery and is lethargic.           Objective   VITAL SIGNS:   Vitals:    07/10/24 1515 07/10/24 1530 07/10/24 1545 07/10/24 1600   BP: 125/62 128/66 132/67 145/69   BP Location:  Right arm     Patient Position:  Lying     Pulse: 73 79 82 87   Resp: 14 16 16 16   Temp: 98.1 °F (36.7 °C) 97.8 °F (36.6 °C) 97.7 °F (36.5 °C) 97.8 °F (36.6 °C)   TempSrc:  Temporal     SpO2: 98% 97% 98% 96%                  KSP %:  70    Physical Exam  Constitutional:       Comments: Lethargic post surgery, responsive to speech and able to nod head to yes and no questions appropriately   HENT:      Head: Normocephalic.      Mouth/Throat:      Mouth: Mucous membranes are moist.   Cardiovascular:      Rate and Rhythm: Normal rate.   Pulmonary:      Effort: Pulmonary effort is normal.   Abdominal:      General: Abdomen is flat.   Musculoskeletal:      Cervical back: Normal range of motion.      Comments: Right leg limited ROM d/t recent surgery   Skin:     General: Skin is warm and dry.              The following portions of the patient's history were reviewed and updated as appropriate: allergies, current medications, past family history, past medical history, past social history, past surgical history, and problem list.    Assessment      IMPRESSION:   84 y.o. female with a history of hyperlipidemia, coronary artery disease, type 2 diabetes, hypertension, acquired hypothyroidism is seen for radiation consultation of heterotopic ossification of right hip.    Today, on 7/10/2024 she underwent total hip arthroplasty for removal of the heterotopic ossification, right hip with Dr. Rodo Jennings.  Consulted for radiation treatment of the right hip within 24-48 hours after total hip arthoplasty.  Discussed radiation treatment with patient and her daughter including what treatment entails, risks, and benefits.  Patient is lethargic since she does had surgery today but is able to nod head appropriately to  questions.  She is unable to sign consent for 24 hours after anesthesia.  Her daughter, Sofia is able to sign consent in her place and reports they were aware that would be proceeding with radiation treatment following surgery.  Obtained informed consent from daughter.  Daughter verbalizes understanding with upcoming plan.    RECOMMENDATIONS: Proceed with CT simulation tomorrow morning, 7/11/2024.  Patient will be treated with EBRT 7 Gray x 1 hopefully tomorrow afternoon, after radiation treatment plan is complete.              BUDDY Mcbride      Errors in dictation may reflect use of voice recognition software and not all errors in transcription may have been detected prior to signing.

## 2024-07-10 NOTE — PLAN OF CARE
Goal Outcome Evaluation:  Plan of Care Reviewed With: patient, daughter        Progress: no change  Outcome Evaluation: Pt amb 25' with FWW and Min Ax2. Decreased weight shifting onto RLE and forward flexed posture observed. No knee buckling noted. Activity limited by quick fatigue. HEP and precautions reviewed with pt and dtr. Additional ambulation tonight with nsg encouraged. Recommend d/c home with 24/7 assist and HHPT when medically appropriate. Pt would benefit from additional gait training and stairs assessment next session.      Anticipated Discharge Disposition (PT): home with assist, home with home health

## 2024-07-10 NOTE — THERAPY EVALUATION
Patient Name: Angelica Spivey  : 1940    MRN: [id number]                              Today's Date: 7/10/2024       Admit Date: 7/10/2024    Visit Dx:     ICD-10-CM ICD-9-CM   1. Right hip pain  M25.551 719.45     Patient Active Problem List   Diagnosis    Abnormal stress test    Mixed hyperlipidemia    Coronary artery disease involving native coronary artery of native heart    Type 2 diabetes mellitus with hyperglycemia, with long-term current use of insulin    Acquired hypothyroidism    Chronic GERD    Unstable angina    Closed fracture of right hip    Essential hypertension    Acute blood loss anemia    Iron deficiency anemia secondary to inadequate dietary iron intake    Malabsorption of iron    Heterotopic ossification right hip    Stiff hip, right     Past Medical History:   Diagnosis Date    Anxiety     Arthritis     Coronary artery disease     Diabetes mellitus     Disease of thyroid gland     Elevated cholesterol     GERD (gastroesophageal reflux disease)     Hearing aid worn     Heart attack     History of stomach ulcers     Hypertension     PONV (postoperative nausea and vomiting)     Wears glasses      Past Surgical History:   Procedure Laterality Date    BLADDER SUSPENSION      CARDIAC CATHETERIZATION N/A 08/15/2018    Procedure: Left Heart Cath;  Surgeon: David Burnett MD;  Location:  [facility];  Service: Cardiology    CARDIAC CATHETERIZATION N/A 2020    Procedure: LEFT HEART CATH;  Surgeon: David Subramanian MD;  Location:  [facility];  Service: Cardiology    CARDIAC CATHETERIZATION N/A 01/15/2020    Procedure: CBI to the LAD;  Surgeon: Phil Deng MD;  Location:  [facility];  Service: Cardiovascular    CARDIAC CATHETERIZATION N/A 2020    Procedure: Left Heart Cath 14419;  Surgeon: Phil Deng MD;  Location:  [facility];  Service: Cardiovascular;  Laterality: N/A;    CATARACT  EXTRACTION Bilateral     COLONOSCOPY      CORONARY ANGIOPLASTY WITH STENT PLACEMENT      ENDOSCOPY      ENDOSCOPY N/A 12/06/2019    Procedure: ESOPHAGOGASTRODUODENOSCOPY;  Surgeon: Burak Patino MD;  Location:  [facility];  Service: Gastroenterology    HYSTERECTOMY  2014    PARTIAL    OOPHORECTOMY Bilateral 2014    [place] RT/LT HEART CATHETERS N/A 10/05/2018    Procedure: Percutaneous Coronary Intervention;  Surgeon: David Burnett MD;  Location:  [facility];  Service: Cardiology    TOTAL HIP ARTHROPLASTY Right 04/26/2023    Procedure: TOTAL HIP ARTHROPLASTY ANTERIOR RIGHT;  Surgeon: Rodo Jennings MD;  Location:  [facility];  Service: Orthopedics;  Laterality: Right;    VULVECTOMY        General Information       Row Name 07/10/24 1814          Physical Therapy Time and Intention    Document Type evaluation  -     Mode of Treatment physical therapy  -       Row Name 07/10/24 1814          General Information    Patient Profile Reviewed yes  -     Prior Level of Function min assist:;all household mobility;community mobility;transfer;gait;bed mobility;ADL's  used a cane and assistance for all home mobility; utilized w/c in community when available; recurrent falls; urinary urgency  -     Existing Precautions/Restrictions fall;hip, anterior;other (see comments)  wound vac  -     Barriers to Rehab previous functional deficit  -       Row Name 07/10/24 1814          Living Environment    People in Home child(anisha), adult;spouse  -       Row Name 07/10/24 1814          Home Main Entrance    Number of Stairs, Main Entrance one;other (see comments)  ramps and one step; can put up another ramp if need be though pt found difficulty navigating ramp in past  -       Row Name 07/10/24 1814          Stairs Within Home, Primary    Number of Stairs, Within Home, Primary none  -       Row Name 07/10/24 1814          Cognition    Orientation Status (Cognition) oriented x 3  -        [facility] Name 07/10/24 1814          Safety Issues, Functional Mobility    Safety Issues Affecting Function (Mobility) insight into deficits/self-awareness;awareness of need for assistance;safety precaution awareness  -     Impairments Affecting Function (Mobility) balance;endurance/activity tolerance;pain;strength;range of motion (ROM)  -               User Key  (r) = Recorded By, (t) = Taken By, (c) = Cosigned By      Initials Name Provider Type     Krystle Tanner PT Physical Therapist                   Mobility       [facility] Name 07/10/24 1097          Bed Mobility    Bed Mobility supine-sit  -     Supine-Sit [place] (Bed Mobility) minimum assist (75% patient effort);verbal cues;nonverbal cues (demo/gesture)  -     Assistive Device (Bed Mobility) bed rails;draw sheet;head of bed elevated  -     Comment, (Bed Mobility) assistance for RLE management and trunk management  -[facility] Name 07/10/24 1747          Transfers    Comment, (Transfers) Pt performed STS x2 with Mod A x2 and FWW. VC for hand placement and encouraging anterior weight shifting  -[facility] Name 07/10/24 1747          Sit-Stand Transfer    Sit-Stand [place] (Transfers) nonverbal cues (demo/gesture);verbal cues;moderate assist (50% patient effort);2 person assist  -     Assistive Device (Sit-Stand Transfers) walker, front-wheeled  -[facility] Name 07/10/24 8477          Gait/Stairs (Locomotion)    [place] Level (Gait) nonverbal cues (demo/gesture);verbal cues;2 person assist;minimum assist (75% patient effort)  -     Assistive Device (Gait) walker, front-wheeled  -     Patient was able to Ambulate yes  -     Distance in Feet (Gait) 25  -HW     Deviations/Abnormal Patterns (Gait) bilateral deviations;base of support, wide;weight shifting decreased;stride length decreased;gait speed decreased  -     Bilateral Gait Deviations forward flexed posture;heel strike decreased  -     Right Sided Gait Deviations weight  shift ability decreased  -     Comment, (Gait/Stairs) Pt amb into haney with step-through gait pattern. Decreased stride length on R noted. VC for upright posture, increased weight shifting onto RLE, and increased stride length. Fair improvement noted prior to quick fatigue. No knee buckling observed. Assistance required for steadiness.  -[facility] Name 07/10/24 2088          Mobility    Extremity Weight-bearing Status right lower extremity  -     Right Lower Extremity (Weight-bearing Status) weight-bearing as tolerated (WBAT)  -               User Key  (r) = Recorded By, (t) = Taken By, (c) = Cosigned By      Initials Name Provider Type     Krystle Tanner, PT Physical Therapist                   Obj/Interventions       [facility] Name 07/10/24 1819          Range of Motion Comprehensive    General Range of Motion lower extremity range of motion deficits identified  -     Comment, General Range of Motion limited by R hip stiffness; encouraged R hip flexion as tolerated  -[facility] Name 07/10/24 1819          Strength Comprehensive (MMT)    General Manual Muscle Testing (MMT) Assessment lower extremity strength deficits identified  -     Comment, General Manual Muscle Testing (MMT) Assessment Pt able to perform B active ankle DF and LAQ  -[facility] Name 07/10/24 1819          Motor Skills    Therapeutic Exercise hip;knee;ankle  -[facility] Name 07/10/24 1819          Hip (Therapeutic Exercise)    Hip (Therapeutic Exercise) strengthening exercise  -     Hip Strengthening (Therapeutic Exercise) right;heel slides;aBduction;5 repetitions  -[facility] Name 07/10/24 1819          Knee (Therapeutic Exercise)    Knee (Therapeutic Exercise) strengthening exercise;isometric exercises  -     Knee Isometrics (Therapeutic Exercise) right;quad sets;10 repetitions  -     Knee Strengthening (Therapeutic Exercise) right;LAQ (long arc quad);10 repetitions  -[facility] Name 07/10/24 1819          Ankle  (Therapeutic Exercise)    Ankle (Therapeutic Exercise) AROM (active range of motion)  -     Ankle AROM (Therapeutic Exercise) bilateral;dorsiflexion;plantarflexion;10 repetitions  -[facility] Name 07/10/24 1819          Balance    Balance Assessment sitting static balance;sitting dynamic balance;sit to stand dynamic balance;standing static balance;standing dynamic balance  -     Static Sitting Balance standby assist  -     Dynamic Sitting Balance contact guard  -     Position, Sitting Balance sitting edge of bed  -     Static Standing Balance minimal assist;verbal cues;non-verbal cues (demo/gesture);2-person assist  -     Dynamic Standing Balance minimal assist;non-verbal cues (demo/gesture);verbal cues;2-person assist  -     Position/Device Used, Standing Balance supported;walker, rolling  -     Balance Interventions sitting;standing;sit to stand;occupation based/functional task  -[facility] Name 07/10/24 1819          Sensory Assessment (Somatosensory)    Sensory Assessment (Somatosensory) LE sensation intact  -               User Key  (r) = Recorded By, (t) = Taken By, (c) = Cosigned By      Initials Name Provider Type     Krystle Tanner, PT Physical Therapist                   Goals/Plan       [facility] Name 07/10/24 1822          Bed Mobility Goal 1 (PT)    Activity/Assistive Device (Bed Mobility Goal 1, PT) sit to supine/supine to sit  -     [place] Level/Cues Needed (Bed Mobility Goal 1, PT) modified independence  -HW     Time Frame (Bed Mobility Goal 1, PT) short term goal (STG);2 days  -[facility] Name 07/10/24 1822          Transfer Goal 1 (PT)    Activity/Assistive Device (Transfer Goal 1, PT) sit-to-stand/stand-to-sit  -     [place] Level/Cues Needed (Transfer Goal 1, PT) contact guard required  -     Time Frame (Transfer Goal 1, PT) long term goal (LTG);3 days  -[facility] Name 07/10/24 1822          Gait Training Goal 1 (PT)    Activity/Assistive Device (Gait  Training Goal 1, PT) gait (walking locomotion)  -HW     [place] Level (Gait Training Goal 1, PT) contact guard required  -HW     Distance (Gait Training Goal 1, PT) 150  -HW     Time Frame (Gait Training Goal 1, PT) long term goal (LTG);3 days  -       Row Name 07/10/24 1822          Stairs Goal 1 (PT)    Activity/Assistive Device (Stairs Goal 1, PT) ascending stairs;descending stairs  -HW     [place] Level/Cues Needed (Stairs Goal 1, PT) contact guard required  -HW     Number of Stairs (Stairs Goal 1, PT) 1  -HW     Time Frame (Stairs Goal 1, PT) long term goal (LTG);3 days  -       Row Name 07/10/24 1822          Therapy Assessment/Plan (PT)    Planned Therapy Interventions (PT) balance training;bed mobility training;gait training;home exercise program;ROM (range of motion);patient/family education;stair training;strengthening;stretching;transfer training  -               User Key  (r) = Recorded By, (t) = Taken By, (c) = Cosigned By      Initials Name Provider Type     Krystle Tanner, PT Physical Therapist                   Clinical Impression       Row Name 07/10/24 1820          Pain    Pretreatment Pain Rating 5/10  -     Posttreatment Pain Rating 6/10  -     Pain Location - Side/Orientation Right  -     Pain Location generalized  -     Pain Location - hip  -     Pre/Posttreatment Pain Comment soreness reported  -     Pain Intervention(s) Ambulation/increased activity;Repositioned;Elevated;Cold applied  -       Row Name 07/10/24 1820          Plan of Care Review    Plan of Care Reviewed With patient;daughter  -     Progress no change  -     Outcome Evaluation Pt amb 25' with FWW and Min Ax2. Decreased weight shifting onto RLE and forward flexed posture observed. No knee buckling noted. Activity limited by quick fatigue. HEP and precautions reviewed with pt and dtr. Additional ambulation tonight with nsg encouraged. Recommend d/c home with 24/7 assist and HHPT when medically  appropriate. Pt would benefit from additional gait training and stairs assessment next session.  -       Row Name 07/10/24 1820          Therapy Assessment/Plan (PT)    Rehab Potential (PT) good, to achieve stated therapy goals  -     Criteria for Skilled Interventions Met (PT) yes;meets criteria;skilled treatment is necessary  -     Therapy Frequency (PT) 2 times/day  -       Row Name 07/10/24 1820          Vital Signs    Pre Patient Position Supine  -     Intra Patient Position Standing  -     Post Patient Position Sitting  -       Row Name 07/10/24 1820          Positioning and Restraints    Pre-Treatment Position in bed  -     Post Treatment Position chair  -HW     In Chair notified nsg;reclined;sitting;call light within reach;encouraged to call for assist;exit alarm on;with family/caregiver;legs elevated;compression device  ice applied  -               User Key  (r) = Recorded By, (t) = Taken By, (c) = Cosigned By      Initials Name Provider Type     Krystle Tanner, HAROLDO Physical Therapist                   Outcome Measures       Row Name 07/10/24 1822 07/10/24 1700       How much help from another person do you currently need...    Turning from your back to your side while in flat bed without using bedrails? 3  -HW 3  -BC    Moving from lying on back to sitting on the side of a flat bed without bedrails? 2  -HW 2  -BC    Moving to and from a bed to a chair (including a wheelchair)? 3  -HW 2  -BC    Standing up from a chair using your arms (e.g., wheelchair, bedside chair)? 3  - 2  -BC    Climbing 3-5 steps with a railing? 2  -HW 1  -BC    To walk in hospital room? 2  -HW 2  -BC    AM-PAC 6 Clicks Score (PT) 15  - 12  -BC    Highest Level of Mobility Goal 4 --> Transfer to chair/commode  - 4 --> Transfer to chair/commode  -BC      Row Name 07/10/24 1822          PADD    Diagnosis 2  -     Gender 1  -     Age Group 0  -HW     Gait Distance 0  -HW     Assist Level 0  -HW     Home Support  3  -     PADD Score 6  -     Patient Preference home with home health  -     Prediction by PADD Score extended rehabilitation  -       Row Name 07/10/24 1822          Functional Assessment    Outcome Measure Options AM-PAC 6 Clicks Basic Mobility (PT);PADD  -               User Key  (r) = Recorded By, (t) = Taken By, (c) = Cosigned By      Initials Name Provider Type    BC Jessi Villegas, RN Registered Nurse     Krystle Tanner, HAROLDO Physical Therapist                                 Physical Therapy Education       Title: PT OT SLP Therapies (Done)       Topic: Physical Therapy (Done)       Point: Mobility training (Done)       Learning Progress Summary             Patient Acceptance, E,D, VU,NR by  at 7/10/2024 1823   Family Acceptance, E,D, VU,NR by  at 7/10/2024 1823                         Point: Home exercise program (Done)       Learning Progress Summary             Patient Acceptance, E,D, VU,NR by  at 7/10/2024 1823   Family Acceptance, E,D, VU,NR by  at 7/10/2024 1823                         Point: Body mechanics (Done)       Learning Progress Summary             Patient Acceptance, E,D, VU,NR by  at 7/10/2024 1823   Family Acceptance, E,D, VU,NR by  at 7/10/2024 1823                         Point: Precautions (Done)       Learning Progress Summary             Patient Acceptance, E,D, VU,NR by  at 7/10/2024 1823   Family Acceptance, E,D, VU,NR by  at 7/10/2024 1823                                         User Key       Initials Effective Dates Name Provider Type Discipline     12/15/23 -  Krystle Tanner, HAROLDO Physical Therapist PT                  PT Recommendation and Plan  Planned Therapy Interventions (PT): balance training, bed mobility training, gait training, home exercise program, ROM (range of motion), patient/family education, stair training, strengthening, stretching, transfer training  Plan of Care Reviewed With: patient, daughter  Progress: no change  Outcome Evaluation: Pt  amb 25' with FWW and Min Ax2. Decreased weight shifting onto RLE and forward flexed posture observed. No knee buckling noted. Activity limited by quick fatigue. HEP and precautions reviewed with pt and dtr. Additional ambulation tonight with nsg encouraged. Recommend d/c home with 24/7 assist and HHPT when medically appropriate. Pt would benefit from additional gait training and stairs assessment next session.     Time Calculation:   PT Evaluation Complexity  History, PT Evaluation Complexity: 1-2 personal factors and/or comorbidities  Examination of Body Systems (PT Eval Complexity): total of 3 or more elements  Clinical Presentation (PT Evaluation Complexity): evolving  Clinical Decision Making (PT Evaluation Complexity): moderate complexity  Overall Complexity (PT Evaluation Complexity): moderate complexity     PT Charges       Row Name 07/10/24 1823             Time Calculation    Start Time 1714  -HW      PT Received On 07/10/24  -HW      PT Goal Re-Cert Due Date 07/20/24  -HW         Timed Charges    71346 - PT Therapeutic Exercise Minutes 8  -HW         Untimed Charges    PT Eval/Re-eval Minutes 47  -HW         Total Minutes    Timed Charges Total Minutes 8  -HW      Untimed Charges Total Minutes 47  -HW       Total Minutes 55  -HW                User Key  (r) = Recorded By, (t) = Taken By, (c) = Cosigned By      Initials Name Provider Type    HW Krystle Tanner, HAROLDO Physical Therapist                  Therapy Charges for Today       Code Description Service Date Service Provider Modifiers Qty    42945570200 HC PT THER PROC EA 15 MIN 7/10/2024 Krystle Tanner, PT GP 1    34950685243 HC PT EVAL MOD COMPLEXITY 4 7/10/2024 Krystle Tanner, PT GP 1    [id number] HC PT THER SUPP EA 15 MIN 7/10/2024 Krystle Tanner, PT GP 3            PT G-Codes  Outcome Measure Options: AM-PAC 6 Clicks Basic Mobility (PT), PADD  AM-PAC 6 Clicks Score (PT): 15  PT Discharge Summary  Anticipated Discharge Disposition (PT): home with assist,  home with home health    Krystle Tanner, PT  7/10/2024

## 2024-07-10 NOTE — ANESTHESIA PREPROCEDURE EVALUATION
Anesthesia Evaluation     Patient summary reviewed and Nursing notes reviewed   history of anesthetic complications:  PONV  NPO Solid Status: > 8 hours  NPO Liquid Status: > 2 hours           Airway   Mallampati: II  TM distance: >3 FB  Neck ROM: full  No difficulty expected  Dental - normal exam     Pulmonary - negative pulmonary ROS and normal exam   Cardiovascular - normal exam    ECG reviewed    (+) hypertension, past MI , CAD, cardiac stents , hyperlipidemia  (-) angina, DIAZ    ROS comment: Cardiac clearance on chart from 3-7-2024, permission to hold Plavix 5 days.  Last dose will be 7-4-2024 prior to surgery.      Hematology clearance on chart 5-.          Neuro/Psych  (+) psychiatric history Anxiety  (-) seizures, TIA, CVA  GI/Hepatic/Renal/Endo    (+) GERD, PUD, diabetes mellitus type 2, thyroid problem hypothyroidism    Musculoskeletal     Abdominal    Substance History      OB/GYN          Other   arthritis, blood dyscrasia (iron deficiency anemia) anemia,     ROS/Med Hx Other: Plavix last dose 7/5/24              Anesthesia Plan    ASA 3     general     Reason for not using neuraxial anesthesia or peripheral nerve block: Other (pt only off plavix x 5days)  (TIVA  Pt with severe PONV)  intravenous induction     Anesthetic plan, risks, benefits, and alternatives have been provided, discussed and informed consent has been obtained with: patient.    Plan discussed with CRNA.    CODE STATUS:

## 2024-07-10 NOTE — PLAN OF CARE
Goal Outcome Evaluation:  Plan of Care Reviewed With: patient           Outcome Evaluation: VSS. Alert ortiented. Voiding well. No complaints of pain.

## 2024-07-10 NOTE — OP NOTE
REMOVAL OF HETEROTOPIC OSSIFICATION  Procedure Report    Patient Name:  Angelica Spivey  YOB: 1940    Date of Surgery:  7/10/2024     Indications:    84 y.o. female who was admitted to [facility] she had a history of of right total hip replacement over a year ago.  Unfortunately postoperatively she developed pretty significant heterotopic ossification anterior to the hip.  This was affecting her daily quality of life and she had difficulty with hip flexion being able to bend over and reach her shoes and foot.  We had nuclear medicine scans that showed that the heterotopic ossification had matured and therefore we thought appropriate to proceed with removal of heterotopic ossification from the right hip    The patient was indicated for a total hip arthroplasty. Likely risk benefits of the procedure including but not limited to infection, DVT, pulmonary embolism, leg length discrepancy, recurrent dislocation, possibility of injury to nerves and vessels, and periprosthetic fractures have been discussed with the patient. Despite the risks involved the patient elected to proceed with an informed consent was obtained.     Patient Identification:  Patient was seen in the prep, consent was reviewed, operative procedure was identified, surgical site and thigh marked.        Pre-op Diagnosis:   Stiff hip, right [[id number]]       Post-Op Diagnosis Codes:     * Stiff hip, right [M25.651]    Procedure/CPT® Codes:      Procedure(s):  REMOVAL OF HETEROTOPIC OSSIFICATION OF THE HIP - RIGHT    Staff:  Surgeon(s):  Rodo Jennings MD    Anesthesia: Spinal    Estimated Blood Loss: 100ml    Implants:    Implant Name Type Inv. Item Serial No.  Lot No. LRB No. Used Action   DEV CONTRL TISS STRATAFIX SYMM PDS PLUS NIKA CT-1 45CM - [id number] Implant DEV CONTRL TISS STRATAFIX SYMM PDS PLUS NIKA CT-1 45CM  [employer] [id number] Right 1 Implanted       Specimen:          Specimens        ID Source Type Tests Collected By Collected At Frozen?    1 Hip, Right Surgical Site FUNGAL CULTURE  TISSUE / BONE CULTURE  AFB CULTURE  ANAEROBIC CULTURE 10 DAY INCUBATION   Rodo Jennings MD 7/10/24 1305     Description: RIGHT HIP HETEROTOPIC OSSIFICATION NUMBER ONE FOR CULTURE    2 Hip, Right Surgical Site FUNGAL CULTURE  TISSUE / BONE CULTURE  AFB CULTURE  ANAEROBIC CULTURE 10 DAY INCUBATION   Rodo Jennings MD 7/10/24 1328     Description: RIGHT HIP HETEROTOPIC OSSIFICATION NUMBER TWO FOR CULTURE    3 Hip, Right Surgical Site FUNGAL CULTURE  TISSUE / BONE CULTURE  AFB CULTURE  ANAEROBIC CULTURE 10 DAY INCUBATION   Rodo Jennings MD 7/10/24 1328     Description: RIGHT HIP HETEROTOPIC OSSIFICATION NUMBER THREE FOR CULTURE              Findings: See operative dictation    Complications: none    Description of Procedure:   The patient was transferred to [facility] operating room and preoperative antibiotics given in form of Kefzol 2gm IV prior to skin incision as well as 1 g of tranexamic acid. Patient received General anesthesia and was transferred to the [place] table. All bony prominences were padded adequately. The operative hip was then prepped and draped in the usual sterile fashion. Multiple timeouts were done identifying the correct patient , surgical site and planned procedure.    A Skin incision was made overlying the anterior lateral aspect of the hip for about 10 cm just below and lateral to the anterior superior iliac spine, in line with the old incision. Skin and subcutaneous tissue and fascia was incised in line with the skin incision overlying the tensor fascia johana muscle. The tensor muscle was then retracted laterally and the interval between sartorius and rectus femoris medially and the tensor fascia muscle were developed.   We first identified some the lateral bone is in some of his abductor and this was pretty easily excised the anterior bone was we carefully dissected  around the anterior aspect and this is where most of the heterotopic ossification that was blocking flexion was however medially was very close to the femoral arteries took a lot of care to protect and slowly dissect this bone off using combination of rongeurs and osteotomes were able to remove the majority of the anterior ossification and bone that was blocking her flexion once it was removed the hip was taken through range of motion and found to be able to have full flexion past 100 degrees.  We brought in fluoroscopy to confirm the removal bone happy with this we began our wound closure.     The wound was then thoroughly irrigated with saline. We did use of more of the injection cocktail. Betadine irrigation was used followed by 3L of saline irrigation. Soft tissue hemostasis was secured and topical TXA was used. Sponge, needle, and instrument counts were correct. the fascial layer with a running #2 STRATAFIX followed by 2-0 Vicryl and then 3-0 nylon for skin.  Incisional wound VAC 4 x 13 cm. The patient was then transferred to the recovery room in stable condition. The patient tolerated the procedure well. There were no medications. The patient had adequate distal pulses and good capillary refill.    The patient will be mobilized by physical therapy. The patient received antibiotic prophylaxis postoperatively for 2 more doses given the first 24 hours. The patient was started on DVT prophylaxis with home dose of Plavix starting postoperative day #1.    I discussed the satisfactory performance of the procedure with the patient's family and discussed the postoperative management.  Also will consult radiation oncology postoperatively in order to give the patient a dose of XRT in order to prevent reformation of this heterotopic ossification.     Assistant Participation:  Surgeon(s):  Rodo Jennings MD    Assistant: Brandon Wade PA-C assisted with proper preoperative positioning, preoperative templating,  determingin availability of proper implants, prepping and draping of patient, manipulation placement of instruments, protection of ligaments and vital soft tissue structures, assistance in maintaining hemostasis and assistance with closure of the wound. Their skills and knowledge of the steps of operation and the desired outcome of each surgical step was crucial, allowing for efficient choreography of surgical procedure, and closure of the wound which lead to reduced surgical time, less blood loss, and less risk of complications for the patient.       Rodo Jennings MD     Date: 7/10/2024  Time: 14:07 EDT

## 2024-07-10 NOTE — H&P
"Patient Name: Angelica Spivey  MRN: [id number]  : 1940  DOS: 7/10/2024    Attending: Rodo Jennings MD    Primary Care Provider: Abimael Matthews MD      Chief complaint:  Right hip pain    Subjective   Patient is a pleasant 84 y.o. female presented for scheduled surgery by Dr. Jennings. She anticipates removal of heterotopic ossification of right hip today. She had a fall on 23 and injured her right hip. She had total hip arthroplasty 23. She has continued to have significant pain and difficulty with weight bearing. She ambulates with a walker and reports recent falls. Repeat imaging showed heterotopic ossification.     When seen preop she is doing well. She has baseline pain. She denies nausea, shortness of breath or chest pain. No hx of DVT or PE.    Allergies:  Allergies   Allergen Reactions    Biaxin [Clarithromycin] Anaphylaxis     \"BLISTERS AND THROAT SWELLING\"    Aspirin Unknown - Low Severity    Elemental Sulfur Unknown - Low Severity    Metformin Diarrhea    Allegra [Fexofenadine] Other (See Comments)     \"MAKES ME FEEL FUNNY\"    Citalopram Unknown - Low Severity and Other (See Comments)    Levofloxacin Other (See Comments) and Nausea And Vomiting     Other reaction(s): Vomiting, vomiting, diarrhea    Rosuvastatin Other (See Comments)     Other reaction(s): leg cramps    Saxagliptin Unknown - Low Severity and Other (See Comments)    Sulfa Antibiotics Nausea Only       Meds:  Medications Prior to Admission   Medication Sig Dispense Refill Last Dose    acetaminophen (TYLENOL) 325 MG tablet Take 2 tablets by mouth Every 4 (Four) Hours As Needed for Mild Pain.   Past Week    amLODIPine (NORVASC) 2.5 MG tablet Take 2 tablets by mouth Daily.   2024    Blood Glucose Monitoring Suppl (ACCU-CHEK LEXIE PLUS) w/Device kit Use device to check blood sugar 3 times a day 1 kit 0 2024    carvedilol (COREG) 6.25 MG tablet Take 1 tablet by mouth 2 (Two) Times a Day With Meals.   7/10/2024    " dexlansoprazole (DEXILANT) 60 MG capsule Take 1 capsule by mouth Every Night.   7/9/2024    famotidine (PEPCID) 40 MG tablet Take 1 tablet by mouth Daily.   7/9/2024    fluticasone (FLONASE) 50 MCG/ACT nasal spray 1 spray into the nostril(s) as directed by provider Daily As Needed for Rhinitis.   7/9/2024    glimepiride (AMARYL) 4 MG tablet Take 1 tablet by mouth At Night As Needed (Takes in the evening of BG >/= 200). (Patient taking differently: Take 1 tablet by mouth Every Night.) 60 tablet 6 7/9/2024    glucose blood (ACCU-CHEK LEXIE PLUS) test strip Use as instructed to test blood sugar 3 times daily 100 each 12 7/9/2024    Insulin Glargine (Lantus SoloStar) 100 UNIT/ML injection pen Inject 14 Units under the skin into the appropriate area as directed Every Night. (Patient taking differently: Inject  under the skin into the appropriate area as directed Every Night. Sliding Scale, if BS > 200 take 10 Units, if < 200 hold.) 6 mL 0 7/9/2024    levothyroxine (SYNTHROID, LEVOTHROID) 88 MCG tablet Take 1 tablet by mouth Every Morning. 30 tablet 6 7/10/2024 at 0700    linagliptin (TRADJENTA) 5 MG tablet tablet Take 1 tablet by mouth Daily.   7/9/2024    losartan (COZAAR) 100 MG tablet Take 1 tablet by mouth Daily.   7/9/2024    metFORMIN ER (GLUCOPHAGE-XR) 500 MG 24 hr tablet Take 2 tablets by mouth Daily With Breakfast. (Patient taking differently: Take 1 tablet by mouth 2 (Two) Times a Day.)   7/9/2024    oxybutynin (DITROPAN) 5 MG tablet 1 tablet.   7/9/2024    RELION PEN NEEDLES 32G X 4 MM misc Use daily with insulin 50 each 11 7/9/2024    Alirocumab (Praluent) 150 MG/ML injection pen Inject 1 mL under the skin into the appropriate area as directed Every 14 (Fourteen) Days.   7/7/2024    clopidogrel (PLAVIX) 75 MG tablet Take 1 tablet by mouth Daily. (Patient taking differently: Take 1 tablet by mouth Daily. Dr. Deng okayed to stop prior to procedure up to five days) 30 tablet  7/4/2024    nitroglycerin  (NITROSTAT) 0.4 MG SL tablet Take 1 tablet by mouth Every 5 (Five) Minutes As Needed for Chest Pain.   Unknown         History:   Past Medical History:   Diagnosis Date    Anxiety     Arthritis     Coronary artery disease     Diabetes mellitus     Disease of thyroid gland     Elevated cholesterol     GERD (gastroesophageal reflux disease)     Hearing aid worn     Heart attack     History of stomach ulcers     Hypertension     PONV (postoperative nausea and vomiting)     Wears glasses      Past Surgical History:   Procedure Laterality Date    BLADDER SUSPENSION      CARDIAC CATHETERIZATION N/A 08/15/2018    Procedure: Left Heart Cath;  Surgeon: David Burnett MD;  Location:  [facility];  Service: Cardiology    CARDIAC CATHETERIZATION N/A 01/08/2020    Procedure: LEFT HEART CATH;  Surgeon: David Subramanian MD;  Location:  [facility];  Service: Cardiology    CARDIAC CATHETERIZATION N/A 01/15/2020    Procedure: CBI to the LAD;  Surgeon: Phil Deng MD;  Location:  [facility];  Service: Cardiovascular    CARDIAC CATHETERIZATION N/A 12/03/2020    Procedure: Left Heart Cath 68045;  Surgeon: Phil Deng MD;  Location:  [facility];  Service: Cardiovascular;  Laterality: N/A;    CATARACT EXTRACTION Bilateral     COLONOSCOPY      CORONARY ANGIOPLASTY WITH STENT PLACEMENT      ENDOSCOPY      ENDOSCOPY N/A 12/06/2019    Procedure: ESOPHAGOGASTRODUODENOSCOPY;  Surgeon: Burak Patino MD;  Location:  [facility];  Service: Gastroenterology    HYSTERECTOMY  2014    PARTIAL    OOPHORECTOMY Bilateral 2014    AR RT/LT HEART CATHETERS N/A 10/05/2018    Procedure: Percutaneous Coronary Intervention;  Surgeon: David Burnett MD;  Location:  [facility];  Service: Cardiology    TOTAL HIP ARTHROPLASTY Right 04/26/2023    Procedure: TOTAL HIP ARTHROPLASTY ANTERIOR RIGHT;  Surgeon: Rodo Jennings MD;  Location:   [facility];  Service: Orthopedics;  Laterality: Right;    VULVECTOMY       Family History   Problem Relation Age of Onset    Breast cancer Neg Hx     Ovarian cancer Neg Hx      Social History     Tobacco Use    Smoking status: Never    Smokeless tobacco: Never   Vaping Use    Vaping status: Never Used   Substance Use Topics    Alcohol use: No    Drug use: No   She lives with her daughter. She has 2 children. She is retired.    Review of Systems  Pertinent items are noted in HPI, all other systems reviewed and negative    Vital Signs  /75 (BP Location: Right arm, Patient Position: Lying)   Pulse 78   Temp 98.3 °F (36.8 °C) (Temporal)   Resp 16   SpO2 96%     Physical Exam:    General Appearance:    Alert, cooperative, in no acute distress   Head:    Normocephalic, without obvious abnormality, atraumatic   Eyes:            Lids and lashes normal, conjunctivae and sclerae normal, no   icterus, no pallor, corneas clear,    Ears:    Ears appear intact with no abnormalities noted   Throat:   No oral lesions, no thrush, oral mucosa moist   Neck:   No adenopathy, supple, trachea midline, no thyromegaly    Lungs:     Clear to auscultation,respirations regular, even and unlabored    Heart:    Regular rhythm and normal rate, normal S1 and S2   Abdomen:     Normal bowel sounds, no masses, no organomegaly, soft non-tender, non-distended, no guarding, no rebound  tenderness   Genitalia:    Deferred   Extremities:   Moves all extremities well, no edema, no cyanosis, no  redness   Pulses:   Pulses palpable and equal bilaterally   Skin:   No bleeding, bruising or rash   Neurologic:   Cranial nerves 2 - 12 grossly intact. Flexion and dorsiflexion intact bilateral feet.        I reviewed the patient's new clinical results.       Lab Results   Component Value Date    HGBA1C 6.00 (H) 07/03/2024      Latest Reference Range & Units 07/03/24 10:57   Sodium 136 - 145 mmol/L 136   Potassium 3.5 - 5.2 mmol/L 4.6   Chloride 98 - 107  mmol/L 98   CO2 22.0 - 29.0 mmol/L 27.0   Anion Gap 5.0 - 15.0 mmol/L 11.0   BUN 8 - 23 mg/dL 15   Creatinine 0.57 - 1.00 mg/dL 0.61   BUN/Creatinine Ratio 7.0 - 25.0  24.6   eGFR >60.0 mL/min/1.73 89.4   Glucose 65 - 99 mg/dL 168 (H)   Calcium 8.6 - 10.5 mg/dL 9.6   Alkaline Phosphatase 39 - 117 U/L 87   Total Protein 6.0 - 8.5 g/dL 7.5   Albumin 3.5 - 5.2 g/dL 4.2   Globulin gm/dL 3.3   A/G Ratio g/dL 1.3   AST (SGOT) 1 - 32 U/L 22   ALT (SGPT) 1 - 33 U/L 11   Total Bilirubin 0.0 - 1.2 mg/dL 0.3   Fructosamine 0 - 285 umol/L 285   Hemoglobin A1C 4.80 - 5.60 % 6.00 (H)   C-Reactive Protein 0.00 - 0.50 mg/dL <0.30   25 Hydroxy, Vitamin D 30.0 - 100.0 ng/ml 14.5 (L)   Protime 12.2 - 14.5 Seconds 12.7   INR 0.89 - 1.12  0.95   WBC 3.40 - 10.80 10*3/mm3 6.21   RBC 3.77 - 5.28 10*6/mm3 5.05   Hemoglobin 12.0 - 15.9 g/dL 13.4   Hematocrit 34.0 - 46.6 % 42.7   Platelets 140 - 450 10*3/mm3 207   RDW  See Comment   MCV 79.0 - 97.0 fL 84.6   MCH 26.6 - 33.0 pg 26.5 (L)   MCHC 31.5 - 35.7 g/dL 31.4 (L)   MPV 6.0 - 12.0 fL 10.2   (H): Data is abnormally high  (L): Data is abnormally low    Assessment and Plan:     Heterotopic ossification right hip    Mixed hyperlipidemia    Coronary artery disease involving native coronary artery of native heart    Type 2 diabetes mellitus with hyperglycemia, with long-term current use of insulin    Acquired hypothyroidism    Essential hypertension      Plan  1. PT/OT- Weightbearing per Dr. Jennings postoperatively  2. Pain control-prns   3. IS-encourage  4. DVT proph- Mechs/ASA  5. Bowel regimen  6. Resume home medications as appropriate  7. Monitor post-op labs  8. DC planning for home    HTN, Hyperlipidemia, CAD  - Continue home norvasc, coreg and cozaar  - Resume plavix when ok with Dr. Jennings  - Monitor BP   - Holding parameters for BP meds  - Labetalol PRN for SBP>170    DM  - hgb A1c on 7/3/24 6.0  - Hold OHA while inpt  - Accu-Chek AC and HS with low dose SSI  - Continue long acting  insulin with holding parameters    Hypothyroid  - Continue home synthroid      Zoya Anderson, BUDDY  07/10/24  14:05 EDT

## 2024-07-10 NOTE — ANESTHESIA PROCEDURE NOTES
Airway  Urgency: elective    Date/Time: 7/10/2024 12:28 PM  Airway not difficult    General Information and Staff    Patient location during procedure: OR  SRNA: Rosetta Pisano SRNA  Indications and Patient Condition  Indications for airway management: airway protection    Preoxygenated: yes  MILS not maintained throughout  Mask difficulty assessment: 1 - vent by mask    Final Airway Details  Final airway type: endotracheal airway      Successful airway: ETT  Cuffed: yes   Successful intubation technique: direct laryngoscopy  Endotracheal tube insertion site: oral  Blade: Atilio  Blade size: 3  ETT size (mm): 7.0  Cormack-Lehane Classification: grade IIa - partial view of glottis  Placement verified by: chest auscultation and capnometry   Measured from: lips  ETT/EBT  to lips (cm): 20  Number of attempts at approach: 1  Assessment: lips, teeth, and gum same as pre-op and atraumatic intubation    Additional Comments  Negative epigastric sounds, Breath sound equal bilaterally with symmetric chest rise and fall

## 2024-07-11 VITALS
HEIGHT: 61 IN | WEIGHT: 152.34 LBS | DIASTOLIC BLOOD PRESSURE: 68 MMHG | SYSTOLIC BLOOD PRESSURE: 141 MMHG | BODY MASS INDEX: 28.76 KG/M2 | OXYGEN SATURATION: 94 % | RESPIRATION RATE: 16 BRPM | TEMPERATURE: 98.1 F | HEART RATE: 78 BPM

## 2024-07-11 PROBLEM — M25.559 HIP PAIN: Status: ACTIVE | Noted: 2024-07-11

## 2024-07-11 LAB
ANION GAP SERPL CALCULATED.3IONS-SCNC: 10 MMOL/L (ref 5–15)
BUN SERPL-MCNC: 18 MG/DL (ref 8–23)
BUN/CREAT SERPL: 23.4 (ref 7–25)
CALCIUM SPEC-SCNC: 8.9 MG/DL (ref 8.6–10.5)
CHLORIDE SERPL-SCNC: 104 MMOL/L (ref 98–107)
CO2 SERPL-SCNC: 27 MMOL/L (ref 22–29)
CREAT SERPL-MCNC: 0.77 MG/DL (ref 0.57–1)
EGFRCR SERPLBLD CKD-EPI 2021: 77.1 ML/MIN/1.73
GLUCOSE BLDC GLUCOMTR-MCNC: 126 MG/DL (ref 70–130)
GLUCOSE BLDC GLUCOMTR-MCNC: 151 MG/DL (ref 70–130)
GLUCOSE SERPL-MCNC: 189 MG/DL (ref 65–99)
HCT VFR BLD AUTO: 34 % (ref 34–46.6)
HGB BLD-MCNC: 11 G/DL (ref 12–15.9)
MCH RBC QN AUTO: 27.2 PG (ref 26.6–33)
MCHC RBC AUTO-ENTMCNC: 32.4 G/DL (ref 31.5–35.7)
MCV RBC AUTO: 84 FL (ref 79–97)
PLATELET # BLD AUTO: 196 10*3/MM3 (ref 140–450)
PMV BLD AUTO: 10 FL (ref 6–12)
POTASSIUM SERPL-SCNC: 4.9 MMOL/L (ref 3.5–5.2)
RBC # BLD AUTO: 4.05 10*6/MM3 (ref 3.77–5.28)
SODIUM SERPL-SCNC: 141 MMOL/L (ref 136–145)
WBC NRBC COR # BLD AUTO: 9.2 10*3/MM3 (ref 3.4–10.8)

## 2024-07-11 PROCEDURE — 63710000001 ACETAMINOPHEN EXTRA STRENGTH 500 MG TABLET: Performed by: ORTHOPAEDIC SURGERY

## 2024-07-11 PROCEDURE — 97116 GAIT TRAINING THERAPY: CPT

## 2024-07-11 PROCEDURE — 63710000001 FAMOTIDINE 20 MG TABLET: Performed by: ORTHOPAEDIC SURGERY

## 2024-07-11 PROCEDURE — 63710000001 INSULIN LISPRO (HUMAN) PER 5 UNITS: Performed by: NURSE PRACTITIONER

## 2024-07-11 PROCEDURE — A9270 NON-COVERED ITEM OR SERVICE: HCPCS | Performed by: ORTHOPAEDIC SURGERY

## 2024-07-11 PROCEDURE — 77295 3-D RADIOTHERAPY PLAN: CPT | Performed by: RADIOLOGY

## 2024-07-11 PROCEDURE — 63710000001 LEVOTHYROXINE 88 MCG TABLET: Performed by: ORTHOPAEDIC SURGERY

## 2024-07-11 PROCEDURE — 77387 GUIDANCE FOR RADJ TX DLVR: CPT | Performed by: RADIOLOGY

## 2024-07-11 PROCEDURE — 63710000001 LOSARTAN 50 MG TABLET: Performed by: ORTHOPAEDIC SURGERY

## 2024-07-11 PROCEDURE — 77300 RADIATION THERAPY DOSE PLAN: CPT | Performed by: RADIOLOGY

## 2024-07-11 PROCEDURE — 63710000001 AMLODIPINE 5 MG TABLET: Performed by: ORTHOPAEDIC SURGERY

## 2024-07-11 PROCEDURE — 97535 SELF CARE MNGMENT TRAINING: CPT

## 2024-07-11 PROCEDURE — 80048 BASIC METABOLIC PNL TOTAL CA: CPT | Performed by: ORTHOPAEDIC SURGERY

## 2024-07-11 PROCEDURE — 85027 COMPLETE CBC AUTOMATED: CPT | Performed by: NURSE PRACTITIONER

## 2024-07-11 PROCEDURE — 77336 RADIATION PHYSICS CONSULT: CPT | Performed by: RADIOLOGY

## 2024-07-11 PROCEDURE — 63710000001 OXYBUTYNIN 5 MG TABLET: Performed by: ORTHOPAEDIC SURGERY

## 2024-07-11 PROCEDURE — A9270 NON-COVERED ITEM OR SERVICE: HCPCS | Performed by: NURSE PRACTITIONER

## 2024-07-11 PROCEDURE — 77412 RADIATION TX DELIVERY LVL 3: CPT | Performed by: RADIOLOGY

## 2024-07-11 PROCEDURE — 82948 REAGENT STRIP/BLOOD GLUCOSE: CPT

## 2024-07-11 PROCEDURE — 77290 THER RAD SIMULAJ FIELD CPLX: CPT | Performed by: RADIOLOGY

## 2024-07-11 PROCEDURE — 63710000001 CARVEDILOL 6.25 MG TABLET: Performed by: ORTHOPAEDIC SURGERY

## 2024-07-11 PROCEDURE — 97110 THERAPEUTIC EXERCISES: CPT

## 2024-07-11 PROCEDURE — 25010000002 CEFAZOLIN PER 500 MG: Performed by: ORTHOPAEDIC SURGERY

## 2024-07-11 PROCEDURE — 97166 OT EVAL MOD COMPLEX 45 MIN: CPT

## 2024-07-11 PROCEDURE — 77334 RADIATION TREATMENT AID(S): CPT | Performed by: RADIOLOGY

## 2024-07-11 RX ORDER — TRANEXAMIC ACID 650 MG/1
1950 TABLET ORAL DAILY
Start: 2024-07-11 | End: 2024-07-15

## 2024-07-11 RX ORDER — ONDANSETRON 4 MG/1
4 TABLET, FILM COATED ORAL EVERY 8 HOURS PRN
Start: 2024-07-11

## 2024-07-11 RX ORDER — ACETAMINOPHEN 500 MG
1000 TABLET ORAL EVERY 8 HOURS
Start: 2024-07-11 | End: 2024-07-18

## 2024-07-11 RX ORDER — CEFADROXIL 500 MG/1
500 CAPSULE ORAL 2 TIMES DAILY
Start: 2024-07-11

## 2024-07-11 RX ORDER — OXYCODONE HYDROCHLORIDE 5 MG/1
5 TABLET ORAL EVERY 4 HOURS PRN
Start: 2024-07-11

## 2024-07-11 RX ORDER — TRAMADOL HYDROCHLORIDE 50 MG/1
50 TABLET ORAL EVERY 6 HOURS PRN
Start: 2024-07-11

## 2024-07-11 RX ORDER — MELOXICAM 15 MG/1
15 TABLET ORAL DAILY
Start: 2024-07-11 | End: 2024-07-26

## 2024-07-11 RX ADMIN — ACETAMINOPHEN 1000 MG: 500 TABLET ORAL at 03:30

## 2024-07-11 RX ADMIN — AMLODIPINE BESYLATE 5 MG: 5 TABLET ORAL at 08:08

## 2024-07-11 RX ADMIN — SODIUM CHLORIDE 2000 MG: 900 INJECTION INTRAVENOUS at 03:31

## 2024-07-11 RX ADMIN — LOSARTAN POTASSIUM 100 MG: 50 TABLET, FILM COATED ORAL at 08:07

## 2024-07-11 RX ADMIN — FAMOTIDINE 20 MG: 20 TABLET, FILM COATED ORAL at 08:08

## 2024-07-11 RX ADMIN — OXYBUTYNIN CHLORIDE 5 MG: 5 TABLET ORAL at 08:08

## 2024-07-11 RX ADMIN — INSULIN LISPRO 2 UNITS: 100 INJECTION, SOLUTION INTRAVENOUS; SUBCUTANEOUS at 08:07

## 2024-07-11 RX ADMIN — CARVEDILOL 6.25 MG: 6.25 TABLET, FILM COATED ORAL at 08:08

## 2024-07-11 RX ADMIN — LEVOTHYROXINE SODIUM 88 MCG: 88 TABLET ORAL at 06:00

## 2024-07-11 RX ADMIN — ACETAMINOPHEN 1000 MG: 500 TABLET ORAL at 11:49

## 2024-07-11 NOTE — ANESTHESIA POSTPROCEDURE EVALUATION
Patient: Angelica Spivey    Procedure Summary       Date: 07/10/24 Room / Location:  [facility] /  [facility]    Anesthesia Start: 1217 Anesthesia Stop: 1435    Procedure: REMOVAL OF HETEROTOPIC OSSIFICATION OF THE HIP - RIGHT (Right: Hip) Diagnosis:       Stiff hip, right      (Stiff hip, right [[id number]])    Surgeons: Rodo Jennings MD Provider: Pina Castellanos MD    Anesthesia Type: general ASA Status: 3            Anesthesia Type: general    Vitals  Vitals Value Taken Time   /69 07/10/24 1600   Temp 97.8 °F (36.6 °C) 07/10/24 1600   Pulse 85 07/10/24 1628   Resp 16 07/10/24 1600   SpO2 91 % 07/10/24 1628   Vitals shown include unfiled device data.        Post Anesthesia Care and Evaluation    Patient location during evaluation: PACU  Patient participation: complete - patient participated  Level of consciousness: awake and alert  Pain management: adequate    Airway patency: patent  Anesthetic complications: No anesthetic complications  PONV Status: none  Cardiovascular status: hemodynamically stable and acceptable  Respiratory status: nonlabored ventilation, acceptable and nasal cannula  Hydration status: acceptable

## 2024-07-11 NOTE — THERAPY EVALUATION
Patient Name: Angelica Spivey  : 1940    MRN: [id number]                              Today's Date: 2024       Admit Date: 7/10/2024    Visit Dx:     ICD-10-CM ICD-9-CM   1. Right hip pain  M25.551 719.45     Patient Active Problem List   Diagnosis    Abnormal stress test    Mixed hyperlipidemia    Coronary artery disease involving native coronary artery of native heart    Type 2 diabetes mellitus with hyperglycemia, with long-term current use of insulin    Acquired hypothyroidism    Chronic GERD    Unstable angina    Closed fracture of right hip    Essential hypertension    Acute blood loss anemia    Iron deficiency anemia secondary to inadequate dietary iron intake    Malabsorption of iron    Heterotopic ossification right hip    Stiff hip, right    Hip pain, s/p REMOVAL OF HETEROTOPIC OSSIFICATION OF THE HIP - RIGHT     Past Medical History:   Diagnosis Date    Anxiety     Arthritis     Coronary artery disease     Diabetes mellitus     Disease of thyroid gland     Elevated cholesterol     GERD (gastroesophageal reflux disease)     Hearing aid worn     Heart attack     History of stomach ulcers     Hypertension     PONV (postoperative nausea and vomiting)     Wears glasses      Past Surgical History:   Procedure Laterality Date    BLADDER SUSPENSION      CARDIAC CATHETERIZATION N/A 08/15/2018    Procedure: Left Heart Cath;  Surgeon: David Burnett MD;  Location:  [facility];  Service: Cardiology    CARDIAC CATHETERIZATION N/A 2020    Procedure: LEFT HEART CATH;  Surgeon: David Subramanian MD;  Location:  [facility];  Service: Cardiology    CARDIAC CATHETERIZATION N/A 01/15/2020    Procedure: CBI to the LAD;  Surgeon: Phil Deng MD;  Location:  [facility];  Service: Cardiovascular    CARDIAC CATHETERIZATION N/A 2020    Procedure: Left Heart Cath 64610;  Surgeon: Phil Deng MD;  Location:  [facility];  Service: Cardiovascular;  Laterality: N/A;    CATARACT EXTRACTION Bilateral     COLONOSCOPY      CORONARY ANGIOPLASTY WITH STENT PLACEMENT      ENDOSCOPY      ENDOSCOPY N/A 12/06/2019    Procedure: ESOPHAGOGASTRODUODENOSCOPY;  Surgeon: Burak Patino MD;  Location:  [facility];  Service: Gastroenterology    HYSTERECTOMY  2014    PARTIAL    OOPHORECTOMY Bilateral 2014    AK RT/LT HEART CATHETERS N/A 10/05/2018    Procedure: Percutaneous Coronary Intervention;  Surgeon: David Burnett MD;  Location:  [facility];  Service: Cardiology    REMOVAL OF HETEROTOPIC OSSIFICATION Right 7/10/2024    Procedure: REMOVAL OF HETEROTOPIC OSSIFICATION OF THE HIP - RIGHT;  Surgeon: Rodo Jennings MD;  Location:  [facility];  Service: Orthopedics;  Laterality: Right;    TOTAL HIP ARTHROPLASTY Right 04/26/2023    Procedure: TOTAL HIP ARTHROPLASTY ANTERIOR RIGHT;  Surgeon: Rodo Jennings MD;  Location:  [facility];  Service: Orthopedics;  Laterality: Right;    VULVECTOMY        General Information       Row Name 07/11/24 0928          OT Time and Intention    Document Type evaluation  -KF     Mode of Treatment occupational therapy;individual therapy  -KF       Row Name 07/11/24 0928          General Information    Patient Profile Reviewed yes  -KF     Prior Level of Function min assist:;all household mobility;community mobility;bed mobility;ADL's  SPC + family assistance during household mobility; wc during community mobility; Assisted in bathing and dressing  -KF     Existing Precautions/Restrictions fall;hip, anterior;other (see comments)  woun vac  -KF     Barriers to Rehab previous functional deficit  -KF       Row Name 07/11/24 0928          Occupational Profile    Environmental Supports and Barriers (Occupational Profile) walk-in shower with seat available, comfort height commode  -KF       Row Name 07/11/24 0928          Living Environment    People in Home child(anisha), adult;spouse  -KF        Row Name 07/11/24 0928          Home Main Entrance    Number of Stairs, Main Entrance one;other (see comments)  1 step + ramp  -       Row Name 07/11/24 0928          Stairs Within Home, Primary    Number of Stairs, Within Home, Primary none  -       Row Name 07/11/24 0928          Cognition    Orientation Status (Cognition) oriented x 3  -       Row Name 07/11/24 0928          Safety Issues, Functional Mobility    Safety Issues Affecting Function (Mobility) awareness of need for assistance;insight into deficits/self-awareness;positioning of assistive device;safety precaution awareness;safety precautions follow-through/compliance  -     Impairments Affecting Function (Mobility) balance;endurance/activity tolerance;pain;strength;range of motion (ROM);cognition  -KF     Cognitive Impairments, Mobility Safety/Performance awareness, need for assistance;insight into deficits/self-awareness;judgment;problem-solving/reasoning;safety precaution awareness;safety precaution follow-through;sequencing abilities  -               User Key  (r) = Recorded By, (t) = Taken By, (c) = Cosigned By      Initials Name Provider Type    KF Gayla Morrell OT Occupational Therapist                     Mobility/ADL's       Row Name 07/11/24 0930          Bed Mobility    Comment, (Bed Mobility) Pt found and left up in the chair.  -       Row Name 07/11/24 0930          Transfers    Transfers sit-stand transfer;stand-sit transfer;toilet transfer  -     Comment, (Transfers) Verbal cues for hand placement and to step RLE forward while sitting for pain control  -       Row Name 07/11/24 0930          Sit-Stand Transfer    Sit-Stand [place] (Transfers) contact guard;verbal cues  -     Assistive Device (Sit-Stand Transfers) walker, front-wheeled  -       Row Name 07/11/24 0930          Stand-Sit Transfer    Stand-Sit [place] (Transfers) contact guard;verbal cues  -     Assistive Device (Stand-Sit Transfers)  walker, front-wheeled  -       Row Name 07/11/24 0930          Toilet Transfer    Type (Toilet Transfer) sit-stand;stand-sit  -     [place] Level (Toilet Transfer) contact guard;verbal cues  -     Assistive Device (Toilet Transfer) walker, front-wheeled;commode;grab bars/safety frame  -       Row Name 07/11/24 0930          Functional Mobility    Functional Mobility- Ind. Level contact guard assist;verbal cues required  -     Functional Mobility- Device walker, front-wheeled  -     Functional Mobility-Distance (Feet) --  to/from the bathroom  -     Functional Mobility- Comment Verbal cue provided to keep RWx close to base of support  -[facility] Name 07/11/24 0930          Activities of Daily Living    BADL Assessment/Intervention bathing;grooming;toileting;lower body dressing  -[facility] Name 07/11/24 0930          Mobility    Extremity Weight-bearing Status right lower extremity  -     Right Lower Extremity (Weight-bearing Status) weight-bearing as tolerated (WBAT)  -[facility] Name 07/11/24 0930          Bathing Assessment/Intervention    Assistive Devices (Bathing) long-handled sponge  -     Comment, (Bathing) Pt provided and educated on use of a long handled sponge to assist in bathing. Verbal understanding provided.  -[facility] Name 07/11/24 0930          Grooming Assessment/Training    [place] Level (Grooming) oral care regimen;wash face, hands;set up  -     Oral Care teeth brushed - regular toothbrush  -     Position (Grooming) sink side;supported standing  -[facility] Name 07/11/24 0930          Toileting Assessment/Training    [place] Level (Toileting) adjust/manage clothing;perform perineal hygiene;standby assist;contact guard assist  -     Assistive Devices (Toileting) commode;raised toilet seat  -     Position (Toileting) unsupported sitting;supported standing  -[facility] Name 07/11/24 0930          Lower Body Dressing Assessment/Training     Assistive Devices (Lower Body Dressing) reacher  -KF     Comment, (Lower Body Dressing) Pt provided and educated on use of a reacher to assist in ADLs. Verbal understanding given. Pt deferred additional AE.  -KF               User Key  (r) = Recorded By, (t) = Taken By, (c) = Cosigned By      Initials Name Provider Type    KF Gayla Morrell OT Occupational Therapist                   Obj/Interventions       Row Name 07/11/24 0933          Sensory Assessment (Somatosensory)    Sensory Assessment (Somatosensory) UE sensation intact  -KF       Row Name 07/11/24 0933          Range of Motion Comprehensive    General Range of Motion bilateral upper extremity ROM WFL  -KF       Row Name 07/11/24 0933          Strength Comprehensive (MMT)    General Manual Muscle Testing (MMT) Assessment upper extremity strength deficits identified  -KF     Comment, General Manual Muscle Testing (MMT) Assessment BUE grossly 3+/5  -KF       Row Name 07/11/24 0933          Balance    Balance Assessment sitting static balance;sitting dynamic balance;sit to stand dynamic balance;standing static balance;standing dynamic balance  -KF     Static Sitting Balance standby assist  -KF     Dynamic Sitting Balance contact guard  -KF     Position, Sitting Balance unsupported;sitting in chair;other (see comments)  commode  -KF     Sit to Stand Dynamic Balance contact guard;verbal cues  -KF     Static Standing Balance contact guard  -KF     Dynamic Standing Balance contact guard  -KF     Position/Device Used, Standing Balance supported;walker, front-wheeled  -KF     Balance Interventions sitting;standing;sit to stand;supported;static;dynamic;occupation based/functional task  -KF     Comment, Balance No overt LOB or knee buckling  -KF               User Key  (r) = Recorded By, (t) = Taken By, (c) = Cosigned By      Initials Name Provider Type    KF Gayla Morrell OT Occupational Therapist                   Goals/Plan       Row Name 07/11/24 0921           Transfer Goal 1 (OT)    Activity/Assistive Device (Transfer Goal 1, OT) commode;bed-to-chair/chair-to-bed  -KF     [place] Level/Cues Needed (Transfer Goal 1, OT) supervision required  -KF     Time Frame (Transfer Goal 1, OT) long term goal (LTG);10 days  -KF     Progress/Outcome (Transfer Goal 1, OT) goal ongoing  -KF       Row Name 07/11/24 0937          Toileting Goal 1 (OT)    Activity/Device (Toileting Goal 1, OT) adjust/manage clothing;perform perineal hygiene  -KF     [place] Level/Cues Needed (Toileting Goal 1, OT) supervision required  -KF     Time Frame (Toileting Goal 1, OT) short term goal (STG);5 days  -KF     Progress/Outcome (Toileting Goal 1, OT) goal ongoing  -KF       Row Name 07/11/24 0937          Grooming Goal 1 (OT)    Activity/Device (Grooming Goal 1, OT) hair care;oral care;wash face, hands  -KF     [place] (Grooming Goal 1, OT) supervision required  -KF     Time Frame (Grooming Goal 1, OT) long term goal (LTG);10 days  -KF     Strategies/Barriers (Grooming Goal 1, OT) sink side  -KF       Row Name 07/11/24 0937          Therapy Assessment/Plan (OT)    Planned Therapy Interventions (OT) activity tolerance training;adaptive equipment training;BADL retraining;functional balance retraining;occupation/activity based interventions;patient/caregiver education/training;ROM/therapeutic exercise;strengthening exercise;transfer/mobility retraining  -KF               User Key  (r) = Recorded By, (t) = Taken By, (c) = Cosigned By      Initials Name Provider Type    KF Gayla Morrell OT Occupational Therapist                   Clinical Impression       Row Name 07/11/24 0934          Pain Assessment    Pretreatment Pain Rating 6/10  -KF     Posttreatment Pain Rating 6/10  -KF     Pain Location - Side/Orientation Right  -KF     Pain Location generalized  -KF     Pain Location - hip  -KF     Pain Intervention(s) Repositioned;Ambulation/increased activity  -KF       Row Name 07/11/24 0934           Plan of Care Review    Plan of Care Reviewed With patient;daughter  -KF     Progress no change  -KF     Outcome Evaluation OT evaluation completed. The pt presents below her functional baseline with acute pain, RLE weakness, decreased activity tolerance and balance deficits. The pt ambulated to/from the bathroom using a RWx with CGA. The pt performed toileting and subsequent sink side grooming ADLs with set up and CGA. The pt was provided and educated on use of a reacher and long handled sponge to assist in ADLs. [place] understanding provided. The pt will benefit from continued IP OT services to increase the pt's safety and independence during ADLs and functional mobility. Recommend a d/c home with 24/7 assist when medically ready.  -KF       Row Name 07/11/24 0934          Therapy Assessment/Plan (OT)    Rehab Potential (OT) good, to achieve stated therapy goals  -KF     Criteria for Skilled Therapeutic Interventions Met (OT) yes;skilled treatment is necessary  -KF     Therapy Frequency (OT) daily  -KF     Predicted Duration of Therapy Intervention (OT) 2 days  -KF       Row Name 07/11/24 0934          Therapy Plan Review/Discharge Plan (OT)    Anticipated Discharge Disposition (OT) home with 24/7 care  -KF       Row Name 07/11/24 0934          Vital Signs    Pre Patient Position Sitting  -KF     Intra Patient Position Standing  -KF     Post Patient Position Sitting  -KF       Row Name 07/11/24 0934          Positioning and Restraints    Pre-Treatment Position sitting in chair/recliner  -KF     Post Treatment Position chair  -KF     In Chair sitting;with nsg;with other staff  with transport  -KF               User Key  (r) = Recorded By, (t) = Taken By, (c) = Cosigned By      Initials Name Provider Type    Gayla Rosenbaum, KATIA Occupational Therapist                   Outcome Measures       Row Name 07/11/24 0937          How much help from another is currently needed...    Putting on and taking off  regular lower body clothing? 2  -KF     Bathing (including washing, rinsing, and drying) 2  -KF     Toileting (which includes using toilet bed pan or urinal) 3  -KF     Putting on and taking off regular upper body clothing 3  -KF     Taking care of personal grooming (such as brushing teeth) 3  -KF     Eating meals 3  -KF     AM-PAC 6 Clicks Score (OT) 16  -KF       Row Name 07/11/24 0937          Functional Assessment    Outcome Measure Options AM-PAC 6 Clicks Daily Activity (OT)  -KF               User Key  (r) = Recorded By, (t) = Taken By, (c) = Cosigned By      Initials Name Provider Type    Gayla Rosenbaum OT Occupational Therapist                    Occupational Therapy Education       Title: PT OT SLP Therapies (In Progress)       Topic: Occupational Therapy (In Progress)       Point: ADL training (Done)       Description:   Instruct learner(s) on proper safety adaptation and remediation techniques during self care or transfers.   Instruct in proper use of assistive devices.                  Learning Progress Summary             Patient Acceptance, E,TB, VU,DU by KRISTAN at 7/11/2024 0855                         Point: Home exercise program (Not Started)       Description:   Instruct learner(s) on appropriate technique for monitoring, assisting and/or progressing therapeutic exercises/activities.                  Learner Progress:  Not documented in this visit.              Point: Precautions (Done)       Description:   Instruct learner(s) on prescribed precautions during self-care and functional transfers.                  Learning Progress Summary             Patient Acceptance, E,TB, VU,DU by KF at 7/11/2024 0855                         Point: Body mechanics (Done)       Description:   Instruct learner(s) on proper positioning and spine alignment during self-care, functional mobility activities and/or exercises.                  Learning Progress Summary             Patient Acceptance, E,TB, VU,DU by KF at  7/11/2024 0855                                         User Key       Initials Effective Dates Name Provider Type Discipline     08/09/23 -  Gayla Morrell OT Occupational Therapist OT                  OT Recommendation and Plan  Planned Therapy Interventions (OT): activity tolerance training, adaptive equipment training, BADL retraining, functional balance retraining, occupation/activity based interventions, patient/caregiver education/training, ROM/therapeutic exercise, strengthening exercise, transfer/mobility retraining  Therapy Frequency (OT): daily  Plan of Care Review  Plan of Care Reviewed With: patient, daughter  Progress: no change  Outcome Evaluation: OT evaluation completed. The pt presents below her functional baseline with acute pain, RLE weakness, decreased activity tolerance and balance deficits. The pt ambulated to/from the bathroom using a RWx with CGA. The pt performed toileting and subsequent sink side grooming ADLs with set up and CGA. The pt was provided and educated on use of a reacher and long handled sponge to assist in ADLs. [place] understanding provided. The pt will benefit from continued IP OT services to increase the pt's safety and independence during ADLs and functional mobility. Recommend a d/c home with 24/7 assist when medically ready.     Time Calculation:   Evaluation Complexity (OT)  Review Occupational Profile/Medical/Therapy History Complexity: expanded/moderate complexity  Assessment, Occupational Performance/Identification of Deficit Complexity: 3-5 performance deficits  Clinical Decision Making Complexity (OT): detailed assessment/moderate complexity  Overall Complexity of Evaluation (OT): moderate complexity     Time Calculation- OT       Row Name 07/11/24 0938             Time Calculation- OT    OT Start Time 0855  -KF      OT Received On 07/11/24  -      OT Goal Re-Cert Due Date 07/21/24  -         Timed Charges    19497 - OT Self Care/Mgmt Minutes 12  -KF          Untimed Charges    OT Eval/Re-eval Minutes 35  -KF         Total Minutes    Timed Charges Total Minutes 12  -KF      Untimed Charges Total Minutes 35  -KF       Total Minutes 47  -KF                User Key  (r) = Recorded By, (t) = Taken By, (c) = Cosigned By      Initials Name Provider Type    KF Gayla Morrell OT Occupational Therapist                  Therapy Charges for Today       Code Description Service Date Service Provider Modifiers Qty    92313919371 HC OT EVAL MOD COMPLEXITY 3 7/11/2024 Gayla Mrorell OT GO 1    [id number]  OT SELF CARE/MGMT/TRAIN EA 15 MIN 7/11/2024 Gayla Morrell OT GO 1                 Gayla Morrell OT  7/11/2024

## 2024-07-11 NOTE — DISCHARGE SUMMARY
Patient Name: Angelica Spivey  MRN: [id number]  : 1940  DOS: 2024    Attending: Rodo Jennings MD    Primary Care Provider: Abimael Matthews MD    Date of Admission:.7/10/2024  9:45 AM    Date of Discharge:  2024    Discharge Diagnosis:   Hip pain, s/p REMOVAL OF HETEROTOPIC OSSIFICATION OF THE HIP - RIGHT    Mixed hyperlipidemia    Coronary artery disease involving native coronary artery of native heart    Type 2 diabetes mellitus with hyperglycemia, with long-term current use of insulin    Acquired hypothyroidism    Essential hypertension    Heterotopic ossification right hip    Stiff hip, right    Consults: Radiation oncology.  Deo Franco MD    Hospital Course  At admit:  Patient is a pleasant 84 y.o. female presented for scheduled surgery by Dr. Jennings. She anticipates removal of heterotopic ossification of right hip today. She had a fall on 23 and injured her right hip. She had total hip arthroplasty 23. She has continued to have significant pain and difficulty with weight bearing. She ambulates with a walker and reports recent falls. Repeat imaging showed heterotopic ossification.      When seen preop she is doing well. She has baseline pain. She denies nausea, shortness of breath or chest pain. No hx of DVT or PE.    She subsequently underwent the following procedure:REMOVAL OF HETEROTOPIC OSSIFICATION OF THE HIP - RIGHT.  This was done under spinal anesthesia, was tolerated well, she was admitted for further management.       After admit  Patient was provided pain medications as needed for pain control.  Adjustments were made to pain medications to optimize postop pain management when indicated. Risks and benefits of opiate medications discussed with patient. LUZ report was reviewed.    Patient was seen by PT and OT and has progressed well over her stay.    She used an IS for atelectasis prophylaxis and Plavix was resumed along with mechanicals for DVT  prophylaxis.    Home medications were resumed as appropriate, and labs were monitored and remained fairly stable.     She was seen by radiation oncology and received radiation treatment for treatment and prevention of heterotopic ossification following revision arthroplasty of her right hip.    With the progress she has made, Ms. Spivey is ready for DC home today.      Discussed with patient and her daughter regarding plan and they show understanding and agreement.             Procedures Performed  Procedure(s):  REMOVAL OF HETEROTOPIC OSSIFICATION OF THE HIP - RIGHT       Pertinent Test Results:    I reviewed the patient's new clinical results.   Results from last 7 days   Lab Units 07/11/24  0356   WBC 10*3/mm3 9.20   HEMOGLOBIN g/dL 11.0*   HEMATOCRIT % 34.0   PLATELETS 10*3/mm3 196     Results from last 7 days   Lab Units 07/11/24  0356   SODIUM mmol/L 141   POTASSIUM mmol/L 4.9   CHLORIDE mmol/L 104   CO2 mmol/L 27.0   BUN mg/dL 18   CREATININE mg/dL 0.77   CALCIUM mg/dL 8.9   GLUCOSE mg/dL 189*     I reviewed the patient's new imaging including images and reports.          Physical therapy  Krystle Tanner, PT   Physical Therapist  Physical Therapy     Plan of Care      Signed     Date of Service: 07/11/24 0819  Creation Time: 07/11/24 1139     Signed         Goal Outcome Evaluation:  Plan of Care Reviewed With: patient, daughter  Progress: improving  Outcome Evaluation: Pt amb 140' with FWW and CGAx2. No knee buckling or LOB noted. Good overall improvement in quality of gait. Activity limited by fatigue. Pt navigated a step with CGA and assistance encouraged for home. HEP and precautions reviewed with pt. Frequent ambulation encouraged with assistance. Recommend d/c home with assist and HHPT when medically appropriate.        Anticipated Discharge Disposition (PT): home with assist, home with home health                Discharge Assessment:      Visit Vitals  /68 (BP Location: Right arm, Patient Position:  "Lying)   Pulse 68   Temp 98.1 °F (36.7 °C) (Oral)   Resp 16   Ht 154.9 cm (60.98\")   Wt 69.1 kg (152 lb 5.4 oz)   SpO2 98%   BMI 28.80 kg/m²     Temp (24hrs), Av °F (36.7 °C), Min:97.5 °F (36.4 °C), Max:99.3 °F (37.4 °C)      General Appearance:    Alert, cooperative, in no acute distress   Lungs:     Clear to auscultation,respirations regular, even and                   unlabored    Heart:    Regular rhythm and normal rate, normal S1 and S2   Abdomen:     Normal bowel sounds, no masses, no organomegaly, soft        non-tender, non-distended, no guarding, no rebound                 tenderness   Extremities:   CDI dressing over  right hip incision   Pulses:   Pulses palpable and equal bilaterally   Skin:   No bleeding, bruising or rash            Discharge Disposition: Home         Discharge Medications        New Medications        Instructions Start Date   cefadroxil 500 MG capsule  Commonly known as: DURICEF   500 mg, Oral, 2 Times Daily      meloxicam 15 MG tablet  Commonly known as: MOBIC   15 mg, Oral, Daily      ondansetron 4 MG tablet  Commonly known as: Zofran   4 mg, Oral, Every 8 Hours PRN      oxyCODONE 5 MG immediate release tablet  Commonly known as: Roxicodone   5 mg, Oral, Every 4 Hours PRN      traMADol 50 MG tablet  Commonly known as: ULTRAM   50 mg, Oral, Every 6 Hours PRN      Tranexamic Acid 650 MG tablet   1,950 mg, Oral, Daily             Changes to Medications        Instructions Start Date   acetaminophen 325 MG tablet  Commonly known as: TYLENOL  What changed: Another medication with the same name was added. Make sure you understand how and when to take each.   650 mg, Oral, Every 4 Hours PRN      acetaminophen 500 MG tablet  Commonly known as: TYLENOL  What changed: You were already taking a medication with the same name, and this prescription was added. Make sure you understand how and when to take each.   1,000 mg, Oral, Every 8 Hours, Take every 8 hours  as needed after 1 week    "          Continue These Medications        Instructions Start Date   Accu-Chek Ericka Plus w/Device kit   Use device to check blood sugar 3 times a day      amLODIPine 2.5 MG tablet  Commonly known as: NORVASC   Take 2 tablets by mouth Daily.      carvedilol 6.25 MG tablet  Commonly known as: COREG   6.25 mg, Oral, 2 Times Daily With Meals      clopidogrel 75 MG tablet  Commonly known as: PLAVIX   75 mg, Oral, Daily      dexlansoprazole 60 MG capsule  Commonly known as: DEXILANT   60 mg, Oral, Nightly      famotidine 40 MG tablet  Commonly known as: PEPCID   1 tablet, Oral, Daily      fluticasone 50 MCG/ACT nasal spray  Commonly known as: FLONASE   1 spray into the nostril(s) as directed by provider Daily As Needed for Rhinitis.      glimepiride 4 MG tablet  Commonly known as: AMARYL   4 mg, Oral, Nightly PRN      glucose blood test strip  Commonly known as: Accu-Chek Ericka Plus   Use as instructed to test blood sugar 3 times daily      Lantus SoloStar 100 UNIT/ML injection pen  Generic drug: Insulin Glargine   14 Units, Subcutaneous, Nightly      levothyroxine 88 MCG tablet  Commonly known as: SYNTHROID, LEVOTHROID   88 mcg, Oral, Every Morning      linagliptin 5 MG tablet tablet  Commonly known as: TRADJENTA   5 mg, Oral, Daily      losartan 100 MG tablet  Commonly known as: COZAAR   100 mg, Oral, Every 24 Hours Scheduled      metFORMIN  MG 24 hr tablet  Commonly known as: GLUCOPHAGE-XR   1,000 mg, Oral, Daily With Breakfast      nitroglycerin 0.4 MG SL tablet  Commonly known as: NITROSTAT   0.4 mg, Oral, Every 5 Minutes PRN      oxybutynin 5 MG tablet  Commonly known as: DITROPAN   1 tablet.      Praluent 150 MG/ML injection pen  Generic drug: Alirocumab   150 mg, Subcutaneous, Every 14 Days      ReliOn Pen Needles 32G X 4 MM misc  Generic drug: Insulin Pen Needle   Use daily with insulin               Discharge Diet: Resume prior    Activity at Discharge: Weightbearing as tolerated     Future Appointments    Date Time Provider Department Center   7/11/2024 10:00 AM  [facility]   7/24/2024 11:30 AM [facility] [place]      Orthopedic surgery per Dr. Jennings's orders  Dragon disclaimer:  Part of this encounter note is an electronic transcription/translation of spoken language to printed text. The electronic translation of spoken language may permit erroneous, or at times, nonsensical words or phrases to be inadvertently transcribed; Although I have reviewed the note for such errors, some may still exist.       Leah Hi MD  07/11/24  08:00 EDT

## 2024-07-11 NOTE — PLAN OF CARE
Goal Outcome Evaluation:  Plan of Care Reviewed With: patient, daughter        Progress: improving  Outcome Evaluation: Pt amb 140' with FWW and CGAx2. No knee buckling or LOB noted. Good overall improvement in quality of gait. Activity limited by fatigue. Pt navigated a step with CGA and assistance encouraged for home. HEP and precautions reviewed with pt. Frequent ambulation encouraged with assistance. Recommend d/c home with assist and HHPT when medically appropriate.      Anticipated Discharge Disposition (PT): home with assist, home with home health

## 2024-07-11 NOTE — THERAPY TREATMENT NOTE
Patient Name: Angelica Spivey  : 1940    MRN: [id number]                              Today's Date: 2024       Admit Date: 7/10/2024    Visit Dx:     ICD-10-CM ICD-9-CM   1. Right hip pain  M25.551 719.45     Patient Active Problem List   Diagnosis    Abnormal stress test    Mixed hyperlipidemia    Coronary artery disease involving native coronary artery of native heart    Type 2 diabetes mellitus with hyperglycemia, with long-term current use of insulin    Acquired hypothyroidism    Chronic GERD    Unstable angina    Closed fracture of right hip    Essential hypertension    Acute blood loss anemia    Iron deficiency anemia secondary to inadequate dietary iron intake    Malabsorption of iron    Heterotopic ossification right hip    Stiff hip, right    Hip pain, s/p REMOVAL OF HETEROTOPIC OSSIFICATION OF THE HIP - RIGHT     Past Medical History:   Diagnosis Date    Anxiety     Arthritis     Coronary artery disease     Diabetes mellitus     Disease of thyroid gland     Elevated cholesterol     GERD (gastroesophageal reflux disease)     Hearing aid worn     Heart attack     History of stomach ulcers     Hypertension     PONV (postoperative nausea and vomiting)     Wears glasses      Past Surgical History:   Procedure Laterality Date    BLADDER SUSPENSION      CARDIAC CATHETERIZATION N/A 08/15/2018    Procedure: Left Heart Cath;  Surgeon: David Burnett MD;  Location:  [facility];  Service: Cardiology    CARDIAC CATHETERIZATION N/A 2020    Procedure: LEFT HEART CATH;  Surgeon: David Subramanian MD;  Location:  [facility];  Service: Cardiology    CARDIAC CATHETERIZATION N/A 01/15/2020    Procedure: CBI to the LAD;  Surgeon: Phil Deng MD;  Location:  [facility];  Service: Cardiovascular    CARDIAC CATHETERIZATION N/A 2020    Procedure: Left Heart Cath 67361;  Surgeon: Phil Deng MD;  Location:  [facility];  Service: Cardiovascular;  Laterality: N/A;    CATARACT EXTRACTION Bilateral     COLONOSCOPY      CORONARY ANGIOPLASTY WITH STENT PLACEMENT      ENDOSCOPY      ENDOSCOPY N/A 12/06/2019    Procedure: ESOPHAGOGASTRODUODENOSCOPY;  Surgeon: Burak Patino MD;  Location:  [facility];  Service: Gastroenterology    HYSTERECTOMY  2014    PARTIAL    OOPHORECTOMY Bilateral 2014    DC RT/LT HEART CATHETERS N/A 10/05/2018    Procedure: Percutaneous Coronary Intervention;  Surgeon: David Burnett MD;  Location:  [facility];  Service: Cardiology    REMOVAL OF HETEROTOPIC OSSIFICATION Right 7/10/2024    Procedure: REMOVAL OF HETEROTOPIC OSSIFICATION OF THE HIP - RIGHT;  Surgeon: Rodo Jennings MD;  Location:  [facility];  Service: Orthopedics;  Laterality: Right;    TOTAL HIP ARTHROPLASTY Right 04/26/2023    Procedure: TOTAL HIP ARTHROPLASTY ANTERIOR RIGHT;  Surgeon: Rodo Jennings MD;  Location:  [facility];  Service: Orthopedics;  Laterality: Right;    VULVECTOMY        General Information       Row Name 07/11/24 1133          Physical Therapy Time and Intention    Document Type therapy note (daily note)  -     Mode of Treatment physical therapy  -       Row Name 07/11/24 1133          General Information    Patient Profile Reviewed yes  -     Existing Precautions/Restrictions fall;hip, anterior;other (see comments)  wound vac  -       Row Name 07/11/24 1133          Cognition    Orientation Status (Cognition) oriented x 3  -       Row Name 07/11/24 1133          Safety Issues, Functional Mobility    Impairments Affecting Function (Mobility) balance;endurance/activity tolerance;pain;strength;range of motion (ROM);cognition  -               User Key  (r) = Recorded By, (t) = Taken By, (c) = Cosigned By      Initials Name Provider Type    HW Krystle Tanner PT Physical Therapist                   Mobility       Row Name 07/11/24 1133          Bed Mobility    Bed Mobility  supine-sit  -HW     Supine-Sit [place] (Bed Mobility) standby assist  -     Assistive Device (Bed Mobility) bed rails;draw sheet;head of bed elevated  -     Comment, (Bed Mobility) Improvement in initiation this session  -       Row Name 07/11/24 1133          Transfers    Comment, (Transfers) Pt performed STS with Min A and FWW. VC for hand placement and encouraged anterio weight shifting.  -       Row Name 07/11/24 1133          Sit-Stand Transfer    Sit-Stand [place] (Transfers) minimum assist (75% patient effort);verbal cues;nonverbal cues (demo/gesture)  -     Assistive Device (Sit-Stand Transfers) walker, front-wheeled  -       Row Name 07/11/24 1133          Gait/Stairs (Locomotion)    [place] Level (Gait) contact guard;verbal cues;nonverbal cues (demo/gesture)  -     Assistive Device (Gait) walker, front-wheeled  -     Distance in Feet (Gait) 140  -HW     Deviations/Abnormal Patterns (Gait) bilateral deviations;base of support, wide;weight shifting decreased;stride length decreased;gait speed decreased  -     Bilateral Gait Deviations forward flexed posture;heel strike decreased  -     [place] Level (Stairs) contact guard;nonverbal cues (demo/gesture);verbal cues;2 person assist  -     Assistive Device (Stairs) walker, front-wheeled  -     Number of Steps (Stairs) 1  -HW     Ascending Technique (Stairs) step-to-step  -HW     Descending Technique (Stairs) step-to-step  -HW     Comment, (Gait/Stairs) Pt amb in haney with step-through gait pattern. VC for upright posture, increased stride length, increased heel strike, and staying closer to AD. Good improvement noted with VC. Activity limited by fatigue. No knee buckling or LOB noted. Pt navigated a step with backwards technique when ascending. Dtr present for training and encouraged assistance when navigating steps.  -       Row Name 07/11/24 1133          Mobility    Extremity Weight-bearing Status right lower  extremity  -     Right Lower Extremity (Weight-bearing Status) weight-bearing as tolerated (WBAT)  -               User Key  (r) = Recorded By, (t) = Taken By, (c) = Cosigned By      Initials Name Provider Type     Krystle Tanner PT Physical Therapist                   Obj/Interventions       [facility] Name 07/11/24 1137          Range of Motion Comprehensive    General Range of Motion lower extremity range of motion deficits identified  -     Comment, General Range of Motion limited by R hip precaution  -[facility] Name 07/11/24 1137          Strength Comprehensive (MMT)    General Manual Muscle Testing (MMT) Assessment lower extremity strength deficits identified  -     Comment, General Manual Muscle Testing (MMT) Assessment Pt able to perform B active ankle DF and LAQ  -[facility] Name 07/11/24 1137          Motor Skills    Therapeutic Exercise hip;knee;ankle  -[facility] Name 07/11/24 1137          Hip (Therapeutic Exercise)    Hip (Therapeutic Exercise) strengthening exercise  -     Hip Strengthening (Therapeutic Exercise) right;heel slides;aBduction;10 repetitions  -[facility] Name 07/11/24 1137          Knee (Therapeutic Exercise)    Knee (Therapeutic Exercise) strengthening exercise;isometric exercises  -     Knee Isometrics (Therapeutic Exercise) right;gluteal sets;quad sets;10 repetitions  -     Knee Strengthening (Therapeutic Exercise) right;LAQ (long arc quad);10 repetitions  -[facility] Name 07/11/24 1137          Ankle (Therapeutic Exercise)    Ankle (Therapeutic Exercise) AROM (active range of motion)  -     Ankle AROM (Therapeutic Exercise) bilateral;dorsiflexion;plantarflexion;10 repetitions  -[facility] Name 07/11/24 1137          Balance    Balance Assessment sitting static balance;sitting dynamic balance;sit to stand dynamic balance;standing static balance;standing dynamic balance  -     Static Sitting Balance standby assist  -     Dynamic Sitting Balance standby assist   -     Position, Sitting Balance sitting edge of bed  -     Static Standing Balance contact guard  -     Dynamic Standing Balance contact guard  -     Position/Device Used, Standing Balance supported;walker, rolling  -     Balance Interventions sitting;standing;sit to stand;occupation based/functional task  -               User Key  (r) = Recorded By, (t) = Taken By, (c) = Cosigned By      Initials Name Provider Type     Krystle Tanner, HAROLDO Physical Therapist                   Goals/Plan    No documentation.                  Clinical Impression       Row Name 07/11/24 1137          Pain    Pretreatment Pain Rating 4/10  -     Posttreatment Pain Rating 4/10  -     Pain Location - Side/Orientation Right  -     Pain Location generalized  -     Pain Location - hip  -     Pre/Posttreatment Pain Comment soreness  -     Pain Intervention(s) Repositioned;Cold applied;Ambulation/increased activity;Elevated  -       Row Name 07/11/24 1137          Plan of Care Review    Plan of Care Reviewed With patient;daughter  -     Progress improving  -     Outcome Evaluation Pt amb 140' with FWW and CGAx2. No knee buckling or LOB noted. Good overall improvement in quality of gait. Activity limited by fatigue. Pt navigated a step with CGA and assistance encouraged for home. HEP and precautions reviewed with pt. Frequent ambulation encouraged with assistance. Recommend d/c home with assist and HHPT when medically appropriate.  -       Row Name 07/11/24 1137          Therapy Assessment/Plan (PT)    Predicted Duration of Therapy Intervention (PT) one day  -       Row Name 07/11/24 1137          Positioning and Restraints    Pre-Treatment Position in bed  -     Post Treatment Position chair  -     In Chair notified nsg;reclined;sitting;call light within reach;encouraged to call for assist;exit alarm on;with family/caregiver;legs elevated;compression device  ice applied  -               User Key  (r) =  Recorded By, (t) = Taken By, (c) = Cosigned By      Initials Name Provider Type     Krystle Tanner, PT Physical Therapist                   Outcome Measures       Row Name 07/11/24 1139          How much help from another person do you currently need...    Turning from your back to your side while in flat bed without using bedrails? 3  -HW     Moving from lying on back to sitting on the side of a flat bed without bedrails? 3  -HW     Moving to and from a bed to a chair (including a wheelchair)? 3  -HW     Standing up from a chair using your arms (e.g., wheelchair, bedside chair)? 3  -HW     Climbing 3-5 steps with a railing? 3  -HW     To walk in hospital room? 3  -HW     AM-PAC 6 Clicks Score (PT) 18  -HW     Highest Level of Mobility Goal 6 --> Walk 10 steps or more  -       Row Name 07/11/24 1139 07/11/24 0937       Functional Assessment    Outcome Measure Options AM-PAC 6 Clicks Basic Mobility (PT)  -HW AM-PAC 6 Clicks Daily Activity (OT)  -KF              User Key  (r) = Recorded By, (t) = Taken By, (c) = Cosigned By      Initials Name Provider Type     Krystle Tanner, PT Physical Therapist    KF Gayla Morrell OT Occupational Therapist                                 Physical Therapy Education       Title: PT OT SLP Therapies (In Progress)       Topic: Physical Therapy (Done)       Point: Mobility training (Done)       Learning Progress Summary             Patient Acceptance, E,D, VU,NR by  at 7/11/2024 1139    Acceptance, E,D, VU,NR by  at 7/10/2024 1823   Family Acceptance, E,D, VU,NR by  at 7/10/2024 1823                         Point: Home exercise program (Done)       Learning Progress Summary             Patient Acceptance, E,D, VU,NR by  at 7/11/2024 1139    Acceptance, E,D, VU,NR by  at 7/10/2024 1823   Family Acceptance, E,D, VU,NR by  at 7/10/2024 1823                         Point: Body mechanics (Done)       Learning Progress Summary             Patient Acceptance, E,D, VU,NR by   at 7/11/2024 1139    Acceptance, E,D, VU,NR by  at 7/10/2024 1823   Family Acceptance, E,D, VU,NR by  at 7/10/2024 1823                         Point: Precautions (Done)       Learning Progress Summary             Patient Acceptance, E,D, VU,NR by  at 7/11/2024 1139    Acceptance, E,D, VU,NR by  at 7/10/2024 1823   Family Acceptance, E,D, VU,NR by  at 7/10/2024 1823                                         User Key       Initials Effective Dates Name Provider Type Discipline     12/15/23 -  Krystle Tanner, PT Physical Therapist PT                  PT Recommendation and Plan  Planned Therapy Interventions (PT): balance training, bed mobility training, gait training, home exercise program, ROM (range of motion), patient/family education, stair training, strengthening, stretching, transfer training  Plan of Care Reviewed With: patient, daughter  Progress: improving  Outcome Evaluation: Pt amb 140' with FWW and CGAx2. No knee buckling or LOB noted. Good overall improvement in quality of gait. Activity limited by fatigue. Pt navigated a step with CGA and assistance encouraged for home. HEP and precautions reviewed with pt. Frequent ambulation encouraged with assistance. Recommend d/c home with assist and HHPT when medically appropriate.     Time Calculation:   PT Evaluation Complexity  History, PT Evaluation Complexity: 1-2 personal factors and/or comorbidities  Examination of Body Systems (PT Eval Complexity): total of 3 or more elements  Clinical Presentation (PT Evaluation Complexity): evolving  Clinical Decision Making (PT Evaluation Complexity): moderate complexity  Overall Complexity (PT Evaluation Complexity): moderate complexity     PT Charges       Row Name 07/11/24 1139             Time Calculation    Start Time 0819  -      PT Received On 07/11/24  -         Timed Charges    03049 - PT Therapeutic Exercise Minutes 14  -      16084 - Gait Training Minutes  19  -         Total Minutes    Timed  Charges Total Minutes 33  -HW       Total Minutes 33  -HW                User Key  (r) = Recorded By, (t) = Taken By, (c) = Cosigned By      Initials Name Provider Type     Krystle Tanner, PT Physical Therapist                  Therapy Charges for Today       Code Description Service Date Service Provider Modifiers Qty    98694499670  PT THER PROC EA 15 MIN 7/10/2024 Krystle Tanner, PT GP 1    80860408990 HC PT EVAL MOD COMPLEXITY 4 7/10/2024 Krystle Tanner, PT GP 1    [id number] HC PT THER SUPP EA 15 MIN 7/10/2024 Krystle Tanner, PT GP 3    [id number] HC PT THER PROC EA 15 MIN 7/11/2024 Krystle Tanner, PT GP 1    [id number]  GAIT TRAINING EA 15 MIN 7/11/2024 Krystle Tanner, PT GP 1    [id number] HC PT THER SUPP EA 15 MIN 7/11/2024 Krystle Tanner, PT GP 2            PT G-Codes  Outcome Measure Options: AM-PAC 6 Clicks Basic Mobility (PT)  AM-PAC 6 Clicks Score (PT): 18  AM-PAC 6 Clicks Score (OT): 16  PT Discharge Summary  Anticipated Discharge Disposition (PT): home with assist, home with home health    Krystle Tanner PT  7/11/2024

## 2024-07-11 NOTE — CASE MANAGEMENT/SOCIAL WORK
Continued Stay Note  [facility]     Patient Name: Angelica Spivey  MRN: [id number]  Today's Date: 7/11/2024    Admit Date: 7/10/2024    Plan: Home   Discharge Plan       Row Name 07/11/24 1209       Plan    Plan Home    Plan Comments I spoke with patient and she included her daughter, Sofia in the discussion of discharge planning. She resides in [place] and will have Sofia's assistance in the home at discharge. Patient has a rolling walker, tall toilet. Sofia has self arranaged a physical therapistLowell to provide therapy for patient. Patient has [employer] Medicare. She has no advanced directives.    Final Discharge Disposition Code 01 - home or self-care                   Discharge Codes    No documentation.                 Expected Discharge Date and Time       Expected Discharge Date Expected Discharge Time    Jul 11, 2024               Fara Vasquez RN

## 2024-07-11 NOTE — PLAN OF CARE
Goal Outcome Evaluation:         Patient calm and cooperative thi shift. Took medications without issue. Only c/o soreness in right hip. Wound vac in place. Patient is to go to IR today for radiation to hip then possibly discharge. Daughter at bedside all night.

## 2024-07-11 NOTE — PLAN OF CARE
Goal Outcome Evaluation:  Plan of Care Reviewed With: patient, daughter        Progress: no change  Outcome Evaluation: OT evaluation completed. The pt presents below her functional baseline with acute pain, RLE weakness, decreased activity tolerance and balance deficits. The pt ambulated to/from the bathroom using a RWx with CGA. The pt performed toileting and subsequent sink side grooming ADLs with set up and CGA. The pt was provided and educated on use of a reacher and long handled sponge to assist in ADLs. Vebal understanding provided. The pt will benefit from continued IP OT services to increase the pt's safety and independence during ADLs and functional mobility. Recommend a d/c home with 24/7 assist when medically ready.      Anticipated Discharge Disposition (OT): home with 24/7 care

## 2024-07-11 NOTE — ADDENDUM NOTE
Addendum  created 07/11/24 1100 by Steven Houston CRNA    Clinical Note Signed, Delete clinical note

## 2024-07-11 NOTE — PROGRESS NOTES
RADIATION ONCOLOGY NOTE  7/11/2024    I saw and evaluated Ms. Spivey today on behalf of my partner, Dr. Deo Franco.  She received radiation treatment today for treatment and prevention of heterotopic ossification after undergoing revision arthroplasty to the right hip.  She received a dose of 7 Gy in a single fraction which she tolerated uneventfully.  She will return to the inpatient floor.  There are no further radiation treatments planned and she can be discharged from our point of view once cleared by orthopedic surgery.

## 2024-07-11 NOTE — PROGRESS NOTES
"  Orthopedic Progress Note      Patient: Angelica Spivey  YOB: 1940     Date of Admission: 7/10/2024  9:45 AM Medical Record Number: [id number]     Attending Physician: Rodo Jennings MD    Status Post:  Procedure(s):  REMOVAL OF HETEROTOPIC OSSIFICATION OF THE HIP - RIGHT Post Operative Day Number: 1    Subjective : No new orthopaedic complaints     Pain Relief: some relief with present medication.     Systemic Complaints: No Complaints  Vitals:    07/11/24 0800 07/11/24 0807 07/11/24 0900 07/11/24 1014   BP:       BP Location:       Patient Position:       Pulse: 85 81 82 74   Resp:       Temp:       TempSrc:       SpO2: 98%  97% 94%   Weight:       Height:           Physical Exam: 84 y.o. female    General Appearance:       Alert, cooperative, in no acute distress                  Extremities:    Dressing Clean, Dry and Intact             No clinical sign of DVT        Able to do good movements of digits    Pulses:   Pulses palpable and equal bilaterally           Diagnostic Tests:     Results from last 7 days   Lab Units 07/11/24  0356   WBC 10*3/mm3 9.20   HEMOGLOBIN g/dL 11.0*   HEMATOCRIT % 34.0   PLATELETS 10*3/mm3 196     Results from last 7 days   Lab Units 07/11/24  0356   SODIUM mmol/L 141   POTASSIUM mmol/L 4.9   CHLORIDE mmol/L 104   CO2 mmol/L 27.0   BUN mg/dL 18   CREATININE mg/dL 0.77   GLUCOSE mg/dL 189*   CALCIUM mg/dL 8.9         No results found for: \"URICACID\"  No results found for: \"CRYSTAL\"  Microbiology Results (last 10 days)       Procedure Component Value - Date/Time    Tissue / Bone Culture - Surgical Site, Hip, Right [[id number]] Collected: 07/10/24 1328    Lab Status: Preliminary result Specimen: Surgical Site from Hip, Right Updated: 07/11/24 0729     Tissue Culture No growth     Gram Stain No WBCs or organisms seen    Tissue / Bone Culture - Surgical Site, Hip, Right [[id number]] Collected: 07/10/24 1328    Lab Status: Preliminary result Specimen: Surgical Site from " Hip, Right Updated: 07/11/24 0729     Tissue Culture No growth     Gram Stain No WBCs or organisms seen    Tissue / Bone Culture - Surgical Site, Hip, Right [[id number]] Collected: 07/10/24 1305    Lab Status: Preliminary result Specimen: Surgical Site from Hip, Right Updated: 07/11/24 0729     Tissue Culture No growth     Gram Stain No WBCs or organisms seen    MRSA Screen Culture (Outpatient) - Swab, Nares [[id number]]  (Normal) Collected: 07/03/24 1057    Lab Status: Final result Specimen: Swab from Nares Updated: 07/04/24 1151     MRSA Screen Cx No Methicillin Resistant Staphylococcus aureus isolated    Narrative:      The negative predictive value of this diagnostic test is high and should only be used to consider de-escalating anti-MRSA therapy. A positive result may indicate colonization with MRSA and must be correlated clinically.            XR Hip With or Without Pelvis 2 - 3 View Right    Result Date: 7/10/2024  Impression: Postoperative changes of right total hip arthroplasty revision without complication. Electronically Signed: Doris Ewing MD  7/10/2024 3:22 PM EDT  Workstation ID: [id number]       Current Medications:  Scheduled Meds:acetaminophen, 1,000 mg, Oral, Q8H  amLODIPine, 5 mg, Oral, Daily  carvedilol, 6.25 mg, Oral, BID With Meals  famotidine, 20 mg, Oral, BID  insulin glargine, 10 Units, Subcutaneous, Nightly  insulin lispro, 2-7 Units, Subcutaneous, 4x Daily AC & at Bedtime  levothyroxine, 88 mcg, Oral, QAM  losartan, 100 mg, Oral, Q24H  oxybutynin, 5 mg, Oral, BID      Continuous Infusions:lactated ringers, 100 mL/hr, Last Rate: 100 mL/hr (07/10/24 1703)      PRN Meds:.  dextrose    dextrose    glucagon (human recombinant)    HYDROmorphone **AND** naloxone    labetalol    ondansetron    oxyCODONE    oxyCODONE    sodium chloride    traMADol    Assessment: Status post  REMOVAL OF HETEROTOPIC OSSIFICATION OF THE HIP - RIGHT    Patient Active Problem List   Diagnosis    Abnormal stress test     Mixed hyperlipidemia    Coronary artery disease involving native coronary artery of native heart    Type 2 diabetes mellitus with hyperglycemia, with long-term current use of insulin    Acquired hypothyroidism    Chronic GERD    Unstable angina    Closed fracture of right hip    Essential hypertension    Acute blood loss anemia    Iron deficiency anemia secondary to inadequate dietary iron intake    Malabsorption of iron    Heterotopic ossification right hip    Stiff hip, right    Hip pain, s/p REMOVAL OF HETEROTOPIC OSSIFICATION OF THE HIP - RIGHT       PLAN:   Continues current post-op course  Anticoagulation: Aspirin started  Mobilize with PT as tolerated per protocol  Radiation therapy per radiation oncology very much appreciate their input and expertise in this area.  2 weeks of postoperative oral antibiotics    Weight Bearing: WBAT  Discharge Plan: OK to plan for discharge in  today to home  from orthopaedic perspective.    Rodo Jennings MD    Date: 7/11/2024    Time: 11:44 EDT

## 2024-07-12 LAB
COTININE SERPL-MCNC: <1 NG/ML
NICOTINE SERPL-MCNC: <1 NG/ML

## 2024-07-13 LAB
BACTERIA SPEC AEROBE CULT: NORMAL
GRAM STN SPEC: NORMAL

## 2024-07-17 LAB
FUNGUS WND CULT: NORMAL
MYCOBACTERIUM SPEC CULT: NORMAL
NIGHT BLUE STAIN TISS: NORMAL

## 2024-07-20 LAB
BACTERIA SPEC ANAEROBE CULT: NORMAL

## 2024-07-24 ENCOUNTER — HOSPITAL ENCOUNTER (OUTPATIENT)
Dept: MAMMOGRAPHY | Facility: HOSPITAL | Age: 84
Discharge: HOME | End: 2024-07-24
Admitting: OBSTETRICS & GYNECOLOGY
Payer: MEDICARE

## 2024-07-24 DIAGNOSIS — Z12.31 VISIT FOR SCREENING MAMMOGRAM: ICD-10-CM

## 2024-07-24 LAB
FUNGUS WND CULT: NORMAL
MYCOBACTERIUM SPEC CULT: NORMAL
NIGHT BLUE STAIN TISS: NORMAL

## 2024-07-24 PROCEDURE — 77063 BREAST TOMOSYNTHESIS BI: CPT

## 2024-07-24 PROCEDURE — 77067 SCR MAMMO BI INCL CAD: CPT

## 2024-07-31 LAB
FUNGUS WND CULT: NORMAL
MYCOBACTERIUM SPEC CULT: NORMAL
NIGHT BLUE STAIN TISS: NORMAL

## 2024-08-07 LAB
FUNGUS WND CULT: NORMAL
MYCOBACTERIUM SPEC CULT: NORMAL
NIGHT BLUE STAIN TISS: NORMAL

## 2024-08-08 ENCOUNTER — OFFICE VISIT (OUTPATIENT)
Dept: RADIATION ONCOLOGY | Facility: HOSPITAL | Age: 84
End: 2024-08-08
Payer: MEDICARE

## 2024-08-08 ENCOUNTER — HOSPITAL ENCOUNTER (OUTPATIENT)
Dept: RADIATION ONCOLOGY | Facility: HOSPITAL | Age: 84
Setting detail: RADIATION/ONCOLOGY SERIES
Discharge: HOME | End: 2024-08-08
Payer: MEDICARE

## 2024-08-08 DIAGNOSIS — M25.551 PAIN OF RIGHT HIP: Primary | ICD-10-CM

## 2024-08-08 NOTE — PROGRESS NOTES
TELEMEDICINE FOLLOW UP NOTE    PATIENT:                                                      Angelica Spivey  MEDICAL RECORD #:                        [id number]  :                                                          1940  COMPLETION DATE:   2024  DIAGNOSIS:    Heterotopic ossification    This visit has been converted to a telehealth virtual visit, the patient's preferred method for today's follow-up.  Total time of discussion was 4 minutes.  The patient has given verbal consent and history was obtained by the patient's daughter, Sofia.      BRIEF HISTORY:    Angelica Spivey is an 84 y.o. female with history of right hip arthroplasty.  She subsequently developed severe heterotopic ossification in the surrounding tissues causing pain and limitation of mobility.  She underwent revision arthroplasty without removal of hardware per Dr. Russell.  She then underwent adjuvant radiotherapy to prevent recurrence of heterotopic ossification.  The right hip surrounding tissues encompassing areas of prior ossification in the soft tissues received a single fraction of 7 Guzman, completing 2024.  She tolerated treatment well.  She continues to note some soreness in the right hip which responds to Tylenol prn.  She continues to work with PT to regain strength and range of motion.  She uses a cane for ambulation.  She denies numbness or tingling.    MEDICATIONS: Medication reconciliation for the patient was reviewed and confirmed in the electronic medical record.    Review of Systems   Musculoskeletal:  Positive for arthralgias (right hip) and gait problem (uses cane).   Neurological:  Positive for gait problem (uses cane).   All other systems reviewed and are negative.          KPS 80%      Physical Exam  Pulmonary:      Respirations even, unlabored. No audible wheezing or cough.  Neurological:      A+Ox4, conversant, answers questions appropriately.  Psychiatric:     Judgement, affect, and decision-making WNL.     Limited physical exam as visit was conducted remotely via telephone.              The following portions of the patient's history were reviewed and updated as appropriate: allergies, current medications, past family history, past medical history, past social history, past surgical history and problem list.         Diagnoses and all orders for this visit:    1. Pain of right hip (Primary)         IMPRESSION: Angelica Spivey is an 84 y.o. female who is now 1 month status post adjuvant radiation therapy for treatment and prevention of heterotopic ossification after undergoing revision arthroplasty to the right hip.  She tolerated treatment well and did not develop acute radiation related toxicity.  She continues close follow-up with orthopedic surgery and will likely continue to benefit from some ongoing physical therapy.  At this point, no indication or plans for further radiation therapy.      RECOMMENDATIONS:  Follow-up per orthopedic surgery, with next appointment 8/27/2024 with Dr. Jennings.      Return if symptoms worsen or fail to improve, for Office Visit.    BUDDY Orourke      I spent a total of 15 minutes on today's visit, with more than 4 minutes in virtual communication with the patient via telephone, and the remainder of the time spent in reviewing the relevant history, records, available imaging, and for documentation.

## 2024-08-14 LAB
FUNGUS WND CULT: NORMAL
MYCOBACTERIUM SPEC CULT: NORMAL
NIGHT BLUE STAIN TISS: NORMAL

## 2024-08-21 LAB
FUNGUS WND CULT: NORMAL
MYCOBACTERIUM SPEC CULT: NORMAL
NIGHT BLUE STAIN TISS: NORMAL

## (undated) DEVICE — PINNACLE INTRODUCER SHEATH: Brand: PINNACLE

## (undated) DEVICE — ST INF PRI SMRTSTE 20DRP 2VLV 24ML 117

## (undated) DEVICE — INTRO SHEATH PRELUDE IDEAL SPRNG COIL 021 6F 23X80CM

## (undated) DEVICE — INTRO SHEATH ART/FEM ENGAGE .038 6F12CM

## (undated) DEVICE — CATH DIAG EXPO M/ PK 5F FL4/FR4 PIG

## (undated) DEVICE — HI-TORQUE WHISPER MS GUIDE WIRE .014 STRAIGHT TIP 3.0 CM X 190 CM: Brand: HI-TORQUE WHISPER

## (undated) DEVICE — C-ARM DRAPE: Brand: DEROYAL

## (undated) DEVICE — SEAL FEM EXETER 1/2/MOON DISP

## (undated) DEVICE — PULLOVER TOGA, 2X LARGE: Brand: FLYTE, SURGICOOL

## (undated) DEVICE — ST EXT IV SMARTSITE 2VLV SP M LL 5ML IV1

## (undated) DEVICE — PREVENA PEEL & PLACE SYSTEM KIT- 13 CM: Brand: PREVENA™ PEEL & PLACE™

## (undated) DEVICE — CANNULA,ADULT,SOFT-TOUCH,7'TUBE,UC: Brand: PENDING

## (undated) DEVICE — GW J TP FIX CORE .035 150

## (undated) DEVICE — PRESSURE MONITORING SET: Brand: TRUWAVE, VAMP

## (undated) DEVICE — SYRINGE,CONTROL,LL,FINGER,GRIP: Brand: MEDLINE INDUSTRIES, INC.

## (undated) DEVICE — STRYKER PERFORMANCE SERIES SAGITTAL BLADE: Brand: STRYKER PERFORMANCE SERIES

## (undated) DEVICE — BIT DRL TRIDENT2 TRITANIUM 3.3X40MM DISP

## (undated) DEVICE — STRIP,CLOSURE,WOUND,MEDI-STRIP,1/2X4: Brand: MEDLINE

## (undated) DEVICE — PREMIUM DRY TRAY LF: Brand: MEDLINE INDUSTRIES, INC.

## (undated) DEVICE — SYR CONTRL PRESS/LO FIX/M/LL W/THMB/RNG 10ML

## (undated) DEVICE — CATH DIAG EXPO M/ PK 6FR FL4/FR4 PIG 3PK

## (undated) DEVICE — SINGLE-USE BIOPSY FORCEPS: Brand: RADIAL JAW 4

## (undated) DEVICE — DEV INFL MONARCH 25W

## (undated) DEVICE — KODAMA CATHETER, P/N 701470CONTENTS: IMAGING CATHETER, 3 ML SYRINGE, 10 ML SYRINGE, EXTENSION SET, STERILE BAG, IFU: Brand: ACIST KODAMA® CORONARY IMAGING CATHETER

## (undated) DEVICE — SUT MNCRYL PLS ANTIB UD 3/0 PS2 27IN

## (undated) DEVICE — SOL ISO/ALC RUB 70PCT 4OZ

## (undated) DEVICE — BLANKT WARM UPPR/BDY ARM/OUT 57X196CM

## (undated) DEVICE — PK MAJ SHLDR SPLT 10

## (undated) DEVICE — "MH-443 SUCTION VALVE F/EVIS140 EVIS160": Brand: SUCTION VALVE

## (undated) DEVICE — MODEL AT P65, P/N 701554-001KIT CONTENTS: HAND CONTROLLER, 3-WAY HIGH-PRESSURE STOPCOCK WITH ROTATING END AND PREMIUM HIGH-PRESSURE TUBING: Brand: ANGIOTOUCH® KIT

## (undated) DEVICE — GLV SURG SENSICARE PI MIC PF SZ9 LF STRL

## (undated) DEVICE — GW LUGE .014 182 CM

## (undated) DEVICE — GW INQWIRE FC PTFE STD J/1.5 .035 260

## (undated) DEVICE — GLV SURG SENSICARE PI ORTHO SZ8.5 LF STRL

## (undated) DEVICE — "MH-438 A/W VLVE F/140 EVIS-140": Brand: AIR/WATER VALVE

## (undated) DEVICE — Device

## (undated) DEVICE — DISPOSABLE ORTHOPAEDIC PROTECTOR: Brand: ALEXIS ® ORTHOPAEDIC PROTECTOR

## (undated) DEVICE — DRSNG WND STRIP OPTIFOAM AG SUPRABS A/MIC 4X8IN STRL

## (undated) DEVICE — ANTIBACTERIAL UNDYED BRAIDED (POLYGLACTIN 910), SYNTHETIC ABSORBABLE SUTURE: Brand: COATED VICRYL

## (undated) DEVICE — UNDERGLV SURG BIOGEL INDICAT PI SZ8.5 BLU

## (undated) DEVICE — SCRB SURG BACTOSHIELD CHG 4PCT 4OZ

## (undated) DEVICE — GUIDE CATHETER: Brand: MACH1™

## (undated) DEVICE — HANDPIECE SET WITH HIGH FLOW TIP AND SUCTION TUBE: Brand: INTERPULSE

## (undated) DEVICE — KT MANIFOLD CATHLAB CUST

## (undated) DEVICE — STERILE PVP: Brand: MEDLINE INDUSTRIES, INC.

## (undated) DEVICE — PK CATH CARD 10

## (undated) DEVICE — SPK10295 ORTHOPEDIC FRACTURE KIT: Brand: SPK10295 ORTHOPEDIC FRACTURE KIT

## (undated) DEVICE — Device: Brand: FIELDER XT

## (undated) DEVICE — CATH DIAG EXPO .045 FL3  5F 100CM

## (undated) DEVICE — GW LUGE .014 300CM

## (undated) DEVICE — GW PRESS VERRATA STR 185CM 10185P

## (undated) DEVICE — KT VLV HEMO MAP ACC PLS LG/BORE MTL/INTRO W/TORQ/DEV

## (undated) DEVICE — PATIENT RETURN ELECTRODE, SINGLE-USE, CONTACT QUALITY MONITORING, ADULT, WITH 9FT CORD, FOR PATIENTS WEIGING OVER 33LBS. (15KG): Brand: MEGADYNE

## (undated) DEVICE — INTENDED FOR TISSUE SEPARATION, AND OTHER PROCEDURES THAT REQUIRE A SHARP SURGICAL BLADE TO PUNCTURE OR CUT.: Brand: BARD-PARKER ® STAINLESS STEEL BLADES

## (undated) DEVICE — 6617 IOBAN II PATIENT ISOLATION DRAPE 5/BX,4BX/CS: Brand: STERI-DRAPE™ IOBAN™ 2

## (undated) DEVICE — THE BITE BLOCK MAXI, LATEX FREE STRAP IS USED TO PROTECT THE ENDOSCOPE INSERTION TUBE FROM BEING BITTEN BY THE PATIENT.

## (undated) DEVICE — MARKER,SKIN,W/RULER,DUAL,STOP: Brand: MEDLINE

## (undated) DEVICE — SKN PREP SPNG STKS PVP PNT STR: Brand: MEDLINE INDUSTRIES, INC.

## (undated) DEVICE — SKIN PREP TRAY W/CHG: Brand: MEDLINE INDUSTRIES, INC.

## (undated) DEVICE — HYPODERMIC SAFETY NEEDLE: Brand: MONOJECT

## (undated) DEVICE — PENCL SMOKE/EVAC MEGADYNE TELESCP 10FT

## (undated) DEVICE — MODEL BT2000 P/N 700287-012KIT CONTENTS: MANIFOLD WITH SALINE AND CONTRAST PORTS, SALINE TUBING WITH SPIKE AND HAND SYRINGE, TRANSDUCER: Brand: BT2000 AUTOMATED MANIFOLD KIT

## (undated) DEVICE — CONTN GRAD MEAS TRIANG 32OZ BLK

## (undated) DEVICE — ANGIO-SEAL VIP VASCULAR CLOSURE DEVICE: Brand: ANGIO-SEAL

## (undated) DEVICE — TUBING,OXYGEN,CRUSH RES,7',CLEAR,UC: Brand: MEDLINE INDUSTRIES, INC.

## (undated) DEVICE — SHEET,DRAPE,53X77,STERILE: Brand: MEDLINE

## (undated) DEVICE — GW GRAPHX PT 30CM

## (undated) DEVICE — Device: Brand: AIR/WATER CHANNEL CLEANING ADAPTER

## (undated) DEVICE — CATH IMG DRAGONFLY OPTIS 2.7F 135CM

## (undated) DEVICE — GUIDELINER CATHETERS ARE INTENDED TO BE USED IN CONJUNCTION WITH GUIDE CATHETERS TO ACCESS DISCRETE REGIONS OF THE CORONARY AND/OR PERIPHERAL VASCULATURE, AND TO FACILITATE PLACEMENT OF INTERVENTIONAL DEVICES.: Brand: GUIDELINER® V3 CATHETER

## (undated) DEVICE — SYR LUERLOK 50ML

## (undated) DEVICE — HI-TORQUE PILOT 150 GUIDE WIRE .014 STRAIGHT TIP 3.0 CM X 190 CM: Brand: HI-TORQUE PILOT

## (undated) DEVICE — MINI TREK CORONARY DILATATION CATHETER 2.0 MM X 20 MM / RAPID-EXCHANGE: Brand: MINI TREK

## (undated) DEVICE — BALN NC/EUPHORA RX 2.50X27MM

## (undated) DEVICE — NDL HYPO ECLPS SFTY 22G 1 1/2IN

## (undated) DEVICE — ENDOGATOR HYBRID TUBING KIT FOR USE WITH ENDOGATOR IRRIGATION PUMP, OLYMPUS PUMP, GI4000 ESU, AND TORRENT IRRIGATION PUMP.: Brand: ENDOGATOR KIT

## (undated) DEVICE — DEV COMP RAD PRELUDESYNC 24CM

## (undated) DEVICE — ADHS SKIN PREMIERPRO EXOFIN TOPICAL HI/VISC .5ML

## (undated) DEVICE — XIENCE SIERRA™ EVEROLIMUS ELUTING CORONARY STENT SYSTEM 2.25 MM X 23 MM / RAPID-EXCHANGE
Type: IMPLANTABLE DEVICE | Status: NON-FUNCTIONAL
Brand: XIENCE SIERRA™

## (undated) DEVICE — TBG PENCL TELESCP MEGADYNE SMOKE EVAC 10FT

## (undated) DEVICE — RUNTHROUGH NS EXTRA FLOPPY PTCA GUIDEWIRE: Brand: RUNTHROUGH